# Patient Record
Sex: MALE | Race: WHITE | NOT HISPANIC OR LATINO | URBAN - METROPOLITAN AREA
[De-identification: names, ages, dates, MRNs, and addresses within clinical notes are randomized per-mention and may not be internally consistent; named-entity substitution may affect disease eponyms.]

---

## 2018-05-15 ENCOUNTER — EMERGENCY (EMERGENCY)
Facility: HOSPITAL | Age: 78
LOS: 1 days | Discharge: HOME | End: 2018-05-15
Attending: EMERGENCY MEDICINE | Admitting: EMERGENCY MEDICINE
Payer: COMMERCIAL

## 2018-05-15 VITALS
RESPIRATION RATE: 20 BRPM | DIASTOLIC BLOOD PRESSURE: 80 MMHG | HEART RATE: 74 BPM | OXYGEN SATURATION: 100 % | SYSTOLIC BLOOD PRESSURE: 152 MMHG | TEMPERATURE: 99 F

## 2018-05-15 DIAGNOSIS — Z96.652 PRESENCE OF LEFT ARTIFICIAL KNEE JOINT: Chronic | ICD-10-CM

## 2018-05-15 LAB
ALBUMIN SERPL ELPH-MCNC: 4.2 G/DL — SIGNIFICANT CHANGE UP (ref 3.3–5)
ALP SERPL-CCNC: 137 U/L — HIGH (ref 40–120)
ALT FLD-CCNC: 3 U/L — LOW (ref 4–41)
AST SERPL-CCNC: 16 U/L — SIGNIFICANT CHANGE UP (ref 4–40)
BASOPHILS # BLD AUTO: 0.03 K/UL — SIGNIFICANT CHANGE UP (ref 0–0.2)
BASOPHILS NFR BLD AUTO: 0.3 % — SIGNIFICANT CHANGE UP (ref 0–2)
BILIRUB SERPL-MCNC: 0.3 MG/DL — SIGNIFICANT CHANGE UP (ref 0.2–1.2)
BUN SERPL-MCNC: 40 MG/DL — HIGH (ref 7–23)
CALCIUM SERPL-MCNC: 9.1 MG/DL — SIGNIFICANT CHANGE UP (ref 8.4–10.5)
CHLORIDE SERPL-SCNC: 104 MMOL/L — SIGNIFICANT CHANGE UP (ref 98–107)
CK MB BLD-MCNC: 4.19 NG/ML — SIGNIFICANT CHANGE UP (ref 1–6.6)
CK SERPL-CCNC: 113 U/L — SIGNIFICANT CHANGE UP (ref 30–200)
CO2 SERPL-SCNC: 24 MMOL/L — SIGNIFICANT CHANGE UP (ref 22–31)
CREAT SERPL-MCNC: 1.88 MG/DL — HIGH (ref 0.5–1.3)
EOSINOPHIL # BLD AUTO: 0.09 K/UL — SIGNIFICANT CHANGE UP (ref 0–0.5)
EOSINOPHIL NFR BLD AUTO: 0.8 % — SIGNIFICANT CHANGE UP (ref 0–6)
GLUCOSE SERPL-MCNC: 115 MG/DL — HIGH (ref 70–99)
HBA1C BLD-MCNC: 5.6 % — SIGNIFICANT CHANGE UP (ref 4–5.6)
HCT VFR BLD CALC: 38.2 % — LOW (ref 39–50)
HGB BLD-MCNC: 12.7 G/DL — LOW (ref 13–17)
IMM GRANULOCYTES # BLD AUTO: 0.02 # — SIGNIFICANT CHANGE UP
IMM GRANULOCYTES NFR BLD AUTO: 0.2 % — SIGNIFICANT CHANGE UP (ref 0–1.5)
LYMPHOCYTES # BLD AUTO: 0.9 K/UL — LOW (ref 1–3.3)
LYMPHOCYTES # BLD AUTO: 8.1 % — LOW (ref 13–44)
MCHC RBC-ENTMCNC: 33.2 % — SIGNIFICANT CHANGE UP (ref 32–36)
MCHC RBC-ENTMCNC: 33.2 PG — SIGNIFICANT CHANGE UP (ref 27–34)
MCV RBC AUTO: 100 FL — SIGNIFICANT CHANGE UP (ref 80–100)
MONOCYTES # BLD AUTO: 0.36 K/UL — SIGNIFICANT CHANGE UP (ref 0–0.9)
MONOCYTES NFR BLD AUTO: 3.3 % — SIGNIFICANT CHANGE UP (ref 2–14)
NEUTROPHILS # BLD AUTO: 9.65 K/UL — HIGH (ref 1.8–7.4)
NEUTROPHILS NFR BLD AUTO: 87.3 % — HIGH (ref 43–77)
NRBC # FLD: 0 — SIGNIFICANT CHANGE UP
PLATELET # BLD AUTO: 219 K/UL — SIGNIFICANT CHANGE UP (ref 150–400)
PMV BLD: 11.5 FL — SIGNIFICANT CHANGE UP (ref 7–13)
POTASSIUM SERPL-MCNC: 4.7 MMOL/L — SIGNIFICANT CHANGE UP (ref 3.5–5.3)
POTASSIUM SERPL-SCNC: 4.7 MMOL/L — SIGNIFICANT CHANGE UP (ref 3.5–5.3)
PROT SERPL-MCNC: 6.9 G/DL — SIGNIFICANT CHANGE UP (ref 6–8.3)
RBC # BLD: 3.82 M/UL — LOW (ref 4.2–5.8)
RBC # FLD: 13.3 % — SIGNIFICANT CHANGE UP (ref 10.3–14.5)
SODIUM SERPL-SCNC: 140 MMOL/L — SIGNIFICANT CHANGE UP (ref 135–145)
TROPONIN T SERPL-MCNC: < 0.06 NG/ML — SIGNIFICANT CHANGE UP (ref 0–0.06)
TROPONIN T SERPL-MCNC: < 0.06 NG/ML — SIGNIFICANT CHANGE UP (ref 0–0.06)
TSH SERPL-MCNC: 0.46 UIU/ML — SIGNIFICANT CHANGE UP (ref 0.27–4.2)
WBC # BLD: 11.05 K/UL — HIGH (ref 3.8–10.5)
WBC # FLD AUTO: 11.05 K/UL — HIGH (ref 3.8–10.5)

## 2018-05-15 PROCEDURE — 99218: CPT | Mod: 25

## 2018-05-15 PROCEDURE — 70450 CT HEAD/BRAIN W/O DYE: CPT | Mod: 26

## 2018-05-15 PROCEDURE — 71046 X-RAY EXAM CHEST 2 VIEWS: CPT | Mod: 26

## 2018-05-15 PROCEDURE — 93010 ELECTROCARDIOGRAM REPORT: CPT | Mod: 59

## 2018-05-15 RX ORDER — ACETAMINOPHEN 500 MG
650 TABLET ORAL ONCE
Qty: 0 | Refills: 0 | Status: COMPLETED | OUTPATIENT
Start: 2018-05-15 | End: 2018-05-15

## 2018-05-15 RX ORDER — AMLODIPINE BESYLATE 2.5 MG/1
5 TABLET ORAL DAILY
Qty: 0 | Refills: 0 | Status: DISCONTINUED | OUTPATIENT
Start: 2018-05-15 | End: 2018-05-19

## 2018-05-15 RX ORDER — LORATADINE 10 MG/1
10 TABLET ORAL AT BEDTIME
Qty: 0 | Refills: 0 | Status: DISCONTINUED | OUTPATIENT
Start: 2018-05-15 | End: 2018-05-19

## 2018-05-15 RX ORDER — ALLOPURINOL 300 MG
100 TABLET ORAL DAILY
Qty: 0 | Refills: 0 | Status: DISCONTINUED | OUTPATIENT
Start: 2018-05-15 | End: 2018-05-19

## 2018-05-15 RX ORDER — TAMSULOSIN HYDROCHLORIDE 0.4 MG/1
0.4 CAPSULE ORAL AT BEDTIME
Qty: 0 | Refills: 0 | Status: DISCONTINUED | OUTPATIENT
Start: 2018-05-15 | End: 2018-05-19

## 2018-05-15 RX ORDER — CARBIDOPA AND LEVODOPA 25; 100 MG/1; MG/1
1 TABLET ORAL DAILY
Qty: 0 | Refills: 0 | Status: DISCONTINUED | OUTPATIENT
Start: 2018-05-15 | End: 2018-05-15

## 2018-05-15 RX ORDER — LEVOTHYROXINE SODIUM 125 MCG
100 TABLET ORAL DAILY
Qty: 0 | Refills: 0 | Status: DISCONTINUED | OUTPATIENT
Start: 2018-05-15 | End: 2018-05-19

## 2018-05-15 RX ORDER — SIMVASTATIN 20 MG/1
10 TABLET, FILM COATED ORAL AT BEDTIME
Qty: 0 | Refills: 0 | Status: DISCONTINUED | OUTPATIENT
Start: 2018-05-15 | End: 2018-05-19

## 2018-05-15 RX ORDER — CARBIDOPA AND LEVODOPA 25; 100 MG/1; MG/1
2 TABLET ORAL THREE TIMES A DAY
Qty: 0 | Refills: 0 | Status: DISCONTINUED | OUTPATIENT
Start: 2018-05-15 | End: 2018-05-19

## 2018-05-15 RX ORDER — LISINOPRIL 2.5 MG/1
10 TABLET ORAL DAILY
Qty: 0 | Refills: 0 | Status: DISCONTINUED | OUTPATIENT
Start: 2018-05-15 | End: 2018-05-19

## 2018-05-15 RX ADMIN — Medication 650 MILLIGRAM(S): at 13:23

## 2018-05-15 RX ADMIN — LORATADINE 10 MILLIGRAM(S): 10 TABLET ORAL at 23:05

## 2018-05-15 RX ADMIN — LISINOPRIL 10 MILLIGRAM(S): 2.5 TABLET ORAL at 21:34

## 2018-05-15 RX ADMIN — TAMSULOSIN HYDROCHLORIDE 0.4 MILLIGRAM(S): 0.4 CAPSULE ORAL at 21:29

## 2018-05-15 RX ADMIN — CARBIDOPA AND LEVODOPA 2 TABLET(S): 25; 100 TABLET ORAL at 21:29

## 2018-05-15 RX ADMIN — SIMVASTATIN 10 MILLIGRAM(S): 20 TABLET, FILM COATED ORAL at 21:29
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
## 2018-05-15 NOTE — ED ADULT NURSE NOTE - OBJECTIVE STATEMENT
received pt A&ox3 in no apparent distress at this time. #20g IVL to R AC, bloods drawn and sent to the lab. no s/s of infiltration noted at this time. family and Md at bedside, vss. cardiac monitor in place. dispo pending.
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
## 2018-05-16 VITALS
OXYGEN SATURATION: 98 % | RESPIRATION RATE: 16 BRPM | HEART RATE: 55 BPM | DIASTOLIC BLOOD PRESSURE: 63 MMHG | TEMPERATURE: 98 F | SYSTOLIC BLOOD PRESSURE: 135 MMHG

## 2018-05-16 PROCEDURE — 93306 TTE W/DOPPLER COMPLETE: CPT | Mod: 26

## 2018-05-16 PROCEDURE — 99217: CPT

## 2018-05-16 RX ORDER — AMLODIPINE BESYLATE 2.5 MG/1
5 TABLET ORAL ONCE
Qty: 0 | Refills: 0 | Status: COMPLETED | OUTPATIENT
Start: 2018-05-16 | End: 2018-05-16

## 2018-05-16 RX ADMIN — Medication 100 MICROGRAM(S): at 07:00

## 2018-05-16 RX ADMIN — LISINOPRIL 10 MILLIGRAM(S): 2.5 TABLET ORAL at 06:59

## 2018-05-16 RX ADMIN — AMLODIPINE BESYLATE 5 MILLIGRAM(S): 2.5 TABLET ORAL at 07:00

## 2018-05-16 RX ADMIN — CARBIDOPA AND LEVODOPA 2 TABLET(S): 25; 100 TABLET ORAL at 07:00

## 2018-05-16 RX ADMIN — AMLODIPINE BESYLATE 5 MILLIGRAM(S): 2.5 TABLET ORAL at 01:32

## 2018-05-16 NOTE — ED CDU PROVIDER DISPOSITION NOTE - PLAN OF CARE
Continue taking your home medications as prescribed. Follow up with your cardiologist within 1 week for further evaluation. Return to the Emergency Department for any new, worsening or concerning symptoms.

## 2018-05-16 NOTE — ED CDU PROVIDER DISPOSITION NOTE - ATTENDING CONTRIBUTION TO CARE
Dr. Guevara:  I performed a face to face bedside interview with patient regarding history of present illness, review of symptoms and past medical history. I completed an independent physical exam.  I have discussed patient's plan of care with PA.   I agree with note as stated above, having amended the EMR as needed to reflect my findings.   This includes HISTORY OF PRESENT ILLNESS, HIV, PAST MEDICAL/SURGICAL/FAMILY/SOCIAL HISTORY, ALLERGIES AND HOME MEDICATIONS, REVIEW OF SYSTEMS, PHYSICAL EXAM, and any PROGRESS NOTES during the time I functioned as the attending physician for this patient.

## 2018-05-16 NOTE — ED CDU PROVIDER SUBSEQUENT DAY NOTE - PROGRESS NOTE DETAILS
JERMAINE Fraga: pt NAD, VSS, no acute complaints. No events on tele. PE: cardiac: RRR, Lungs: CTA. Pending Echo. Echo WNL. Pt stable for dc. Pt has a cardiologist he is able to f/u with. Discussed results of labs/imaging with the patient. Pt ok for dc.

## 2018-05-16 NOTE — ED CDU PROVIDER SUBSEQUENT DAY NOTE - NS ED ATTENDING STATEMENT MOD
I have personally performed a face to face diagnostic evaluation on this patient. I have reviewed the ACP note and agree with the history, exam and plan of care, except as noted.
Yes
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
## 2019-09-13 PROBLEM — G20 PARKINSON'S DISEASE: Chronic | Status: ACTIVE | Noted: 2018-05-15

## 2019-09-13 PROBLEM — E78.5 HYPERLIPIDEMIA, UNSPECIFIED: Chronic | Status: ACTIVE | Noted: 2018-05-15

## 2019-09-13 PROBLEM — N18.9 CHRONIC KIDNEY DISEASE, UNSPECIFIED: Chronic | Status: ACTIVE | Noted: 2018-05-15

## 2019-09-13 PROBLEM — I10 ESSENTIAL (PRIMARY) HYPERTENSION: Chronic | Status: ACTIVE | Noted: 2018-05-15

## 2019-11-25 ENCOUNTER — APPOINTMENT (OUTPATIENT)
Dept: NEUROLOGY | Facility: CLINIC | Age: 79
End: 2019-11-25
Payer: MEDICARE

## 2019-11-25 VITALS
WEIGHT: 148 LBS | DIASTOLIC BLOOD PRESSURE: 80 MMHG | BODY MASS INDEX: 27.23 KG/M2 | SYSTOLIC BLOOD PRESSURE: 187 MMHG | HEIGHT: 62 IN | HEART RATE: 58 BPM

## 2019-11-25 VITALS — SYSTOLIC BLOOD PRESSURE: 172 MMHG | DIASTOLIC BLOOD PRESSURE: 83 MMHG | HEART RATE: 61 BPM

## 2019-11-25 PROCEDURE — 99205 OFFICE O/P NEW HI 60 MIN: CPT

## 2019-11-25 RX ORDER — GLUCOSAMINE HCL/CHONDROITIN SU 500-400 MG
3 CAPSULE ORAL
Qty: 30 | Refills: 5 | Status: ACTIVE | COMMUNITY
Start: 2019-11-25 | End: 1900-01-01

## 2019-11-25 NOTE — REASON FOR VISIT
[Spouse] : spouse [Initial Eval - Existing Diagnosis] : an initial evaluation of an existing diagnosis

## 2019-11-26 NOTE — DISCUSSION/SUMMARY
[FreeTextEntry1] : Conrad Thornton is a 78 year old M with a history of chronic lower back pain in the setting of moderate to severe spinal stenosis, and Parkinson's disease diagnosed in 2012, initially presenting with changes in handwriting, head tremor, and left foot dragging. He is here today with his wife. His examination was significant for bilateral bradykinesia and rigidity, more affected on the right side. There were resting tremors in the hands and legs bilaterally. No dyskinesias. Gait is with short stride, multistep turns, no freezing, postural reflexes are intact. The patient was not orthostatic today.\par \par Impression: Idiopathic Parkinson's disease. There were no red flags to indicate an atypical Parkinsonism at this time, however he is unable to tell any benefit on sinemet. . It was discussed that he may need an increased dose to control his symptoms. 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 \par \par Recommendations:\par - Start Sinemet  mg CR at bedtime\par - Start Melatonin 3 mg at bedtime\par - Monitor whether symptoms improve with medication and how long it lasts\par - Increase fluid intake. Monitor BP at home. \par - Continue and increase exercise\par \par RTC 2 months
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
## 2019-11-26 NOTE — PHYSICAL EXAM
[Motor Handedness Right-Handed] : the patient is right hand dominant [Person] : oriented to person [Place] : oriented to place [Time] : oriented to time [Concentration Intact] : normal concentrating ability [Comprehension] : comprehension intact [Motor Strength] : muscle strength was normal in all four extremities [Sensation Tactile Decrease] : light touch was intact [2+] : Ankle jerk left 2+ [General Appearance - Alert] : alert [General Appearance - In No Acute Distress] : in no acute distress [Oriented To Time, Place, And Person] : oriented to person, place, and time [Impaired Insight] : insight and judgment were intact [Affect] : the affect was normal [Motor Strength Lower Extremities Bilaterally] : strength was normal in both lower extremities [Motor Strength Upper Extremities Bilaterally] : strength was normal in both upper extremities [Coordination - Dysmetria Impaired Finger-to-Nose Bilateral] : not present [Extraocular Movements] : extraocular movements were intact [Hearing Threshold Finger Rub Not [place]] : hearing was normal [FreeTextEntry1] : dystonia [Edema] : there was no peripheral edema [Abdomen Soft] : soft [Nail Clubbing] : no clubbing  or cyanosis of the fingernails [] : no rash

## 2019-12-09 ENCOUNTER — MEDICATION RENEWAL (OUTPATIENT)
Age: 79
End: 2019-12-09

## 2019-12-20 ENCOUNTER — MEDICATION RENEWAL (OUTPATIENT)
Age: 79
End: 2019-12-20

## 2020-02-25 ENCOUNTER — APPOINTMENT (OUTPATIENT)
Dept: NEUROLOGY | Facility: CLINIC | Age: 80
End: 2020-02-25

## 2020-05-11 ENCOUNTER — APPOINTMENT (OUTPATIENT)
Dept: NEUROLOGY | Facility: CLINIC | Age: 80
End: 2020-05-11
Payer: MEDICARE

## 2020-05-11 PROCEDURE — 99443: CPT | Mod: CR

## 2020-05-26 ENCOUNTER — APPOINTMENT (OUTPATIENT)
Dept: NEUROLOGY | Facility: CLINIC | Age: 80
End: 2020-05-26
Payer: MEDICARE

## 2020-05-26 PROCEDURE — 99214 OFFICE O/P EST MOD 30 MIN: CPT | Mod: 95

## 2020-05-26 NOTE — DISCUSSION/SUMMARY
[FreeTextEntry1] : plan\par agree with northera, urged to monitor sitting /standing BP -- follow with cardio/nephrology\par risk of fall and injury with PD emphasized\par monitor if freezing is worse in off time\par increase fluid intake, fall precautions\par f/u in 6 weeks\par
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
## 2020-07-01 ENCOUNTER — APPOINTMENT (OUTPATIENT)
Dept: NEUROLOGY | Facility: CLINIC | Age: 80
End: 2020-07-01
Payer: MEDICARE

## 2020-07-01 VITALS — DIASTOLIC BLOOD PRESSURE: 85 MMHG | HEART RATE: 67 BPM | SYSTOLIC BLOOD PRESSURE: 150 MMHG

## 2020-07-01 VITALS
SYSTOLIC BLOOD PRESSURE: 166 MMHG | BODY MASS INDEX: 27.23 KG/M2 | WEIGHT: 148 LBS | HEART RATE: 64 BPM | DIASTOLIC BLOOD PRESSURE: 89 MMHG | HEIGHT: 62 IN

## 2020-07-01 VITALS — TEMPERATURE: 97.6 F

## 2020-07-01 PROCEDURE — 99214 OFFICE O/P EST MOD 30 MIN: CPT

## 2020-07-01 NOTE — PHYSICAL EXAM
[General Appearance - Alert] : alert [General Appearance - In No Acute Distress] : in no acute distress [Impaired Insight] : insight and judgment were intact [Oriented To Time, Place, And Person] : oriented to person, place, and time [Affect] : the affect was normal [Place] : oriented to place [Person] : oriented to person [Concentration Intact] : normal concentrating ability [Time] : oriented to time [Comprehension] : comprehension intact [Motor Strength] : muscle strength was normal in all four extremities [Motor Handedness Right-Handed] : the patient is right hand dominant [Motor Strength Lower Extremities Bilaterally] : strength was normal in both lower extremities [Sensation Tactile Decrease] : light touch was intact [Motor Strength Upper Extremities Bilaterally] : strength was normal in both upper extremities [Coordination - Dysmetria Impaired Finger-to-Nose Bilateral] : not present [2+] : Ankle jerk left 2+ [Extraocular Movements] : extraocular movements were intact [FreeTextEntry1] : dystonia [Hearing Threshold Finger Rub Not [place]] : hearing was normal [Edema] : there was no peripheral edema [Abdomen Soft] : soft [Nail Clubbing] : no clubbing  or cyanosis of the fingernails [] : no rash

## 2020-07-01 NOTE — DISCUSSION/SUMMARY
[FreeTextEntry1] : Conrad Thornton is a 78 year old M with a history of chronic lower back pain in the setting of moderate to severe spinal stenosis, and Parkinson's disease diagnosed in 2012, initially presenting with changes in handwriting, head tremor, and left foot dragging. He is here today with his wife. His examination was significant for bilateral bradykinesia and rigidity, more affected on the right side. There were resting tremors in the hands and legs bilaterally. No dyskinesias. Gait is with short stride, multistep turns, no freezing, postural reflexes are intact. \par \par Impression: Idiopathic Parkinson's disease. There were no red flags to indicate an atypical Parkinsonism at this time, however he is unable to tell any benefit on sinemet. . It was discussed that he may need an increased dose to control his symptoms. 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 \par \par Recommendations:\par - continue Sinemet 3 times a day at 8-1-7\par - Increase fluid intake, to help with orthostatic hypotension and constipation\par -Compression stockings\par . Monitor BP at home. \par - Continue and increase exercise, PT, monitor for any improvement in freezing, if not may consider azilect\par - low back pain follows with [facility]\par \par RTC 2-3  months
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
## 2020-07-15 ENCOUNTER — INPATIENT (INPATIENT)
Facility: HOSPITAL | Age: 80
LOS: 3 days | Discharge: HOME HEALTH | End: 2020-07-19
Attending: INTERNAL MEDICINE | Admitting: INTERNAL MEDICINE
Payer: MEDICARE

## 2020-07-15 VITALS
OXYGEN SATURATION: 94 % | RESPIRATION RATE: 20 BRPM | DIASTOLIC BLOOD PRESSURE: 67 MMHG | TEMPERATURE: 98 F | SYSTOLIC BLOOD PRESSURE: 118 MMHG | HEART RATE: 76 BPM

## 2020-07-15 DIAGNOSIS — E03.9 HYPOTHYROIDISM, UNSPECIFIED: ICD-10-CM

## 2020-07-15 DIAGNOSIS — R55 SYNCOPE AND COLLAPSE: ICD-10-CM

## 2020-07-15 DIAGNOSIS — N17.9 ACUTE KIDNEY FAILURE, UNSPECIFIED: ICD-10-CM

## 2020-07-15 DIAGNOSIS — G90.3 MULTI-SYSTEM DEGENERATION OF THE AUTONOMIC NERVOUS SYSTEM: ICD-10-CM

## 2020-07-15 DIAGNOSIS — I16.0 HYPERTENSIVE URGENCY: ICD-10-CM

## 2020-07-15 DIAGNOSIS — Z96.652 PRESENCE OF LEFT ARTIFICIAL KNEE JOINT: Chronic | ICD-10-CM

## 2020-07-15 DIAGNOSIS — G20 PARKINSON'S DISEASE: ICD-10-CM

## 2020-07-15 DIAGNOSIS — Z29.9 ENCOUNTER FOR PROPHYLACTIC MEASURES, UNSPECIFIED: ICD-10-CM

## 2020-07-15 LAB
ALBUMIN SERPL ELPH-MCNC: 3.9 G/DL — SIGNIFICANT CHANGE UP (ref 3.3–5)
ALP SERPL-CCNC: 88 U/L — SIGNIFICANT CHANGE UP (ref 40–120)
ALT FLD-CCNC: 5 U/L — SIGNIFICANT CHANGE UP (ref 4–41)
ANION GAP SERPL CALC-SCNC: 14 MMO/L — SIGNIFICANT CHANGE UP (ref 7–14)
APPEARANCE UR: CLEAR — SIGNIFICANT CHANGE UP
AST SERPL-CCNC: 18 U/L — SIGNIFICANT CHANGE UP (ref 4–40)
BACTERIA # UR AUTO: NEGATIVE — SIGNIFICANT CHANGE UP
BASOPHILS # BLD AUTO: 0.01 K/UL — SIGNIFICANT CHANGE UP (ref 0–0.2)
BASOPHILS NFR BLD AUTO: 0.1 % — SIGNIFICANT CHANGE UP (ref 0–2)
BILIRUB SERPL-MCNC: 0.3 MG/DL — SIGNIFICANT CHANGE UP (ref 0.2–1.2)
BILIRUB UR-MCNC: NEGATIVE — SIGNIFICANT CHANGE UP
BLOOD UR QL VISUAL: HIGH
BUN SERPL-MCNC: 34 MG/DL — HIGH (ref 7–23)
CALCIUM SERPL-MCNC: 8.9 MG/DL — SIGNIFICANT CHANGE UP (ref 8.4–10.5)
CHLORIDE SERPL-SCNC: 109 MMOL/L — HIGH (ref 98–107)
CO2 SERPL-SCNC: 18 MMOL/L — LOW (ref 22–31)
COLOR SPEC: SIGNIFICANT CHANGE UP
CREAT SERPL-MCNC: 2.25 MG/DL — HIGH (ref 0.5–1.3)
EOSINOPHIL # BLD AUTO: 0.02 K/UL — SIGNIFICANT CHANGE UP (ref 0–0.5)
EOSINOPHIL NFR BLD AUTO: 0.1 % — SIGNIFICANT CHANGE UP (ref 0–6)
GLUCOSE SERPL-MCNC: 108 MG/DL — HIGH (ref 70–99)
GLUCOSE UR-MCNC: NEGATIVE — SIGNIFICANT CHANGE UP
HCT VFR BLD CALC: 37.2 % — LOW (ref 39–50)
HGB BLD-MCNC: 11.9 G/DL — LOW (ref 13–17)
HYALINE CASTS # UR AUTO: SIGNIFICANT CHANGE UP
IMM GRANULOCYTES NFR BLD AUTO: 0.4 % — SIGNIFICANT CHANGE UP (ref 0–1.5)
KETONES UR-MCNC: NEGATIVE — SIGNIFICANT CHANGE UP
LEUKOCYTE ESTERASE UR-ACNC: NEGATIVE — SIGNIFICANT CHANGE UP
LYMPHOCYTES # BLD AUTO: 0.54 K/UL — LOW (ref 1–3.3)
LYMPHOCYTES # BLD AUTO: 3.4 % — LOW (ref 13–44)
MCHC RBC-ENTMCNC: 32 % — SIGNIFICANT CHANGE UP (ref 32–36)
MCHC RBC-ENTMCNC: 33.1 PG — SIGNIFICANT CHANGE UP (ref 27–34)
MCV RBC AUTO: 103.3 FL — HIGH (ref 80–100)
MONOCYTES # BLD AUTO: 0.74 K/UL — SIGNIFICANT CHANGE UP (ref 0–0.9)
MONOCYTES NFR BLD AUTO: 4.6 % — SIGNIFICANT CHANGE UP (ref 2–14)
NEUTROPHILS # BLD AUTO: 14.67 K/UL — HIGH (ref 1.8–7.4)
NEUTROPHILS NFR BLD AUTO: 91.4 % — HIGH (ref 43–77)
NITRITE UR-MCNC: NEGATIVE — SIGNIFICANT CHANGE UP
NRBC # FLD: 0 K/UL — SIGNIFICANT CHANGE UP (ref 0–0)
PH UR: 6.5 — SIGNIFICANT CHANGE UP (ref 5–8)
PLATELET # BLD AUTO: 198 K/UL — SIGNIFICANT CHANGE UP (ref 150–400)
PMV BLD: 11.4 FL — SIGNIFICANT CHANGE UP (ref 7–13)
POTASSIUM SERPL-MCNC: 3.6 MMOL/L — SIGNIFICANT CHANGE UP (ref 3.5–5.3)
POTASSIUM SERPL-SCNC: 3.6 MMOL/L — SIGNIFICANT CHANGE UP (ref 3.5–5.3)
PROT SERPL-MCNC: 6.5 G/DL — SIGNIFICANT CHANGE UP (ref 6–8.3)
PROT UR-MCNC: 50 — SIGNIFICANT CHANGE UP
RBC # BLD: 3.6 M/UL — LOW (ref 4.2–5.8)
RBC # FLD: 13.8 % — SIGNIFICANT CHANGE UP (ref 10.3–14.5)
RBC CASTS # UR COMP ASSIST: SIGNIFICANT CHANGE UP (ref 0–?)
SODIUM SERPL-SCNC: 141 MMOL/L — SIGNIFICANT CHANGE UP (ref 135–145)
SP GR SPEC: 1.02 — SIGNIFICANT CHANGE UP (ref 1–1.04)
SQUAMOUS # UR AUTO: SIGNIFICANT CHANGE UP
TROPONIN T, HIGH SENSITIVITY: 62 NG/L — CRITICAL HIGH (ref ?–14)
TROPONIN T, HIGH SENSITIVITY: 71 NG/L — CRITICAL HIGH (ref ?–14)
UROBILINOGEN FLD QL: NORMAL — SIGNIFICANT CHANGE UP
WBC # BLD: 16.05 K/UL — HIGH (ref 3.8–10.5)
WBC # FLD AUTO: 16.05 K/UL — HIGH (ref 3.8–10.5)
WBC UR QL: SIGNIFICANT CHANGE UP (ref 0–?)

## 2020-07-15 PROCEDURE — 71046 X-RAY EXAM CHEST 2 VIEWS: CPT | Mod: 26

## 2020-07-15 PROCEDURE — 99285 EMERGENCY DEPT VISIT HI MDM: CPT

## 2020-07-15 PROCEDURE — 99223 1ST HOSP IP/OBS HIGH 75: CPT

## 2020-07-15 PROCEDURE — 93010 ELECTROCARDIOGRAM REPORT: CPT

## 2020-07-15 PROCEDURE — 70450 CT HEAD/BRAIN W/O DYE: CPT | Mod: 26

## 2020-07-15 RX ORDER — LEVOTHYROXINE SODIUM 125 MCG
75 TABLET ORAL DAILY
Refills: 0 | Status: DISCONTINUED | OUTPATIENT
Start: 2020-07-15 | End: 2020-07-19

## 2020-07-15 RX ORDER — ACETAMINOPHEN 500 MG
650 TABLET ORAL EVERY 6 HOURS
Refills: 0 | Status: DISCONTINUED | OUTPATIENT
Start: 2020-07-15 | End: 2020-07-19

## 2020-07-15 RX ORDER — HYDRALAZINE HCL 50 MG
10 TABLET ORAL ONCE
Refills: 0 | Status: COMPLETED | OUTPATIENT
Start: 2020-07-15 | End: 2020-07-15

## 2020-07-15 RX ORDER — ALLOPURINOL 300 MG
100 TABLET ORAL DAILY
Refills: 0 | Status: DISCONTINUED | OUTPATIENT
Start: 2020-07-15 | End: 2020-07-19

## 2020-07-15 RX ORDER — HYDRALAZINE HCL 50 MG
25 TABLET ORAL ONCE
Refills: 0 | Status: COMPLETED | OUTPATIENT
Start: 2020-07-15 | End: 2020-07-15

## 2020-07-15 RX ORDER — SIMVASTATIN 20 MG/1
40 TABLET, FILM COATED ORAL AT BEDTIME
Refills: 0 | Status: DISCONTINUED | OUTPATIENT
Start: 2020-07-15 | End: 2020-07-19

## 2020-07-15 RX ORDER — CARBIDOPA AND LEVODOPA 25; 100 MG/1; MG/1
2 TABLET ORAL THREE TIMES A DAY
Refills: 0 | Status: DISCONTINUED | OUTPATIENT
Start: 2020-07-15 | End: 2020-07-19

## 2020-07-15 RX ORDER — SODIUM CHLORIDE 9 MG/ML
1000 INJECTION INTRAMUSCULAR; INTRAVENOUS; SUBCUTANEOUS
Refills: 0 | Status: DISCONTINUED | OUTPATIENT
Start: 2020-07-15 | End: 2020-07-15

## 2020-07-15 RX ORDER — HYDRALAZINE HCL 50 MG
25 TABLET ORAL THREE TIMES A DAY
Refills: 0 | Status: DISCONTINUED | OUTPATIENT
Start: 2020-07-15 | End: 2020-07-19

## 2020-07-15 RX ORDER — AMLODIPINE BESYLATE 2.5 MG/1
2.5 TABLET ORAL ONCE
Refills: 0 | Status: COMPLETED | OUTPATIENT
Start: 2020-07-15 | End: 2020-07-15

## 2020-07-15 RX ORDER — SENNA PLUS 8.6 MG/1
2 TABLET ORAL AT BEDTIME
Refills: 0 | Status: DISCONTINUED | OUTPATIENT
Start: 2020-07-15 | End: 2020-07-17

## 2020-07-15 RX ORDER — FINASTERIDE 5 MG/1
5 TABLET, FILM COATED ORAL DAILY
Refills: 0 | Status: DISCONTINUED | OUTPATIENT
Start: 2020-07-15 | End: 2020-07-19

## 2020-07-15 RX ORDER — AMLODIPINE BESYLATE 2.5 MG/1
2.5 TABLET ORAL DAILY
Refills: 0 | Status: DISCONTINUED | OUTPATIENT
Start: 2020-07-15 | End: 2020-07-17

## 2020-07-15 RX ORDER — HEPARIN SODIUM 5000 [USP'U]/ML
5000 INJECTION INTRAVENOUS; SUBCUTANEOUS EVERY 8 HOURS
Refills: 0 | Status: DISCONTINUED | OUTPATIENT
Start: 2020-07-15 | End: 2020-07-19

## 2020-07-15 RX ADMIN — AMLODIPINE BESYLATE 2.5 MILLIGRAM(S): 2.5 TABLET ORAL at 17:22

## 2020-07-15 RX ADMIN — Medication 10 MILLIGRAM(S): at 18:07

## 2020-07-15 RX ADMIN — CARBIDOPA AND LEVODOPA 2 TABLET(S): 25; 100 TABLET ORAL at 23:19

## 2020-07-15 RX ADMIN — Medication 650 MILLIGRAM(S): at 23:18

## 2020-07-15 RX ADMIN — AMLODIPINE BESYLATE 2.5 MILLIGRAM(S): 2.5 TABLET ORAL at 14:06

## 2020-07-15 RX ADMIN — Medication 25 MILLIGRAM(S): at 17:22

## 2020-07-15 RX ADMIN — SENNA PLUS 2 TABLET(S): 8.6 TABLET ORAL at 22:12

## 2020-07-15 RX ADMIN — Medication 10 MILLIGRAM(S): at 14:06

## 2020-07-15 RX ADMIN — SIMVASTATIN 40 MILLIGRAM(S): 20 TABLET, FILM COATED ORAL at 22:12

## 2020-07-15 RX ADMIN — Medication 25 MILLIGRAM(S): at 22:13

## 2020-07-15 NOTE — H&P ADULT - HISTORY OF PRESENT ILLNESS
79yo M with HTN (episodes of hypotension), parkinsons, CKD, hypothyroidism presents to the ED BIBA with 1 episode of syncope during ambulation this morning. Pt states he was walking from his kitchen to his dinning room when he suddenly felt lightheaded and like he "bottomed out" in blood pressure. He states he had LOC for a few seconds. Denies preceding symptoms including dizziness, chest pain or SOB. Blood pressure drawn at the time was SBP 70s. Pt states he has been experiencing recent hypotension (SBP averaging range: 70-80. He takes his BP at home QID) and his PCP recommended he discontinue his amlodipine. Wife believes it could be related to recent increase in levodopa. Pt states he fell forward onto his knees and was on the floor for over an hour while him and his wife tried to get him up. Pt states he was so weak that he couldn't stand. Pt states he has "rug burn" on his knees and admits to pain. Pt also admits to decreased PO fluid intake and has been drinking Gingerale mostly and been feeling "thirsty." Also has had loose stools up until 2 days ago, frequently suffers from constipation. Pt reveals that he has been hospitalized for falls in the past; most recent fall was "months ago." Also states he has had frequent falls recently related to rigidity in his legs.    	Denies: chest pain/tightness, head trauma, neck pain, blood thinners, arrhythmias, CHF, palpitations, urinary symptoms/changes, Hx of CA, abdominal pain, SOB, DM, melena, bleeding

## 2020-07-15 NOTE — ED ADULT NURSE REASSESSMENT NOTE - NS ED NURSE REASSESS COMMENT FT1
Hypertensive; sbp 230s. Pt. asymptomatic. MD Zavala notified. BP medication given as ordered. Will reassess.

## 2020-07-15 NOTE — ED PROVIDER NOTE - CLINICAL SUMMARY MEDICAL DECISION MAKING FREE TEXT BOX
80 yo M with HTN, parkinsons, CKD, and Hypothyroidism presents with syncope and minor knee trauma/abraisons  - Suggestive of orthostatic hypotension vs dysautonomia related to progression of parkinsons. R/O ACS vs sepsis  -plan: EKG, troponin, labs, fluids, UA, imaging (CT head and Chest Xray), reassess vitals  possible admisison

## 2020-07-15 NOTE — ED ADULT TRIAGE NOTE - BP NONINVASIVE SYSTOLIC (MM HG)
Patient resolved from Transition of Care episode on 4/8/20  Patient/family has been provided the following resources and education related to COVID-19:                         Signs, symptoms and red flags related to COVID-19            CDC exposure and quarantine guidelines            [facility] exposure contact - [phone]            Contact for their local Department of Health               Patient currently reports that the following symptoms have improved: chest discomfort  fatigue, pain or aching joints, cough, no new/worsening symptoms     Patient states that he is feeling better. No more symptoms as per Pt. No further outreach scheduled with this CTN/ACM. Episode of Care resolved. Patient: Nicolle Serra    118

## 2020-07-15 NOTE — H&P ADULT - NSHPREVIEWOFSYSTEMS_GEN_ALL_CORE
CONSTITUTIONAL: +generalized weakness No weight loss, fever, chills  HEENT:  Eyes:  No visual loss, blurred vision, double vision or yellow sclerae. Ears, Nose, Throat:  No hearing loss, sneezing, congestion, runny nose or sore throat.  SKIN:  No rash or itching.  CARDIOVASCULAR:  No chest pain, chest pressure or chest discomfort. No palpitations or edema.  RESPIRATORY:  No shortness of breath, cough or sputum.  GASTROINTESTINAL: +constipation No anorexia, nausea, vomiting or diarrhea. No abdominal pain or blood.  GENITOURINARY:  Denies hematuria, dysuria.   NEUROLOGICAL: +syncope No headache, dizziness, paralysis, ataxia, numbness or tingling in the extremities. No change in bowel or bladder control.  MUSCULOSKELETAL:  +chronic back pain +bilateral knee pain  HEMATOLOGIC:  No anemia, bleeding or bruising.  LYMPHATICS:  No enlarged nodes. No history of splenectomy.  PSYCHIATRIC:  No history of depression or anxiety.  ENDOCRINOLOGIC:  No reports of sweating, cold or heat intolerance. No polyuria or polydipsia.  ALLERGIES:  No history of asthma, hives, eczema or rhinitis.

## 2020-07-15 NOTE — H&P ADULT - NSHPPHYSICALEXAM_GEN_ALL_CORE
GENERAL APPEARANCE: Well developed, well nourished, alert and cooperative. NAD.   HEENT:  PERRL, EOMI. External auditory canals normal, hearing grossly intact.  NECK: Neck supple, non-tender without lymphadenopathy, masses or thyromegaly.  CARDIAC: Normal S1 and S2. No S3, S4 or murmurs. Rhythm is regular.  LUNGS: Clear to auscultation and percussion without rales, rhonchi, wheezing or diminished breath sounds anteriorly   ABDOMEN: Positive bowel sounds. Soft, distended, nontender. No guarding or rebound.   MUSCULOSKELETAL: ROM intact spine and extremities. No joint erythema or tenderness.   BACK: Kyphosis, no tenderness on palpation  EXTREMITIES: +1 b/l pitting edema. No significant deformity or joint abnormality. Peripheral pulses intact. No varicosities.  NEUROLOGICAL: CN II-XII intact. Strength and sensation symmetric and intact throughout. Pill rolling tremor. Rigidity of all extremities notes  SKIN: erythema/malar rash. Scattered brusing. Abrasion on right knee, pressure injury to both knees  PSYCHIATRIC: AOx3.Normal affect and behavior.

## 2020-07-15 NOTE — H&P ADULT - PROBLEM SELECTOR PLAN 2
no signs of end organ damage  -restart home meds  -plan to lower blood pressure 20-30% over first several hour with goal of SBP 160s in the first 24 hour  -VSS q4h  -trend trops

## 2020-07-15 NOTE — H&P ADULT - NSHPLABSRESULTS_GEN_ALL_CORE
(07-15 @ 12:30)                      11.9  16.05 )-----------( 198                 37.2    Neutrophils = 14.67 (91.4%)  Lymphocytes = 0.54 (3.4%)  Eosinophils = 0.02 (0.1%)  Basophils = 0.01 (0.1%)  Monocytes = 0.74 (4.6%)  Bands = --%    07-15    141  |  109<H>  |  34<H>  ----------------------------<  108<H>  3.6   |  18<L>  |  2.25<H>    Ca    8.9      15 Jul 2020 12:30    TPro  6.5  /  Alb  3.9  /  TBili  0.3  /  DBili  x   /  AST  18  /  ALT  5   /  AlkPhos  88  07-15      Urinalysis Basic - ( 15 Jul 2020 13:25 )    Color: LIGHT YELLOW / Appearance: CLEAR / S.016 / pH: 6.5  Gluc: NEGATIVE / Ketone: NEGATIVE  / Bili: NEGATIVE / Urobili: NORMAL   Blood: MODERATE / Protein: 50 / Nitrite: NEGATIVE   Leuk Esterase: NEGATIVE / RBC: 0-2 / WBC 0-2   Sq Epi: OCC / Non Sq Epi: x / Bacteria: NEGATIVE    Trops: 62 -> 71    < from: CT Head No Cont (07.15.20 @ 13:32) >      IMPRESSION:    Volume loss with microvascular disease, no acute hemorrhage or midline shift. If symptoms persist consider follow-up head CT or MR if no contraindications    < end of copied text >    < from: Xray Chest 2 Views PA/Lat (07.15.20 @ 13:53) >    IMPRESSION:  Clear lungs. No pleural effusions or pneumothorax.    Stable cardiac and mediastinal silhouettes.     Trachea midline.    Generalized osteopenia and spinal degenerative changes.    < end of copied text >      Labs and imaging reviewed  EKG: NSR, t wave inversion III, no acute ST changes

## 2020-07-15 NOTE — ED ADULT NURSE NOTE - NSIMPLEMENTINTERV_GEN_ALL_ED
Implemented All Fall with Harm Risk Interventions:  [place] to call system. Call bell, personal items and telephone within reach. Instruct patient to call for assistance. Room bathroom lighting operational. Non-slip footwear when patient is off stretcher. Physically safe environment: no spills, clutter or unnecessary equipment. Stretcher in lowest position, wheels locked, appropriate side rails in place. Provide visual cue, wrist band, yellow gown, etc. Monitor gait and stability. Monitor for mental status changes and reorient to person, place, and time. Review medications for side effects contributing to fall risk. Reinforce activity limits and safety measures with patient and family. Provide visual clues: red socks.

## 2020-07-15 NOTE — ED PROVIDER NOTE - OBJECTIVE STATEMENT
79yo M with HTN (episodes of hypotension), parkinsons, CKD, hypothyroidism presents to the ED BIBA with 1 episode of syncope during ambulation this morning. Pt states he was walking from his kitchen to his dinning room when he suddenly felt lightheaded and like he "bottomed out" in blood pressure. Pt states he has been experiencing recent hypotension (SBP averaging range: 70-80. He takes his BP at home QID) and his PCP recommended he discontinue his amlodipine. Pt states he is compliant on his current synthroid and methyldopa without dosage changes. Pt states he fell forward onto his knees and was on the floor for over an hour while him and his wife tried to get him up. Pt states he was so weak that he couldn't stand. Pt states he has "rug burn" on his knees and admits to pain. Pt also admits to decreased PO fluid intake and has been drinking Gingerale mostly and been feeling "thirsty." Pt states he has also been constipated. Pt reveals that he has been hospitalized for falls in the past; most recent fall was "months ago."    Denies: chest pain/tightness, head trauma, neck pain, blood thinners, arrhythmias, CHF, palpitations, urinary symptoms/changes, bowel movement changes, Hx of CA, abdominal pain, SOB, DM, melena, bleeding

## 2020-07-15 NOTE — H&P ADULT - PROBLEM SELECTOR PLAN 3
- 2/2 to progression of Parkinson's, dehydration. Carbidopa recently increased at night, will hold bedtime doing. Continue with TID dosing. In AM, should reach out to neurologist Dr. Gao regarding titration of caribidopa in the setting of worsening orthostatic hypotension  -hold tamsulosin, start finasteride  -gentle hydration

## 2020-07-15 NOTE — H&P ADULT - PROBLEM SELECTOR PLAN 1
-Pt s/p syncopal episode with brief LOC upon standing. States he has multiple episodes of similar occurrence  -Denies chest pain. EKG without acute changes. Trops elevated likely 2/2 to HTN urgency, MELISSA on CKD.  Continue to trend. Repeat TTE in AM  -Rigidity on exam but otherwise non focal. CT head wnl, low suspicion for TIA/CVA  -Suspect likely orthostatic instability 2/2 to progression of Parkinson's, dehydration. Carbidopa recently increased at night, will hold bedtime doing. Continue with TID dosing. In AM, should reach out to neurologist Dr. Gao regarding titration of caribidopa in the setting of worsening orthostatic hypotension  -Monitor on telemetry  -Gentle hydration -Pt s/p syncopal episode with brief LOC upon standing. States he has multiple episodes of similar occurrence  -Denies chest pain. EKG without acute changes. Trops elevated likely 2/2 to HTN urgency, MELISSA on CKD.  Continue to trend. Repeat TTE in AM  -Rigidity on exam but otherwise non focal. CT head wnl, low suspicion for TIA/CVA  -despite leukocytosis, no evidence of infection. Monitor off antibiotics  -Suspect likely orthostatic instability 2/2 to progression of Parkinson's, dehydration. Carbidopa recently increased at night, will hold bedtime doing. Continue with TID dosing. In AM, should reach out to neurologist Dr. Gao regarding titration of caribidopa in the setting of worsening orthostatic hypotension  -Monitor on telemetry  -Gentle hydration

## 2020-07-15 NOTE — H&P ADULT - ASSESSMENT
79yo M with HTN (episodes of hypotension), Parkinson's, CKD, hypothyroidism presents s/p syncopal episode likely 2/2 to postural instability in the setting of progression of parkinson's

## 2020-07-15 NOTE — H&P ADULT - NSICDXPASTMEDICALHX_GEN_ALL_CORE_FT
PAST MEDICAL HISTORY:  CKD (chronic kidney disease)     HLD (hyperlipidemia)     HTN (hypertension)     Hypothyroidism     Parkinson disease

## 2020-07-15 NOTE — ED PROVIDER NOTE - PHYSICAL EXAMINATION
Neuro exam consistent with parkinson's. B/L leg/arm tremor at rest. Tremor decreases when pt completes a voluntary movement.

## 2020-07-15 NOTE — ED ADULT TRIAGE NOTE - CHIEF COMPLAINT QUOTE
BIBEMS from home for lethargy and syncope with +LOC. On route pt was hypotensive with systolic BP in the 60-70s.Hx: CKD, HLD, hypertension but wife told EMS that pt has not been taking BP meds because of hypotension. Arrives with 2 IVs. Received 250Ml NS bolus and now has 1L NS infusing. Pt awake/alert, verbal Aox3 at this time

## 2020-07-15 NOTE — ED PROVIDER NOTE - ATTENDING CONTRIBUTION TO CARE
Awake, Alert, Conversant.  Resting comfortably.  Breath sounds clear in all lung fields.  Normal and regular heart rate without murmurs, rubs, or gallops.  Normal S1/S2.  Abdomen soft and nontender.  No lower extremity swelling or tenderness.  Alert and Oriented x3, CN II-XII intact, no pronator drift, finger t onose intact B/L, parkinsonian tremor, conversant with fluent speech,Strength 5/5 in all extremities, sensation intact throughout.    Dr. Zavala: I agree with the above provided history and exam and addend/modify it as follows.  80M Hx HTN, HLD, parkinsons, CKD, hypothyroidism Presents S/P syncopal episode.  Patient endorses feeling lightheaded upon rising.  Lost consciousness: no incontinence or tongue biting.  Otherwise in his USOH until today.  Denies chest pain.  No fever or chills.  No headache.  Syncope.  Will R/O ACS.  EKG without evidence of STEMI.  R/O pneumothorax.  R/O ICH though unlikely.  Screen for underlying infection or electrolyte abnormality, dehydration.  Plan for labs, EKG, CXR, CT head.  Anticipate admission.    I Lavelle Zavala MD performed a history and physical exam of the patient and discussed their management with the resident and /or advanced care provider. I reviewed the resident and /or ACP's note and agree with the documented findings and plan of care. My medical decision making and observations are found above.

## 2020-07-15 NOTE — ED ADULT NURSE NOTE - OBJECTIVE STATEMENT
A&Ox3 and ambulatory at baseline. Pt. states that he was walking at his house, felt faint and fell face forward. Denies hitting his head of having LOC. Bilateral knees reddened but no abrasions or bruising noted. Pt. able to move all extremities. Pt. states that he has been having these episodes previously and has gone to the hospital afterward. No SOB or respiratory distress noted. Denies N/V/D. Two existing IVs from EMS. VSS, pt. hypertensive sbp 170s at this time. NSR on cardiac monitor. MD at bedside to assess. A&Ox3 and ambulatory at baseline. Pt. states that he was walking at his house, felt faint and fell face forward. Denies hitting his head of having LOC. Bilateral knees reddened but no abrasions or bruising noted. Pt. able to move all extremities. Pt. states that he has been having these episodes previously and has gone to the hospital afterward. No SOB or respiratory distress noted. Denies N/V/D. Two existing IVs from EMS; one in LAC 18G and RFA 20G. VSS, pt. hypertensive sbp 170s at this time. NSR on cardiac monitor. MD at bedside to assess. Pt. unable to perform orthostatic VS as ordered d/t him not being able to stand safely at this time.

## 2020-07-16 DIAGNOSIS — Z02.9 ENCOUNTER FOR ADMINISTRATIVE EXAMINATIONS, UNSPECIFIED: ICD-10-CM

## 2020-07-16 LAB
ANION GAP SERPL CALC-SCNC: 15 MMO/L — HIGH (ref 7–14)
BASOPHILS # BLD AUTO: 0.03 K/UL — SIGNIFICANT CHANGE UP (ref 0–0.2)
BASOPHILS NFR BLD AUTO: 0.3 % — SIGNIFICANT CHANGE UP (ref 0–2)
BUN SERPL-MCNC: 31 MG/DL — HIGH (ref 7–23)
CALCIUM SERPL-MCNC: 9.8 MG/DL — SIGNIFICANT CHANGE UP (ref 8.4–10.5)
CHLORIDE SERPL-SCNC: 108 MMOL/L — HIGH (ref 98–107)
CK MB BLD-MCNC: 24.14 NG/ML — HIGH (ref 1–6.6)
CK MB BLD-MCNC: 27.24 NG/ML — HIGH (ref 1–6.6)
CK MB BLD-MCNC: 27.68 NG/ML — HIGH (ref 1–6.6)
CK SERPL-CCNC: 7412 U/L — HIGH (ref 30–200)
CK SERPL-CCNC: 8895 U/L — HIGH (ref 30–200)
CK SERPL-CCNC: 9042 U/L — HIGH (ref 30–200)
CO2 SERPL-SCNC: 21 MMOL/L — LOW (ref 22–31)
CREAT SERPL-MCNC: 2.08 MG/DL — HIGH (ref 0.5–1.3)
CULTURE RESULTS: SIGNIFICANT CHANGE UP
EOSINOPHIL # BLD AUTO: 0.09 K/UL — SIGNIFICANT CHANGE UP (ref 0–0.5)
EOSINOPHIL NFR BLD AUTO: 0.9 % — SIGNIFICANT CHANGE UP (ref 0–6)
GLUCOSE SERPL-MCNC: 102 MG/DL — HIGH (ref 70–99)
HCT VFR BLD CALC: 42.2 % — SIGNIFICANT CHANGE UP (ref 39–50)
HGB BLD-MCNC: 13.6 G/DL — SIGNIFICANT CHANGE UP (ref 13–17)
IMM GRANULOCYTES NFR BLD AUTO: 0.3 % — SIGNIFICANT CHANGE UP (ref 0–1.5)
LYMPHOCYTES # BLD AUTO: 1.16 K/UL — SIGNIFICANT CHANGE UP (ref 1–3.3)
LYMPHOCYTES # BLD AUTO: 11.6 % — LOW (ref 13–44)
MAGNESIUM SERPL-MCNC: 2.5 MG/DL — SIGNIFICANT CHANGE UP (ref 1.6–2.6)
MCHC RBC-ENTMCNC: 32.2 % — SIGNIFICANT CHANGE UP (ref 32–36)
MCHC RBC-ENTMCNC: 32.9 PG — SIGNIFICANT CHANGE UP (ref 27–34)
MCV RBC AUTO: 102.2 FL — HIGH (ref 80–100)
MONOCYTES # BLD AUTO: 0.52 K/UL — SIGNIFICANT CHANGE UP (ref 0–0.9)
MONOCYTES NFR BLD AUTO: 5.2 % — SIGNIFICANT CHANGE UP (ref 2–14)
NEUTROPHILS # BLD AUTO: 8.15 K/UL — HIGH (ref 1.8–7.4)
NEUTROPHILS NFR BLD AUTO: 81.7 % — HIGH (ref 43–77)
NRBC # FLD: 0 K/UL — SIGNIFICANT CHANGE UP (ref 0–0)
PHOSPHATE SERPL-MCNC: 3.4 MG/DL — SIGNIFICANT CHANGE UP (ref 2.5–4.5)
PLATELET # BLD AUTO: 211 K/UL — SIGNIFICANT CHANGE UP (ref 150–400)
PMV BLD: 11.2 FL — SIGNIFICANT CHANGE UP (ref 7–13)
POTASSIUM SERPL-MCNC: 4.7 MMOL/L — SIGNIFICANT CHANGE UP (ref 3.5–5.3)
POTASSIUM SERPL-SCNC: 4.7 MMOL/L — SIGNIFICANT CHANGE UP (ref 3.5–5.3)
RBC # BLD: 4.13 M/UL — LOW (ref 4.2–5.8)
RBC # FLD: 13.8 % — SIGNIFICANT CHANGE UP (ref 10.3–14.5)
SARS-COV-2 IGG SERPL QL IA: NEGATIVE — SIGNIFICANT CHANGE UP
SARS-COV-2 IGM SERPL IA-ACNC: <3.8 AU/ML — SIGNIFICANT CHANGE UP
SARS-COV-2 RNA SPEC QL NAA+PROBE: SIGNIFICANT CHANGE UP
SODIUM SERPL-SCNC: 144 MMOL/L — SIGNIFICANT CHANGE UP (ref 135–145)
SPECIMEN SOURCE: SIGNIFICANT CHANGE UP
TROPONIN T, HIGH SENSITIVITY: 61 NG/L — CRITICAL HIGH (ref ?–14)
TROPONIN T, HIGH SENSITIVITY: 65 NG/L — CRITICAL HIGH (ref ?–14)
TROPONIN T, HIGH SENSITIVITY: 67 NG/L — CRITICAL HIGH (ref ?–14)
TSH SERPL-MCNC: 1.16 UIU/ML — SIGNIFICANT CHANGE UP (ref 0.27–4.2)
WBC # BLD: 9.98 K/UL — SIGNIFICANT CHANGE UP (ref 3.8–10.5)
WBC # FLD AUTO: 9.98 K/UL — SIGNIFICANT CHANGE UP (ref 3.8–10.5)

## 2020-07-16 PROCEDURE — 93010 ELECTROCARDIOGRAM REPORT: CPT

## 2020-07-16 PROCEDURE — 99233 SBSQ HOSP IP/OBS HIGH 50: CPT

## 2020-07-16 RX ORDER — SODIUM CHLORIDE 9 MG/ML
1000 INJECTION, SOLUTION INTRAVENOUS
Refills: 0 | Status: DISCONTINUED | OUTPATIENT
Start: 2020-07-16 | End: 2020-07-16

## 2020-07-16 RX ORDER — POLYETHYLENE GLYCOL 3350 17 G/17G
17 POWDER, FOR SOLUTION ORAL DAILY
Refills: 0 | Status: DISCONTINUED | OUTPATIENT
Start: 2020-07-16 | End: 2020-07-17

## 2020-07-16 RX ORDER — SODIUM CHLORIDE 9 MG/ML
1000 INJECTION, SOLUTION INTRAVENOUS
Refills: 0 | Status: DISCONTINUED | OUTPATIENT
Start: 2020-07-16 | End: 2020-07-17

## 2020-07-16 RX ADMIN — Medication 25 MILLIGRAM(S): at 13:56

## 2020-07-16 RX ADMIN — HEPARIN SODIUM 5000 UNIT(S): 5000 INJECTION INTRAVENOUS; SUBCUTANEOUS at 13:57

## 2020-07-16 RX ADMIN — SODIUM CHLORIDE 125 MILLILITER(S): 9 INJECTION, SOLUTION INTRAVENOUS at 03:26

## 2020-07-16 RX ADMIN — SIMVASTATIN 40 MILLIGRAM(S): 20 TABLET, FILM COATED ORAL at 22:48

## 2020-07-16 RX ADMIN — Medication 650 MILLIGRAM(S): at 05:36

## 2020-07-16 RX ADMIN — Medication 25 MILLIGRAM(S): at 22:48

## 2020-07-16 RX ADMIN — Medication 100 MILLIGRAM(S): at 13:56

## 2020-07-16 RX ADMIN — CARBIDOPA AND LEVODOPA 2 TABLET(S): 25; 100 TABLET ORAL at 13:56

## 2020-07-16 RX ADMIN — CARBIDOPA AND LEVODOPA 2 TABLET(S): 25; 100 TABLET ORAL at 22:48

## 2020-07-16 RX ADMIN — SENNA PLUS 2 TABLET(S): 8.6 TABLET ORAL at 22:49

## 2020-07-16 RX ADMIN — FINASTERIDE 5 MILLIGRAM(S): 5 TABLET, FILM COATED ORAL at 13:56

## 2020-07-16 RX ADMIN — CARBIDOPA AND LEVODOPA 2 TABLET(S): 25; 100 TABLET ORAL at 05:36

## 2020-07-16 RX ADMIN — Medication 25 MILLIGRAM(S): at 05:36

## 2020-07-16 RX ADMIN — SODIUM CHLORIDE 75 MILLILITER(S): 9 INJECTION, SOLUTION INTRAVENOUS at 12:17

## 2020-07-16 RX ADMIN — Medication 75 MICROGRAM(S): at 05:36

## 2020-07-16 RX ADMIN — HEPARIN SODIUM 5000 UNIT(S): 5000 INJECTION INTRAVENOUS; SUBCUTANEOUS at 05:36

## 2020-07-16 RX ADMIN — AMLODIPINE BESYLATE 2.5 MILLIGRAM(S): 2.5 TABLET ORAL at 05:36

## 2020-07-16 NOTE — PHYSICAL THERAPY INITIAL EVALUATION ADULT - ADDITIONAL COMMENTS
Pt reports that he lives in a private house with his wife with ~3 steps to enter; (+)bilateral handrails; and a flight of stairs to negotiate to bedroom; (+)1 handrail. Prior to hospital admission pt ambulated both independently/ with assistance from his wife using a hurricane cane. Pt also owns a rolling walker, and his daughter is trying to get him a wheel chair. As per pt's daughter his parkinson's has been getting worse.    Orthostatic Hypotension: Supine BP-140/73mmHg, Sitting BP-125/58mmHg, Standing-122/64mmHg    Pt left comfortable in bed, NAD, all lines intact, all precautions maintained, with call bell in reach, bed alarm on, and RN aware of PT evaluation.

## 2020-07-16 NOTE — PHYSICAL THERAPY INITIAL EVALUATION ADULT - DIAGNOSIS, PT EVAL
Pt admitted for syncope; +orthostatic hypotension; pt presents with decreased strength and decreased balance.

## 2020-07-16 NOTE — PATIENT PROFILE ADULT - NSPRESCRALCFREQ_GEN_A_NUR
1  Pantoprazole twice a day instead of famotidine  2   You will likely need to be admitted and have blood work and a CAT scan & see a GI specialist Never

## 2020-07-16 NOTE — PHYSICAL THERAPY INITIAL EVALUATION ADULT - PATIENT PROFILE REVIEW, REHAB EVAL
yes/ACTIVITY: Ambulate with Assistance/Increase as tolerated; spoke with KAT Galindo prior to PT evaluation--> Pt OK for PT consult/OOB activity

## 2020-07-16 NOTE — PROGRESS NOTE ADULT - PROBLEM SELECTOR PLAN 2
no signs of end organ damage. Per wife, patient was hypotensive for the past several months. Has not taking BP meds since May because his BP at home has been very low. Wife records: 90s-80s/60s-50. In [employer] 7/1: blood pressure 166/89 standing blood pressure 150/85  -restart home meds  -plan to lower blood pressure 20-30% over first several hour with goal of SBP 160s in the first 24 hour  -VSS q4h BP max 261/115 in ED, now 160s/80s   Per wife, patient was hypotensive for the past several months. Has not taking BP meds since May because his BP at home has been very low. Wife records: 90s-80s/60s-50. In [employer] office visit on 7/1: BP sitting- 166/89 BP standing- 150/85  -restart home meds. Advised wife to get new BP cuff   -plan to lower blood pressure 20-30% over first several hour with goal of SBP 160s in the first 24 hour  -VSS q4h

## 2020-07-16 NOTE — PROGRESS NOTE ADULT - ASSESSMENT
80M with HTN (episodes of hypotension), Parkinson's, CKD, hypothyroidism presents s/p syncopal episode likely 2/2 to postural instability in the setting of progression of parkinson's

## 2020-07-16 NOTE — PROGRESS NOTE ADULT - PROBLEM SELECTOR PLAN 8
1.  Name of PCP: Dr. Cody Lazcano; Dr. Oconnor (cards), Dr. Cullen (renal)   2.  PCP Contacted on Admission: [ ] Y    [ ] N    3.  PCP contacted at Discharge: [ ] Y    [ ] N    [ ] N/A  4.  Post-Discharge Appointment Date and Location:  5.  Summary of Handoff given to PCP: 1.  Name of PCP: Dr. Cody Lazcano; Dr. Oconnor (cards), Dr. Cullen (renal); Dr. Gao (neuro)   2.  PCP Contacted on Admission: [X ] Y    [ ] N  Emailed neuro Dr. Gao   3.  PCP contacted at Discharge: [ ] Y    [ ] N    [ ] N/A  4.  Post-Discharge Appointment Date and Location:  5.  Summary of Handoff given to PCP:

## 2020-07-16 NOTE — PROGRESS NOTE ADULT - PROBLEM SELECTOR PLAN 1
-Pt s/p syncopal episode with brief LOC upon standing. States he has multiple episodes of similar occurrence  -Denies chest pain. EKG without acute changes. Trops elevated likely 2/2 to HTN urgency, MELISSA on CKD.  Continue to trend.   -TTE pending  -Rigidity on exam but otherwise non focal.   -CT head wnl, low suspicion for TIA/CVA  -Leukocytosis likely reactive -> improved, no evidence of infection. Monitor off antibiotics  -Suspect likely orthostatic instability 2/2 to progression of Parkinson's, dehydration. Carbidopa recently increased at night, will hold bedtime doing. Continue with TID dosing. Emailed patient's neurologist Dr. Gao  -Monitor on telemetry  -IV hydration  -PT eval   -Orthostatic BP negative

## 2020-07-16 NOTE — CONSULT NOTE ADULT - ASSESSMENT
ASSESSMENT:  Mr. Thornton is a  79 M with significant PMH of CKD stage 4 (patient of Dr. Godfrey Navarro of [facility]), HTN (episodes of hypotension), Parkinson's, hypothyroidism presents to the ED at [facility] with 1 episode of syncope during ambulation this morning. Pt states he was walking from his kitchen to his dinning room when he suddenly felt lightheaded and like he "bottomed out" in blood pressure. Nephrology consulted for MELISSA on CKD stage 4.     1. MELISSA: oliguric secondary to prerenal azotemia or hypotension. He is now hypertensive. at 225/105.   2. CKD stage 3b  3. Euvolemic and at goal.   4. High anion gap metabolic acidosis.   5. Syncope.  6. HTN.   7. Hypermagnesemia.       RECOMMEND:  - UA, urine creatinine, urine protein, urine sodium, urine creatinine, urine chloride.   - BMP, CBC, Magnesium and phosphorus.   - Renal US.   - No need for IVF.   - Blood pressure is elevated and uncontrolled add back amlodipine of 2.5 mg po daily in am.       Thank you for involving [employer] in this patient's care.    With warm regards,    Mirna Matthew, DO  [email or web address]  [phone]

## 2020-07-16 NOTE — CONSULT NOTE ADULT - SUBJECTIVE AND OBJECTIVE BOX
Patient is a 79y old  Male who presents with a chief complaint of syncope (2020 14:51)      HPI:  Mr. Thornton is a 81 yo gentleman with significant PMH of CKD stage 3b (patient of Dr. Godfrey Navarro of [facility]), HTN (episodes of hypotension), Parkinson's, CKD, hypothyroidism presents to the ED at [facility] with 1 episode of syncope during ambulation this morning. Pt states he was walking from his kitchen to his dinning room when he suddenly felt lightheaded and like he "bottomed out" in blood pressure. He states he had LOC for a few seconds. Denies preceding symptoms including dizziness, chest pain or SOB. Blood pressure drawn at the time was SBP 70s. Pt states he has been experiencing recent hypotension (SBP averaging range: 70-80. He takes his BP at home QID) and his PCP recommended he discontinue his amlodipine. Wife believes it could be related to recent increase in levodopa. Pt states he fell forward onto his knees and was on the floor for over an hour while him and his wife tried to get him up. Pt states he was so weak that he couldn't stand. Pt states he has "rug burn" on his knees and admits to pain. Pt also admits to decreased PO fluid intake and has been drinking Gingerale mostly and been feeling "thirsty." Also has had loose stools up until 2 days ago, frequently suffers from constipation. Pt reveals that he has been hospitalized for falls in the past; most recent fall was "months ago." Also states he has had frequent falls recently related to rigidity in his legs. He denies chest pain, chest tightness, head trauma, neck pain,  arrhythmias, CHF, palpitations, urinary symptoms/changes, Hx of CA, abdominal pain, SOB, DM, melena, bleeding (15 Jul 2020 18:08). Nephrology consulted for MELISSA on CKD stage 3b.       PAST MEDICAL & SURGICAL HISTORY:  Hypothyroidism  CKD (chronic kidney disease)  Parkinson disease  HLD (hyperlipidemia)  HTN (hypertension)  History of total knee replacement, left      MEDICATIONS  (STANDING):  allopurinol 100 milliGRAM(s) Oral daily  carbidopa/levodopa  25/100 2 Tablet(s) Oral three times a day  finasteride 5 milliGRAM(s) Oral daily  heparin   Injectable 5000 Unit(s) SubCutaneous every 8 hours  hydrALAZINE 25 milliGRAM(s) Oral three times a day  lactated ringers. 1000 milliLiter(s) (75 mL/Hr) IV Continuous <Continuous>  levothyroxine 75 MICROGram(s) Oral daily  polyethylene glycol 3350 17 Gram(s) Oral daily  senna 2 Tablet(s) Oral at bedtime  simvastatin 40 milliGRAM(s) Oral at bedtime      Allergies  Advil (Rash)  Alleve    SOCIAL HISTORY:  Denies alcohol or tobacco abuse.    FAMILY HISTORY:  No pertinent family history in first degree relatives    No kidney disease in family.    REVIEW OF SYSTEMS:  CONSTITUTIONAL: No weakness, fevers or chills  EYES/ENT: No visual changes  NECK: No pain or stiffness  RESPIRATORY: No cough, wheezing, hemoptysis. No shortness of breath  CARDIOVASCULAR: No chest pain or palpitations  GASTROINTESTINAL: No abdominal or epigastric pain. No nausea, vomiting, or hematemesis. No diarrhea or constipation. No melena or hematochezia  GENITOURINARY: No dysuria, frequency or hematuria. No stones or infection  NEUROLOGICAL: No numbness or weakness  SKIN: No itching, burning, rashes, or lesions   All other review of systems is negative unless indicated above    VITAL:  T(C): , Max: 36.9 (20 @ 12:50)  T(F): , Max: 98.4 (20 @ 12:50)  HR: 67 (20 @ 22:38)  BP: 225/105 (20 @ 22:38)  RR: 18 (20 @ 22:38)  SpO2: 99% (20 @ 22:38)      PHYSICAL EXAM:  General: NAD, Alert, Pleasant  HEENT: NCAT, PERRLA  Neck: Supple, No JVD  Respiratory: CTA-b/l  Cardiovascular: RRR s1s2, no m/r/g  Gastrointestinal: +BS, soft, NT/ND  Extremities: No peripheral edema b/l  Neurological: no focal deficits; strength grossly intact  Psychiatric: Normal mood, normal affect  : No perry  Back: no CVAT b/l  Skin: No rashes, no nevi      LABS:                        13.6   9.98  )-----------( 211      ( 2020 05:30 )             42.2     Na(144)/K(4.7)/Cl(108)/HCO3(21)/BUN(31)/Cr(2.08)Glu(102)/Ca(9.8)/Mg(2.5)/PO4(3.4)    07-16 @ 05:30  Na(141)/K(3.6)/Cl(109)/HCO3(18)/BUN(34)/Cr(2.25)Glu(108)/Ca(8.9)/Mg(--)/PO4(--)    07-15 @ 12:30    07-16    144  |  108<H>  |  31<H>  ----------------------------<  102<H>  4.7   |  21<L>  |  2.08<H>    Ca    9.8      2020 05:30  Phos  3.4     07-16  Mg     2.5     07-16    TPro  6.5  /  Alb  3.9  /  TBili  0.3  /  DBili  x   /  AST  18  /  ALT  5   /  AlkPhos  88  -15    Urinalysis Basic - ( 15 Jul 2020 13:25 )    Color: LIGHT YELLOW / Appearance: CLEAR / S.016 / pH: 6.5  Gluc: NEGATIVE / Ketone: NEGATIVE  / Bili: NEGATIVE / Urobili: NORMAL   Blood: MODERATE / Protein: 50 / Nitrite: NEGATIVE   Leuk Esterase: NEGATIVE / RBC: 0-2 / WBC 0-2   Sq Epi: OCC / Non Sq Epi: x / Bacteria: NEGATIVE            IMAGING:  EXAM:  CT BRAIN        PROCEDURE DATE:  Jul 15 2020         INTERPRETATION:  CLINICAL INDICATION: Syncope     Technique: Noncontrast CT of the head was performed.    Multiple contiguous axial images were acquired from the skull base to the vertex without the administration of intravenous contrast. Coronal and sagittal reformations were made.    COMPARISON: Head CT 5/15/18    FINDINGS:  Unchanged enlarged ventricles and sulci consistent with volume loss. Redemonstration hemispheric white matter areas of low attenuation which are nonspecific but likely related to sequelae of microvascular disease. There is no intraparenchymal hematoma, mass effect or midline shift. No new abnormal extra-axial fluid collections are present. The basal cisterns remain patent.    The calvarium is intact. The visualized intraorbital compartments and paranasal sinuses  appear free of acute disease. There is slight sclerosis and opacification within the right mastoid tip.    IMPRESSION:    Volume loss with microvascular disease, no acute hemorrhage or midline shift. If symptoms persist consider follow-up head CT or MR if no contraindications        EXAM:  XR CHEST PA LAT 2V        PROCEDURE DATE:  Jul 15 2020         INTERPRETATION:  CLINICAL INDICATION: chest pain    EXAM:  Portable upright frontal and lateral chest from 7/15/2020 at 1353. Compared to prior study from 5/15/2018.     IMPRESSION:  Clear lungs. No pleural effusions or pneumothorax.    Stable cardiac and mediastinal silhouettes.     Trachea midline.    Generalized osteopenia and spinal degenerative changes.

## 2020-07-16 NOTE — PHYSICAL THERAPY INITIAL EVALUATION ADULT - PLANNED THERAPY INTERVENTIONS, PT EVAL
bed mobility training/strengthening/balance training/postural re-education/gait training/transfer training

## 2020-07-16 NOTE — PHYSICAL THERAPY INITIAL EVALUATION ADULT - PERTINENT HX OF CURRENT PROBLEM, REHAB EVAL
81yo M with HTN (episodes of hypotension), Parkinson's, CKD, hypothyroidism presents s/p syncopal episode likely 2/2 to postural instability in the setting of progression of parkinson's

## 2020-07-17 ENCOUNTER — TRANSCRIPTION ENCOUNTER (OUTPATIENT)
Age: 80
End: 2020-07-17

## 2020-07-17 LAB
ANION GAP SERPL CALC-SCNC: 13 MMO/L — SIGNIFICANT CHANGE UP (ref 7–14)
BASOPHILS # BLD AUTO: 0.02 K/UL — SIGNIFICANT CHANGE UP (ref 0–0.2)
BASOPHILS NFR BLD AUTO: 0.3 % — SIGNIFICANT CHANGE UP (ref 0–2)
BUN SERPL-MCNC: 33 MG/DL — HIGH (ref 7–23)
CALCIUM SERPL-MCNC: 9 MG/DL — SIGNIFICANT CHANGE UP (ref 8.4–10.5)
CHLORIDE SERPL-SCNC: 107 MMOL/L — SIGNIFICANT CHANGE UP (ref 98–107)
CK SERPL-CCNC: 3460 U/L — HIGH (ref 30–200)
CO2 SERPL-SCNC: 20 MMOL/L — LOW (ref 22–31)
CREAT SERPL-MCNC: 2.03 MG/DL — HIGH (ref 0.5–1.3)
EOSINOPHIL # BLD AUTO: 0.19 K/UL — SIGNIFICANT CHANGE UP (ref 0–0.5)
EOSINOPHIL NFR BLD AUTO: 2.6 % — SIGNIFICANT CHANGE UP (ref 0–6)
GLUCOSE SERPL-MCNC: 81 MG/DL — SIGNIFICANT CHANGE UP (ref 70–99)
HCT VFR BLD CALC: 36.2 % — LOW (ref 39–50)
HGB BLD-MCNC: 12.1 G/DL — LOW (ref 13–17)
IMM GRANULOCYTES NFR BLD AUTO: 0.4 % — SIGNIFICANT CHANGE UP (ref 0–1.5)
LYMPHOCYTES # BLD AUTO: 0.8 K/UL — LOW (ref 1–3.3)
LYMPHOCYTES # BLD AUTO: 10.8 % — LOW (ref 13–44)
MAGNESIUM SERPL-MCNC: 2.4 MG/DL — SIGNIFICANT CHANGE UP (ref 1.6–2.6)
MCHC RBC-ENTMCNC: 33.4 % — SIGNIFICANT CHANGE UP (ref 32–36)
MCHC RBC-ENTMCNC: 34.5 PG — HIGH (ref 27–34)
MCV RBC AUTO: 103.1 FL — HIGH (ref 80–100)
MONOCYTES # BLD AUTO: 0.49 K/UL — SIGNIFICANT CHANGE UP (ref 0–0.9)
MONOCYTES NFR BLD AUTO: 6.6 % — SIGNIFICANT CHANGE UP (ref 2–14)
NEUTROPHILS # BLD AUTO: 5.88 K/UL — SIGNIFICANT CHANGE UP (ref 1.8–7.4)
NEUTROPHILS NFR BLD AUTO: 79.3 % — HIGH (ref 43–77)
NRBC # FLD: 0 K/UL — SIGNIFICANT CHANGE UP (ref 0–0)
PHOSPHATE SERPL-MCNC: 3.2 MG/DL — SIGNIFICANT CHANGE UP (ref 2.5–4.5)
PLATELET # BLD AUTO: 182 K/UL — SIGNIFICANT CHANGE UP (ref 150–400)
PMV BLD: 12.3 FL — SIGNIFICANT CHANGE UP (ref 7–13)
POTASSIUM SERPL-MCNC: 3.7 MMOL/L — SIGNIFICANT CHANGE UP (ref 3.5–5.3)
POTASSIUM SERPL-SCNC: 3.7 MMOL/L — SIGNIFICANT CHANGE UP (ref 3.5–5.3)
RBC # BLD: 3.51 M/UL — LOW (ref 4.2–5.8)
RBC # FLD: 14 % — SIGNIFICANT CHANGE UP (ref 10.3–14.5)
SODIUM SERPL-SCNC: 140 MMOL/L — SIGNIFICANT CHANGE UP (ref 135–145)
WBC # BLD: 7.41 K/UL — SIGNIFICANT CHANGE UP (ref 3.8–10.5)
WBC # FLD AUTO: 7.41 K/UL — SIGNIFICANT CHANGE UP (ref 3.8–10.5)

## 2020-07-17 PROCEDURE — 99233 SBSQ HOSP IP/OBS HIGH 50: CPT

## 2020-07-17 PROCEDURE — 76770 US EXAM ABDO BACK WALL COMP: CPT | Mod: 26

## 2020-07-17 PROCEDURE — 93306 TTE W/DOPPLER COMPLETE: CPT | Mod: 26

## 2020-07-17 RX ORDER — CARBIDOPA AND LEVODOPA 25; 100 MG/1; MG/1
1 TABLET ORAL
Qty: 0 | Refills: 0 | DISCHARGE

## 2020-07-17 RX ORDER — FINASTERIDE 5 MG/1
1 TABLET, FILM COATED ORAL
Qty: 0 | Refills: 0 | DISCHARGE
Start: 2020-07-17

## 2020-07-17 RX ORDER — FINASTERIDE 5 MG/1
1 TABLET, FILM COATED ORAL
Qty: 30 | Refills: 0
Start: 2020-07-17

## 2020-07-17 RX ORDER — TAMSULOSIN HYDROCHLORIDE 0.4 MG/1
1 CAPSULE ORAL
Qty: 0 | Refills: 0 | DISCHARGE

## 2020-07-17 RX ORDER — POLYETHYLENE GLYCOL 3350 17 G/17G
17 POWDER, FOR SOLUTION ORAL DAILY
Refills: 0 | Status: DISCONTINUED | OUTPATIENT
Start: 2020-07-17 | End: 2020-07-17

## 2020-07-17 RX ORDER — AMLODIPINE BESYLATE 2.5 MG/1
5 TABLET ORAL DAILY
Refills: 0 | Status: DISCONTINUED | OUTPATIENT
Start: 2020-07-17 | End: 2020-07-19

## 2020-07-17 RX ORDER — AMLODIPINE BESYLATE 2.5 MG/1
1 TABLET ORAL
Qty: 30 | Refills: 0
Start: 2020-07-17 | End: 2020-08-15

## 2020-07-17 RX ORDER — ALLOPURINOL 300 MG
2 TABLET ORAL
Qty: 0 | Refills: 0 | DISCHARGE

## 2020-07-17 RX ORDER — HYDRALAZINE HCL 50 MG
1 TABLET ORAL
Qty: 0 | Refills: 0 | DISCHARGE

## 2020-07-17 RX ORDER — ALLOPURINOL 300 MG
1 TABLET ORAL
Qty: 0 | Refills: 0 | DISCHARGE
Start: 2020-07-17

## 2020-07-17 RX ORDER — HYDRALAZINE HCL 50 MG
1 TABLET ORAL
Refills: 0 | DISCHARGE
Start: 2020-07-17

## 2020-07-17 RX ORDER — AMLODIPINE BESYLATE 2.5 MG/1
1 TABLET ORAL
Qty: 0 | Refills: 0 | DISCHARGE

## 2020-07-17 RX ORDER — HYDRALAZINE HCL 50 MG
25 TABLET ORAL ONCE
Refills: 0 | Status: COMPLETED | OUTPATIENT
Start: 2020-07-17 | End: 2020-07-17

## 2020-07-17 RX ORDER — HYDRALAZINE HCL 50 MG
1 TABLET ORAL
Qty: 90 | Refills: 0
Start: 2020-07-17 | End: 2020-08-15

## 2020-07-17 RX ADMIN — Medication 25 MILLIGRAM(S): at 11:47

## 2020-07-17 RX ADMIN — AMLODIPINE BESYLATE 2.5 MILLIGRAM(S): 2.5 TABLET ORAL at 06:11

## 2020-07-17 RX ADMIN — HEPARIN SODIUM 5000 UNIT(S): 5000 INJECTION INTRAVENOUS; SUBCUTANEOUS at 22:03

## 2020-07-17 RX ADMIN — Medication 100 MILLIGRAM(S): at 11:47

## 2020-07-17 RX ADMIN — AMLODIPINE BESYLATE 5 MILLIGRAM(S): 2.5 TABLET ORAL at 19:11

## 2020-07-17 RX ADMIN — CARBIDOPA AND LEVODOPA 2 TABLET(S): 25; 100 TABLET ORAL at 06:10

## 2020-07-17 RX ADMIN — HEPARIN SODIUM 5000 UNIT(S): 5000 INJECTION INTRAVENOUS; SUBCUTANEOUS at 11:47

## 2020-07-17 RX ADMIN — Medication 25 MILLIGRAM(S): at 19:11

## 2020-07-17 RX ADMIN — HEPARIN SODIUM 5000 UNIT(S): 5000 INJECTION INTRAVENOUS; SUBCUTANEOUS at 06:11

## 2020-07-17 RX ADMIN — Medication 25 MILLIGRAM(S): at 04:34

## 2020-07-17 RX ADMIN — CARBIDOPA AND LEVODOPA 2 TABLET(S): 25; 100 TABLET ORAL at 22:03

## 2020-07-17 RX ADMIN — CARBIDOPA AND LEVODOPA 2 TABLET(S): 25; 100 TABLET ORAL at 11:47

## 2020-07-17 RX ADMIN — FINASTERIDE 5 MILLIGRAM(S): 5 TABLET, FILM COATED ORAL at 11:47

## 2020-07-17 RX ADMIN — SIMVASTATIN 40 MILLIGRAM(S): 20 TABLET, FILM COATED ORAL at 22:03

## 2020-07-17 RX ADMIN — Medication 75 MICROGRAM(S): at 06:10

## 2020-07-17 NOTE — DISCHARGE NOTE NURSING/CASE MANAGEMENT/SOCIAL WORK - PATIENT PORTAL LINK FT
You can access the FollowMyHealth Patient Portal offered by [facility] by registering at the following website: [email or web address]. By joining [facility]’s FollowMyHealth portal, you will also be able to view your health information using other applications (apps) compatible with our system.

## 2020-07-17 NOTE — DISCHARGE NOTE PROVIDER - NSDCCPCAREPLAN_GEN_ALL_CORE_FT
PRINCIPAL DISCHARGE DIAGNOSIS  Diagnosis: Orthostatic hypotension due to Parkinson's disease  Assessment and Plan of Treatment: Orthostatic hypotension due to Parkinson's disease  Follow up with your primary care doctor in one week Dr Navarro   Follow up with your neurologist DR Gao in one week for titrating iof Continue sinemet three times daily, disocntinue bedtime dose  Stop flomax      SECONDARY DISCHARGE DIAGNOSES  Diagnosis: Hypertension  Assessment and Plan of Treatment: low salt, low fat, low cholesterol   hydralazine increased and norvasc started    Diagnosis: Acute kidney injury superimposed on CKD  Assessment and Plan of Treatment: Acute kidney injury superimposed on CKD  Follow up with your primary care doctor for bloodwork check creatinine    Diagnosis: Acute kidney injury superimposed on CKD  Assessment and Plan of Treatment: Acute kidney injury superimposed on CKD PRINCIPAL DISCHARGE DIAGNOSIS  Diagnosis: Orthostatic hypotension due to Parkinson's disease  Assessment and Plan of Treatment: Orthostatic hypotension due to Parkinson's disease  Follow up with your primary care doctor in one week Dr Navarro   Follow up with your neurologist DR Gao in one week for titrating of Sinemet.  Continue sinemet three times daily, disocntinue bedtime dose  Stop flomax but start proscar      SECONDARY DISCHARGE DIAGNOSES  Diagnosis: Hypertension  Assessment and Plan of Treatment: low salt, low fat, low cholesterol   hydralazine increased and norvasc started    Diagnosis: Acute kidney injury superimposed on CKD  Assessment and Plan of Treatment: Acute kidney injury superimposed on CKD  Follow up with your primary care doctor for bloodwork check creatinine    Diagnosis: Acute kidney injury superimposed on CKD  Assessment and Plan of Treatment: Acute kidney injury superimposed on CKD PRINCIPAL DISCHARGE DIAGNOSIS  Diagnosis: Orthostatic hypotension due to Parkinson's disease  Assessment and Plan of Treatment: Orthostatic hypotension due to Parkinson's disease  Follow up with your primary care doctor in one week Dr Navarro   Follow up with your neurologist Dr. Gao in one week for titrating of Sinemet.  Continue sinemet three times daily, disocntinue bedtime dose  Stop flomax but start proscar      SECONDARY DISCHARGE DIAGNOSES  Diagnosis: Hypertension  Assessment and Plan of Treatment: low salt, low fat, low cholesterol   hydralazine increased and norvasc started    Diagnosis: Hypothyroidism  Assessment and Plan of Treatment: Continue your thyroid medications as directed and please follow-up as an outpatient with your primary care provider/Endo for further care and recommendations.      Diagnosis: Acute kidney injury superimposed on CKD  Assessment and Plan of Treatment: In order to prevent further disease progression, continue to follow recommendations made by your primary provider/nephrologist. Continue a diet that is low in sodium and avoid foods that are concentrated in potassium and phosphorus. Continue your medications/supplementations as directed and avoid over-the-counter drugs that are harmful to kidneys, such as, Non-Steroidal Anti-Inflammatory Drugs (NSAIDs). Follow-up as outpatient to monitor your kidney function, as well as, vitamin D, Calcium, potassium, and phosphorus levels.  Follow up with your primary care doctor for bloodwork check creatinine PRINCIPAL DISCHARGE DIAGNOSIS  Diagnosis: Orthostatic hypotension due to Parkinson's disease  Assessment and Plan of Treatment: Orthostatic hypotension due to Parkinson's disease  Follow up with your primary care doctor in one week Dr Navarro   Follow up with your neurologist Dr. Gao in one week for titrating of Sinemet.  Continue sinemet three times daily, disocntinue bedtime dose  Stop flomax but start proscar  Please move slowly when moving from sitting to standing position      SECONDARY DISCHARGE DIAGNOSES  Diagnosis: Hypertension  Assessment and Plan of Treatment: low salt, low fat, low cholesterol   hydralazine increased and norvasc started    Diagnosis: Hypothyroidism  Assessment and Plan of Treatment: Continue your thyroid medications as directed and please follow-up as an outpatient with your primary care provider/Endo for further care and recommendations.      Diagnosis: Acute kidney injury superimposed on CKD  Assessment and Plan of Treatment: In order to prevent further disease progression, continue to follow recommendations made by your primary provider/nephrologist. Continue a diet that is low in sodium and avoid foods that are concentrated in potassium and phosphorus. Continue your medications/supplementations as directed and avoid over-the-counter drugs that are harmful to kidneys, such as, Non-Steroidal Anti-Inflammatory Drugs (NSAIDs). Follow-up as outpatient to monitor your kidney function, as well as, vitamin D, Calcium, potassium, and phosphorus levels.  Follow up with your primary care doctor for bloodwork check creatinine

## 2020-07-17 NOTE — DISCHARGE NOTE PROVIDER - NSDCACTIVITY_GEN_ALL_CORE
No restrictions/No heavy lifting/straining/Return to Work/School allowed/Bathing allowed/Walking - Indoors allowed

## 2020-07-17 NOTE — PROGRESS NOTE ADULT - ATTENDING COMMENTS
Spoke with patient's wife and updated re: BP continues to be elevated, discussed purchasing new BP cuff at home, she is agreeable. Discussed that we are awaiting TTE before discharge, she is aware. Spoke with patient's wife and updated re: BP continues to be elevated, discussed purchasing new BP cuff at home, she is agreeable. Discussed that we are awaiting TTE before discharge, she is aware. Patient's son is the only one that drives and can pick him up, but he is [employer]. Would need to be discharged later this evening or on Sunday, as family cannot drive on Saturdays. Wife does not feel comfortable with arranging transportation otherwise.

## 2020-07-17 NOTE — DISCHARGE NOTE PROVIDER - NSDCMRMEDTOKEN_GEN_ALL_CORE_FT
amLODIPine 2.5 mg oral tablet: 1 tab(s) orally once a day  carbidopa-levodopa 25 mg-100 mg oral tablet: 2 tab(s) orally 3 times a day  carbidopa-levodopa 50 mg-200 mg oral tablet, extended release: 1 tab(s) orally once a day (at bedtime)  finasteride 5 mg oral tablet: 1 tab(s) orally once a day  hydrALAZINE 10 mg oral tablet: 1 tab(s) orally once a day  levothyroxine 75 mcg (0.075 mg) oral tablet: 1 tab(s) orally once a day  simvastatin 40 mg oral tablet: 1 tab(s) orally once a day (at bedtime)  tamsulosin 0.4 mg oral capsule: 1 cap(s) orally once a day  Zyloprim 100 mg oral tablet: 1 tab(s) orally once a day carbidopa-levodopa 25 mg-100 mg oral tablet: 2 tab(s) orally 3 times a day  levothyroxine 75 mcg (0.075 mg) oral tablet: 1 tab(s) orally once a day  simvastatin 40 mg oral tablet: 1 tab(s) orally once a day (at bedtime)  Zyloprim 100 mg oral tablet: 1 tab(s) orally once a day carbidopa-levodopa 25 mg-100 mg oral tablet: 2 tab(s) orally 3 times a day  finasteride 5 mg oral tablet: 1 tab(s) orally once a day  hydrALAZINE 25 mg oral tablet: 1 tab(s) orally 3 times a day  levothyroxine 75 mcg (0.075 mg) oral tablet: 1 tab(s) orally once a day  Norvasc 5 mg oral tablet: 1 tab(s) orally once a day  simvastatin 40 mg oral tablet: 1 tab(s) orally once a day (at bedtime)  Zyloprim 100 mg oral tablet: 1 tab(s) orally once a day

## 2020-07-17 NOTE — DISCHARGE NOTE PROVIDER - PROVIDER TOKENS
PROVIDER:[TOKEN:[[id number]],ESTABLISHEDPATIENT:[T]],PROVIDER:[TOKEN:[[id number]],ESTABLISHEDPATIENT:[T]],PROVIDER:[TOKEN:[[id number]],ESTABLISHEDPATIENT:[T]],PROVIDER:[TOKEN:[[id number]],ESTABLISHEDPATIENT:[T]]

## 2020-07-17 NOTE — DISCHARGE NOTE NURSING/CASE MANAGEMENT/SOCIAL WORK - NSDCFUADDAPPT_GEN_ALL_CORE_FT
If you are in need of a general medicine physician and post-discharge medical follow-up for further care/recommendations you may contact the [facility] for an appointment [phone]

## 2020-07-17 NOTE — PROGRESS NOTE ADULT - ASSESSMENT
ASSESSMENT/PLAN    ASSESSMENT:  Mr. Thornton is a  79 M with significant PMH of CKD stage 4 (patient of Dr. Godfrey Navarro of [facility]), HTN (episodes of hypotension), Parkinson's, hypothyroidism presents to the ED at [facility] with 1 episode of syncope during ambulation this morning. Pt states he was walking from his kitchen to his dinning room when he suddenly felt lightheaded and like he "bottomed out" in blood pressure. Nephrology consulted for MELISSA on CKD stage 4.     1. MELISSA: oliguric secondary to prerenal azotemia or hypotension.  Scer improved to 2.03 mg/dl.   2. CKD stage 3b. US showed CKD and multiple cysts in addition to enlarged prostate. Not completely emptying the kidneys.   3. Euvolemic and at goal.   4. High anion gap metabolic acidosis.   5. Syncope.  6. HTN.   7. Hypermagnesemia.       RECOMMEND:  - UA, urine creatinine, urine protein, urine sodium, urine creatinine, urine chloride.   - BMP, CBC, Magnesium and phosphorus.   - Renal US.   - No need for IVF.   - Blood pressure is elevated and uncontrolled add back amlodipine of 2.5 mg po daily in am. They increased it to 5mg po daily.   - Have him follow up with Dr. Navarro within the month. Office number is below.       Thank you for involving [employer] in this patient's care.    With warm regards,    Mirna Matthew, DO  [employer]  [phone]

## 2020-07-17 NOTE — DISCHARGE NOTE NURSING/CASE MANAGEMENT/SOCIAL WORK - NSSCNAMETXT_GEN_ALL_CORE
[facility] [phone] Nurse to visit on the day following discharge. Other appropriate services to be arranged thereafter. Please contact the home care agency at the above phone number if you have not heard from them by approximately 12 noon on the day after your hospital discharge.

## 2020-07-17 NOTE — PROGRESS NOTE ADULT - PROBLEM SELECTOR PLAN 8
1.  Name of PCP: Dr. Cody Lazcano; Dr. Oconnor (cards), Dr. Cullen (renal); Dr. Gao (neuro)   2.  PCP Contacted on Admission: [X ] Y    [ ] N  Emailed neuro Dr. Gao   3.  PCP contacted at Discharge: [ ] Y    [ ] N    [ ] N/A  4.  Post-Discharge Appointment Date and Location:  5.  Summary of Handoff given to PCP:

## 2020-07-17 NOTE — DISCHARGE NOTE PROVIDER - NSDCFUADDAPPT_GEN_ALL_CORE_FT
If you are in need of a general medicine physician and post-discharge medical follow-up for further care/recommendations you may contact the [facility] for an appointment [phone]

## 2020-07-17 NOTE — DISCHARGE NOTE PROVIDER - CARE PROVIDERS DIRECT ADDRESSES
[email or web address],[email or web address],DirectAddress_Unknown

## 2020-07-17 NOTE — PROGRESS NOTE ADULT - PROBLEM SELECTOR PLAN 1
-Pt s/p syncopal episode with brief LOC upon standing. States he has multiple episodes of similar occurrence  -Denies chest pain. EKG without acute changes. Trops elevated likely 2/2 to HTN urgency, MELISSA on CKD.  Continue to trend.   -TTE pending  -Rigidity on exam but otherwise non focal.   -CT head wnl, low suspicion for TIA/CVA  -Leukocytosis likely reactive -> improved, no evidence of infection. Monitor off antibiotics  -Suspect likely orthostatic instability 2/2 to progression of Parkinson's, dehydration. Carbidopa recently increased at night, will hold bedtime doing. Continue with TID dosing. Emailed patient's neurologist Dr. Gao  -Monitor on telemetry  -PT eval   -Orthostatic BP negative

## 2020-07-17 NOTE — DISCHARGE NOTE PROVIDER - CARE PROVIDER_API CALL
Sylvia Gao)  Neurology  [address], [address]  [place] [address]  Phone: [phone]  Fax: [phone]  Established Patient  Follow Up Time:     Godfrey Navarro)  Internal Medicine; Nephrology  [address], [address]  [place] [address]  Phone: [phone]  Fax: [phone]  Established Patient  Follow Up Time:     WICHO MAY  Geriatric Medicine-Internal Medicine  [address]  [place] [address]  Phone: [phone]  Fax: [phone]  Established Patient  Follow Up Time:     ABDON WALLS  Cardiology  [address]  [place] [address]  Phone: [phone]  Fax: [phone]  Established Patient  Follow Up Time:

## 2020-07-17 NOTE — PROGRESS NOTE ADULT - PROBLEM SELECTOR PLAN 2
BP max 261/115 in ED, now 160s/80s   Per wife, patient was hypotensive for the past several months. Has not taking BP meds since May 2020 because his BP at home has been very low. Wife records: 90s-80s/60s-50. In [employer] office visit on 7/1: BP sitting- 166/89 BP standing- 150/85  -restart home meds. Advised wife to get new BP cuff   -Likely related to autonomic dysfunction in setting of PD.   -Avoid hypotension   -VSS q4h

## 2020-07-17 NOTE — DISCHARGE NOTE PROVIDER - NSDCFUSCHEDAPPT_GEN_ALL_CORE_FT
FABRIZIO BUNCH ; 10/05/2020 ; [facility] Neuro [address] [facility] FABRIZIO BUNCH ; 10/05/2020 ; NPP Neuro [address] [facility] FABRIZIO BUNCH ; 10/05/2020 ; NPP Neuro [address] [facility] FABRIZIO BUNCH ; 10/05/2020 ; NPP Neuro [address] [facility] FABRIZIO BUNCH ; 10/05/2020 ; NPP Neuro [address] [facility] FABRIZIO BUNCH ; 10/05/2020 ; NPP Neuro [address] [facility] FABRIZIO BUNCH ; 10/05/2020 ; NPP Neuro [address] [facility] FABRIZIO BUNCH ; 10/05/2020 ; NPP Neuro [address] [facility] FABRIZIO BUNCH ; 10/05/2020 ; NPP Neuro [address] [facility] FABRIZIO BUNCH ; 10/05/2020 ; NPP Neuro [address] [facility]

## 2020-07-17 NOTE — DISCHARGE NOTE PROVIDER - HOSPITAL COURSE
79yo M with HTN (episodes of hypotension), Parkinson's, CKD, hypothyroidism presents s/p syncopal episode likely 2/2 to postural instability in the setting of progression of parkinson's         + Syncope - 2/2 progression of parkinsons, dehydration. IVF, neuro in AM     + HTN Urgency- plan to lower blood pressure 20-30% over first several hour with goal of SBP 160s in the first 24 hour, VS q4hrs                 Problem/Plan - 1:    ·  Problem: Syncope.  Plan: -Pt s/p syncopal episode with brief LOC upon standing. States he has multiple episodes of similar occurrence    -Denies chest pain. EKG without acute changes. Trops elevated likely 2/2 to HTN urgency, MELISSA on CKD.  Continue to trend. Repeat TTE in AM    -Rigidity on exam but otherwise non focal. CT head wnl, low suspicion for TIA/CVA    -Suspect likely orthostatic instability 2/2 to progression of Parkinson's, dehydration. Carbidopa recently increased at night, will hold bedtime doing. Continue with TID dosing.     -Monitor on telemetry    -Gentle hydration.         Problem/Plan - 2:    ·  Problem: Hypertensive urgency.  Plan: no signs of end organ damage    -restart home meds    -plan to lower blood pressure 20-30% over first several hour with goal of SBP 160s in the first 24 hour    -VSS q4h    -trend trops.         Problem/Plan - 3:    ·  Problem: Orthostatic hypotension due to Parkinson's disease.  Plan: - 2/2 to progression of Parkinson's, dehydration. Carbidopa recently increased at night, will hold bedtime doing. Continue with TID dosing. In AM, should reach out to neurologist Dr. Gao regarding titration of caribidopa in the setting of worsening orthostatic hypotension    -hold tamsulosin, start finasteride    -gentle hydration.         Problem/Plan - 4:    ·  Problem: Acute kidney injury superimposed on CKD.  Plan: -likely pre-renal 2/2 to dehydration    -gentle hydration    -trend BMP.         Problem/Plan - 5:    ·  Problem: Parkinson disease.  Plan: -c/w carbidopa as above.         Problem/Plan - 6:    Problem: Hypothyroidism. Plan: -c/w synthroid.        Problem/Plan - 7:    ·  Problem: Need for prophylactic measure.  Plan: DVT ppx: heparin. 80M with HTN (episodes of hypotension), Parkinson's, CKD, hypothyroidism presents s/p syncopal episode likely 2/2 to postural instability in the setting of progression of parkinson's          Problem/Plan - 1:    ·  Problem: Syncope.  Plan: -Pt s/p syncopal episode with brief LOC upon standing. States he has multiple episodes of similar occurrence    -Denies chest pain. EKG without acute changes. Trops elevated likely 2/2 to HTN urgency, MELISSA on CKD.  Continue to trend.     -TTE pending    -Rigidity on exam but otherwise non focal.     -CT head wnl, low suspicion for TIA/CVA    -Leukocytosis likely reactive -> improved, no evidence of infection. Monitor off antibiotics    -Suspect likely orthostatic instability 2/2 to progression of Parkinson's, dehydration. Carbidopa recently increased at night, will hold bedtime doing. Continue with TID dosing. Emailed patient's neurologist Dr. Gao    -Monitor on telemetry    -PT eval     -Orthostatic BP negative.          Problem/Plan - 2:    ·  Problem: Hypertensive urgency.  Plan: BP max 261/115 in ED, now 160s/80s     Per wife, patient was hypotensive for the past several months. Has not taking BP meds since May 2020 because his BP at home has been very low. Wife records: 90s-80s/60s-50. In [employer] office visit on 7/1: BP sitting- 166/89 BP standing- 150/85    -restart home meds. Advised wife to get new BP cuff     -Likely related to autonomic dysfunction in setting of PD.     -Avoid hypotension     -VSS q4h.          Problem/Plan - 3:    ·  Problem: Orthostatic hypotension due to Parkinson's disease.  Plan: -2/2 to progression of Parkinson's, dehydration    -hold tamsulosin, start finasteride    -S/p gentle hydration    -Orthostatics negative this AM.          Problem/Plan - 4:    ·  Problem: Acute kidney injury superimposed on CKD.  Plan: -likely 2/2 rhabdo from fall     -renal recs appreciated    -renal US reviewed, "Bilateral renal cortical thinning and multiple cysts noted. No hydronephrosis. Enlarged prostate."    -trend BMP and CK (improved to 3K from 9K).         Dispo: PT evaluated patient and recommended rehab. Patient and his wife declined rehab and prefer home w/ home PT. 80M with HTN (episodes of hypotension), Parkinson's, CKD, hypothyroidism presents s/p syncopal episode likely 2/2 to postural instability in the setting of progression of parkinson's > pt had renal sonogram anhd echocardiogram done. Pt stable for discharge as per attending          Problem/Plan - 1:    ·  Problem: Syncope.  Plan: -Pt s/p syncopal episode with brief LOC upon standing. States he has multiple episodes of similar occurrence    -Denies chest pain. EKG without acute changes. Trops elevated likely 2/2 to HTN urgency, MELISSA on CKD.  Continue to trend.     -TTE :    -Rigidity on exam but otherwise non focal.     -CT head wnl, low suspicion for TIA/CVA    -Leukocytosis likely reactive -> improved, no evidence of infection. Monitor off antibiotics    -Suspect likely orthostatic instability 2/2 to progression of Parkinson's, dehydration. Carbidopa recently increased at night, will hold bedtime doing. Continue with TID dosing. Emailed patient's neurologist Dr. Gao    -Monitor on telemetry    -PT eval for rehab but patient refusing so for home PT    -Orthostatic BP negative.          Problem/Plan - 2:    ·  Problem: Hypertensive urgency.  Plan: BP max 261/115 in ED, now 160s/80s     Per wife, patient was hypotensive for the past several months. Has not taking BP meds since May 2020 because his BP at home has been very low. Wife records: 90s-80s/60s-50. In [employer] office visit on 7/1: BP sitting- 166/89 BP standing- 150/85    -restart home meds. Advised wife to get new BP cuff     -Likely related to autonomic dysfunction in setting of PD.     -Avoid hypotension     -VSS q4h.          Problem/Plan - 3:    ·  Problem: Orthostatic hypotension due to Parkinson's disease.  Plan: -2/2 to progression of Parkinson's, dehydration    -hold tamsulosin, start finasteride    -S/p gentle hydration    -Orthostatics negative this AM.          Problem/Plan - 4:    ·  Problem: Acute kidney injury superimposed on CKD.  Plan: -likely 2/2 rhabdo from fall     -renal recs appreciated    -renal US reviewed, "Bilateral renal cortical thinning and multiple cysts noted. No hydronephrosis. Enlarged prostate."    -trend BMP and CK (improved to 3K from 9K).         Dispo: PT evaluated patient and recommended rehab. Patient and his wife declined rehab and prefer home w/ home PT. 80M with HTN (episodes of hypotension), Parkinson's, CKD, hypothyroidism presents s/p syncopal episode likely 2/2 to postural instability in the setting of progression of parkinson's > pt had renal sonogram and echocardiogram done. Pt stable for discharge as per attending.           Problem/Plan - 1:    ·  Problem: Syncope.  Plan: -Pt s/p syncopal episode with brief LOC upon standing. States he has multiple episodes of similar occurrence    -Denies chest pain. EKG without acute changes. Trops elevated likely 2/2 to HTN urgency, MELISSA on CKD.  Continue to trend.     -TTE :    -Rigidity on exam but otherwise non focal.     -CT head wnl, low suspicion for TIA/CVA    -Leukocytosis likely reactive -> improved, no evidence of infection. Monitor off antibiotics    -Suspect likely orthostatic instability 2/2 to progression of Parkinson's, dehydration. Carbidopa recently increased at night, will hold bedtime doing. Continue with TID dosing. Emailed patient's neurologist Dr. Gao    -Monitor on telemetry    -PT eval for rehab but patient refusing so for home PT    -Orthostatic BP negative.          Problem/Plan - 2:    ·  Problem: Hypertensive urgency.  Plan: BP max 261/115 in ED, now 160s/80s     Per wife, patient was hypotensive for the past several months. Has not taking BP meds since May 2020 because his BP at home has been very low. Wife records: 90s-80s/60s-50. In [employer] office visit on 7/1: BP sitting- 166/89 BP standing- 150/85    -restart home meds. Advised wife to get new BP cuff     -Likely related to autonomic dysfunction in setting of PD.     -Avoid hypotension     -VSS q4h.          Problem/Plan - 3:    ·  Problem: Orthostatic hypotension due to Parkinson's disease.  Plan: -2/2 to progression of Parkinson's, dehydration    -hold tamsulosin, start finasteride    -S/p gentle hydration    -Orthostatics negative this AM.          Problem/Plan - 4:    ·  Problem: Acute kidney injury superimposed on CKD.  Plan: -likely 2/2 rhabdo from fall     -renal recs appreciated    -renal US reviewed, "Bilateral renal cortical thinning and multiple cysts noted. No hydronephrosis. Enlarged prostate."    -trend BMP and CK (improved to 3K from 9K).         Dispo: PT evaluated patient and recommended rehab. Patient and his wife declined rehab and prefer home w/ home PT. 80M with HTN (episodes of hypotension), Parkinson's, CKD, hypothyroidism presents s/p syncopal episode likely 2/2 to postural instability in the setting of progression of parkinson's > pt had renal sonogram and echocardiogram done. Pt stable for discharge as per attending.          Hospital Course    Syncope.      -Pt s/p syncopal episode with brief LOC upon standing. States he has multiple episodes of similar occurrence    -Denies chest pain. EKG without acute changes. Trops elevated likely 2/2 to HTN urgency, MELISSA on CKD.  Continue to trend.     -TTE:     -Rigidity on exam but otherwise non focal.     -CT head wnl, low suspicion for TIA/CVA    -Leukocytosis likely reactive -> improved, no evidence of infection. Monitor off antibiotics    -Suspect likely orthostatic instability 2/2 to progression of Parkinson's, dehydration. Carbidopa recently increased at night, will hold bedtime doing. Continue with TID dosing. Emailed patient's neurologist Dr. Gao    -Monitor on telemetry    -PT eval for rehab but patient refusing so for home PT    -Orthostatic BP negative.          Problem/Plan - 2:    ·  Problem: Hypertensive urgency.  Plan: BP max 261/115 in ED, now 160s/80s     Per wife, patient was hypotensive for the past several months. Has not taking BP meds since May 2020 because his BP at home has been very low. Wife records: 90s-80s/60s-50. In [employer] office visit on 7/1: BP sitting- 166/89 BP standing- 150/85    -restart home meds. Advised wife to get new BP cuff     -Likely related to autonomic dysfunction in setting of PD.     -Avoid hypotension     -VSS q4h.          Problem/Plan - 3:    ·  Problem: Orthostatic hypotension due to Parkinson's disease.  Plan: -2/2 to progression of Parkinson's, dehydration    -hold tamsulosin, start finasteride    -S/p gentle hydration    -Orthostatics negative this AM.          Problem/Plan - 4:    ·  Problem: Acute kidney injury superimposed on CKD.  Plan: -likely 2/2 rhabdo from fall     -renal recs appreciated    -renal US reviewed, "Bilateral renal cortical thinning and multiple cysts noted. No hydronephrosis. Enlarged prostate."    -trend BMP and CK (improved to 3K from 9K).         Dispo: PT evaluated patient and recommended rehab. Patient and his wife declined rehab and prefer home w/ home PT. 80M with HTN (episodes of hypotension), Parkinson's, CKD, hypothyroidism presents s/p syncopal episode likely 2/2 to postural instability in the setting of progression of parkinson's > pt had renal sonogram and echocardiogram done. Pt stable for discharge as per attending.          Hospital Course    Syncope.      -Pt s/p syncopal episode with brief LOC upon standing. States he has multiple episodes of similar occurrence    -Denies chest pain. EKG without acute changes. Trops elevated likely 2/2 to HTN urgency, MELISSA on CKD.  Continue to trend.     -TTE:     -Rigidity on exam but otherwise non focal.     -CT head wnl, low suspicion for TIA/CVA    -Leukocytosis likely reactive -> improved, no evidence of infection. Monitor off antibiotics    -Suspect likely orthostatic instability 2/2 to progression of Parkinson's, dehydration. Carbidopa recently increased at night, will hold bedtime doing. Continue with TID dosing. Emailed patient's neurologist Dr. Gao    -Monitored on telemetry    -PT eval for rehab however pt family requesting DC home w/ home PT    -Orthostatic BP negative.         Hypertensive urgency.      -BP max 261/115 in ED, now 160s/80s     -Per wife, patient was hypotensive for the past several months. Has not taking BP meds since May 2020 because his BP at home has been very low. Wife records: 90s-80s/60s-50. In [employer] office visit on 7/1: BP sitting- 166/89 BP standing- 150/85    -restarted home meds- norvasc 5 mg qd and hydralazine 25 mg TID. Advised wife to get new BP cuff     -Likely related to autonomic dysfunction in setting of PD.         Orthostatic hypotension due to Parkinson's disease.      -2/2 to progression of Parkinson's, dehydration    -hold tamsulosin, start finasteride    -S/p gentle hydration    -Orthostatics negative        Acute kidney injury superimposed on CKD.     -likely 2/2 rhabdo from fall     -renal followed     -renal US reviewed, "Bilateral renal cortical thinning and multiple cysts noted. No hydronephrosis. Enlarged prostate."    -trend BMP and CK (improved to 3K from 9K).         Dispo: PT evaluated patient and recommended rehab. Patient and his wife declined rehab and prefer home w/ home PT.         On 7/18 this case was reviewed with Dr. Hinkle, the patient is medically stable and optimized for discharge. All medications were reviewed and prescriptions were sent to mutually agreed upon pharmacy. 80M with HTN (episodes of hypotension), Parkinson's, CKD, hypothyroidism presents s/p syncopal episode likely 2/2 to postural instability in the setting of progression of parkinson's > pt had renal sonogram and echocardiogram done. Pt stable for discharge as per attending.          Hospital Course    Syncope.      -Pt s/p syncopal episode with brief LOC upon standing. States he has multiple episodes of similar occurrence    -Denies chest pain. EKG without acute changes. Trops elevated likely 2/2 to HTN urgency, MELISSA on CKD.  Continue to trend.     -TTE:     -Rigidity on exam but otherwise non focal.     -CT head wnl, low suspicion for TIA/CVA    -Leukocytosis likely reactive -> improved, no evidence of infection. Monitor off antibiotics    -Suspect likely orthostatic instability 2/2 to progression of Parkinson's, dehydration. Carbidopa recently increased at night, will hold bedtime doing. Continue with TID dosing. Emailed patient's neurologist Dr. Gao    -Monitored on telemetry    -PT eval for rehab however pt family requesting DC home w/ home PT    -Orthostatic BP negative.         Hypertensive urgency.      -BP max 261/115 in ED, now 160s/80s     -Per wife, patient was hypotensive for the past several months. Has not taking BP meds since May 2020 because his BP at home has been very low. Wife records: 90s-80s/60s-50. In [employer] office visit on 7/1: BP sitting- 166/89 BP standing- 150/85    -restarted home meds- norvasc 5 mg qd and hydralazine 25 mg TID. Advised wife to get new BP cuff     -Likely related to autonomic dysfunction in setting of PD.         Orthostatic hypotension due to dysautonomia 2/2 Parkinson's disease.      -2/2 to progression of Parkinson's, dehydration    -hold tamsulosin, start finasteride    -S/p gentle hydration    -Orthostatics negative        Acute kidney injury superimposed on CKD.     -likely 2/2 rhabdo from fall     -renal followed     -renal US reviewed, "Bilateral renal cortical thinning and multiple cysts noted. No hydronephrosis. Enlarged prostate."    -trend BMP and CK (improved to 3K from 9K).         Dispo: PT evaluated patient and recommended rehab. Patient and his wife declined rehab and prefer home w/ home PT.         On 7/19 this case was reviewed with Dr. Hinkle, the patient is medically stable and optimized for discharge. All medications were reviewed and prescriptions were sent to mutually agreed upon pharmacy.

## 2020-07-18 LAB
ANION GAP SERPL CALC-SCNC: 13 MMO/L — SIGNIFICANT CHANGE UP (ref 7–14)
BASOPHILS # BLD AUTO: 0.02 K/UL — SIGNIFICANT CHANGE UP (ref 0–0.2)
BASOPHILS NFR BLD AUTO: 0.3 % — SIGNIFICANT CHANGE UP (ref 0–2)
BUN SERPL-MCNC: 33 MG/DL — HIGH (ref 7–23)
CALCIUM SERPL-MCNC: 8.7 MG/DL — SIGNIFICANT CHANGE UP (ref 8.4–10.5)
CHLORIDE SERPL-SCNC: 108 MMOL/L — HIGH (ref 98–107)
CO2 SERPL-SCNC: 21 MMOL/L — LOW (ref 22–31)
CREAT SERPL-MCNC: 1.5 MG/DL — HIGH (ref 0.5–1.3)
EOSINOPHIL # BLD AUTO: 0.18 K/UL — SIGNIFICANT CHANGE UP (ref 0–0.5)
EOSINOPHIL NFR BLD AUTO: 2.4 % — SIGNIFICANT CHANGE UP (ref 0–6)
GLUCOSE SERPL-MCNC: 84 MG/DL — SIGNIFICANT CHANGE UP (ref 70–99)
HCT VFR BLD CALC: 36.1 % — LOW (ref 39–50)
HGB BLD-MCNC: 11.6 G/DL — LOW (ref 13–17)
IMM GRANULOCYTES NFR BLD AUTO: 0.4 % — SIGNIFICANT CHANGE UP (ref 0–1.5)
LYMPHOCYTES # BLD AUTO: 0.63 K/UL — LOW (ref 1–3.3)
LYMPHOCYTES # BLD AUTO: 8.4 % — LOW (ref 13–44)
MAGNESIUM SERPL-MCNC: 2.3 MG/DL — SIGNIFICANT CHANGE UP (ref 1.6–2.6)
MCHC RBC-ENTMCNC: 32.1 % — SIGNIFICANT CHANGE UP (ref 32–36)
MCHC RBC-ENTMCNC: 33 PG — SIGNIFICANT CHANGE UP (ref 27–34)
MCV RBC AUTO: 102.8 FL — HIGH (ref 80–100)
MONOCYTES # BLD AUTO: 0.5 K/UL — SIGNIFICANT CHANGE UP (ref 0–0.9)
MONOCYTES NFR BLD AUTO: 6.6 % — SIGNIFICANT CHANGE UP (ref 2–14)
NEUTROPHILS # BLD AUTO: 6.17 K/UL — SIGNIFICANT CHANGE UP (ref 1.8–7.4)
NEUTROPHILS NFR BLD AUTO: 81.9 % — HIGH (ref 43–77)
NRBC # FLD: 0 K/UL — SIGNIFICANT CHANGE UP (ref 0–0)
PHOSPHATE SERPL-MCNC: 3 MG/DL — SIGNIFICANT CHANGE UP (ref 2.5–4.5)
PLATELET # BLD AUTO: 193 K/UL — SIGNIFICANT CHANGE UP (ref 150–400)
PMV BLD: 12.3 FL — SIGNIFICANT CHANGE UP (ref 7–13)
POTASSIUM SERPL-MCNC: 3.3 MMOL/L — LOW (ref 3.5–5.3)
POTASSIUM SERPL-SCNC: 3.3 MMOL/L — LOW (ref 3.5–5.3)
RBC # BLD: 3.51 M/UL — LOW (ref 4.2–5.8)
RBC # FLD: 13.8 % — SIGNIFICANT CHANGE UP (ref 10.3–14.5)
SODIUM SERPL-SCNC: 142 MMOL/L — SIGNIFICANT CHANGE UP (ref 135–145)
WBC # BLD: 7.53 K/UL — SIGNIFICANT CHANGE UP (ref 3.8–10.5)
WBC # FLD AUTO: 7.53 K/UL — SIGNIFICANT CHANGE UP (ref 3.8–10.5)

## 2020-07-18 PROCEDURE — 99232 SBSQ HOSP IP/OBS MODERATE 35: CPT

## 2020-07-18 RX ORDER — POTASSIUM CHLORIDE 20 MEQ
40 PACKET (EA) ORAL ONCE
Refills: 0 | Status: COMPLETED | OUTPATIENT
Start: 2020-07-18 | End: 2020-07-18

## 2020-07-18 RX ORDER — SODIUM CHLORIDE 9 MG/ML
500 INJECTION INTRAMUSCULAR; INTRAVENOUS; SUBCUTANEOUS ONCE
Refills: 0 | Status: COMPLETED | OUTPATIENT
Start: 2020-07-18 | End: 2020-07-18

## 2020-07-18 RX ADMIN — Medication 25 MILLIGRAM(S): at 05:40

## 2020-07-18 RX ADMIN — Medication 40 MILLIEQUIVALENT(S): at 14:07

## 2020-07-18 RX ADMIN — HEPARIN SODIUM 5000 UNIT(S): 5000 INJECTION INTRAVENOUS; SUBCUTANEOUS at 21:12

## 2020-07-18 RX ADMIN — CARBIDOPA AND LEVODOPA 2 TABLET(S): 25; 100 TABLET ORAL at 14:06

## 2020-07-18 RX ADMIN — Medication 25 MILLIGRAM(S): at 21:12

## 2020-07-18 RX ADMIN — FINASTERIDE 5 MILLIGRAM(S): 5 TABLET, FILM COATED ORAL at 14:06

## 2020-07-18 RX ADMIN — HEPARIN SODIUM 5000 UNIT(S): 5000 INJECTION INTRAVENOUS; SUBCUTANEOUS at 05:40

## 2020-07-18 RX ADMIN — CARBIDOPA AND LEVODOPA 2 TABLET(S): 25; 100 TABLET ORAL at 05:40

## 2020-07-18 RX ADMIN — Medication 25 MILLIGRAM(S): at 14:06

## 2020-07-18 RX ADMIN — SODIUM CHLORIDE 500 MILLILITER(S): 9 INJECTION INTRAMUSCULAR; INTRAVENOUS; SUBCUTANEOUS at 23:45

## 2020-07-18 RX ADMIN — Medication 75 MICROGRAM(S): at 05:40

## 2020-07-18 RX ADMIN — Medication 100 MILLIGRAM(S): at 14:06

## 2020-07-18 RX ADMIN — CARBIDOPA AND LEVODOPA 2 TABLET(S): 25; 100 TABLET ORAL at 21:12

## 2020-07-18 RX ADMIN — SIMVASTATIN 40 MILLIGRAM(S): 20 TABLET, FILM COATED ORAL at 21:12

## 2020-07-18 RX ADMIN — AMLODIPINE BESYLATE 5 MILLIGRAM(S): 2.5 TABLET ORAL at 05:39

## 2020-07-18 RX ADMIN — HEPARIN SODIUM 5000 UNIT(S): 5000 INJECTION INTRAVENOUS; SUBCUTANEOUS at 14:06

## 2020-07-18 NOTE — CHART NOTE - NSCHARTNOTEFT_GEN_A_CORE
Patient is for discharge tonight. BP prior to the administration of night dose of  Hydralazine 25 mg -186/86. Repeated BP post administration of the /72 manually. Patient also received due Proscar and Sinemet at night .Orthostatic BP checked. Patient is orthostatic positive. Normal saline 500 ml x1 ordered. Patient seen at bedside. Patient denies dizziness or chest pain. Discharge cancelled. Spoke to wife over the phone( [phone])  and updated plan of care. Case discussed with hospitalist and agrees with the plan. Will continue to monitor overnight.  ICU Vital Signs Last 24 Hrs  T(C): 37 (18 Jul 2020 17:23), Max: 37 (18 Jul 2020 17:23)  T(F): 98.6 (18 Jul 2020 17:23), Max: 98.6 (18 Jul 2020 17:23)  HR: 82 (18 Jul 2020 21:00) (70 - 82)  BP: 110/78 (18 Jul 2020 22:07) (110/78 - 188/80)  BP(mean): --  ABP: --  ABP(mean): --  RR: 18 (18 Jul 2020 17:23) (17 - 18)  SpO2: 99% (18 Jul 2020 17:23) (99% - 100%)

## 2020-07-18 NOTE — PROVIDER CONTACT NOTE (CHANGE IN STATUS NOTIFICATION) - ASSESSMENT
pt woke up and called his wife from hospital phone, was confused, not sure where he was. Endorses no other symptoms. easily reoriented. States he feels he might have been dreaming. No focal deficits noted during neuro check.

## 2020-07-18 NOTE — PROGRESS NOTE ADULT - ASSESSMENT
80M with HTN (episodes of hypotension), Parkinson's, CKD, hypothyroidism presents s/p syncopal episode likely 2/2 to postural instability in the setting of progression of parkinson's   < from: Transthoracic Echocardiogram (07.17.20 @ 17:33) >  CONCLUSIONS:  Ef 73 %  1. Mitral annular calcification, otherwise normal mitral  valve. Mild mitral regurgitation.  2. Mildly dilated left atrium.  LA volume index = 35 cc/m2.  3. Normal left ventricular internal dimensions and wall  thicknesses.  4. Normal left ventricular systolic function. No segmental  wall motion abnormalities.  5. Mild diastolic dysfunction (Stage I).  6. Normal right ventricular size and function.  *** Compared with echocardiogram of 5/16/2018, no  significant changes noted.    < end of copied text >

## 2020-07-18 NOTE — PROGRESS NOTE ADULT - ATTENDING COMMENTS
Bp improved  Echo done Bp improved  Echo done  Suspect likely orthostatic instability 2/2 to progression of Parkinson's, dehydration. Carbidopa recently increased at night, will hold bedtime doing. Continue with TID  patient was to go home 7/17 as per NP  He will go home today35 min to coordinate d/c

## 2020-07-19 VITALS
SYSTOLIC BLOOD PRESSURE: 172 MMHG | DIASTOLIC BLOOD PRESSURE: 76 MMHG | OXYGEN SATURATION: 99 % | RESPIRATION RATE: 17 BRPM | HEART RATE: 75 BPM | TEMPERATURE: 98 F

## 2020-07-19 DIAGNOSIS — I50.30 UNSPECIFIED DIASTOLIC (CONGESTIVE) HEART FAILURE: ICD-10-CM

## 2020-07-19 LAB
ANION GAP SERPL CALC-SCNC: 12 MMO/L — SIGNIFICANT CHANGE UP (ref 7–14)
BUN SERPL-MCNC: 33 MG/DL — HIGH (ref 7–23)
CALCIUM SERPL-MCNC: 8.9 MG/DL — SIGNIFICANT CHANGE UP (ref 8.4–10.5)
CHLORIDE SERPL-SCNC: 107 MMOL/L — SIGNIFICANT CHANGE UP (ref 98–107)
CO2 SERPL-SCNC: 21 MMOL/L — LOW (ref 22–31)
CREAT SERPL-MCNC: 1.58 MG/DL — HIGH (ref 0.5–1.3)
GLUCOSE SERPL-MCNC: 90 MG/DL — SIGNIFICANT CHANGE UP (ref 70–99)
MAGNESIUM SERPL-MCNC: 2.3 MG/DL — SIGNIFICANT CHANGE UP (ref 1.6–2.6)
PHOSPHATE SERPL-MCNC: 2.9 MG/DL — SIGNIFICANT CHANGE UP (ref 2.5–4.5)
POTASSIUM SERPL-MCNC: 3.7 MMOL/L — SIGNIFICANT CHANGE UP (ref 3.5–5.3)
POTASSIUM SERPL-SCNC: 3.7 MMOL/L — SIGNIFICANT CHANGE UP (ref 3.5–5.3)
SODIUM SERPL-SCNC: 140 MMOL/L — SIGNIFICANT CHANGE UP (ref 135–145)

## 2020-07-19 PROCEDURE — 99239 HOSP IP/OBS DSCHRG MGMT >30: CPT

## 2020-07-19 RX ADMIN — FINASTERIDE 5 MILLIGRAM(S): 5 TABLET, FILM COATED ORAL at 13:08

## 2020-07-19 RX ADMIN — Medication 25 MILLIGRAM(S): at 13:08

## 2020-07-19 RX ADMIN — CARBIDOPA AND LEVODOPA 2 TABLET(S): 25; 100 TABLET ORAL at 05:31

## 2020-07-19 RX ADMIN — Medication 75 MICROGRAM(S): at 05:31

## 2020-07-19 RX ADMIN — AMLODIPINE BESYLATE 5 MILLIGRAM(S): 2.5 TABLET ORAL at 05:31

## 2020-07-19 RX ADMIN — Medication 100 MILLIGRAM(S): at 13:08

## 2020-07-19 RX ADMIN — CARBIDOPA AND LEVODOPA 2 TABLET(S): 25; 100 TABLET ORAL at 13:08

## 2020-07-19 RX ADMIN — HEPARIN SODIUM 5000 UNIT(S): 5000 INJECTION INTRAVENOUS; SUBCUTANEOUS at 05:31

## 2020-07-19 RX ADMIN — HEPARIN SODIUM 5000 UNIT(S): 5000 INJECTION INTRAVENOUS; SUBCUTANEOUS at 13:08

## 2020-07-19 RX ADMIN — Medication 25 MILLIGRAM(S): at 07:38

## 2020-07-19 NOTE — PROGRESS NOTE ADULT - PROBLEM SELECTOR PLAN 2
BP max 261/115 in ED, now 160s/80s   Per wife, patient was hypotensive for the past several months. Has not taking BP meds since May 2020 because his BP at home has been very low. Wife records: 90s-80s/60s-50. In [employer] office visit on 7/1: BP sitting- 166/89 BP standing- 150/85  -restart home meds. Advised wife to get new BP cuff   -Likely related to autonomic dysfunction in setting of PD.   -Avoid hypotension   -VSS q4h BP max 261/115 in ED, now 160s/80s   Per wife, patient was hypotensive for the past several months. Has not taking BP meds since May 2020 because his BP at home has been very low. Wife records: 90s-80s/60s-50. In [employer] office visit on 7/1: BP sitting- 166/89 BP standing- 150/85  -restart home meds. Advised wife to get new BP cuff   -Likely related to autonomic dysfunction in setting of PD.   -Avoid hypotension   -VSS q4h  Out patient follow up with PCP for BP check

## 2020-07-19 NOTE — PROGRESS NOTE ADULT - PROBLEM SELECTOR PROBLEM 4
Acute kidney injury superimposed on CKD

## 2020-07-19 NOTE — PROVIDER CONTACT NOTE (OTHER) - BACKGROUND
Admitted after syncopal episode
Patient came in for syncope and collapse
Admitted after syncopal episode
Admitted after syncopal episode
Patient admitted with episode of syncope and collapse. Patient has Parkinson's Disease.
Patient admitted with episode of syncope and collapse. Patient has Parkinson's Disease.
Patient admitted with episode of syncope and collapse. Patient has Parkinson's Disease. Patient was scheduled for discharge on 3/18, but was found to be positive for orthostatic hypotension.
Patient came in for syncope and collapse
Patient came in for syncope and collapse
admitted for syncope
patient previous BP taken at 19:11 was 203/86. PO meds given. BP still not within parameters

## 2020-07-19 NOTE — PROGRESS NOTE ADULT - PROBLEM SELECTOR PLAN 7
DVT ppx: heparin  Dispo: PT recs rehab, wife and patient prefer home

## 2020-07-19 NOTE — PROGRESS NOTE ADULT - PROBLEM SELECTOR PLAN 1
-Pt s/p syncopal episode with brief LOC upon standing. States he has multiple episodes of similar occurrence  -Denies chest pain. EKG without acute changes. Trops elevated likely 2/2 to HTN urgency, MELISSA on CKD.  Continue to trend.   -TTE Done  Had svt on Tele-Patient with Low hr and Hr 150s- Tachy / savage - ?? need PPM- Ep to asses  -Rigidity on exam but otherwise non focal.   -CT head wnl, low suspicion for TIA/CVA  -Leukocytosis likely reactive -> improved, no evidence of infection. Monitor off antibiotics  -Suspect likely orthostatic instability 2/2 to progression of Parkinson's, dehydration. Carbidopa recently increased at night, will hold bedtime doing. Continue with TID dosing. Emailed patient's neurologist Dr. Gao  -Monitor on telemetry  -PT eval   -Orthostatic BP negative -Pt s/p syncopal episode with brief LOC upon standing. States he has multiple episodes of similar occurrence  -Denies chest pain. EKG without acute changes. Trops elevated likely 2/2 to HTN urgency, MELISSA on CKD.  Continue to trend.   -TTE Done  Had svt on Tele-Patient with Low hr and Hr 150s- Tachy / savage - ?? need PPM- Ep to asses  -Rigidity on exam but otherwise non focal.   -CT head wnl, low suspicion for TIA/CVA  -Leukocytosis likely reactive -> improved, no evidence of infection. Monitor off antibiotics  -Suspect likely orthostatic instability 2/2 to progression of Parkinson's, dehydration. Carbidopa recently increased at night, will hold bedtime doing. Continue with TID dosing. Emailed patient's neurologist Dr. Gao  -Monitor on telemetry  -PT eval - Rec REHAB- Family wants home  -Orthostatic BP negative

## 2020-07-19 NOTE — PROGRESS NOTE ADULT - ATTENDING COMMENTS
Patient was to go home 7/18 but had increased hr ans svt- EP called to asses  ?/ Tachy/ savage synd - ?? need PPM  ep to asses Patient was to go home 7/18 but had increased hr ans svt- EP called to asses  ?/ Tachy/ savage synd - ?? need PPM  ep to asses    Addendum 1:56 pm  Patient seen with EP- RN- patient DOES NOT HAVE SVT on tele- the readings were artifacts from shaking most likely sec to Parkinson    Patient again ready for home with son today  60 min to coordinate d/c Patient was to go home 7/18 but had increased hr ans svt- EP called to asses  ?/ Tachy/ savage synd - ?? need PPM  ep to asses    Addendum 1:56 pm  Patient seen with EP- RN- patient DOES NOT HAVE SVT on tele- the readings were artifacts from shaking most likely sec to Parkinson    Patient again ready for home with son today  Called wife Angela-c/w walker/ hickman and wheel chair- needs get up slowly - due to dysautonomia sec to Parkinson-  Called wife Angela  - patient is ready for home -explained all above- they will come for him in 1 hr.  Patient will f/u PCP for Bp   60 min to coordinate d/c

## 2020-07-19 NOTE — PROGRESS NOTE ADULT - SUBJECTIVE AND OBJECTIVE BOX
[facility] Division of Hospital Medicine  Josie Henry MD  Pager: [phone]      Patient is a 79y old  Male who presents with a chief complaint of syncope (2020 10:32)      SUBJECTIVE / OVERNIGHT EVENTS: patient feels well today, denies chest pain, SOB or dizziness. He wants to go home. He has a little pain on his R knee where he fell. Tele: sinus, sinus savage     MEDICATIONS  (STANDING):  allopurinol 100 milliGRAM(s) Oral daily  amLODIPine   Tablet 5 milliGRAM(s) Oral daily  carbidopa/levodopa  25/100 2 Tablet(s) Oral three times a day  finasteride 5 milliGRAM(s) Oral daily  heparin   Injectable 5000 Unit(s) SubCutaneous every 8 hours  hydrALAZINE 25 milliGRAM(s) Oral three times a day  levothyroxine 75 MICROGram(s) Oral daily  polyethylene glycol 3350 17 Gram(s) Oral daily  senna 2 Tablet(s) Oral at bedtime  simvastatin 40 milliGRAM(s) Oral at bedtime    MEDICATIONS  (PRN):  acetaminophen   Tablet .. 650 milliGRAM(s) Oral every 6 hours PRN Temp greater or equal to 38C (100.4F), Mild Pain (1 - 3), Moderate Pain (4 - 6)    PHYSICAL EXAM:  Vital Signs Last 24 Hrs  T(C): 36.8 (2020 11:39), Max: 36.9 (2020 12:50)  T(F): 98.3 (2020 11:39), Max: 98.4 (2020 12:50)  HR: 70 (2020 11:39) (56 - 70)  BP: 180/92 (2020 11:39) (145/76 - 225/105)  BP(mean): --  RR: 18 (2020 11:39) (15 - 20)  SpO2: 100% (2020 11:39) (96% - 100%)    CONSTITUTIONAL: elderly male in NAD  ENMT: Moist oral mucosa  RESPIRATORY: Normal respiratory effort; lungs are clear to auscultation bilaterally  CARDIOVASCULAR:  S1/S2; No lower extremity edema  ABDOMEN: Nontender to palpation, normoactive bowel sounds, no rebound/guarding  PSYCH: A+O to person, place, and time; affect appropriate  NEUROLOGY: rigidity of ext; Pill rolling tremor of RUE > LUE   SKIN: abrasion R knee     LABS:                        12.1   7.41  )-----------( 182      ( 2020 05:20 )             36.2     07-17    140  |  107  |  33<H>  ----------------------------<  81  3.7   |  20<L>  |  2.03<H>    Ca    9.0      2020 05:10  Phos  3.2     07-17  Mg     2.4     07-17        CARDIAC MARKERS ( 2020 05:10 )  x     / x     / 3460 u/L / x     / x      CARDIAC MARKERS ( 2020 05:30 )  x     / x     / 9042 u/L / 27.68 ng/mL / x      CARDIAC MARKERS ( 2020 00:57 )  x     / x     / 7412 u/L / 24.14 ng/mL / x          Urinalysis Basic - ( 15 Jul 2020 13:25 )    Color: LIGHT YELLOW / Appearance: CLEAR / S.016 / pH: 6.5  Gluc: NEGATIVE / Ketone: NEGATIVE  / Bili: NEGATIVE / Urobili: NORMAL   Blood: MODERATE / Protein: 50 / Nitrite: NEGATIVE   Leuk Esterase: NEGATIVE / RBC: 0-2 / WBC 0-2   Sq Epi: OCC / Non Sq Epi: x / Bacteria: NEGATIVE        Culture - Urine (collected 15 Jul 2020 13:16)  Source: .Urine Clean Catch (Midstream)  Final Report (2020 16:05):    <10,000 CFU/mL Normal Urogenital Lolita        RADIOLOGY & ADDITIONAL TESTS:  Results Reviewed:   Imaging Personally Reviewed:  Electrocardiogram Personally Reviewed:    COORDINATION OF CARE:  Care Discussed with Consultants/Other Providers [Y/N]:  Prior or Outpatient Records Reviewed [Y/N]:
[facility] Division of Hospital Medicine  Josie Henry MD  Pager: [phone]      Patient is a 79y old  Male who presents with a chief complaint of syncope (15 Jul 2020 18:08)      SUBJECTIVE / OVERNIGHT EVENTS: patient feels okay this AM, he states he has fallen several times in the past several weeks. Currently denies chest pain or SOB. States he felt lightheaded prior to fall and thinks he had LOC for a few seconds. Did not hit his head. States that he felt slightly dizzy when going from sit to stand with PT as well.     MEDICATIONS  (STANDING):  allopurinol 100 milliGRAM(s) Oral daily  amLODIPine   Tablet 2.5 milliGRAM(s) Oral daily  carbidopa/levodopa  25/100 2 Tablet(s) Oral three times a day  finasteride 5 milliGRAM(s) Oral daily  heparin   Injectable 5000 Unit(s) SubCutaneous every 8 hours  hydrALAZINE 25 milliGRAM(s) Oral three times a day  lactated ringers. 1000 milliLiter(s) (75 mL/Hr) IV Continuous <Continuous>  levothyroxine 75 MICROGram(s) Oral daily  polyethylene glycol 3350 17 Gram(s) Oral daily  senna 2 Tablet(s) Oral at bedtime  simvastatin 40 milliGRAM(s) Oral at bedtime    MEDICATIONS  (PRN):  acetaminophen   Tablet .. 650 milliGRAM(s) Oral every 6 hours PRN Temp greater or equal to 38C (100.4F), Mild Pain (1 - 3), Moderate Pain (4 - 6)      CAPILLARY BLOOD GLUCOSE        I&O's Summary    15 Jul 2020 07:01  -  2020 07:00  --------------------------------------------------------  IN: 0 mL / OUT: 275 mL / NET: -275 mL        PHYSICAL EXAM:  Vital Signs Last 24 Hrs  T(C): 36.9 (2020 12:50), Max: 37.4 (15 Jul 2020 16:52)  T(F): 98.4 (2020 12:50), Max: 99.3 (15 Jul 2020 16:52)  HR: 68 (2020 12:50) (63 - 78)  BP: 161/87 (2020 12:50) (161/87 - 235/103)  BP(mean): --  RR: 20 (2020 12:50) (16 - 20)  SpO2: 98% (2020 12:50) (97% - 100%)    CONSTITUTIONAL: elderly male in NAD  ENMT: Moist oral mucosa  RESPIRATORY: Normal respiratory effort; lungs are clear to auscultation bilaterally  CARDIOVASCULAR:  S1/S2; No lower extremity edema  ABDOMEN: Nontender to palpation, normoactive bowel sounds, no rebound/guarding  PSYCH: A+O to person, place, and time; affect appropriate  NEUROLOGY: rigidity of ext; Pill rolling tremor of RUE > LUE   SKIN: No rashes; no palpable lesions    LABS:                        13.6   9.98  )-----------( 211      ( 2020 05:30 )             42.2     07-16    144  |  108<H>  |  31<H>  ----------------------------<  102<H>  4.7   |  21<L>  |  2.08<H>    Ca    9.8      2020 05:30  Phos  3.4     07-16  Mg     2.5     07-16    TPro  6.5  /  Alb  3.9  /  TBili  0.3  /  DBili  x   /  AST  18  /  ALT  5   /  AlkPhos  88  07-15      CARDIAC MARKERS ( 2020 05:30 )  x     / x     / 9042 u/L / 27.68 ng/mL / x      CARDIAC MARKERS ( 2020 00:57 )  x     / x     / 7412 u/L / 24.14 ng/mL / x          Urinalysis Basic - ( 15 Jul 2020 13:25 )    Color: LIGHT YELLOW / Appearance: CLEAR / S.016 / pH: 6.5  Gluc: NEGATIVE / Ketone: NEGATIVE  / Bili: NEGATIVE / Urobili: NORMAL   Blood: MODERATE / Protein: 50 / Nitrite: NEGATIVE   Leuk Esterase: NEGATIVE / RBC: 0-2 / WBC 0-2   Sq Epi: OCC / Non Sq Epi: x / Bacteria: NEGATIVE          RADIOLOGY & ADDITIONAL TESTS:  Results Reviewed: X  Imaging Personally Reviewed:  Electrocardiogram Personally Reviewed:    COORDINATION OF CARE:  Care Discussed with Consultants/Other Providers [Y/N]:  Prior or Outpatient Records Reviewed [Y/N]:
No pain, no shortness of breath. Wants to go home.     Review of systems: All 10 points ROS was obtained except as above.     acetaminophen   Tablet .. 650 milliGRAM(s) Oral every 6 hours PRN  allopurinol 100 milliGRAM(s) Oral daily  amLODIPine   Tablet 5 milliGRAM(s) Oral daily  carbidopa/levodopa  25/100 2 Tablet(s) Oral three times a day  finasteride 5 milliGRAM(s) Oral daily  heparin   Injectable 5000 Unit(s) SubCutaneous every 8 hours  hydrALAZINE 25 milliGRAM(s) Oral three times a day  levothyroxine 75 MICROGram(s) Oral daily  polyethylene glycol 3350 17 Gram(s) Oral daily PRN  senna 2 Tablet(s) Oral at bedtime  simvastatin 40 milliGRAM(s) Oral at bedtime      VITAL:  T(C): , Max: 36.9 (07-16-20 @ 18:04)  T(F): , Max: 98.4 (07-16-20 @ 18:04)  HR: 74 (07-17-20 @ 15:23)  BP: 158/88 (07-17-20 @ 15:23)  BP(mean): --  RR: 18 (07-17-20 @ 15:23)  SpO2: 100% (07-17-20 @ 15:23)  Wt(kg): --      PHYSICAL EXAM:Scr improved to   General: NAD; Alert  HEENT:  NCAT; PERRLA  Neck: No JVD; supple  Respiratory: CTA-b/l  Cardiac: RRR s1s2  Gastrointestinal: BS+, soft, NT/ND  Urologic: No perry  Extremities: No peripheral edema      LABS:                          12.1   7.41  )-----------( 182      ( 17 Jul 2020 05:20 )             36.2     Na(140)/K(3.7)/Cl(107)/HCO3(20)/BUN(33)/Cr(2.03)Glu(81)/Ca(9.0)/Mg(2.4)/PO4(3.2)    07-17 @ 05:10  Na(144)/K(4.7)/Cl(108)/HCO3(21)/BUN(31)/Cr(2.08)Glu(102)/Ca(9.8)/Mg(2.5)/PO4(3.4)    07-16 @ 05:30  Na(141)/K(3.6)/Cl(109)/HCO3(18)/BUN(34)/Cr(2.25)Glu(108)/Ca(8.9)/Mg(--)/PO4(--)    07-15 @ 12:30    07-17    140  |  107  |  33<H>  ----------------------------<  81  3.7   |  20<L>  |  2.03<H>    Ca    9.0      17 Jul 2020 05:10  Phos  3.2     07-17  Mg     2.4     07-17      EXAM:  US KIDNEYS AND BLADDER        PROCEDURE DATE:  Jul 17 2020         INTERPRETATION:  CLINICAL INFORMATION: MELISSA and CKD.    COMPARISON: None available.    TECHNIQUE: Sonography of the kidneys and bladder.     FINDINGS:    Right kidney:  9.7 cm. Cortical thinning and multiple cysts noted. A dominant cyst measures 3.6 x 3.4 x 2.8 cm. No  hydronephrosis or calculi.    Left kidney:  10.2 cm. Cortical thinning and multiple cysts noted. A dominant cyst measures 3.8 x 4.0 x 3.4 cm. No hydronephrosis or calculi.    Urinary bladder: Within normal limits.    Prostate: Enlarged, causing mass effect on the bladder, with a volume of 58 cc (normal volume < 30 cc).    IMPRESSION:     Bilateral renal cortical thinning and multiple cysts noted. No hydronephrosis.  Enlarged prostate.
Patient is a 79y old  Male who presents with a chief complaint of syncope (18 Jul 2020 11:06)      SUBJECTIVE / OVERNIGHT EVENTS:  Patient is resting in bed    MEDICATIONS  (STANDING):  allopurinol 100 milliGRAM(s) Oral daily  amLODIPine   Tablet 5 milliGRAM(s) Oral daily  carbidopa/levodopa  25/100 2 Tablet(s) Oral three times a day  finasteride 5 milliGRAM(s) Oral daily  heparin   Injectable 5000 Unit(s) SubCutaneous every 8 hours  hydrALAZINE 25 milliGRAM(s) Oral three times a day  levothyroxine 75 MICROGram(s) Oral daily  potassium chloride    Tablet ER 40 milliEquivalent(s) Oral once  simvastatin 40 milliGRAM(s) Oral at bedtime    MEDICATIONS  (PRN):  acetaminophen   Tablet .. 650 milliGRAM(s) Oral every 6 hours PRN Temp greater or equal to 38C (100.4F), Mild Pain (1 - 3), Moderate Pain (4 - 6)      Vital Signs Last 24 Hrs  T(C): 36.4 (18 Jul 2020 09:53), Max: 37.1 (17 Jul 2020 17:35)  T(F): 97.6 (18 Jul 2020 09:53), Max: 98.8 (17 Jul 2020 17:35)  HR: 76 (18 Jul 2020 09:53) (73 - 76)  BP: 158/88 (18 Jul 2020 09:53) (158/88 - 203/82)  BP(mean): --  RR: 18 (18 Jul 2020 09:53) (17 - 18)  SpO2: 99% (18 Jul 2020 09:53) (99% - 100%)    PHYSICAL EXAM:  GENERAL: NAD, well-developed  HEAD:  Atraumatic, Normocephalic  EYES: EOMI, PERRLA, conjunctiva and sclera clear  NECK: Supple, No JVD  CHEST/LUNG: Clear to auscultation bilaterally; No wheeze  HEART: Regular rate and rhythm; No murmurs, rubs, or gallops  ABDOMEN: Soft, Nontender, Nondistended; Bowel sounds present  EXTREMITIES:  2+ Peripheral Pulses, No clubbing, cyanosis, or edema  PSYCH: AAOx3  NEUROLOGY: non-focal  SKIN: No rashes or lesions    LABS:                        11.6   7.53  )-----------( 193      ( 18 Jul 2020 06:30 )             36.1     07-18    142  |  108<H>  |  33<H>  ----------------------------<  84  3.3<L>   |  21<L>  |  1.50<H>    Ca    8.7      18 Jul 2020 06:30  Phos  3.0     07-18  Mg     2.3     07-18        CARDIAC MARKERS ( 17 Jul 2020 05:10 )  x     / x     / 3460 u/L / x     / x              RADIOLOGY & ADDITIONAL TESTS:  < from: CT Head No Cont (07.15.20 @ 13:32) >  IMPRESSION:  Volume loss with microvascular disease, no acute hemorrhage or midline shift. If symptoms persist consider follow-up head CT or MR if no contraindications    < from: US Kidney and Bladder (07.17.20 @ 10:12) >  IMPRESSION:   Bilateral renal cortical thinning and multiple cysts noted. No hydronephrosis.  Enlarged prostate.
Patient is a 79y old  Male who presents with a chief complaint of syncope (19 Jul 2020 13:46)      SUBJECTIVE / OVERNIGHT EVENTS:  resting in bed. no sob/ cp  Patient wants to go home    MEDICATIONS  (STANDING):  allopurinol 100 milliGRAM(s) Oral daily  amLODIPine   Tablet 5 milliGRAM(s) Oral daily  carbidopa/levodopa  25/100 2 Tablet(s) Oral three times a day  finasteride 5 milliGRAM(s) Oral daily  heparin   Injectable 5000 Unit(s) SubCutaneous every 8 hours  hydrALAZINE 25 milliGRAM(s) Oral three times a day  levothyroxine 75 MICROGram(s) Oral daily  simvastatin 40 milliGRAM(s) Oral at bedtime    MEDICATIONS  (PRN):  acetaminophen   Tablet .. 650 milliGRAM(s) Oral every 6 hours PRN Temp greater or equal to 38C (100.4F), Mild Pain (1 - 3), Moderate Pain (4 - 6)      Vital Signs Last 24 Hrs  T(C): 36.9 (19 Jul 2020 13:02), Max: 37 (18 Jul 2020 17:23)  T(F): 98.5 (19 Jul 2020 13:02), Max: 98.6 (18 Jul 2020 17:23)  HR: 75 (19 Jul 2020 13:02) (59 - 82)  BP: 172/76 (19 Jul 2020 13:02) (110/78 - 193/76)  BP(mean): --  RR: 17 (19 Jul 2020 13:02) (17 - 18)  SpO2: 99% (19 Jul 2020 13:02) (99% - 99%)    PHYSICAL EXAM:  GENERAL: NAD, well-developed  HEAD:  Atraumatic, Normocephalic  EYES: EOMI, PERRLA, conjunctiva and sclera clear  NECK: Supple, No JVD  CHEST/LUNG: Clear to auscultation bilaterally; No wheeze  HEART: Regular rate and rhythm; No murmurs, rubs, or gallops  ABDOMEN: Soft, Nontender, Nondistended; Bowel sounds present  EXTREMITIES:  2+ Peripheral Pulses, No clubbing, cyanosis, or edema  PSYCH: Alert  NEUROLOGY: mask like face    LABS:                        11.6   7.53  )-----------( 193      ( 18 Jul 2020 06:30 )             36.1     07-19    140  |  107  |  33<H>  ----------------------------<  90  3.7   |  21<L>  |  1.58<H>    Ca    8.9      19 Jul 2020 05:24  Phos  2.9     07-19  Mg     2.3     07-19            Care Discussed with Consultants/Other Providers:  EP- RN- notes no SVT on telemetry- all artifacts

## 2020-07-19 NOTE — PROVIDER CONTACT NOTE (OTHER) - DATE AND TIME:
15-Jul-2020 22:02
16-Jul-2020 00:40
16-Jul-2020 08:23
16-Jul-2020 11:15
16-Jul-2020 18:11
16-Jul-2020 22:45
17-Jul-2020 00:20
17-Jul-2020 04:15
17-Jul-2020 08:25
17-Jul-2020 19:17
17-Jul-2020 21:23
18-Jul-2020 13:46
19-Jul-2020 01:35
19-Jul-2020 02:45
19-Jul-2020 04:30
16-Jul-2020 14:57

## 2020-07-19 NOTE — PROGRESS NOTE ADULT - PROBLEM SELECTOR PLAN 4
-likely 2/2 rhabdo from fall   -renal recs appreciated  -renal US reviewed, "Bilateral renal cortical thinning and multiple cysts noted. No hydronephrosis. Enlarged prostate."  -trend BMP and CK (improved to 3K from 9K)
-likely 2/2 rhabdo from fall   -renal recs appreciated  -renal US reviewed, "Bilateral renal cortical thinning and multiple cysts noted. No hydronephrosis. Enlarged prostate."  -trend BMP and CK (improved to 3K from 9K)
-likely 2/2 rhabdo from fall   -IVF hydration @75 cc/hr   -trend BMP and CK
-likely 2/2 rhabdo from fall   -renal recs appreciated  -renal US reviewed, "Bilateral renal cortical thinning and multiple cysts noted. No hydronephrosis. Enlarged prostate."  -trend BMP and CK (improved to 3K from 9K)

## 2020-07-19 NOTE — CHART NOTE - NSCHARTNOTEFT_GEN_A_CORE
Notified by RN that patient with episode of bradycardia while sleeping. Heart rate on Tele monitor noted to be 55-59. Per RN patient asymptomatic. Later RN has informed patient noted to have an abnormal rhythm on the monitor. Tele strip reviewed. Patient noted to have 2 episodes of ( 0130 & 0220) ?? SVT heart rate approximately 150s and which has self resolved.  Patient had similar episode of tachycardia on 7/17/20. Patient denies chest pain, dizziness, palpitation and shortness of breath. Current rhythm on monitor is sinus rhythm, Heart rate at 60s. Consider EP consult in AM. Notified by RN that patient with episode of bradycardia while sleeping. Heart rate on Tele monitor noted to be 55-59. Per RN patient asymptomatic. Later RN has informed patient noted to have an abnormal rhythm on the monitor. Tele strip reviewed. Patient noted to have 2 episodes of ( 0130 & 0220) ?? SVT heart rate approximately 150s while urinating and which has self resolved.  Patient had similar episode of tachycardia on 7/17/20. Patient denies chest pain, dizziness, palpitation and shortness of breath. Current rhythm on monitor is sinus rhythm, Heart rate at 60s. Consider EP consult in AM.

## 2020-07-19 NOTE — CHART NOTE - NSCHARTNOTEFT_GEN_A_CORE
EP consult called by PA for SVT. This is 80M with HTN (episodes of hypotension), Parkinson's, CKD, hypothyroidism presents s/p syncopal episode likely 2/2 to postural instability SBP range 220-110 in the setting of progression of parkinson's. Patient had multiple episode of syncope in last 4 years and had neuro work up. Patient was seen and examined. Patient denies any chest pain ,sob, palpitation dizziness. Tele monitor reviewed showed artifact. Bedside RN confirmed that patient was urinating at bedside and asymptomatic. Patient is currently NSR at 60-70

## 2020-07-19 NOTE — PROGRESS NOTE ADULT - PROBLEM SELECTOR PROBLEM 3
Orthostatic hypotension due to Parkinson's disease

## 2020-07-19 NOTE — PROVIDER CONTACT NOTE (OTHER) - ACTION/TREATMENT ORDERED:
Okay to give one BP medication early.
Give hydralazine 25mg, Recheck BP in an 1 hr & 30 mins
Okay with blood pressure
Provider okay with HR, continue to monitor patient
Administer Hydralazine and Amlodipine as ordered. Reassess Manual BP in 1 hr.
Provider would like orthostats during day shift and okay with blood pressure
Awaiting orders. Continue to monitor.
Give BP medications and continue to monitor patient
No further actions ordered. Will continue to monitor.
No further actions. Continue to monitor.
Provider okay with blood pressure, given hydralazine 25 mg as ordered.
recheck BP in an hour
Hold hydralazine due at 10pm, patient received 25mg @ 7pm. Continue to monitor BP throughout night
Per provider no action necessary at this time since patient asymptomatic and arrythmia resolves on its own. Will continue to monitor. Safety maintained.
Per provider no action necessary at this time since patient sleeping. Will continue to monitor. Safety maintained.
Provider consulted with cardiology team and arrythmia likely SVT. No action needed at the moment since episode resolved on its own. No action necessary for BP at this time. Will continue to monitor.

## 2020-07-19 NOTE — PROVIDER CONTACT NOTE (OTHER) - ASSESSMENT
Patient HR is 56, patient is asymptomatic
SBP: 225/105, HR: 67, patient is asymptomatic
Patient blood pressure 161/87. Denies headache, dizziness, chest pain, shortness of breath. Gave hydralazine as per orders.
Previous SBP: 182/89, Recheck /77, patient is asymptomatic
Pt arrived to unit back from echo. Asymptomatic at this time. Denies headache, dizziness, or distress.
Patient A&Ox3. No signs of SOB, dizziness, chest pain, palpitations. Asymptomatic. No acute distress noted.
Patient A&Ox4. Denies SOB, dizziness, pain, headache, palpitations. No acute distress noted. Tele RN notified RN Lara and Lara discovered patient urinating during arrhythmic episode similar to previous documented event.
Patient A&Ox4. Patient denies SOB, dizziness, chest pain, palpitations. No acute distress noted. Following notification of tele RN of abnormal rhythm, RN Lara entered patient's room and discovered him urinating.
Patient IV infiltrated, R forearm. Patient denies pain. Site is swollen, without redness. RN elevated arm, removed IV, and applied hot pack.
Patient blood pressure 180/84. Denies dizziness, headache, visual changes. Not due for BP medications until 14:00.
SBP: 194/90, patient is asymptomatic, patient has 7/10 neck pain
SBP:194/94
melissa/ox4, /81

## 2020-07-19 NOTE — PROGRESS NOTE ADULT - ASSESSMENT
80M with HTN (episodes of hypotension), Parkinson's, CKD, hypothyroidism presents s/p syncopal episode likely 2/2 to postural instability in the setting of progression of parkinson's   < from: Transthoracic Echocardiogram (07.17.20 @ 17:33) >  CONCLUSIONS:  Ef 73 %  1. Mitral annular calcification, otherwise normal mitral  valve. Mild mitral regurgitation.  2. Mildly dilated left atrium.  LA volume index = 35 cc/m2.  3. Normal left ventricular internal dimensions and wall  thicknesses.  4. Normal left ventricular systolic function. No segmental  wall motion abnormalities.  5. Mild diastolic dysfunction (Stage I).  6. Normal right ventricular size and function.  *** Compared with echocardiogram of 5/16/2018, no  significant changes noted.    Patient was to go home 7/18 but had increased hr ans svt- EP called to belkises  Rn notes"Notified by RN that patient with episode of bradycardia while sleeping. Heart rate on Tele monitor noted to be 55-59. Per RN patient asymptomatic. Later RN has informed patient noted to have an abnormal rhythm on the monitor. Tele strip reviewed. Patient noted to have 2 episodes of ( 0130 & 0220) ?? SVT heart rate approximately 150s while urinating and which has self resolved.  Patient had similar episode of tachycardia on 7/17/20."    Tachy / savage Syndrome ? - May need PPM 80M with HTN (episodes of hypotension), Parkinson's, CKD, hypothyroidism presents s/p syncopal episode likely 2/2 to postural instability in the setting of progression of parkinson's   < from: Transthoracic Echocardiogram (07.17.20 @ 17:33) >  CONCLUSIONS:  Ef 73 %  1. Mitral annular calcification, otherwise normal mitral  valve. Mild mitral regurgitation.  2. Mildly dilated left atrium.  LA volume index = 35 cc/m2.  3. Normal left ventricular internal dimensions and wall  thicknesses.  4. Normal left ventricular systolic function. No segmental  wall motion abnormalities.  5. Mild diastolic dysfunction (Stage I).  6. Normal right ventricular size and function.  *** Compared with echocardiogram of 5/16/2018, no  significant changes noted.    Patient was to go home 7/18 but had increased hr ans svt- EP called to asses  Rn notes"Notified by RN that patient with episode of bradycardia while sleeping. Heart rate on Tele monitor noted to be 55-59. Per RN patient asymptomatic. Later RN has informed patient noted to have an abnormal rhythm on the monitor. Tele strip reviewed. Patient noted to have 2 episodes of ( 0130 & 0220) ?? SVT heart rate approximately 150s while urinating and which has self resolved.  Patient had similar episode of tachycardia on 7/17/20."    Tachy / savage Syndrome ? - May need PPM- Evaluated by EP- fast HR on monitors were artifacts- NO SVT

## 2020-07-19 NOTE — CHART NOTE - NSCHARTNOTESELECT_GEN_ALL_CORE
[facility] [facility] Note    Sabrina Chavez is a 48 y.o. female who was seen in clinic today (10/30/2018). Subjective:  Shoulder Pain  Patient complains of right side shoulder pain. The symptoms began 1 month ago Course of symptoms since onset has been symptoms have progressed to a point and plateaued. . Pain is described as location: glenohumeral region and worse with overhead movements. Symptoms were incited by repetitive activity, exercise. Therapy to date includes OTC analgesics: somewhat effective. Prior to Admission medications    Medication Sig Start Date End Date Taking? Authorizing Provider   PARoxetine (PAXIL) 30 mg tablet Take 60 mg by mouth daily. 6/7/18  Yes Provider, Historical   clonazePAM (KLONOPIN) 0.5 mg tablet Take 1 Tab by mouth daily as needed. 6/12/18  Yes Radha Craig NP   ALPRAZolam Wilfredo Splinter) 0.5 mg tablet Take 1 Tab by mouth daily as needed for Anxiety. 6/1/18  Yes Fozia Dave NP   buPROPion XL (WELLBUTRIN XL) 300 mg XL tablet TAKE ONE TABLET BY MOUTH EVERY MORNING 11/28/17  Yes Radha Craig NP   hydroCHLOROthiazide (HYDRODIURIL) 25 mg tablet Take 1 Tab by mouth daily. For blood pressure 11/28/17  Yes Radha Craig NP   multivitamin (ONE A DAY) tablet Take 1 tablet by mouth daily. Yes Provider, Historical   SF 5000 PLUS 1.1 % crea BRUSH FOR 2 MINUTES AT BEDTIME, SPIT OUT, DO NOT RINSE 10/6/16   Provider, Historical          No Known Allergies        ROS  See HPI    Objective:   Physical Exam   Constitutional: She is oriented to person, place, and time. She appears well-developed and well-nourished. Cardiovascular: Normal rate, regular rhythm and intact distal pulses. Exam reveals no gallop and no friction rub. No murmur heard. Pulmonary/Chest: Effort normal and breath sounds normal. No respiratory distress.    Musculoskeletal:        Right shoulder: She exhibits decreased range of motion (with overhead movements), Event Note tenderness (glenohumeral region) and decreased strength (with rotation). She exhibits no bony tenderness. Positive right shoulder impingement test   Neurological: She is alert and oriented to person, place, and time. Psychiatric: She has a normal mood and affect. Her behavior is normal. Judgment and thought content normal.   Nursing note and vitals reviewed. Visit Vitals  /86 (BP 1 Location: Right arm, BP Patient Position: Sitting)   Pulse 68   Temp 98.3 °F (36.8 °C) (Oral)   Resp 18   Ht 5' 1\" (1.549 m)   Wt 157 lb (71.2 kg)   LMP 09/10/2018 (Approximate)   SpO2 98%   BMI 29.66 kg/m²       Assessment & Plan:  Diagnoses and all orders for this visit:    1. Acute pain of right shoulder  Possible rotator cuff strain with bursitis. Request physical therapy evaluation and treatment. Recommended ice and NSAiDs. Referral to orthopedics for continued symptoms.   -     REFERRAL TO PHYSICAL THERAPY      I have discussed the diagnosis with the patient and the intended plan as seen in the above orders. The patient has received an after-visit summary along with patient information handout. I have discussed medication side effects and warnings with the patient as well. Follow-up Disposition:  Return if symptoms worsen or fail to improve.         Coleen Barahona NP

## 2020-07-19 NOTE — PROVIDER CONTACT NOTE (OTHER) - SITUATION
Patient blood pressure 161/87
/81, received hydralzine
Patient IV infiltrated
Patient blood pressure 180/84
Patient bradycardic while asleep to 55-59 BPM
Patient had abnormal heart rhythm on tele at 0240, patient BP elevated.
Patient had arrhythmia similar to previous episode while urinating.
Patient was due for AM vitals with AM medication
Previous SBP: 182/89, Recheck BP after meds 160/77
Pt /82, HR 73
Pt BP not within parameters. 175/76
Received patient, did vitals for admission, patient has high SBP

## 2020-07-19 NOTE — PROGRESS NOTE ADULT - PROBLEM SELECTOR PLAN 3
-2/2 to progression of Parkinson's, dehydration  -hold tamsulosin, start finasteride  -S/p gentle hydration  -Orthostatics negative this AM
- 2/2 to progression of Parkinson's, dehydration. Carbidopa recently increased at night, will hold bedtime doing. Continue with TID dosing. In AM, should reach out to neurologist Dr. Gao regarding titration of caribidopa in the setting of worsening orthostatic hypotension  -hold tamsulosin, start finasteride  -gentle hydration
-2/2 to progression of Parkinson's, dehydration  -hold tamsulosin, start finasteride  -S/p gentle hydration  -Orthostatics negative this AM
-2/2 to progression of Parkinson's, dehydration  -hold tamsulosin, start finasteride  -S/p gentle hydration  -Orthostatics negative this AM

## 2020-10-06 ENCOUNTER — APPOINTMENT (OUTPATIENT)
Dept: NEUROLOGY | Facility: CLINIC | Age: 80
End: 2020-10-06
Payer: MEDICARE

## 2020-10-06 VITALS
DIASTOLIC BLOOD PRESSURE: 79 MMHG | WEIGHT: 145 LBS | HEART RATE: 61 BPM | SYSTOLIC BLOOD PRESSURE: 198 MMHG | HEIGHT: 62 IN | BODY MASS INDEX: 26.68 KG/M2

## 2020-10-06 VITALS — TEMPERATURE: 97.7 F

## 2020-10-06 PROBLEM — E03.9 HYPOTHYROIDISM, UNSPECIFIED: Chronic | Status: ACTIVE | Noted: 2020-07-15

## 2020-10-06 PROCEDURE — 99214 OFFICE O/P EST MOD 30 MIN: CPT

## 2020-10-06 NOTE — PHYSICAL EXAM
[General Appearance - Alert] : alert [General Appearance - In No Acute Distress] : in no acute distress [Oriented To Time, Place, And Person] : oriented to person, place, and time [Impaired Insight] : insight and judgment were intact [Affect] : the affect was normal [Person] : oriented to person [Place] : oriented to place [Time] : oriented to time [Concentration Intact] : normal concentrating ability [Comprehension] : comprehension intact [Motor Strength] : muscle strength was normal in all four extremities [Motor Handedness Right-Handed] : the patient is right hand dominant [Motor Strength Upper Extremities Bilaterally] : strength was normal in both upper extremities [Motor Strength Lower Extremities Bilaterally] : strength was normal in both lower extremities [Sensation Tactile Decrease] : light touch was intact [Coordination - Dysmetria Impaired Finger-to-Nose Bilateral] : not present [2+] : Ankle jerk left 2+ [Extraocular Movements] : extraocular movements were intact [Hearing Threshold Finger Rub Not Isabella] : hearing was normal [FreeTextEntry1] : dystonia [Edema] : there was no peripheral edema [Abdomen Soft] : soft [Nail Clubbing] : no clubbing  or cyanosis of the fingernails [] : no rash

## 2020-10-06 NOTE — DISCUSSION/SUMMARY
[FreeTextEntry1] : Conrad Thornton is a 78 year old M with a history of chronic lower back pain in the setting of moderate to severe spinal stenosis, and Parkinson's disease diagnosed in 2012, initially presenting with changes in handwriting, head tremor, and left foot dragging. He is here today with his wife. His examination was significant for bilateral bradykinesia and rigidity, more affected on the right side. There were resting tremors in the hands and legs bilaterally. No dyskinesias. Gait is with short stride, multistep turns, no freezing, postural reflexes are intact. \par \par Impression: Idiopathic Parkinson's disease. There were no red flags to indicate an atypical Parkinsonism at this time, however he is unable to tell any benefit on sinemet. . It was discussed that he may need an increased dose to control his symptoms. 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 \par \par Recommendations:\par - continue Sinemet  2 tabs qid, 4 hrs apart, monitor improvement in tremor freezing/BP\par - Increase fluid intake, to help with orthostatic hypotension and constipation\par -Compression stockings\par . Monitor BP at home- keep appointment with cardio and nephro\par - Continue and increase exercise, PT, monitor for any improvement in freezing, if not may consider azilect\par - [facility]\par \par RTC 3  months
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
## 2021-01-19 ENCOUNTER — APPOINTMENT (OUTPATIENT)
Dept: NEUROLOGY | Facility: CLINIC | Age: 81
End: 2021-01-19
Payer: MEDICARE

## 2021-01-19 VITALS — SYSTOLIC BLOOD PRESSURE: 210 MMHG | DIASTOLIC BLOOD PRESSURE: 99 MMHG | HEART RATE: 56 BPM

## 2021-01-19 VITALS
WEIGHT: 145 LBS | HEIGHT: 62 IN | HEART RATE: 59 BPM | SYSTOLIC BLOOD PRESSURE: 218 MMHG | DIASTOLIC BLOOD PRESSURE: 86 MMHG | BODY MASS INDEX: 26.68 KG/M2

## 2021-01-19 VITALS — TEMPERATURE: 97.8 F

## 2021-01-19 PROCEDURE — 99214 OFFICE O/P EST MOD 30 MIN: CPT

## 2021-01-19 NOTE — DISCUSSION/SUMMARY
[FreeTextEntry1] : Conrad Thornton is a 78 year old M with a history of chronic lower back pain in the setting of moderate to severe spinal stenosis, and Parkinson's disease diagnosed in 2012, initially presenting with changes in handwriting, head tremor, and left foot dragging. He is here today with his wife. His examination was significant for bilateral bradykinesia and rigidity, more affected on the right side. There were resting tremors in the hands and legs bilaterally. No dyskinesias. Gait is with short stride, multistep turns, no freezing, postural reflexes are intact. 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 \par \par Recommendations:\par To help with freezing we will try azilect 0.5mg qd, s/e discussed in detail\par atropine drops-for drooling were discussed, not prescribed today, will check with cardiologist\par uses 2 canes to walk, will check with PT if walker is better\par - continue Sinemet  2 tabs qid, 4 hrs apart, monitor improvement in tremor freezing/BP\par - Increase fluid intake, to help with orthostatic hypotension and constipation, PND\par -Compression stockings\par . Monitor BP at home- keep appointment with cardio and nephro. BP high on 2 occasions today, no HA, CP, SOB- states that it is sometimes that high. will check BP again when they go home and if high will call nephro and take extra pill for BP. advised to go to ER in case of CP,SOb,HA vision change or persistent high BP\par - Continue and increase exercise, PT, monitor for any improvement in freezing\par - [facility]\par \par RTC 2 months

## 2021-01-19 NOTE — PHYSICAL EXAM
[General Appearance - Alert] : alert [General Appearance - In No Acute Distress] : in no acute distress [Oriented To Time, Place, And Person] : oriented to person, place, and time [Impaired Insight] : insight and judgment were intact [Affect] : the affect was normal [Person] : oriented to person [Place] : oriented to place [Time] : oriented to time [Concentration Intact] : normal concentrating ability [Comprehension] : comprehension intact [Motor Strength] : muscle strength was normal in all four extremities [Motor Handedness Right-Handed] : the patient is right hand dominant [Sensation Tactile Decrease] : light touch was intact [2+] : Ankle jerk left 2+ [Extraocular Movements] : extraocular movements were intact [Hearing Threshold Finger Rub Not Parmer] : hearing was normal [Edema] : there was no peripheral edema [Abdomen Soft] : soft [Nail Clubbing] : no clubbing  or cyanosis of the fingernails [] : no rash [Motor Strength Upper Extremities Bilaterally] : strength was normal in both upper extremities [Motor Strength Lower Extremities Bilaterally] : strength was normal in both lower extremities [Coordination - Dysmetria Impaired Finger-to-Nose Bilateral] : not present [FreeTextEntry1] : dystonia
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
## 2021-04-09 ENCOUNTER — APPOINTMENT (OUTPATIENT)
Dept: NEUROLOGY | Facility: CLINIC | Age: 81
End: 2021-04-09
Payer: MEDICARE

## 2021-04-09 VITALS — TEMPERATURE: 97.1 F

## 2021-04-09 VITALS
HEIGHT: 65 IN | DIASTOLIC BLOOD PRESSURE: 79 MMHG | BODY MASS INDEX: 24.99 KG/M2 | WEIGHT: 150 LBS | SYSTOLIC BLOOD PRESSURE: 174 MMHG | HEART RATE: 58 BPM

## 2021-04-09 PROCEDURE — 99215 OFFICE O/P EST HI 40 MIN: CPT

## 2021-04-09 RX ORDER — CARBIDOPA AND LEVODOPA 50; 200 MG/1; MG/1
50-200 TABLET, EXTENDED RELEASE ORAL
Qty: 90 | Refills: 3 | Status: DISCONTINUED | COMMUNITY
Start: 2019-11-25 | End: 2021-04-09

## 2021-04-09 RX ORDER — RASAGILINE 0.5 MG/1
0.5 TABLET ORAL
Qty: 30 | Refills: 3 | Status: DISCONTINUED | COMMUNITY
Start: 2021-01-19 | End: 2021-04-09

## 2021-04-09 NOTE — DISCUSSION/SUMMARY
[FreeTextEntry1] : Conrad Thornton is a 80 year old M with a history of chronic lower back pain in the setting of moderate to severe spinal stenosis, and Parkinson's disease diagnosed in 2012, initially presenting with changes in handwriting, head tremor, and left foot dragging. He is here today with his wife. His examination was significant for bilateral bradykinesia and rigidity, more affected on the right side. There were resting tremors in the  legs bilaterally. No dyskinesias. Gait is with short stride, multistep turns, no freezing, postural reflexes are intact. \par Having blood pressure fluctuations, orthostatic hypotension-follows with GI and nephrology\par Drooling\par Constipation\par Soft speech\par Vivid dreams\par Azilect worsened dizziness stopped\par Impression: Parkinsonism- autonomic dysfunction (fluctuating blood pressure, cold) 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 \par \par Recommendations:\par \par atropine drops-for drooling were discussed again, not prescribed today, name written will check with cardiologist.  If atropine is not an option may consider Botox injections\par uses 2 canes to walk, will check with PT if walker is better\par - continue Sinemet  2 tabs qid, 4 hrs apart, monitor improvement in tremor freezing/BP\par - Increase fluid intake, to help with orthostatic hypotension and constipation\par -Compression stockings\par . Monitor BP at home- keep appointment with cardio and nephro. BP high on 2 occasions today, no HA, CP, SOB- states that it is sometimes that high. will check BP again when they go home and if high will call nephro and take extra pill for BP. advised to go to ER in case of CP,SOb,HA vision change or persistent high or low BP\par - Continue and increase exercise, PT, monitor for any improvement in freezing\par -Speech therapy-encouraged to exercise and practice speaking louder\par -Restart melatonin to help with vivid dreams\par All questions were addressed and answered\par RTC 2-3 months

## 2021-04-09 NOTE — PHYSICAL EXAM
[General Appearance - Alert] : alert [General Appearance - In No Acute Distress] : in no acute distress [Oriented To Time, Place, And Person] : oriented to person, place, and time [Impaired Insight] : insight and judgment were intact [Affect] : the affect was normal [Person] : oriented to person [Place] : oriented to place [Time] : oriented to time [Concentration Intact] : normal concentrating ability [Comprehension] : comprehension intact [Motor Strength] : muscle strength was normal in all four extremities [Motor Handedness Right-Handed] : the patient is right hand dominant [Motor Strength Lower Extremities Bilaterally] : strength was normal in both lower extremities [Motor Strength Upper Extremities Bilaterally] : strength was normal in both upper extremities [Sensation Tactile Decrease] : light touch was intact [Coordination - Dysmetria Impaired Finger-to-Nose Bilateral] : not present [2+] : Ankle jerk left 2+ [Extraocular Movements] : extraocular movements were intact [Hearing Threshold Finger Rub Not Dickey] : hearing was normal [FreeTextEntry1] : dystonia [Edema] : there was no peripheral edema [Abdomen Soft] : soft [Nail Clubbing] : no clubbing  or cyanosis of the fingernails [] : no rash
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
## 2021-06-07 ENCOUNTER — NON-APPOINTMENT (OUTPATIENT)
Age: 81
End: 2021-06-07

## 2021-06-10 ENCOUNTER — APPOINTMENT (OUTPATIENT)
Dept: SPEECH THERAPY | Facility: CLINIC | Age: 81
End: 2021-06-10

## 2021-07-16 ENCOUNTER — APPOINTMENT (OUTPATIENT)
Dept: NEUROLOGY | Facility: CLINIC | Age: 81
End: 2021-07-16

## 2021-08-02 NOTE — ED ADULT TRIAGE NOTE - NS ED NURSE DIRECT TO ROOM YN
[facility]  Complex Care Plan  About Me:    Patient Name:  Catherine Dwyer Ra    YOB: 1978  Age:         43 year old   [place] MRN:    [id number] Telephone Information:  Home Phone [phone]   Mobile [phone]       Address:  [address]  [place] [address] Email address:  No e-mail address on record      Emergency Contact(s)    Name Relationship Lgl Grd Work Phone Home Phone Mobile Phone   1. ONDINA TAYLOR Cousin    [phone]   2. NUBIA ALVAREZ Spouse   [phone]           Primary language:  Kendra     needed? Yes   [place] [facility]:  [phone] op. 1  Other communication barriers:    Preferred Method of Communication:     Current living arrangement:    Mobility Status/ Medical Equipment:      Health Maintenance  Health Maintenance Reviewed:      My Access Plan  Medical Emergency 911   Primary Clinic Line [facility] [phone]   24 Hour Appointment Line [phone] or  [phone]) (toll-free)   24 Hour Nurse Line [phone] (toll-free)   Preferred Urgent Care     Preferred Hospital     Preferred Pharmacy [facility] - [place], [place] - [address]     Behavioral Health Crisis Line The National Suicide Prevention Lifeline at 1-328.136.6026 or 911             My Care Team Members  Patient Care Team       Relationship Specialty Notifications Start End    Diana Levy MD PCP - General Family Practice  11/22/20     Phone: [phone] Fax: [phone]         [address] [place] [address]    Diana Levy MD Assigned PCP   6/16/21     Phone: [phone] Fax: [phone]         [address] [place] [address]    Kristine Odom, RN Lead Care Coordinator Primary Care - [facility] Admissions 6/21/21     Alexander Torre Community Health Worker Primary Care - [facility] Admissions 6/21/21     Phone: [phone] Fax: [phone]         [address]  [address]            My Care Plans  Self Management and Treatment Plan  Goals and (Comments)  Goals        General     Medical (pt-stated)      Notes - Note edited  7/27/2021 11:34 AM by Alexander Torre     Goal Statement: I will attend my follow up appt with [facility] the next 90 days.      Date Goal set: 6/21/2021  Barriers: language barrier, lack of knowledge  Strengths: agrees to work on goal  Date to Achieve By: 9/21/2021  Patient expressed understanding of goal: Yes     Action steps to achieve this goal:  1. I went to my appointment at [facility] on 7/16/2021 but there no , my appointment was rescheduled on 7/19/2021 and I didn't have medical transportation and rescheduled again tomorrow, 7/28/2021 at 10:45 AM and my friend will provide medical transportation.    2. I will call [facility] directly for upcoming follow up appointment with [facility] for transportation if needed.    3. I will call [facility] or [facility] RN with concerns or questions.      [facility] - [facility]   [address], [place] [address]   ~14.7 mi  [phone]    Note: Patient's appointment rescheduled on 7/28/2021 at 10:45 AM and friend will provide medical transportation.       Goal update: 7/27/2021       Medical (pt-stated)      Notes - Note edited  7/27/2021 11:36 AM by Alexander Torre     Goal Statement: My family and I will attend my green card appt 4 months.      Date Goal set: 6/21/2021  Barriers: language barrier, lack of knowledge  Strengths: agrees to work on goal  Date to Achieve By: 10/21/2021  Patient expressed understanding of goal: Yes     Action steps to achieve this goal:  1) My family of 5 will attend our green card appts as scheduled in October 2021.   2. I will call [facility] or [facility] RN with concerns or questions.      Spouse: Moo There ( [phone])  Child 1: Ta War ( [phone])  Child 2: Hser Kpray ( [phone])  Child 3: Haroldo La mike Destinee ( [phone])    Note: Family of 5 is scheduled for Green Card physical  on 10/13/2021.      Goal update: 7/27/2021             Action Plans on File:                       Advance Care Plans/Directives Type:        My Medical and Care Information  Problem List   Patient Active Problem List   Diagnosis     Current smoker     Poverty status     Closed fracture of distal ends of left radius and ulna with routine healing, subsequent encounter     Financial difficulties     Language barrier affecting health care      Current Medications and Allergies:  See printed Medication Report.    Care Coordination Start Date: 6/21/2021   Frequency of Care Coordination: 6 weeks   Form Last Updated: 08/02/2021        No

## 2021-09-01 ENCOUNTER — APPOINTMENT (OUTPATIENT)
Dept: NEUROLOGY | Facility: CLINIC | Age: 81
End: 2021-09-01
Payer: MEDICARE

## 2021-09-01 VITALS — SYSTOLIC BLOOD PRESSURE: 180 MMHG | DIASTOLIC BLOOD PRESSURE: 78 MMHG | HEART RATE: 61 BPM

## 2021-09-01 PROCEDURE — 99215 OFFICE O/P EST HI 40 MIN: CPT

## 2021-09-01 NOTE — PHYSICAL EXAM
[General Appearance - Alert] : alert [General Appearance - In No Acute Distress] : in no acute distress [Oriented To Time, Place, And Person] : oriented to person, place, and time [Impaired Insight] : insight and judgment were intact [Affect] : the affect was normal [Person] : oriented to person [Place] : oriented to place [Time] : oriented to time [Concentration Intact] : normal concentrating ability [Comprehension] : comprehension intact [Motor Strength] : muscle strength was normal in all four extremities [Motor Handedness Right-Handed] : the patient is right hand dominant [Motor Strength Upper Extremities Bilaterally] : strength was normal in both upper extremities [Motor Strength Lower Extremities Bilaterally] : strength was normal in both lower extremities [Sensation Tactile Decrease] : light touch was intact [Coordination - Dysmetria Impaired Finger-to-Nose Bilateral] : not present [2+] : Ankle jerk left 2+ [Extraocular Movements] : extraocular movements were intact [Hearing Threshold Finger Rub Not [place]] : hearing was normal [FreeTextEntry1] : dystonia [Edema] : there was no peripheral edema [Abdomen Soft] : soft [Nail Clubbing] : no clubbing  or cyanosis of the fingernails [] : no rash
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
## 2021-09-01 NOTE — DISCUSSION/SUMMARY
[FreeTextEntry1] : Conrad Thornton is a 80 year old M with a history of chronic lower back pain in the setting of moderate to severe spinal stenosis, and Parkinson's disease diagnosed in 2012, initially presenting with changes in handwriting, head tremor, and left foot dragging. He is here today with his wife. His examination was significant for bilateral bradykinesia and rigidity, more affected on the right side. There were resting tremors in the  legs bilaterally. No dyskinesias. Gait is with short stride, multistep turns, no freezing, postural reflexes are intact. 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 \par \par Recommendations:\par -Interested in Botox injections for cervical dystonia, will obtain a prior authorization-would like to inject right splenius capitis, right sided paraspinals and trapezius\par -Can try increasing Sinemet by half a tablet and 1 dose during the day where his blood pressure is greater than 120 x 80.  Will monitor if it makes any difference to the freezing of gait\par -Continue occupational therapy\par -Is trying to get speech therapy at home\par -Advised to take a  regimen on a regular basis to prevent constipation, increase fluid intake\par -Continue melatonin 3 mg at bedtime\par -Presently  takes Sinemet 25/102 tablets 4 times a day about 4 hours apart\par All questions were addressed and answered\par RTC 2-3 months

## 2021-10-11 ENCOUNTER — RX RENEWAL (OUTPATIENT)
Age: 81
End: 2021-10-11

## 2021-11-12 ENCOUNTER — APPOINTMENT (OUTPATIENT)
Dept: NEUROLOGY | Facility: CLINIC | Age: 81
End: 2021-11-12

## 2022-01-10 ENCOUNTER — RX RENEWAL (OUTPATIENT)
Age: 82
End: 2022-01-10

## 2022-01-12 ENCOUNTER — APPOINTMENT (OUTPATIENT)
Dept: NEUROLOGY | Facility: CLINIC | Age: 82
End: 2022-01-12

## 2022-03-01 ENCOUNTER — APPOINTMENT (OUTPATIENT)
Dept: NEUROLOGY | Facility: CLINIC | Age: 82
End: 2022-03-01

## 2022-03-01 ENCOUNTER — APPOINTMENT (OUTPATIENT)
Dept: NEUROLOGY | Facility: CLINIC | Age: 82
End: 2022-03-01
Payer: MEDICARE

## 2022-03-01 VITALS
RESPIRATION RATE: 15 BRPM | HEART RATE: 60 BPM | SYSTOLIC BLOOD PRESSURE: 190 MMHG | DIASTOLIC BLOOD PRESSURE: 94 MMHG | BODY MASS INDEX: 24.99 KG/M2 | WEIGHT: 150 LBS | HEIGHT: 65 IN

## 2022-03-01 PROCEDURE — 99214 OFFICE O/P EST MOD 30 MIN: CPT | Mod: 25

## 2022-03-01 PROCEDURE — 64616 CHEMODENERV MUSC NECK DYSTON: CPT

## 2022-03-01 NOTE — PROCEDURE
[FreeTextEntry1] : Area to be injected was cleansed with alcohol wipes.  100 units of Botox were mixed in 1 cc of normal saline in 10units/0.1cc\par I injected as follows\par Right splenius capitis 40 units\par Right trapezius 30 units\par Right paraspinal 10 units\par \par Total injected 80 units\par Wasted 20 units\par

## 2022-03-01 NOTE — PHYSICAL EXAM
[General Appearance - Alert] : alert [General Appearance - In No Acute Distress] : in no acute distress [Oriented To Time, Place, And Person] : oriented to person, place, and time [Impaired Insight] : insight and judgment were intact [Affect] : the affect was normal [Person] : oriented to person [Place] : oriented to place [Time] : oriented to time [Concentration Intact] : normal concentrating ability [Comprehension] : comprehension intact [Motor Strength] : muscle strength was normal in all four extremities [Motor Handedness Right-Handed] : the patient is right hand dominant [Motor Strength Upper Extremities Bilaterally] : strength was normal in both upper extremities [Motor Strength Lower Extremities Bilaterally] : strength was normal in both lower extremities [Sensation Tactile Decrease] : light touch was intact [Coordination - Dysmetria Impaired Finger-to-Nose Bilateral] : not present [2+] : Ankle jerk left 2+ [Extraocular Movements] : extraocular movements were intact [Hearing Threshold Finger Rub Not [place]] : hearing was normal [FreeTextEntry1] : dystonia [Edema] : there was no peripheral edema [Abdomen Soft] : soft [Nail Clubbing] : no clubbing  or cyanosis of the fingernails [] : no rash

## 2022-03-01 NOTE — DISCUSSION/SUMMARY
[FreeTextEntry1] : Conrad Thornton is a 80 year old M with a history of chronic lower back pain in the setting of moderate to severe spinal stenosis, and Parkinson's disease diagnosed in 2012\par Having blood pressure fluctuations, orthostatic hypotension- follows with GI and nephrology\par Drooling -better with Claritin, not wish to have Botox, cannot use atropine drops per cardiology\par Constipation\par Soft speech\par Vivid dreams\par Azilect worsened dizziness stopped\par Freezing of gait\par Cervical dystonia\par \par Impression: Parkinsonism- autonomic dysfunction (fluctuating blood pressure, cold) 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 \par \par Recommendations:\par -Patient was injected with 80 units of Botox as above.  He tolerated the procedure well.  Anticipated risks and benefits were discussed in detail\par -Can try increasing Sinemet by half a tablet and 1 dose during the day where his blood pressure is greater than 120/ 80.  Will monitor if it makes any difference to the freezing of gait\par -Continue PT OT\par -Advised to take bowel regimen on a regular basis to prevent constipation, increase fluid intake\par -Continue melatonin 3 mg at bedtime\par -Presently  takes Sinemet 25/100, 2 tablets 4 times a day about 4 hours apart\par All questions were addressed and answered\par He will have a telehealth visit in 1 month for follow-up on Botox injections\par Return to clinic in 3 months for repeat Botox injections and follow-up of Parkinson's disease

## 2022-03-22 ENCOUNTER — TRANSCRIPTION ENCOUNTER (OUTPATIENT)
Age: 82
End: 2022-03-22

## 2022-03-25 ENCOUNTER — TRANSCRIPTION ENCOUNTER (OUTPATIENT)
Age: 82
End: 2022-03-25

## 2022-03-28 ENCOUNTER — APPOINTMENT (OUTPATIENT)
Dept: NEUROLOGY | Facility: CLINIC | Age: 82
End: 2022-03-28

## 2022-04-26 ENCOUNTER — APPOINTMENT (OUTPATIENT)
Dept: NEUROLOGY | Facility: CLINIC | Age: 82
End: 2022-04-26
Payer: MEDICARE

## 2022-04-26 DIAGNOSIS — K59.00 CONSTIPATION, UNSPECIFIED: ICD-10-CM

## 2022-04-26 PROCEDURE — 99214 OFFICE O/P EST MOD 30 MIN: CPT | Mod: 95
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
## 2022-04-26 NOTE — PHYSICAL EXAM
[General Appearance - Alert] : alert [General Appearance - In No Acute Distress] : in no acute distress [Oriented To Time, Place, And Person] : oriented to person, place, and time [Impaired Insight] : insight and judgment were intact [Affect] : the affect was normal [Person] : oriented to person [Place] : oriented to place [Time] : oriented to time [Concentration Intact] : normal concentrating ability [Comprehension] : comprehension intact [Motor Strength] : muscle strength was normal in all four extremities [Motor Handedness Right-Handed] : the patient is right hand dominant [Motor Strength Upper Extremities Bilaterally] : strength was normal in both upper extremities [Motor Strength Lower Extremities Bilaterally] : strength was normal in both lower extremities [Sensation Tactile Decrease] : light touch was intact [Coordination - Dysmetria Impaired Finger-to-Nose Bilateral] : not present [2+] : Ankle jerk left 2+ [Extraocular Movements] : extraocular movements were intact [Hearing Threshold Finger Rub Not [place]] : hearing was normal [FreeTextEntry1] : dystonia [Edema] : there was no peripheral edema [Abdomen Soft] : soft [Nail Clubbing] : no clubbing  or cyanosis of the fingernails [] : no rash

## 2022-04-26 NOTE — DISCUSSION/SUMMARY
[FreeTextEntry1] : Conrad Thornton is a 81 year old M with a history of chronic lower back pain in the setting of moderate to severe spinal stenosis, and Parkinson's disease diagnosed in 2012\par Having blood pressure fluctuations, orthostatic hypotension- follows with GI and nephrology\par Drooling -better with Claritin, not wish to have Botox, cannot use atropine drops per cardiology\par Constipation\par Soft speech\par Vivid dreams\par Azilect worsened dizziness stopped\par Freezing of gait\par Cervical dystonia\par \par Impression: Parkinsonism- autonomic dysfunction (fluctuating blood pressure, cold) 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 \par \par Recommendations:\par -Patient was injected with 80 units of Botox 1 month ago.  He feels that it has been helpful especially with neck pain however the benefit was short-lived.  He denies any side effects and wishes to have repeat injections done.  He has a follow-up appointment Carisa 3 plan on injecting a higher dose at that time.  \par -Can try increasing Sinemet by half a tablet and 1 dose during the day where his blood pressure is greater than 120/ 80.  Will monitor if it makes any difference to the freezing of gait\par -Continue PT OT\par -Advised to take bowel regimen on a regular basis to prevent constipation, increase fluid intake\par -Continue melatonin 3 mg at bedtime\par -Presently  takes Sinemet 25/100, 2 tablets 4 times a day about 4 hours apart\par All questions were addressed and answered\par

## 2022-06-03 ENCOUNTER — APPOINTMENT (OUTPATIENT)
Dept: NEUROLOGY | Facility: CLINIC | Age: 82
End: 2022-06-03
Payer: MEDICARE

## 2022-06-03 VITALS
WEIGHT: 150 LBS | SYSTOLIC BLOOD PRESSURE: 119 MMHG | BODY MASS INDEX: 24.96 KG/M2 | HEART RATE: 61 BPM | DIASTOLIC BLOOD PRESSURE: 67 MMHG

## 2022-06-03 PROCEDURE — 64616 CHEMODENERV MUSC NECK DYSTON: CPT

## 2022-06-03 PROCEDURE — 99214 OFFICE O/P EST MOD 30 MIN: CPT | Mod: 25

## 2022-06-03 NOTE — PROCEDURE
[FreeTextEntry1] : Area to be injected was cleansed with alcohol wipes.  200 units of Botox were mixed in 1 cc of normal saline in 10units/0.1cc\par I injected as follows\par Right splenius capitis 80 units  (+40 compared to last visit)\par Right trapezius 40 units (+10 compared to last visit)\par Right paraspinal 20/2  units (+10 compared to last visit)\par \par Total injected 140 units\par Wasted 60 units\par

## 2022-06-03 NOTE — DISCUSSION/SUMMARY
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 \par \par Recommendations:\par -Patient was injected with 140 units of Botox as above.  He tolerated the procedure well.  Anticipated risks and benefits were discussed in detail\par -Continue Sinemet 2 tablets 4 times a day\par -Continue PT OT\par -Advised to take bowel regimen on a regular basis to prevent constipation, increase fluid intake\par -Continue melatonin 3 mg at bedtime\par \par All questions were addressed and answered\par He will have a telehealth visit in 1 month for follow-up on Botox injections\par Return to clinic in 3 months for repeat Botox injections and follow-up of Parkinson's disease

## 2022-06-03 NOTE — PHYSICAL EXAM
[General Appearance - Alert] : alert [General Appearance - In No Acute Distress] : in no acute distress [Oriented To Time, Place, And Person] : oriented to person, place, and time [Impaired Insight] : insight and judgment were intact [Affect] : the affect was normal [Person] : oriented to person [Place] : oriented to place [Time] : oriented to time [Concentration Intact] : normal concentrating ability [Comprehension] : comprehension intact [Motor Strength] : muscle strength was normal in all four extremities [Motor Handedness Right-Handed] : the patient is right hand dominant [Motor Strength Upper Extremities Bilaterally] : strength was normal in both upper extremities [Motor Strength Lower Extremities Bilaterally] : strength was normal in both lower extremities [Sensation Tactile Decrease] : light touch was intact [Coordination - Dysmetria Impaired Finger-to-Nose Bilateral] : not present [2+] : Ankle jerk left 2+ [Extraocular Movements] : extraocular movements were intact [Hearing Threshold Finger Rub Not [place]] : hearing was normal [FreeTextEntry1] : dystonia [Edema] : there was no peripheral edema [Abdomen Soft] : soft [Nail Clubbing] : no clubbing  or cyanosis of the fingernails [] : no rash
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
## 2022-06-05 ENCOUNTER — INPATIENT (INPATIENT)
Facility: HOSPITAL | Age: 82
LOS: 7 days | Discharge: NURSING, REHAB OR LONG-TERM CARE | DRG: 329 | End: 2022-06-13
Attending: COLON & RECTAL SURGERY | Admitting: COLON & RECTAL SURGERY
Payer: MEDICARE

## 2022-06-05 VITALS
SYSTOLIC BLOOD PRESSURE: 187 MMHG | HEIGHT: 65 IN | OXYGEN SATURATION: 97 % | TEMPERATURE: 98 F | DIASTOLIC BLOOD PRESSURE: 85 MMHG | RESPIRATION RATE: 17 BRPM | WEIGHT: 145.06 LBS | HEART RATE: 67 BPM

## 2022-06-05 DIAGNOSIS — Z96.652 PRESENCE OF LEFT ARTIFICIAL KNEE JOINT: Chronic | ICD-10-CM

## 2022-06-05 DIAGNOSIS — K56.2 VOLVULUS: ICD-10-CM

## 2022-06-05 LAB
APPEARANCE UR: CLEAR — SIGNIFICANT CHANGE UP
BASOPHILS # BLD AUTO: 0.02 K/UL — SIGNIFICANT CHANGE UP (ref 0–0.2)
BASOPHILS NFR BLD AUTO: 0.2 % — SIGNIFICANT CHANGE UP (ref 0–2)
BILIRUB UR-MCNC: NEGATIVE — SIGNIFICANT CHANGE UP
COLOR SPEC: YELLOW — SIGNIFICANT CHANGE UP
DIFF PNL FLD: NEGATIVE — SIGNIFICANT CHANGE UP
EOSINOPHIL # BLD AUTO: 0.04 K/UL — SIGNIFICANT CHANGE UP (ref 0–0.5)
EOSINOPHIL NFR BLD AUTO: 0.4 % — SIGNIFICANT CHANGE UP (ref 0–6)
GLUCOSE UR QL: NEGATIVE — SIGNIFICANT CHANGE UP
HCT VFR BLD CALC: 35.9 % — LOW (ref 39–50)
HGB BLD-MCNC: 11.6 G/DL — LOW (ref 13–17)
IMM GRANULOCYTES NFR BLD AUTO: 0.3 % — SIGNIFICANT CHANGE UP (ref 0–1.5)
KETONES UR-MCNC: NEGATIVE — SIGNIFICANT CHANGE UP
LACTATE SERPL-SCNC: 0.9 MMOL/L — SIGNIFICANT CHANGE UP (ref 0.7–2)
LEUKOCYTE ESTERASE UR-ACNC: NEGATIVE — SIGNIFICANT CHANGE UP
LYMPHOCYTES # BLD AUTO: 0.78 K/UL — LOW (ref 1–3.3)
LYMPHOCYTES # BLD AUTO: 7.1 % — LOW (ref 13–44)
MCHC RBC-ENTMCNC: 32.3 GM/DL — SIGNIFICANT CHANGE UP (ref 32–36)
MCHC RBC-ENTMCNC: 32.7 PG — SIGNIFICANT CHANGE UP (ref 27–34)
MCV RBC AUTO: 101.1 FL — HIGH (ref 80–100)
MONOCYTES # BLD AUTO: 0.62 K/UL — SIGNIFICANT CHANGE UP (ref 0–0.9)
MONOCYTES NFR BLD AUTO: 5.7 % — SIGNIFICANT CHANGE UP (ref 2–14)
NEUTROPHILS # BLD AUTO: 9.44 K/UL — HIGH (ref 1.8–7.4)
NEUTROPHILS NFR BLD AUTO: 86.3 % — HIGH (ref 43–77)
NITRITE UR-MCNC: NEGATIVE — SIGNIFICANT CHANGE UP
PH UR: 5 — SIGNIFICANT CHANGE UP (ref 5–8)
PLATELET # BLD AUTO: 269 K/UL — SIGNIFICANT CHANGE UP (ref 150–400)
PROT UR-MCNC: 100
RBC # BLD: 3.55 M/UL — LOW (ref 4.2–5.8)
RBC # FLD: 14 % — SIGNIFICANT CHANGE UP (ref 10.3–14.5)
SP GR SPEC: 1.02 — SIGNIFICANT CHANGE UP (ref 1.01–1.02)
UROBILINOGEN FLD QL: NEGATIVE — SIGNIFICANT CHANGE UP
WBC # BLD: 10.93 K/UL — HIGH (ref 3.8–10.5)
WBC # FLD AUTO: 10.93 K/UL — HIGH (ref 3.8–10.5)

## 2022-06-05 PROCEDURE — 93005 ELECTROCARDIOGRAM TRACING: CPT

## 2022-06-05 PROCEDURE — 97116 GAIT TRAINING THERAPY: CPT | Mod: GP

## 2022-06-05 PROCEDURE — 45330 DIAGNOSTIC SIGMOIDOSCOPY: CPT

## 2022-06-05 PROCEDURE — 97162 PT EVAL MOD COMPLEX 30 MIN: CPT | Mod: GP

## 2022-06-05 PROCEDURE — U0003: CPT

## 2022-06-05 PROCEDURE — 88307 TISSUE EXAM BY PATHOLOGIST: CPT

## 2022-06-05 PROCEDURE — 99222 1ST HOSP IP/OBS MODERATE 55: CPT | Mod: 25

## 2022-06-05 PROCEDURE — U0005: CPT

## 2022-06-05 PROCEDURE — 99285 EMERGENCY DEPT VISIT HI MDM: CPT | Mod: FS

## 2022-06-05 PROCEDURE — 83735 ASSAY OF MAGNESIUM: CPT

## 2022-06-05 PROCEDURE — C1765: CPT

## 2022-06-05 PROCEDURE — 93010 ELECTROCARDIOGRAM REPORT: CPT

## 2022-06-05 PROCEDURE — C1889: CPT

## 2022-06-05 PROCEDURE — 97530 THERAPEUTIC ACTIVITIES: CPT | Mod: GP

## 2022-06-05 PROCEDURE — 84100 ASSAY OF PHOSPHORUS: CPT

## 2022-06-05 PROCEDURE — 80048 BASIC METABOLIC PNL TOTAL CA: CPT

## 2022-06-05 PROCEDURE — 82962 GLUCOSE BLOOD TEST: CPT

## 2022-06-05 PROCEDURE — 74176 CT ABD & PELVIS W/O CONTRAST: CPT | Mod: 26,MA

## 2022-06-05 PROCEDURE — 74018 RADEX ABDOMEN 1 VIEW: CPT

## 2022-06-05 PROCEDURE — 36415 COLL VENOUS BLD VENIPUNCTURE: CPT

## 2022-06-05 PROCEDURE — 71045 X-RAY EXAM CHEST 1 VIEW: CPT

## 2022-06-05 PROCEDURE — C9113: CPT

## 2022-06-05 PROCEDURE — 85027 COMPLETE CBC AUTOMATED: CPT

## 2022-06-05 RX ORDER — HYDRALAZINE HCL 50 MG
25 TABLET ORAL THREE TIMES A DAY
Refills: 0 | Status: DISCONTINUED | OUTPATIENT
Start: 2022-06-05 | End: 2022-06-07

## 2022-06-05 RX ORDER — OXYCODONE HYDROCHLORIDE 5 MG/1
5 TABLET ORAL EVERY 6 HOURS
Refills: 0 | Status: DISCONTINUED | OUTPATIENT
Start: 2022-06-05 | End: 2022-06-06

## 2022-06-05 RX ORDER — HYDRALAZINE HCL 50 MG
10 TABLET ORAL ONCE
Refills: 0 | Status: COMPLETED | OUTPATIENT
Start: 2022-06-05 | End: 2022-06-05

## 2022-06-05 RX ORDER — SODIUM CHLORIDE 9 MG/ML
1000 INJECTION, SOLUTION INTRAVENOUS
Refills: 0 | Status: DISCONTINUED | OUTPATIENT
Start: 2022-06-05 | End: 2022-06-08

## 2022-06-05 RX ORDER — AMLODIPINE BESYLATE 2.5 MG/1
5 TABLET ORAL DAILY
Refills: 0 | Status: DISCONTINUED | OUTPATIENT
Start: 2022-06-05 | End: 2022-06-10

## 2022-06-05 RX ORDER — ONDANSETRON 8 MG/1
4 TABLET, FILM COATED ORAL EVERY 6 HOURS
Refills: 0 | Status: DISCONTINUED | OUTPATIENT
Start: 2022-06-05 | End: 2022-06-13

## 2022-06-05 RX ORDER — ACETAMINOPHEN 500 MG
650 TABLET ORAL EVERY 6 HOURS
Refills: 0 | Status: DISCONTINUED | OUTPATIENT
Start: 2022-06-05 | End: 2022-06-13

## 2022-06-05 RX ORDER — FINASTERIDE 5 MG/1
5 TABLET, FILM COATED ORAL DAILY
Refills: 0 | Status: DISCONTINUED | OUTPATIENT
Start: 2022-06-05 | End: 2022-06-13

## 2022-06-05 RX ORDER — LEVOTHYROXINE SODIUM 125 MCG
75 TABLET ORAL DAILY
Refills: 0 | Status: DISCONTINUED | OUTPATIENT
Start: 2022-06-05 | End: 2022-06-13

## 2022-06-05 RX ORDER — SIMVASTATIN 20 MG/1
40 TABLET, FILM COATED ORAL AT BEDTIME
Refills: 0 | Status: DISCONTINUED | OUTPATIENT
Start: 2022-06-05 | End: 2022-06-13

## 2022-06-05 RX ORDER — POTASSIUM CHLORIDE 20 MEQ
10 PACKET (EA) ORAL
Refills: 0 | Status: COMPLETED | OUTPATIENT
Start: 2022-06-05 | End: 2022-06-05

## 2022-06-05 RX ORDER — ONDANSETRON 8 MG/1
4 TABLET, FILM COATED ORAL ONCE
Refills: 0 | Status: DISCONTINUED | OUTPATIENT
Start: 2022-06-05 | End: 2022-06-05

## 2022-06-05 RX ORDER — HEPARIN SODIUM 5000 [USP'U]/ML
5000 INJECTION INTRAVENOUS; SUBCUTANEOUS EVERY 8 HOURS
Refills: 0 | Status: DISCONTINUED | OUTPATIENT
Start: 2022-06-05 | End: 2022-06-13

## 2022-06-05 RX ORDER — CARBIDOPA AND LEVODOPA 25; 100 MG/1; MG/1
2 TABLET ORAL THREE TIMES A DAY
Refills: 0 | Status: DISCONTINUED | OUTPATIENT
Start: 2022-06-05 | End: 2022-06-07

## 2022-06-05 RX ORDER — SODIUM CHLORIDE 9 MG/ML
1000 INJECTION, SOLUTION INTRAVENOUS
Refills: 0 | Status: DISCONTINUED | OUTPATIENT
Start: 2022-06-05 | End: 2022-06-05

## 2022-06-05 RX ORDER — PANTOPRAZOLE SODIUM 20 MG/1
40 TABLET, DELAYED RELEASE ORAL
Refills: 0 | Status: DISCONTINUED | OUTPATIENT
Start: 2022-06-05 | End: 2022-06-10

## 2022-06-05 RX ORDER — ALLOPURINOL 300 MG
100 TABLET ORAL DAILY
Refills: 0 | Status: DISCONTINUED | OUTPATIENT
Start: 2022-06-05 | End: 2022-06-13

## 2022-06-05 RX ADMIN — SODIUM CHLORIDE 75 MILLILITER(S): 9 INJECTION, SOLUTION INTRAVENOUS at 23:13

## 2022-06-05 RX ADMIN — SODIUM CHLORIDE 75 MILLILITER(S): 9 INJECTION, SOLUTION INTRAVENOUS at 22:24

## 2022-06-05 RX ADMIN — Medication 100 MILLIEQUIVALENT(S): at 20:24

## 2022-06-05 RX ADMIN — Medication 10 MILLIGRAM(S): at 22:24

## 2022-06-05 RX ADMIN — Medication 100 MILLIEQUIVALENT(S): at 22:24

## 2022-06-05 RX ADMIN — Medication 10 MILLIGRAM(S): at 18:50

## 2022-06-05 RX ADMIN — Medication 100 MILLIEQUIVALENT(S): at 18:33

## 2022-06-05 NOTE — ED ADULT NURSE NOTE - OBJECTIVE STATEMENT
Pt present to ED c/o constipation for a few days. Pt abd distended and round. Pt has a hx of parkinson's and has constipation frequently. Pt had mag citrate at 9am with no bm. Pt denies any pain at this time comfort and safety maintained.

## 2022-06-05 NOTE — H&P ADULT - HISTORY OF PRESENT ILLNESS
An 81 year old male with a history of  An 81 year old male with a history of Parkinson's disease. He presented to the ED for abdominal distension and constipation for 2 days. Patient has been having intermittent constipation for a long time and was seen last by his gastroenterologist 2 weeks ago and was given laxatives. He started having increasing abdominal distention and constipation for the last 2 days and was unable to pass any bowel movement or gas. He denies any fever, nausea, vomiting, and is only complaining of mild abdominal pain. He was not able to have any food becuase of the abdominal distension.

## 2022-06-05 NOTE — H&P ADULT - NSHPPHYSICALEXAM_GEN_ALL_CORE
Vital Signs Last 24 Hrs  T(C): 36.4 (05 Jun 2022 22:45), Max: 36.7 (05 Jun 2022 18:42)  T(F): 97.6 (05 Jun 2022 22:45), Max: 98 (05 Jun 2022 18:42)  HR: 62 (05 Jun 2022 22:45) (56 - 67)  BP: 161/68 (05 Jun 2022 22:45) (146/62 - 223/80)  BP(mean): 106 (05 Jun 2022 19:09) (106 - 106)  RR: 18 (05 Jun 2022 22:45) (12 - 18)  SpO2: 96% (05 Jun 2022 22:45) (95% - 100%)  Alert, conscious, oriented  Not in pain or respiratory distress  Pale, no jaundice or cyanosis  Chest clear, equal air entry bilaterally  Abdomen distended, tympanic percussion note, no tenderness, no guarding or rigidity  Extremities: no swelling or tenderness

## 2022-06-05 NOTE — ED PROVIDER NOTE - PROGRESS NOTE DETAILS
PA: Patient is an 80 y/o male with PMHx of CKD, HTN, HLD, hypothyroid, Parkinson's disease who presents  to ED c/o lower abd pain and constipation x2 days. Pt took magnesium citrate and Miralax at home without relief. Denies fever, n/v. ~Elpidio Evans PA-C Waiting on CT. ~Elpidio Evans PA-C, Discussed with radiology.  patient with sigmoid volvulus.  Paging surgery, adding lactate. Lavelle Jenkins D.O. Discussed with colorectal and GI Dr. Finney.  Plan for decompression with Dr. Finney.  Discussed with colorectal on call, states will send resident to see patient, likely admit under colorectal, plan for surgery later in the week. Lavelle Jenkins D.O.

## 2022-06-05 NOTE — H&P ADULT - NSHPLABSRESULTS_GEN_ALL_CORE
11.6   10.93 )-----------( 269      ( 05 Jun 2022 15:43 )             35.9   06-05    139  |  107  |  48<H>  ----------------------------<  106<H>  3.4<L>   |  20<L>  |  2.72<H>    Ca    8.7      05 Jun 2022 16:52    TPro  7.2  /  Alb  3.2<L>  /  TBili  0.4  /  DBili  x   /  AST  17  /  ALT  8<L>  /  AlkPhos  93  06-05  < from: CT Abdomen and Pelvis w/ Oral Cont (06.05.22 @ 18:24) >    IMPRESSION:  Sigmoid volvulus. GI or Surgical consultation is advised for reduction.    Findings discussed with DR. ANI VILLARREAL  on 6/5/2022 6:49 PM with read   back.      < end of copied text >

## 2022-06-05 NOTE — CONSULT NOTE ADULT - SUBJECTIVE AND OBJECTIVE BOX
Patient is a 81y old  Male who presents with a chief complaint of     HPI:  82yo male with chronic constipation and Parkinsons presented to Er 6/5/22 with acute abdominal distention  CT shows sigmoid volvulus    PAST MEDICAL & SURGICAL HISTORY:  HTN (hypertension)      HLD (hyperlipidemia)      Parkinson disease      CKD (chronic kidney disease)      Hypothyroidism      History of total knee replacement, left          MEDICATIONS  (STANDING):  potassium chloride  10 mEq/100 mL IVPB 10 milliEquivalent(s) IV Intermittent every 1 hour    MEDICATIONS  (PRN):      Allergies    Advil (Rash)  allieve (Rash)    Intolerances        SOCIAL HISTORY:    FAMILY HISTORY:  No pertinent family history in first degree relatives        REVIEW OF SYSTEMS:    CONSTITUTIONAL: No weakness, fevers or chills  EYES/ENT: No visual changes;  No vertigo or throat pain   NECK: No pain or stiffness  RESPIRATORY: No cough, wheezing, hemoptysis; No shortness of breath  CARDIOVASCULAR: No chest pain or palpitations  GASTROINTESTINAL: No abdominal or epigastric pain. No nausea, vomiting, or hematemesis; No diarrhea or constipation. No melena or hematochezia.  GENITOURINARY: No dysuria, frequency or hematuria  NEUROLOGICAL: No numbness or weakness  SKIN: No itching, burning, rashes, or lesions   PSYCH: Normal mood and affect  All other review of systems is negative unless indicated above.    Vital Signs Last 24 Hrs  T(C): 36.7 (05 Jun 2022 21:01), Max: 36.7 (05 Jun 2022 18:42)  T(F): 98 (05 Jun 2022 21:01), Max: 98 (05 Jun 2022 18:42)  HR: 58 (05 Jun 2022 21:01) (58 - 67)  BP: 158/61 (05 Jun 2022 21:01) (158/61 - 223/80)  BP(mean): 106 (05 Jun 2022 19:09) (106 - 106)  RR: 18 (05 Jun 2022 21:01) (16 - 18)  SpO2: 100% (05 Jun 2022 21:01) (95% - 100%)    PHYSICAL EXAM  Constitutional: NAD, uncomfortable  HEENT: MMM  Neck: No LAD  Respiratory: CTA  Cardiovascular: S1 and S2  Gastrointestinal: markedly distended  Neurological: A/O x 3, no focal deficits  Psychiatric: Normal mood, normal affect    LABS:                        11.6   10.93 )-----------( 269      ( 05 Jun 2022 15:43 )             35.9     06-05    139  |  107  |  48<H>  ----------------------------<  106<H>  3.4<L>   |  20<L>  |  2.72<H>    Ca    8.7      05 Jun 2022 16:52    TPro  7.2  /  Alb  3.2<L>  /  TBili  0.4  /  DBili  x   /  AST  17  /  ALT  8<L>  /  AlkPhos  93  06-05    PT/INR - ( 05 Jun 2022 16:52 )   PT: 11.3 sec;   INR: 0.97 ratio         PTT - ( 05 Jun 2022 16:52 )  PTT:30.9 sec  LIVER FUNCTIONS - ( 05 Jun 2022 16:52 )  Alb: 3.2 g/dL / Pro: 7.2 gm/dL / ALK PHOS: 93 U/L / ALT: 8 U/L / AST: 17 U/L / GGT: x             RADIOLOGY & ADDITIONAL STUDIES:

## 2022-06-05 NOTE — ED ADULT NURSE NOTE - CHIEF COMPLAINT QUOTE
BIBA to ED from [facility] for c/o constipation x2 days. Reports abdominal pain. Denies nausea and vomiting. Abdomen firm and distended.

## 2022-06-05 NOTE — PATIENT PROFILE ADULT - FALL HARM RISK - HARM RISK INTERVENTIONS

## 2022-06-05 NOTE — ED ADULT TRIAGE NOTE - CHIEF COMPLAINT QUOTE
BIBA to ED from [facility] for c/o constipation x2 days. Reports abdominal pain. Denies nausea and vomiting. Abdomen firm and distended.

## 2022-06-05 NOTE — ED PROVIDER NOTE - PHYSICAL EXAMINATION
PA NOTE: GEN: AOX3, NAD. HEENT: Throat clear. Airway is patent. EYES: PERRLA. EOMI. Head: NC/AT. NECK: Supple, No JVD. FROM. C-spine non-tender. CV:S1S2, RRR, LUNGS: Non-labored breathing, no tachypnea. O2sat 100% RA. CTA b/l. No w/r/r. CHEST: Equal chest expansion and rise. No deformity. ABD: Soft, +Moderate tenderness lower ABD. No rebound, no guarding. No CVAT. EXT: No e/c/c. 2+ distal pulses. SKIN: No rashes. NEURO: No focal deficits. CN II-XII intact. FROM. 5/5 motor and sensory. ~Elpidio Evans PA-C PA NOTE: GEN: AOX3, NAD. HEENT: Throat clear. Airway is patent. EYES: PERRLA. EOMI. Head: NC/AT. NECK: Supple, No JVD. FROM. C-spine non-tender. CV:S1S2, RRR, LUNGS: Non-labored breathing, no tachypnea. O2sat 100% RA. CTA b/l. No w/r/r. CHEST: Equal chest expansion and rise. No deformity. ABD: Soft, Severely distended abd. +Tympany on percussion. +Moderate tenderness lower ABD. No rebound, no guarding. No CVAT. EXT: No e/c/c. 2+ distal pulses. SKIN: No rashes. NEURO: No focal deficits. CN II-XII intact. FROM. 5/5 motor and sensory. ~Elpidio Evans PA-C

## 2022-06-05 NOTE — ED PROVIDER NOTE - CLINICAL SUMMARY MEDICAL DECISION MAKING FREE TEXT BOX
Patient with sigmoid volvulus.  Discussed with Dr. Finney, will take for decompression, and Dr. Yoon colorectal for admit.

## 2022-06-05 NOTE — ED PROVIDER NOTE - OBJECTIVE STATEMENT
80 y/o male with a PMHx of CKD, HTN, HLD, hypothyroid, Parkinsons presents to the ED c/o abd pain and constipation x2 days.  Pt took magnesium citrate and Miralax at home without relief. Denies fevers, vomiting. No other complaints at this time. GI: Dr. Zapien.

## 2022-06-05 NOTE — ED ADULT NURSE REASSESSMENT NOTE - NS ED NURSE REASSESS COMMENT FT1
Pt awaiting CT scan. Pt Aox3, VSS. Pt has no complaints at this time. Denies pain. Safety and comfort maintained, will continue to monitor.

## 2022-06-05 NOTE — ED ADULT NURSE NOTE - NSIMPLEMENTINTERV_GEN_ALL_ED
Implemented All Fall with Harm Risk Interventions:  [place] to call system. Call bell, personal items and telephone within reach. Instruct patient to call for assistance. Room bathroom lighting operational. Non-slip footwear when patient is off stretcher. Physically safe environment: no spills, clutter or unnecessary equipment. Stretcher in lowest position, wheels locked, appropriate side rails in place. Provide visual cue, wrist band, yellow gown, etc. Monitor gait and stability. Monitor for mental status changes and reorient to person, place, and time. Review medications for side effects contributing to fall risk. Reinforce activity limits and safety measures with patient and family. Provide visual clues: red socks.

## 2022-06-05 NOTE — H&P ADULT - ASSESSMENT
An 81 year old male with history of Parkinson's disease  Sigmoid volvulus    PLan:  GI for decompression  Admission to colorectal surgery  Keep NPO for now  IV fluids  Pain medications  Will arrange for sigmoid colectomy on Tuesday    Plan discussed with Dr Vences

## 2022-06-06 ENCOUNTER — TRANSCRIPTION ENCOUNTER (OUTPATIENT)
Age: 82
End: 2022-06-06

## 2022-06-06 ENCOUNTER — APPOINTMENT (OUTPATIENT)
Dept: COLORECTAL SURGERY | Facility: HOSPITAL | Age: 82
End: 2022-06-06

## 2022-06-06 ENCOUNTER — RESULT REVIEW (OUTPATIENT)
Age: 82
End: 2022-06-06

## 2022-06-06 LAB
ANION GAP SERPL CALC-SCNC: 8 MMOL/L — SIGNIFICANT CHANGE UP (ref 5–17)
BUN SERPL-MCNC: 42 MG/DL — HIGH (ref 7–23)
CALCIUM SERPL-MCNC: 8.2 MG/DL — LOW (ref 8.5–10.1)
CHLORIDE SERPL-SCNC: 108 MMOL/L — SIGNIFICANT CHANGE UP (ref 96–108)
CO2 SERPL-SCNC: 21 MMOL/L — LOW (ref 22–31)
CREAT SERPL-MCNC: 2.36 MG/DL — HIGH (ref 0.5–1.3)
CULTURE RESULTS: SIGNIFICANT CHANGE UP
EGFR: 27 ML/MIN/1.73M2 — LOW
GLUCOSE BLDC GLUCOMTR-MCNC: 108 MG/DL — HIGH (ref 70–99)
GLUCOSE BLDC GLUCOMTR-MCNC: 110 MG/DL — HIGH (ref 70–99)
GLUCOSE BLDC GLUCOMTR-MCNC: 197 MG/DL — HIGH (ref 70–99)
GLUCOSE SERPL-MCNC: 114 MG/DL — HIGH (ref 70–99)
HCT VFR BLD CALC: 29.4 % — LOW (ref 39–50)
HGB BLD-MCNC: 9.4 G/DL — LOW (ref 13–17)
MAGNESIUM SERPL-MCNC: 3.1 MG/DL — HIGH (ref 1.6–2.6)
MCHC RBC-ENTMCNC: 32 GM/DL — SIGNIFICANT CHANGE UP (ref 32–36)
MCHC RBC-ENTMCNC: 32.6 PG — SIGNIFICANT CHANGE UP (ref 27–34)
MCV RBC AUTO: 102.1 FL — HIGH (ref 80–100)
PHOSPHATE SERPL-MCNC: 3.1 MG/DL — SIGNIFICANT CHANGE UP (ref 2.5–4.5)
PLATELET # BLD AUTO: 240 K/UL — SIGNIFICANT CHANGE UP (ref 150–400)
POTASSIUM SERPL-MCNC: 2.8 MMOL/L — CRITICAL LOW (ref 3.5–5.3)
POTASSIUM SERPL-SCNC: 2.8 MMOL/L — CRITICAL LOW (ref 3.5–5.3)
RBC # BLD: 2.88 M/UL — LOW (ref 4.2–5.8)
RBC # FLD: 14 % — SIGNIFICANT CHANGE UP (ref 10.3–14.5)
SODIUM SERPL-SCNC: 137 MMOL/L — SIGNIFICANT CHANGE UP (ref 135–145)
SPECIMEN SOURCE: SIGNIFICANT CHANGE UP
WBC # BLD: 7.43 K/UL — SIGNIFICANT CHANGE UP (ref 3.8–10.5)
WBC # FLD AUTO: 7.43 K/UL — SIGNIFICANT CHANGE UP (ref 3.8–10.5)

## 2022-06-06 PROCEDURE — 44143 PARTIAL REMOVAL OF COLON: CPT

## 2022-06-06 PROCEDURE — 93010 ELECTROCARDIOGRAM REPORT: CPT

## 2022-06-06 PROCEDURE — 71045 X-RAY EXAM CHEST 1 VIEW: CPT | Mod: 26

## 2022-06-06 PROCEDURE — 88307 TISSUE EXAM BY PATHOLOGIST: CPT | Mod: 26

## 2022-06-06 PROCEDURE — 99222 1ST HOSP IP/OBS MODERATE 55: CPT

## 2022-06-06 PROCEDURE — 99233 SBSQ HOSP IP/OBS HIGH 50: CPT | Mod: 57

## 2022-06-06 RX ORDER — ACETAMINOPHEN 500 MG
1000 TABLET ORAL EVERY 6 HOURS
Refills: 0 | Status: DISCONTINUED | OUTPATIENT
Start: 2022-06-06 | End: 2022-06-13

## 2022-06-06 RX ORDER — CARBIDOPA AND LEVODOPA 25; 100 MG/1; MG/1
2 TABLET ORAL
Qty: 0 | Refills: 0 | DISCHARGE

## 2022-06-06 RX ORDER — SODIUM CHLORIDE 9 MG/ML
1000 INJECTION, SOLUTION INTRAVENOUS
Refills: 0 | Status: DISCONTINUED | OUTPATIENT
Start: 2022-06-06 | End: 2022-06-06

## 2022-06-06 RX ORDER — KETOROLAC TROMETHAMINE 30 MG/ML
15 SYRINGE (ML) INJECTION EVERY 6 HOURS
Refills: 0 | Status: DISCONTINUED | OUTPATIENT
Start: 2022-06-06 | End: 2022-06-11

## 2022-06-06 RX ORDER — MEPERIDINE HYDROCHLORIDE 50 MG/ML
12.5 INJECTION INTRAMUSCULAR; INTRAVENOUS; SUBCUTANEOUS
Refills: 0 | Status: DISCONTINUED | OUTPATIENT
Start: 2022-06-06 | End: 2022-06-06

## 2022-06-06 RX ORDER — FENTANYL CITRATE 50 UG/ML
25 INJECTION INTRAVENOUS
Refills: 0 | Status: DISCONTINUED | OUTPATIENT
Start: 2022-06-06 | End: 2022-06-06

## 2022-06-06 RX ORDER — HYDROMORPHONE HYDROCHLORIDE 2 MG/ML
0.5 INJECTION INTRAMUSCULAR; INTRAVENOUS; SUBCUTANEOUS
Refills: 0 | Status: DISCONTINUED | OUTPATIENT
Start: 2022-06-06 | End: 2022-06-06

## 2022-06-06 RX ORDER — MORPHINE SULFATE 50 MG/1
2 CAPSULE, EXTENDED RELEASE ORAL EVERY 6 HOURS
Refills: 0 | Status: DISCONTINUED | OUTPATIENT
Start: 2022-06-06 | End: 2022-06-11

## 2022-06-06 RX ORDER — LEVOTHYROXINE SODIUM 125 MCG
1 TABLET ORAL
Qty: 0 | Refills: 0 | DISCHARGE

## 2022-06-06 RX ORDER — NEOMYCIN SULFATE 500 MG/1
1000 TABLET ORAL
Refills: 0 | Status: COMPLETED | OUTPATIENT
Start: 2022-06-06 | End: 2022-06-06

## 2022-06-06 RX ORDER — HYDRALAZINE HCL 50 MG
10 TABLET ORAL ONCE
Refills: 0 | Status: COMPLETED | OUTPATIENT
Start: 2022-06-06 | End: 2022-06-06

## 2022-06-06 RX ORDER — METRONIDAZOLE 500 MG
500 TABLET ORAL
Refills: 0 | Status: COMPLETED | OUTPATIENT
Start: 2022-06-06 | End: 2022-06-06

## 2022-06-06 RX ORDER — MAGNESIUM SULFATE 500 MG/ML
1 VIAL (ML) INJECTION ONCE
Refills: 0 | Status: DISCONTINUED | OUTPATIENT
Start: 2022-06-06 | End: 2022-06-06

## 2022-06-06 RX ORDER — SIMVASTATIN 20 MG/1
1 TABLET, FILM COATED ORAL
Qty: 0 | Refills: 0 | DISCHARGE

## 2022-06-06 RX ORDER — POTASSIUM CHLORIDE 20 MEQ
10 PACKET (EA) ORAL
Refills: 0 | Status: COMPLETED | OUTPATIENT
Start: 2022-06-06 | End: 2022-06-06

## 2022-06-06 RX ORDER — ONDANSETRON 8 MG/1
4 TABLET, FILM COATED ORAL ONCE
Refills: 0 | Status: DISCONTINUED | OUTPATIENT
Start: 2022-06-06 | End: 2022-06-06

## 2022-06-06 RX ORDER — FENTANYL CITRATE 50 UG/ML
50 INJECTION INTRAVENOUS
Refills: 0 | Status: DISCONTINUED | OUTPATIENT
Start: 2022-06-06 | End: 2022-06-06

## 2022-06-06 RX ADMIN — Medication 25 MILLIGRAM(S): at 21:28

## 2022-06-06 RX ADMIN — Medication 25 MILLIGRAM(S): at 06:40

## 2022-06-06 RX ADMIN — Medication 100 MILLIEQUIVALENT(S): at 13:08

## 2022-06-06 RX ADMIN — Medication 500 MILLIGRAM(S): at 10:15

## 2022-06-06 RX ADMIN — NEOMYCIN SULFATE 1000 MILLIGRAM(S): 500 TABLET ORAL at 10:15

## 2022-06-06 RX ADMIN — SODIUM CHLORIDE 75 MILLILITER(S): 9 INJECTION, SOLUTION INTRAVENOUS at 17:06

## 2022-06-06 RX ADMIN — CARBIDOPA AND LEVODOPA 2 TABLET(S): 25; 100 TABLET ORAL at 17:32

## 2022-06-06 RX ADMIN — Medication 25 MILLIGRAM(S): at 16:58

## 2022-06-06 RX ADMIN — Medication 500 MILLIGRAM(S): at 08:35

## 2022-06-06 RX ADMIN — Medication 100 MILLIGRAM(S): at 10:16

## 2022-06-06 RX ADMIN — PANTOPRAZOLE SODIUM 40 MILLIGRAM(S): 20 TABLET, DELAYED RELEASE ORAL at 06:40

## 2022-06-06 RX ADMIN — NEOMYCIN SULFATE 1000 MILLIGRAM(S): 500 TABLET ORAL at 08:35

## 2022-06-06 RX ADMIN — Medication 100 MILLIEQUIVALENT(S): at 11:55

## 2022-06-06 RX ADMIN — Medication 75 MICROGRAM(S): at 06:39

## 2022-06-06 RX ADMIN — FINASTERIDE 5 MILLIGRAM(S): 5 TABLET, FILM COATED ORAL at 10:15

## 2022-06-06 RX ADMIN — CARBIDOPA AND LEVODOPA 2 TABLET(S): 25; 100 TABLET ORAL at 21:28

## 2022-06-06 RX ADMIN — SODIUM CHLORIDE 75 MILLILITER(S): 9 INJECTION, SOLUTION INTRAVENOUS at 10:22

## 2022-06-06 RX ADMIN — Medication 10 MILLIGRAM(S): at 17:02

## 2022-06-06 RX ADMIN — SODIUM CHLORIDE 75 MILLILITER(S): 9 INJECTION, SOLUTION INTRAVENOUS at 17:05

## 2022-06-06 RX ADMIN — AMLODIPINE BESYLATE 5 MILLIGRAM(S): 2.5 TABLET ORAL at 10:18

## 2022-06-06 RX ADMIN — Medication 650 MILLIGRAM(S): at 23:36

## 2022-06-06 RX ADMIN — HEPARIN SODIUM 5000 UNIT(S): 5000 INJECTION INTRAVENOUS; SUBCUTANEOUS at 06:40

## 2022-06-06 RX ADMIN — Medication 100 MILLIEQUIVALENT(S): at 10:15

## 2022-06-06 RX ADMIN — HEPARIN SODIUM 5000 UNIT(S): 5000 INJECTION INTRAVENOUS; SUBCUTANEOUS at 21:29

## 2022-06-06 RX ADMIN — Medication 10 MILLIGRAM(S): at 23:31

## 2022-06-06 RX ADMIN — CARBIDOPA AND LEVODOPA 2 TABLET(S): 25; 100 TABLET ORAL at 06:40

## 2022-06-06 RX ADMIN — SIMVASTATIN 40 MILLIGRAM(S): 20 TABLET, FILM COATED ORAL at 21:28

## 2022-06-06 NOTE — CONSULT NOTE ADULT - SUBJECTIVE AND OBJECTIVE BOX
PCP    Patient is a 81y old  Male who presents with a chief complaint of Sigmoid volvulus (2022 01:18)        HPI:  An 81 year old male with a history of Parkinson's disease. He presented to the ED for abdominal distension and constipation for 2 days. Patient has been having intermittent constipation for a long time and was seen last by his gastroenterologist 2 weeks ago and was given laxatives. He started having increasing abdominal distention and constipation for the last 2 days and was unable to pass any bowel movement or gas. He denies any fever, nausea, vomiting, and is only complaining of mild abdominal pain. He was not able to have any food becuase of the abdominal distension.  (2022 20:48)      Review of system- All 10 systems reviewed and is as per HPI otherwise negative.           T(C): 36.8 (22 @ 00:48), Max: 36.8 (22 @ 00:48)  HR: 62 (22 @ 06:45) (56 - 84)  BP: 172/64 (22 @ 06:45) (112/71 - 223/80)  RR: 17 (22 @ 00:48) (12 - 18)  SpO2: 100% (22 @ 00:48) (95% - 100%)  Wt(kg): --      LABS:                        11.6   10.93 )-----------( 269      ( 2022 15:43 )             35.9     06-    139  |  107  |  48<H>  ----------------------------<  106<H>  3.4<L>   |  20<L>  |  2.72<H>    Ca    8.7      2022 16:52    TPro  7.2  /  Alb  3.2<L>  /  TBili  0.4  /  DBili  x   /  AST  17  /  ALT  8<L>  /  AlkPhos  93  06-05    PT/INR - ( 2022 16:52 )   PT: 11.3 sec;   INR: 0.97 ratio         PTT - ( 2022 16:52 )  PTT:30.9 sec  Urinalysis Basic - ( 2022 18:15 )    Color: Yellow / Appearance: Clear / S.020 / pH: x  Gluc: x / Ketone: Negative  / Bili: Negative / Urobili: Negative   Blood: x / Protein: 100 / Nitrite: Negative   Leuk Esterase: Negative / RBC: Negative /HPF / WBC Negative   Sq Epi: x / Non Sq Epi: Occasional / Bacteria: Negative        CAPILLARY BLOOD GLUCOSE          CAPILLARY BLOOD GLUCOSE        CAPILLARY BLOOD GLUCOSE                RECENT CULTURES:      RADIOLOGY & ADDITIONAL TESTS:      PHYSICAL EXAM:    GENERAL: NAD, well-groomed, well-developed  HEAD:  Atraumatic, Normocephalic  EYES: EOMI, PERRLA, conjunctiva and sclera clear  HEENT: Moist mucous membranes  NECK: Supple, No JVD  NERVOUS SYSTEM:  Alert & Oriented X3, Motor Strength 5/5 B/L upper and lower extremities; DTRs 2+ intact and symmetric  CHEST/LUNG: Clear to auscultation bilaterally; No rales, rhonchi, wheezing, or rubs  HEART: Regular rate and rhythm; No murmurs, rubs, or gallops  ABDOMEN: Soft, Nontender, Nondistended; Bowel sounds present  GENITOURINARY- Voiding, no palpable bladder  EXTREMITIES:  2+ Peripheral Pulses, No clubbing, cyanosis, or edema  MUSCULOSKELTAL- No muscle tenderness, Muscle tone normal, No joint tenderness, no Joint swelling, Joint range of motion-normal  SKIN-no rash, no lesion  CNS- alert, oriented X3, non focal       Daily Height in cm: 165.1 (2022 14:59)    Daily       acetaminophen     Tablet .. 650 milliGRAM(s) Oral every 6 hours PRN  allopurinol 100 milliGRAM(s) Oral daily  amLODIPine   Tablet 5 milliGRAM(s) Oral daily  carbidopa/levodopa  25/100 2 Tablet(s) Oral three times a day  dextrose 5% + sodium chloride 0.45%. 1000 milliLiter(s) IV Continuous <Continuous>  finasteride 5 milliGRAM(s) Oral daily  heparin   Injectable 5000 Unit(s) SubCutaneous every 8 hours  hydrALAZINE 25 milliGRAM(s) Oral three times a day  levothyroxine 75 MICROGram(s) Oral daily  metroNIDAZOLE    Tablet 500 milliGRAM(s) Oral <User Schedule>  neomycin 1000 milliGRAM(s) Oral <User Schedule>  ondansetron Injectable 4 milliGRAM(s) IV Push every 6 hours PRN  oxyCODONE    IR 5 milliGRAM(s) Oral every 6 hours PRN  pantoprazole    Tablet 40 milliGRAM(s) Oral before breakfast  simvastatin 40 milliGRAM(s) Oral at bedtime        Assessment    Plan       CC-  Sigmoid volvulus (2022 01:18)    HPI:  82yo/M with PMH Parkinson's. HTN, hypothyroidism, CKD presented  to the ED for abdominal distension and constipation for 2 days. Patient has been having intermittent constipation for a long time and was seen last by his gastroenterologist 2 weeks ago and was given laxatives. He started having increasing abdominal distention and constipation for the last 2 days and was unable to pass any bowel movement or gas. He denies any fever, nausea, vomiting, and is only complaining of mild abdominal pain. He was not able to have any food because of the abdominal distension.  (2022 20:48) Patient admitted with volvulus and underwent colonoscopy with decompression. His abdomen still distended and he needs to go to OR urgently. Medical consult called for preop medical clearance    PMH-as above  PSH- LT TKR  SOc hx- wheelchair bound  Fam hx- +COPD    22- NPO for OR. +abd distention    Review of system- All 10 systems reviewed and is as per HPI otherwise negative.     T(C): 36.8 (22 @ 00:48), Max: 36.8 (22 @ 00:48)  HR: 62 (22 @ 06:45) (56 - 84)  BP: 172/64 (22 @ 06:45) (112/71 - 223/80)  RR: 17 (22 @ 00:48) (12 - 18)  SpO2: 100% (22 @ 00:48) (95% - 100%)  Wt(kg): --    LABS:                        11.6   10.93 )-----------( 269      ( 2022 15:43 )             35.9         139  |  107  |  48<H>  ----------------------------<  106<H>  3.4<L>   |  20<L>  |  2.72<H>    Ca    8.7      2022 16:52    TPro  7.2  /  Alb  3.2<L>  /  TBili  0.4  /  DBili  x   /  AST  17  /  ALT  8<L>  /  AlkPhos  93  06-05    PT/INR - ( 2022 16:52 )   PT: 11.3 sec;   INR: 0.97 ratio       PTT - ( 2022 16:52 )  PTT:30.9 sec    Urinalysis Basic - ( 2022 18:15 )  Color: Yellow / Appearance: Clear / S.020 / pH: x  Gluc: x / Ketone: Negative  / Bili: Negative / Urobili: Negative   Blood: x / Protein: 100 / Nitrite: Negative   Leuk Esterase: Negative / RBC: Negative /HPF / WBC Negative   Sq Epi: x / Non Sq Epi: Occasional / Bacteria: Negative    RADIOLOGY & ADDITIONAL TESTS:  CT ABDOMEN AND PELVIS OC                        PROCEDURE DATE:  2022    INTERPRETATION:  CLINICAL INFORMATION: Abdominal pain and constipation.    COMPARISON: None.    CONTRAST/COMPLICATIONS:  IV Contrast: NONE  Oral Contrast: NONE  Complications: None reported at time of study completion    PROCEDURE:  CT of the Abdomen and Pelvis was performed.  Sagittal and coronal reformats were performed.    FINDINGS:  LOWER CHEST: Small right pleural effusion.    LIVER:Within normal limits.  BILE DUCTS: Normal caliber.  GALLBLADDER: Cholelithiasis.  SPLEEN: Within normal limits.  PANCREAS: Within normal limits.  ADRENALS: Within normal limits.  KIDNEYS/URETERS: Bilateral atrophic kidneys with cortical thinning and no   hydronephrosis. A slightly hyperdense cyst in the right upper pole has   increased from 3.6 cm to 5.1 cm from prior ultrasound and with simple on   that exam. Multiple other bilateral simple/hyperdense cysts are noted.    BLADDER: Trabeculated wall with diverticuli, consistent with chronic   outlet obstruction.  REPRODUCTIVE ORGANS: Prostate is enlarged. Fiduciary markers in place.   Small bilateral fat and fluid containing inguinal hernias.    BOWEL: Large hiatal hernia with small intraluminal contrast. Appendix is   normal. Extensive amount of stool in ascending through descending colon.   There is swirling and twisting of the sigmoid mesentery with a markedly   distended sigmoid, measuring at least 10 cm, located in the right upper   quadrant, with associated collapse of the rectum. Trace free fluid in the   presacral space is reactive. Engorgement of the sigmoid mesentery.  PERITONEUM: Trace ascites around the liver.  VESSELS: Atherosclerotic changes.  RETROPERITONEUM/LYMPH NODES:No lymphadenopathy.  ABDOMINAL WALL: Within normal limits.  BONES: Extensive degenerative changes from L1 to L3.    IMPRESSION:  Sigmoid volvulus. GI or Surgical consultation is advised for reduction.      PHYSICAL EXAM:  GENERAL: NAD, well-groomed, well-developed  HEAD:  Atraumatic, Normocephalic  EYES: EOMI, PERRLA, conjunctiva and sclera clear  HEENT: Moist mucous membranes  NECK: Supple, No JVD  NERVOUS SYSTEM:  Alert & Oriented X3, Motor Strength 5/5 B/L upper and lower extremities; DTRs 2+ intact and symmetric  CHEST/LUNG: Clear to auscultation bilaterally; No rales, rhonchi, wheezing, or rubs  HEART: Regular rate and rhythm; No murmurs, rubs, or gallops  ABDOMEN: Distended, BS+  GENITOURINARY- Voiding, no palpable bladder  EXTREMITIES:  2+ Peripheral Pulses, No clubbing, cyanosis, or edema  MUSCULOSKELTAL- No muscle tenderness, Muscle tone normal, No joint tenderness, no Joint swelling, Joint range of motion-normal  SKIN-no rash, no lesion  CNS- alert, oriented X3, non focal       Daily Height in cm: 165.1 (2022 14:59)        acetaminophen     Tablet .. 650 milliGRAM(s) Oral every 6 hours PRN  allopurinol 100 milliGRAM(s) Oral daily  amLODIPine   Tablet 5 milliGRAM(s) Oral daily  carbidopa/levodopa  25/100 2 Tablet(s) Oral three times a day  dextrose 5% + sodium chloride 0.45%. 1000 milliLiter(s) IV Continuous <Continuous>  finasteride 5 milliGRAM(s) Oral daily  heparin   Injectable 5000 Unit(s) SubCutaneous every 8 hours  hydrALAZINE 25 milliGRAM(s) Oral three times a day  levothyroxine 75 MICROGram(s) Oral daily  metroNIDAZOLE    Tablet 500 milliGRAM(s) Oral <User Schedule>  neomycin 1000 milliGRAM(s) Oral <User Schedule>  ondansetron Injectable 4 milliGRAM(s) IV Push every 6 hours PRN  oxyCODONE    IR 5 milliGRAM(s) Oral every 6 hours PRN  pantoprazole    Tablet 40 milliGRAM(s) Oral before breakfast  simvastatin 40 milliGRAM(s) Oral at bedtime    Assessment/Plan  #Sigmoid volvulus  S/p GI decompression  CRS team following  NPO for OR  IVF  ECHO from 20- EF 73%, no wall motion abnormalities  No medical contraindications for OR    #Parkinson's  Cont Sinemet and home meds    #HTN- stable    #Hypothyroidism- cont synthroid    #DVT proph- on hold for OR    #Dispo- thank you for consult, no medical contraindications for OR, medically optimized. Will follow

## 2022-06-06 NOTE — PROGRESS NOTE ADULT - TIME BILLING
Seen and examined.  s/p decompression of volvulus  Discussed with pt, wife and daughter need to undergo urgent colectomy to prevent recurrent volvulus and given absence of bowel prep and likely questionable ischemic changes to the colon, anticipate the need for colostomy. Discussed possibility of colostomy reversal in future should pt recover without incident.   All agree to proceed with surgery.

## 2022-06-06 NOTE — PHARMACOTHERAPY INTERVENTION NOTE - COMMENTS
Med history complete, reviewed medications and allergies with patients med list from [facility] and confirmed medication list with doctor first med profile, reviewed updates to med list with primary team

## 2022-06-07 LAB
ANION GAP SERPL CALC-SCNC: 9 MMOL/L — SIGNIFICANT CHANGE UP (ref 5–17)
BUN SERPL-MCNC: 37 MG/DL — HIGH (ref 7–23)
CALCIUM SERPL-MCNC: 8.1 MG/DL — LOW (ref 8.5–10.1)
CHLORIDE SERPL-SCNC: 108 MMOL/L — SIGNIFICANT CHANGE UP (ref 96–108)
CO2 SERPL-SCNC: 20 MMOL/L — LOW (ref 22–31)
CREAT SERPL-MCNC: 2.5 MG/DL — HIGH (ref 0.5–1.3)
EGFR: 25 ML/MIN/1.73M2 — LOW
GLUCOSE BLDC GLUCOMTR-MCNC: 113 MG/DL — HIGH (ref 70–99)
GLUCOSE BLDC GLUCOMTR-MCNC: 143 MG/DL — HIGH (ref 70–99)
GLUCOSE BLDC GLUCOMTR-MCNC: 147 MG/DL — HIGH (ref 70–99)
GLUCOSE BLDC GLUCOMTR-MCNC: 147 MG/DL — HIGH (ref 70–99)
GLUCOSE SERPL-MCNC: 142 MG/DL — HIGH (ref 70–99)
HCT VFR BLD CALC: 29.4 % — LOW (ref 39–50)
HGB BLD-MCNC: 9.9 G/DL — LOW (ref 13–17)
MAGNESIUM SERPL-MCNC: 2.8 MG/DL — HIGH (ref 1.6–2.6)
MCHC RBC-ENTMCNC: 33 PG — SIGNIFICANT CHANGE UP (ref 27–34)
MCHC RBC-ENTMCNC: 33.7 GM/DL — SIGNIFICANT CHANGE UP (ref 32–36)
MCV RBC AUTO: 98 FL — SIGNIFICANT CHANGE UP (ref 80–100)
PHOSPHATE SERPL-MCNC: 3.8 MG/DL — SIGNIFICANT CHANGE UP (ref 2.5–4.5)
PLATELET # BLD AUTO: 237 K/UL — SIGNIFICANT CHANGE UP (ref 150–400)
POTASSIUM SERPL-MCNC: 3.7 MMOL/L — SIGNIFICANT CHANGE UP (ref 3.5–5.3)
POTASSIUM SERPL-SCNC: 3.7 MMOL/L — SIGNIFICANT CHANGE UP (ref 3.5–5.3)
RBC # BLD: 3 M/UL — LOW (ref 4.2–5.8)
RBC # FLD: 14 % — SIGNIFICANT CHANGE UP (ref 10.3–14.5)
SODIUM SERPL-SCNC: 137 MMOL/L — SIGNIFICANT CHANGE UP (ref 135–145)
WBC # BLD: 11.24 K/UL — HIGH (ref 3.8–10.5)
WBC # FLD AUTO: 11.24 K/UL — HIGH (ref 3.8–10.5)

## 2022-06-07 PROCEDURE — 99233 SBSQ HOSP IP/OBS HIGH 50: CPT

## 2022-06-07 RX ORDER — HYDRALAZINE HCL 50 MG
50 TABLET ORAL THREE TIMES A DAY
Refills: 0 | Status: DISCONTINUED | OUTPATIENT
Start: 2022-06-07 | End: 2022-06-10

## 2022-06-07 RX ORDER — CARBIDOPA AND LEVODOPA 25; 100 MG/1; MG/1
2 TABLET ORAL
Refills: 0 | Status: DISCONTINUED | OUTPATIENT
Start: 2022-06-07 | End: 2022-06-13

## 2022-06-07 RX ADMIN — HEPARIN SODIUM 5000 UNIT(S): 5000 INJECTION INTRAVENOUS; SUBCUTANEOUS at 05:14

## 2022-06-07 RX ADMIN — CARBIDOPA AND LEVODOPA 2 TABLET(S): 25; 100 TABLET ORAL at 17:38

## 2022-06-07 RX ADMIN — HEPARIN SODIUM 5000 UNIT(S): 5000 INJECTION INTRAVENOUS; SUBCUTANEOUS at 21:26

## 2022-06-07 RX ADMIN — CARBIDOPA AND LEVODOPA 2 TABLET(S): 25; 100 TABLET ORAL at 21:26

## 2022-06-07 RX ADMIN — CARBIDOPA AND LEVODOPA 2 TABLET(S): 25; 100 TABLET ORAL at 05:14

## 2022-06-07 RX ADMIN — AMLODIPINE BESYLATE 5 MILLIGRAM(S): 2.5 TABLET ORAL at 09:03

## 2022-06-07 RX ADMIN — Medication 650 MILLIGRAM(S): at 00:00

## 2022-06-07 RX ADMIN — Medication 75 MICROGRAM(S): at 05:14

## 2022-06-07 RX ADMIN — Medication 50 MILLIGRAM(S): at 11:20

## 2022-06-07 RX ADMIN — HEPARIN SODIUM 5000 UNIT(S): 5000 INJECTION INTRAVENOUS; SUBCUTANEOUS at 13:00

## 2022-06-07 RX ADMIN — Medication 15 MILLIGRAM(S): at 15:56

## 2022-06-07 RX ADMIN — Medication 25 MILLIGRAM(S): at 05:14

## 2022-06-07 RX ADMIN — PANTOPRAZOLE SODIUM 40 MILLIGRAM(S): 20 TABLET, DELAYED RELEASE ORAL at 09:03

## 2022-06-07 RX ADMIN — Medication 100 MILLIGRAM(S): at 09:03

## 2022-06-07 RX ADMIN — CARBIDOPA AND LEVODOPA 2 TABLET(S): 25; 100 TABLET ORAL at 13:01

## 2022-06-07 RX ADMIN — SIMVASTATIN 40 MILLIGRAM(S): 20 TABLET, FILM COATED ORAL at 21:26

## 2022-06-07 RX ADMIN — Medication 50 MILLIGRAM(S): at 21:26

## 2022-06-07 RX ADMIN — FINASTERIDE 5 MILLIGRAM(S): 5 TABLET, FILM COATED ORAL at 09:03

## 2022-06-07 RX ADMIN — SODIUM CHLORIDE 75 MILLILITER(S): 9 INJECTION, SOLUTION INTRAVENOUS at 05:14

## 2022-06-07 NOTE — PROVIDER CONTACT NOTE (CHANGE IN STATUS NOTIFICATION) - ACTION/TREATMENT ORDERED:
MD's aware. Assessed by colorectal surgical team this morning. Continue to monitor. Surgical team will reassess tomorrow morning.

## 2022-06-07 NOTE — CDI QUERY NOTE - NSCDIOTHERTXTBX_GEN_ALL_CORE_HH
The patient has a past medical history of CKD. The patient's creatinine / eGFR results in [facility] are as below:    Query:  Is there a clinically significant diagnosis associated with the findings?    Chronic Kidney Disease :    A) Stage  III /moderate    (eGFR 30-59)  B) Stage  IV /severe   (eGFR 15-29)  C) Other (please specify) _____________.  D) Unknown    SUPPORTING DOCUMENTATION AND/OR CLINICAL EVIDENCE:    Supporting Documentation:    Creatinine, Serum: 2.50 mg/dL (06.07.22 @ 07:14)   Creatinine, Serum: 2.36 mg/dL (06.06.22 @ 08:08)   Creatinine, Serum: 2.72 mg/dL (06.05.22 @ 16:52)     eGFR: 25 (06.07.22 @ 07:14)    eGFR: 27 (06.06.22 @ 08:08)   eGFR: 23 (06.05.22 @ 16:52)    Noted in [facility] the diagnoses of CKD stage 4 dated 11/8/2021 and CKD stage 3 dated 7/19/2020

## 2022-06-07 NOTE — PHYSICAL THERAPY INITIAL EVALUATION ADULT - DIAGNOSIS, PT EVAL
sigmoid volvulus s/p colonoscopic decompression 6/5 and emergent sigmoid colectomy 6/6 with Hartmanns procedure, post-op debility sigmoid volvulus s/p colonoscopic decompression 6/5 and emergent sigmoid colectomy 6/6 with Hartmanns procedure, post-op debility,Parkinsons Disease/movement disorder , CKD3B,

## 2022-06-07 NOTE — PHYSICAL THERAPY INITIAL EVALUATION ADULT - PERTINENT HX OF CURRENT PROBLEM, REHAB EVAL
abdominal distention and constipation x few days abdominal distention and constipation x few days, no BM,no flatus ,unable to eat because of abdominal distention

## 2022-06-08 LAB
ANION GAP SERPL CALC-SCNC: 9 MMOL/L — SIGNIFICANT CHANGE UP (ref 5–17)
BUN SERPL-MCNC: 35 MG/DL — HIGH (ref 7–23)
CALCIUM SERPL-MCNC: 8.1 MG/DL — LOW (ref 8.5–10.1)
CHLORIDE SERPL-SCNC: 108 MMOL/L — SIGNIFICANT CHANGE UP (ref 96–108)
CO2 SERPL-SCNC: 19 MMOL/L — LOW (ref 22–31)
CREAT SERPL-MCNC: 2.5 MG/DL — HIGH (ref 0.5–1.3)
EGFR: 25 ML/MIN/1.73M2 — LOW
GLUCOSE BLDC GLUCOMTR-MCNC: 112 MG/DL — HIGH (ref 70–99)
GLUCOSE BLDC GLUCOMTR-MCNC: 125 MG/DL — HIGH (ref 70–99)
GLUCOSE BLDC GLUCOMTR-MCNC: 139 MG/DL — HIGH (ref 70–99)
GLUCOSE SERPL-MCNC: 116 MG/DL — HIGH (ref 70–99)
HCT VFR BLD CALC: 31.5 % — LOW (ref 39–50)
HGB BLD-MCNC: 10.2 G/DL — LOW (ref 13–17)
MAGNESIUM SERPL-MCNC: 2.7 MG/DL — HIGH (ref 1.6–2.6)
MCHC RBC-ENTMCNC: 32.4 GM/DL — SIGNIFICANT CHANGE UP (ref 32–36)
MCHC RBC-ENTMCNC: 32.7 PG — SIGNIFICANT CHANGE UP (ref 27–34)
MCV RBC AUTO: 101 FL — HIGH (ref 80–100)
PHOSPHATE SERPL-MCNC: 2.9 MG/DL — SIGNIFICANT CHANGE UP (ref 2.5–4.5)
PLATELET # BLD AUTO: 238 K/UL — SIGNIFICANT CHANGE UP (ref 150–400)
POTASSIUM SERPL-MCNC: 3.6 MMOL/L — SIGNIFICANT CHANGE UP (ref 3.5–5.3)
POTASSIUM SERPL-SCNC: 3.6 MMOL/L — SIGNIFICANT CHANGE UP (ref 3.5–5.3)
RBC # BLD: 3.12 M/UL — LOW (ref 4.2–5.8)
RBC # FLD: 14.4 % — SIGNIFICANT CHANGE UP (ref 10.3–14.5)
SODIUM SERPL-SCNC: 136 MMOL/L — SIGNIFICANT CHANGE UP (ref 135–145)
WBC # BLD: 9.73 K/UL — SIGNIFICANT CHANGE UP (ref 3.8–10.5)
WBC # FLD AUTO: 9.73 K/UL — SIGNIFICANT CHANGE UP (ref 3.8–10.5)

## 2022-06-08 PROCEDURE — 74018 RADEX ABDOMEN 1 VIEW: CPT | Mod: 26

## 2022-06-08 PROCEDURE — 99232 SBSQ HOSP IP/OBS MODERATE 35: CPT

## 2022-06-08 RX ORDER — TAMSULOSIN HYDROCHLORIDE 0.4 MG/1
0.4 CAPSULE ORAL AT BEDTIME
Refills: 0 | Status: DISCONTINUED | OUTPATIENT
Start: 2022-06-08 | End: 2022-06-13

## 2022-06-08 RX ORDER — LORATADINE 10 MG/1
5 TABLET ORAL DAILY
Refills: 0 | Status: DISCONTINUED | OUTPATIENT
Start: 2022-06-08 | End: 2022-06-13

## 2022-06-08 RX ORDER — MAGNESIUM HYDROXIDE 400 MG/1
30 TABLET, CHEWABLE ORAL AT BEDTIME
Refills: 0 | Status: DISCONTINUED | OUTPATIENT
Start: 2022-06-08 | End: 2022-06-13

## 2022-06-08 RX ORDER — SODIUM CHLORIDE 9 MG/ML
1000 INJECTION, SOLUTION INTRAVENOUS ONCE
Refills: 0 | Status: DISCONTINUED | OUTPATIENT
Start: 2022-06-08 | End: 2022-06-08

## 2022-06-08 RX ORDER — TAMSULOSIN HYDROCHLORIDE 0.4 MG/1
0.4 CAPSULE ORAL ONCE
Refills: 0 | Status: COMPLETED | OUTPATIENT
Start: 2022-06-08 | End: 2022-06-08

## 2022-06-08 RX ADMIN — Medication 100 MILLIGRAM(S): at 09:00

## 2022-06-08 RX ADMIN — SIMVASTATIN 40 MILLIGRAM(S): 20 TABLET, FILM COATED ORAL at 21:11

## 2022-06-08 RX ADMIN — HEPARIN SODIUM 5000 UNIT(S): 5000 INJECTION INTRAVENOUS; SUBCUTANEOUS at 15:09

## 2022-06-08 RX ADMIN — FINASTERIDE 5 MILLIGRAM(S): 5 TABLET, FILM COATED ORAL at 09:00

## 2022-06-08 RX ADMIN — Medication 75 MICROGRAM(S): at 05:12

## 2022-06-08 RX ADMIN — CARBIDOPA AND LEVODOPA 2 TABLET(S): 25; 100 TABLET ORAL at 15:09

## 2022-06-08 RX ADMIN — TAMSULOSIN HYDROCHLORIDE 0.4 MILLIGRAM(S): 0.4 CAPSULE ORAL at 11:48

## 2022-06-08 RX ADMIN — LORATADINE 5 MILLIGRAM(S): 10 TABLET ORAL at 11:48

## 2022-06-08 RX ADMIN — PANTOPRAZOLE SODIUM 40 MILLIGRAM(S): 20 TABLET, DELAYED RELEASE ORAL at 05:12

## 2022-06-08 RX ADMIN — Medication 50 MILLIGRAM(S): at 05:11

## 2022-06-08 RX ADMIN — CARBIDOPA AND LEVODOPA 2 TABLET(S): 25; 100 TABLET ORAL at 21:11

## 2022-06-08 RX ADMIN — CARBIDOPA AND LEVODOPA 2 TABLET(S): 25; 100 TABLET ORAL at 08:57

## 2022-06-08 RX ADMIN — HEPARIN SODIUM 5000 UNIT(S): 5000 INJECTION INTRAVENOUS; SUBCUTANEOUS at 21:11

## 2022-06-08 RX ADMIN — HEPARIN SODIUM 5000 UNIT(S): 5000 INJECTION INTRAVENOUS; SUBCUTANEOUS at 05:12

## 2022-06-08 RX ADMIN — TAMSULOSIN HYDROCHLORIDE 0.4 MILLIGRAM(S): 0.4 CAPSULE ORAL at 21:11

## 2022-06-08 RX ADMIN — AMLODIPINE BESYLATE 5 MILLIGRAM(S): 2.5 TABLET ORAL at 09:00

## 2022-06-08 RX ADMIN — ONDANSETRON 4 MILLIGRAM(S): 8 TABLET, FILM COATED ORAL at 08:52

## 2022-06-08 RX ADMIN — Medication 15 MILLIGRAM(S): at 08:52

## 2022-06-08 RX ADMIN — Medication 50 MILLIGRAM(S): at 21:10

## 2022-06-08 RX ADMIN — MAGNESIUM HYDROXIDE 30 MILLILITER(S): 400 TABLET, CHEWABLE ORAL at 21:11

## 2022-06-08 RX ADMIN — CARBIDOPA AND LEVODOPA 2 TABLET(S): 25; 100 TABLET ORAL at 18:36

## 2022-06-08 NOTE — ADVANCED PRACTICE NURSE CONSULT - RECOMMEDATIONS
Recommendations	  Pouch change: 	  -Gently remove pouch water moistened gauze   -Cleanse stoma site with water moistened gauze, gently pat dry  -Measure stoma, currently 2" x 2 1/2"  -Trace and cut current size on [place] 2 ¾” CeraPlus flat barrier (#14931)  -Stretch Emmons Adapt CeraRing (#9412) onto back of barrier  -Apply barrier to patient's skin  -Attach Kaleb 2 ¾” drainable lock and roll pouch (#06512) onto barrier  Empty pouch when 1/3 to no more than 1/2 full of effluent/flatus. Change pouching system q 3 - 4 days and prn for leaking. Next pouch change- Thurs 8/20.

## 2022-06-08 NOTE — ADVANCED PRACTICE NURSE CONSULT - ASSESSMENT
This is a 81 year old male Per H&P MD Note" with a history of Parkinson's disease. He presented to the ED for abdominal distension and constipation for 2 days. Patient has been having intermittent constipation for a long time and was seen last by his gastroenterologist 2 weeks ago and was given laxatives. He started having increasing abdominal distention and constipation for the last 2 days and was unable to pass any bowel movement or gas. He denies any fever, nausea, vomiting, and is only complaining of mild abdominal pain. He was not able to have any food because of the abdominal distension."     Wife at bedside and education done with her.       Received patient laying up in bed, awake, with recognition of WOCN from previous visits, made aware of purpose of WOCN, agreeable to pouch change & ostomy teaching. End colostomy to LUQ  w/ Kaleb drainable pouching system applied  in place, barrier intact, no leakage. Pouch gently removed from pt's abdomen w/ water moistened gauze.     Type of ostomy: End Colostomy   Location: LUQ  Abdullahi: n/a  Size: Round 2 1/4"  Mucosa: red, moist, remains edematous & slightly translucent   Peristomal skin: intact with left lateral abdomen with blister   Mucocutaneous junction: intact  Abdominal contours: round firm   Output: ~75cc dark brown liquid effluent present in removed pouch  Midline/lap sites/ drains: MADALYN in place    Patient re-pouched w/ [place] 2 ¾” CeraPlus flat barrier, and [place]  2 ¾” drainable pouch w/ lock & roll closure.  Full ostomy lesson provided to patient.    Impression:  End Colostomy to LUQ-given pt's stomal characteristics and abdominal topography, pt still requires flat pouching system w/ use of flat ring to absorb excess moisture and protect peristomal skin w/ shrinking post-op stomal size    Patient more ready & willing to learn ostomy care this visit, observed entire pouch change, looking directly at stoma, asking  appropriate questions & able to verbalize key ostomy points. Patient again observed pouch opening/closing, again reviewed w/ patient that her stool consistency & frequency of End Colostomy will require her to empty pouch several times each day & again strongly encouraged patient start practicing pouch opening/closing & emptying as this skill is required prior to d/c home, patient agreeable to start practicing pouch emptying. Patient expressed feeling overwhelmed with everything,  much emotional support & encouragement provided to patient.     Reviewed with patient dietary restrictions, s/s & prevention of food obstruction & constipation. Also reviewed w/ patient lifestyle modifications (clothing, bathing/showering, physical activity). Written information regarding all above topics provided to patient.

## 2022-06-09 LAB
ANION GAP SERPL CALC-SCNC: 10 MMOL/L — SIGNIFICANT CHANGE UP (ref 5–17)
BUN SERPL-MCNC: 36 MG/DL — HIGH (ref 7–23)
CALCIUM SERPL-MCNC: 8.3 MG/DL — LOW (ref 8.5–10.1)
CHLORIDE SERPL-SCNC: 104 MMOL/L — SIGNIFICANT CHANGE UP (ref 96–108)
CO2 SERPL-SCNC: 19 MMOL/L — LOW (ref 22–31)
CREAT SERPL-MCNC: 2.49 MG/DL — HIGH (ref 0.5–1.3)
EGFR: 25 ML/MIN/1.73M2 — LOW
GLUCOSE SERPL-MCNC: 122 MG/DL — HIGH (ref 70–99)
HCT VFR BLD CALC: 29.7 % — LOW (ref 39–50)
HGB BLD-MCNC: 9.7 G/DL — LOW (ref 13–17)
MAGNESIUM SERPL-MCNC: 2.7 MG/DL — HIGH (ref 1.6–2.6)
MCHC RBC-ENTMCNC: 32.7 GM/DL — SIGNIFICANT CHANGE UP (ref 32–36)
MCHC RBC-ENTMCNC: 32.7 PG — SIGNIFICANT CHANGE UP (ref 27–34)
MCV RBC AUTO: 100 FL — SIGNIFICANT CHANGE UP (ref 80–100)
PHOSPHATE SERPL-MCNC: 3.4 MG/DL — SIGNIFICANT CHANGE UP (ref 2.5–4.5)
PLATELET # BLD AUTO: 246 K/UL — SIGNIFICANT CHANGE UP (ref 150–400)
POTASSIUM SERPL-MCNC: 3.7 MMOL/L — SIGNIFICANT CHANGE UP (ref 3.5–5.3)
POTASSIUM SERPL-SCNC: 3.7 MMOL/L — SIGNIFICANT CHANGE UP (ref 3.5–5.3)
RBC # BLD: 2.97 M/UL — LOW (ref 4.2–5.8)
RBC # FLD: 14.2 % — SIGNIFICANT CHANGE UP (ref 10.3–14.5)
SODIUM SERPL-SCNC: 133 MMOL/L — LOW (ref 135–145)
WBC # BLD: 7.42 K/UL — SIGNIFICANT CHANGE UP (ref 3.8–10.5)
WBC # FLD AUTO: 7.42 K/UL — SIGNIFICANT CHANGE UP (ref 3.8–10.5)

## 2022-06-09 PROCEDURE — 74018 RADEX ABDOMEN 1 VIEW: CPT | Mod: 26

## 2022-06-09 PROCEDURE — 99232 SBSQ HOSP IP/OBS MODERATE 35: CPT

## 2022-06-09 RX ORDER — METOCLOPRAMIDE HCL 10 MG
10 TABLET ORAL
Refills: 0 | Status: DISCONTINUED | OUTPATIENT
Start: 2022-06-09 | End: 2022-06-13

## 2022-06-09 RX ORDER — POLYETHYLENE GLYCOL 3350 17 G/17G
17 POWDER, FOR SOLUTION ORAL AT BEDTIME
Refills: 0 | Status: DISCONTINUED | OUTPATIENT
Start: 2022-06-09 | End: 2022-06-13

## 2022-06-09 RX ADMIN — TAMSULOSIN HYDROCHLORIDE 0.4 MILLIGRAM(S): 0.4 CAPSULE ORAL at 21:32

## 2022-06-09 RX ADMIN — CARBIDOPA AND LEVODOPA 2 TABLET(S): 25; 100 TABLET ORAL at 17:30

## 2022-06-09 RX ADMIN — Medication 15 MILLIGRAM(S): at 16:15

## 2022-06-09 RX ADMIN — Medication 15 MILLIGRAM(S): at 06:22

## 2022-06-09 RX ADMIN — ONDANSETRON 4 MILLIGRAM(S): 8 TABLET, FILM COATED ORAL at 06:22

## 2022-06-09 RX ADMIN — PANTOPRAZOLE SODIUM 40 MILLIGRAM(S): 20 TABLET, DELAYED RELEASE ORAL at 05:46

## 2022-06-09 RX ADMIN — CARBIDOPA AND LEVODOPA 2 TABLET(S): 25; 100 TABLET ORAL at 08:40

## 2022-06-09 RX ADMIN — FINASTERIDE 5 MILLIGRAM(S): 5 TABLET, FILM COATED ORAL at 09:23

## 2022-06-09 RX ADMIN — HEPARIN SODIUM 5000 UNIT(S): 5000 INJECTION INTRAVENOUS; SUBCUTANEOUS at 13:30

## 2022-06-09 RX ADMIN — CARBIDOPA AND LEVODOPA 2 TABLET(S): 25; 100 TABLET ORAL at 21:32

## 2022-06-09 RX ADMIN — Medication 10 MILLIGRAM(S): at 13:29

## 2022-06-09 RX ADMIN — SIMVASTATIN 40 MILLIGRAM(S): 20 TABLET, FILM COATED ORAL at 21:32

## 2022-06-09 RX ADMIN — Medication 75 MICROGRAM(S): at 05:46

## 2022-06-09 RX ADMIN — LORATADINE 5 MILLIGRAM(S): 10 TABLET ORAL at 09:24

## 2022-06-09 RX ADMIN — Medication 50 MILLIGRAM(S): at 05:46

## 2022-06-09 RX ADMIN — Medication 100 MILLIGRAM(S): at 09:24

## 2022-06-09 RX ADMIN — Medication 50 MILLIGRAM(S): at 21:33

## 2022-06-09 RX ADMIN — AMLODIPINE BESYLATE 5 MILLIGRAM(S): 2.5 TABLET ORAL at 09:24

## 2022-06-09 RX ADMIN — HEPARIN SODIUM 5000 UNIT(S): 5000 INJECTION INTRAVENOUS; SUBCUTANEOUS at 05:46

## 2022-06-09 RX ADMIN — Medication 10 MILLIGRAM(S): at 17:29

## 2022-06-09 RX ADMIN — CARBIDOPA AND LEVODOPA 2 TABLET(S): 25; 100 TABLET ORAL at 13:30

## 2022-06-09 RX ADMIN — HEPARIN SODIUM 5000 UNIT(S): 5000 INJECTION INTRAVENOUS; SUBCUTANEOUS at 21:33

## 2022-06-09 RX ADMIN — Medication 15 MILLIGRAM(S): at 15:42

## 2022-06-09 NOTE — DIETITIAN INITIAL EVALUATION ADULT - NSFNSGIIOFT_GEN_A_CORE
06-08-22 @ 07:01  -  06-09-22 @ 07:00  --------------------------------------------------------  OUT:    Colostomy (mL): 100 mL  Total OUT: 100 mL    Total NET: -100 mL

## 2022-06-09 NOTE — DIETITIAN INITIAL EVALUATION ADULT - ORAL INTAKE PTA/DIET HISTORY
Pt lethargic and unable to provide detailed diet/ wt hx  From [facility] - receives meals from there

## 2022-06-09 NOTE — DIETITIAN INITIAL EVALUATION ADULT - ADD RECOMMEND
1) Suggest advance diet to Clear Liquids to Full Liquids to Low Fiber, when medically feasible and as tolerated, 2) add ensure max BID to optimize PO intake, 3) MVI w/ minerals daily to ensure 100% RDA met, 4) Consider adding thiamine 100 mg daily 2/2 poor PO intake/ malnutrition, 5) Monitor bowel movements, encourage hydration and monitor ostomy output, 6) Confirm goals of care regarding nutrition support, 7) Encourage protein-rich foods, maximize food preferences. RD will continue to monitor PO intake, labs, hydration, and wt prn.

## 2022-06-09 NOTE — DIETITIAN INITIAL EVALUATION ADULT - ETIOLOGY
r/t decreased ability to meet increased nutrient needs 2/2 altered GI function (intestinal volvulus)

## 2022-06-09 NOTE — DIETITIAN INITIAL EVALUATION ADULT - PERTINENT MEDS FT
MEDICATIONS  (STANDING):  allopurinol 100 milliGRAM(s) Oral daily  amLODIPine   Tablet 5 milliGRAM(s) Oral daily  carbidopa/levodopa  25/100 2 Tablet(s) Oral <User Schedule>  finasteride 5 milliGRAM(s) Oral daily  heparin   Injectable 5000 Unit(s) SubCutaneous every 8 hours  hydrALAZINE 50 milliGRAM(s) Oral three times a day  levothyroxine 75 MICROGram(s) Oral daily  loratadine 5 milliGRAM(s) Oral daily  magnesium hydroxide Suspension 30 milliLiter(s) Oral at bedtime  metoclopramide 10 milliGRAM(s) Oral three times a day before meals  pantoprazole    Tablet 40 milliGRAM(s) Oral before breakfast  simvastatin 40 milliGRAM(s) Oral at bedtime  tamsulosin 0.4 milliGRAM(s) Oral at bedtime    MEDICATIONS  (PRN):  acetaminophen     Tablet .. 650 milliGRAM(s) Oral every 6 hours PRN Mild Pain (1 - 3)  acetaminophen   IVPB .. 1000 milliGRAM(s) IV Intermittent every 6 hours PRN Mild Pain (1 - 3)  aluminum hydroxide/magnesium hydroxide/simethicone Suspension 30 milliLiter(s) Oral every 6 hours PRN Dyspepsia  ketorolac   Injectable 15 milliGRAM(s) IV Push every 6 hours PRN Moderate Pain (4 - 6)  morphine  - Injectable 2 milliGRAM(s) IV Push every 6 hours PRN Severe Pain (7 - 10)  ondansetron Injectable 4 milliGRAM(s) IV Push every 6 hours PRN Nausea and/or Vomiting

## 2022-06-09 NOTE — DIETITIAN INITIAL EVALUATION ADULT - ENTER FROM (CAL/KG)
Speech Therapy Video Visit     This patient contact was made to address the patients Speech Therapy  needs.  She is an established patient of mine. The patient's identity has been established.    patient was informed of the requirements, reasons for the rehab video-visit due to COVID-19 crisis and right to refuse the visit at any time. Patient was informed of the option to be seen for an in-person clinic visit.  In-person clinic visit risk and requirements were reviewed with patient related to COVID-19.     Prior to initiating the virtual visit, the patient's representative (name: Jennifer, relationship: mother) was informed that verbal consent to treat includes permission to submit claims to their insurance on their behalf for the services received.    patient and mother verbally consented to video-visit. Mother and patient present throughout the video-visit.  Video-visit was not recorded.     Therapist location: [place], [place]  Patient location: [address] [place] [id number]  Platform used: Zoom    Visit: 12 in authorization    SUBJECTIVE   Pt arrived and greeted the clinician with a smile. Mom helped expand to start conversation. Pt appeared happy and participated well today.  Pain: patient does not demonstrate pain behaviors, unable to verbalize specific level    OBJECTIVE   Session targeted conversation about progress and plan, discussion of additional testing, reading, and preferred topic (sheep) Boom cards.    Pt was assessed previously using the Clinical Evaluation of Language Fundaments- (CELP-3):  Ages: 3-6;11   Score Confidence Interval  (% level) Percentile  Age Equivalent Standard Score   SC   Raw: 13    Scaled: 8           Rank: 4     4;2 90   WS Raw: 9  Scaled: 7  Rank: 3 3;5 85   EV Raw: 22  Scaled: 8  Rank: 4 4;2 90   FD        RS        BC        WC        PA        DPP        PRS        Key: SC = Sentence Comprehension, WS = Word Structure, EV= Expressive Vocabulary, FD = Following  Directions, RS = Recalling Sentences, BC= Basic Concepts, WC = Word Classes, PA= Phonological Awareness, DPP = Descriptive Pragmatics Profile, PRS= Preliteracy Rating Scale    Core Language Score:   Sum of subtest scaled scores: 23  Standard score: 85  Percentile rank: 16    Today's Session:  Started with conversation then moved on to book reading x2 and comprehension questions. Pt correctly answered 10/12 questions which was good progress, and verbalized in 1 word responses to answer. Pt increased to imitating 2-6 word with direct models 10x in story and 5x in conversation/questions.    Pt verbalized the following spontaneously:  Because he cant eat any cupcakes    Talked about what is different:  They're not the same  (prompt one has...) horn  cutie iris  (hair) rainbow  White and brown  One's sad and one happy    A horn= inference questions  Attempted prediction questions    Clinician presented \"Conversation Starters\" handout to increase theory of mind and higher level language. Mother expressed understanding, and clinician modeled use in 2 stories. Mom will attempt at home before visit.    Pt made progress toward the following goals, to be obtained by end of this plan of care:  1.  Patient will produce the following sounds in initial, medial, final position at the syllable level: /s/, /z/ with 80% accuracy to facilitate expression.= also targeted sh, ch, j today. Continued difficulty producing but improving slightly with models  2. Pt will demonstrate 50-60% speech intelligibility, given structured activity, in 2/3 sessions= pt >65% intelligible today with semi-known context  3. Targeted language expansion= pt produced 3-5 word sentences in 3 different activities: to describe pictures in story, to say \"the X sheep is different\", and to imitate more difficult vocabulary to describe sheep.    Home Program:  Access Code: [email or web address]  URL: [email or web address]  Date: 06/02/2022  Prepared by:  Court Mathew    Patient Education  · NEW:  · Conversation.Starters.Parent.Coaching.Handout.Speech.ACH  5/19: continue book reading, discussed results of testing, plan to do 1-2 more sessions and will provide lengthy HEP  5/5: testing/discussion of plan  4/21: Easter scenes  4/7: continue expanding phrases with preferred topics, story retell  3/10  · Color.Monster.Emotions.Feelings.Chart.ACH.speech  · HowdoyoufeelThecolormonster.ACH.speech.emotions  · TheColorMonsterFeelingsActivity.ACH.Speech.emotions  · TheColorMonsterFeelingsActivity.ACH.speech.emotions   2/24: continue SH targets  2/3: continue emotions, unicorn vocabulary and expanding to phrases  1/27: continue discussion of emotions and identifying during ADLs  1/13: HEP: copy of winter break questionnaire for discussion with family  12/1: Clinician sent home encrypted email with Thanksgiving food chart with annotations to show patient's family's foods and his preference.  11/24: Provided screenshot of turkey craft activity incorporating colors, shapes, and numbers to describe at phrase level (and initially targeted following directions). Also advised to continue conversation of likes/dislikes related to Thanksgiving theme, especially foods.  ([employer] access code): [email or web address] not utilized today= sent via email  Suggestions for next session as indicated: progress per plan of care, continue target areas from today, will consider switch to EOW    ASSESSMENT   5 year old female patient has signs and symptoms consistent with mild speech sound deficits and below age-appropriate speech intelligibility. Discussed plan to discharge at next visit which was scheduled. Mother expressed understanding of session objectives, HEP/recs, and plan and consented to next visit and plan for discharge.    Patient/Caregiver Education:   Who will be receiving education: patient and patient's family  Are they ready to learn: yes  Preferred learning style: written, verbal,  30 demonstration  Barriers to learning: no barriers apparent at this time   Result of initial outlined education: Verbalizes understanding    Therapy procedure time and total treatment time can be found documented on the Time Entry flowsheet

## 2022-06-09 NOTE — DIETITIAN INITIAL EVALUATION ADULT - PERTINENT LABORATORY DATA
06-09    133<L>  |  104  |  36<H>  ----------------------------<  122<H>  3.7   |  19<L>  |  2.49<H>    Ca    8.3<L>      09 Jun 2022 06:24  Phos  3.4     06-09  Mg     2.7     06-09    POCT Blood Glucose.: 139 mg/dL (06-08-22 @ 12:52)    BMI: BMI (kg/m2): 24.1 (06-05-22 @ 14:59)  BP: 123/57 (06-09-22 @ 08:00) (107/47 - 192/77)

## 2022-06-09 NOTE — DIETITIAN INITIAL EVALUATION ADULT - OTHER INFO
81 year old male with a history of Parkinson's disease. He presented to the ED for abdominal distension and constipation for 2 days. Patient has been having intermittent constipation for a long time and was seen last by his gastroenterologist 2 weeks ago and was given laxatives. He started having increasing abdominal distention and constipation for the last 2 days and was unable to pass any bowel movement or gas. Is complaining of mild abdominal pain. He was not able to have any food because of the abdominal distension. S/p Janak's procedure on 6/6    Pt very lethargic at time of RD visit, endorses nausea, regurgitation, acid reflux while drinking CLD. As per surgery, will be advanced to FLD today. Pt has been NPO/ on clears x 4 days with poor PO intake PTA; meeting <50% intake x >5 days. Pt noted to be thin, frail with moderate to severe muscle/ fat wasting; meets criteria for PCM. Reports UBW ~148#; RD took bed scale wt 135#, c/w admit wt. Unintentional wt loss of 13#/ 8.7% x unknown time frame. RD gave pt and wife written and verbal education regarding ostomy nutrition. See recommendations below.

## 2022-06-09 NOTE — DIETITIAN INITIAL EVALUATION ADULT - PERSON TAUGHT/METHOD
verbal instruction/written material/teach back - (Patient repeats in own words)/patient instructed/spouse instructed

## 2022-06-10 PROCEDURE — 99233 SBSQ HOSP IP/OBS HIGH 50: CPT

## 2022-06-10 RX ORDER — OXYCODONE AND ACETAMINOPHEN 5; 325 MG/1; MG/1
1 TABLET ORAL
Qty: 20 | Refills: 0
Start: 2022-06-10

## 2022-06-10 RX ORDER — AMLODIPINE BESYLATE 2.5 MG/1
10 TABLET ORAL DAILY
Refills: 0 | Status: DISCONTINUED | OUTPATIENT
Start: 2022-06-10 | End: 2022-06-13

## 2022-06-10 RX ADMIN — POLYETHYLENE GLYCOL 3350 17 GRAM(S): 17 POWDER, FOR SOLUTION ORAL at 21:25

## 2022-06-10 RX ADMIN — FINASTERIDE 5 MILLIGRAM(S): 5 TABLET, FILM COATED ORAL at 11:21

## 2022-06-10 RX ADMIN — CARBIDOPA AND LEVODOPA 2 TABLET(S): 25; 100 TABLET ORAL at 21:24

## 2022-06-10 RX ADMIN — PANTOPRAZOLE SODIUM 40 MILLIGRAM(S): 20 TABLET, DELAYED RELEASE ORAL at 06:02

## 2022-06-10 RX ADMIN — Medication 10 MILLIGRAM(S): at 17:17

## 2022-06-10 RX ADMIN — AMLODIPINE BESYLATE 10 MILLIGRAM(S): 2.5 TABLET ORAL at 11:22

## 2022-06-10 RX ADMIN — LORATADINE 5 MILLIGRAM(S): 10 TABLET ORAL at 11:22

## 2022-06-10 RX ADMIN — HEPARIN SODIUM 5000 UNIT(S): 5000 INJECTION INTRAVENOUS; SUBCUTANEOUS at 21:25

## 2022-06-10 RX ADMIN — Medication 10 MILLIGRAM(S): at 06:02

## 2022-06-10 RX ADMIN — Medication 75 MICROGRAM(S): at 06:02

## 2022-06-10 RX ADMIN — SIMVASTATIN 40 MILLIGRAM(S): 20 TABLET, FILM COATED ORAL at 21:24

## 2022-06-10 RX ADMIN — TAMSULOSIN HYDROCHLORIDE 0.4 MILLIGRAM(S): 0.4 CAPSULE ORAL at 21:24

## 2022-06-10 RX ADMIN — MAGNESIUM HYDROXIDE 30 MILLILITER(S): 400 TABLET, CHEWABLE ORAL at 21:24

## 2022-06-10 RX ADMIN — Medication 50 MILLIGRAM(S): at 06:02

## 2022-06-10 RX ADMIN — HEPARIN SODIUM 5000 UNIT(S): 5000 INJECTION INTRAVENOUS; SUBCUTANEOUS at 14:30

## 2022-06-10 RX ADMIN — Medication 10 MILLIGRAM(S): at 11:21

## 2022-06-10 RX ADMIN — CARBIDOPA AND LEVODOPA 2 TABLET(S): 25; 100 TABLET ORAL at 17:17

## 2022-06-10 RX ADMIN — Medication 100 MILLIGRAM(S): at 11:21

## 2022-06-10 RX ADMIN — CARBIDOPA AND LEVODOPA 2 TABLET(S): 25; 100 TABLET ORAL at 12:24

## 2022-06-11 ENCOUNTER — TRANSCRIPTION ENCOUNTER (OUTPATIENT)
Age: 82
End: 2022-06-11

## 2022-06-11 DIAGNOSIS — E78.5 HYPERLIPIDEMIA, UNSPECIFIED: ICD-10-CM

## 2022-06-11 DIAGNOSIS — I10 ESSENTIAL (PRIMARY) HYPERTENSION: ICD-10-CM

## 2022-06-11 DIAGNOSIS — R33.9 RETENTION OF URINE, UNSPECIFIED: ICD-10-CM

## 2022-06-11 DIAGNOSIS — G20 PARKINSON'S DISEASE: ICD-10-CM

## 2022-06-11 LAB
ANION GAP SERPL CALC-SCNC: 10 MMOL/L — SIGNIFICANT CHANGE UP (ref 5–17)
BUN SERPL-MCNC: 46 MG/DL — HIGH (ref 7–23)
CALCIUM SERPL-MCNC: 8 MG/DL — LOW (ref 8.5–10.1)
CHLORIDE SERPL-SCNC: 109 MMOL/L — HIGH (ref 96–108)
CO2 SERPL-SCNC: 18 MMOL/L — LOW (ref 22–31)
CREAT SERPL-MCNC: 2.91 MG/DL — HIGH (ref 0.5–1.3)
EGFR: 21 ML/MIN/1.73M2 — LOW
GLUCOSE SERPL-MCNC: 99 MG/DL — SIGNIFICANT CHANGE UP (ref 70–99)
HCT VFR BLD CALC: 27.1 % — LOW (ref 39–50)
HGB BLD-MCNC: 9 G/DL — LOW (ref 13–17)
MAGNESIUM SERPL-MCNC: 2.9 MG/DL — HIGH (ref 1.6–2.6)
MCHC RBC-ENTMCNC: 33.1 PG — SIGNIFICANT CHANGE UP (ref 27–34)
MCHC RBC-ENTMCNC: 33.2 GM/DL — SIGNIFICANT CHANGE UP (ref 32–36)
MCV RBC AUTO: 99.6 FL — SIGNIFICANT CHANGE UP (ref 80–100)
PHOSPHATE SERPL-MCNC: 2.8 MG/DL — SIGNIFICANT CHANGE UP (ref 2.5–4.5)
PLATELET # BLD AUTO: 238 K/UL — SIGNIFICANT CHANGE UP (ref 150–400)
POTASSIUM SERPL-MCNC: 3.3 MMOL/L — LOW (ref 3.5–5.3)
POTASSIUM SERPL-SCNC: 3.3 MMOL/L — LOW (ref 3.5–5.3)
RBC # BLD: 2.72 M/UL — LOW (ref 4.2–5.8)
RBC # FLD: 14 % — SIGNIFICANT CHANGE UP (ref 10.3–14.5)
SODIUM SERPL-SCNC: 137 MMOL/L — SIGNIFICANT CHANGE UP (ref 135–145)
WBC # BLD: 6.85 K/UL — SIGNIFICANT CHANGE UP (ref 3.8–10.5)
WBC # FLD AUTO: 6.85 K/UL — SIGNIFICANT CHANGE UP (ref 3.8–10.5)

## 2022-06-11 PROCEDURE — 99232 SBSQ HOSP IP/OBS MODERATE 35: CPT

## 2022-06-11 RX ORDER — POTASSIUM CHLORIDE 20 MEQ
20 PACKET (EA) ORAL
Refills: 0 | Status: COMPLETED | OUTPATIENT
Start: 2022-06-11 | End: 2022-06-11

## 2022-06-11 RX ORDER — ACETAMINOPHEN 500 MG
2 TABLET ORAL
Qty: 0 | Refills: 0 | DISCHARGE
Start: 2022-06-11

## 2022-06-11 RX ORDER — PANTOPRAZOLE SODIUM 20 MG/1
40 TABLET, DELAYED RELEASE ORAL DAILY
Refills: 0 | Status: DISCONTINUED | OUTPATIENT
Start: 2022-06-11 | End: 2022-06-13

## 2022-06-11 RX ORDER — AMLODIPINE BESYLATE 2.5 MG/1
1 TABLET ORAL
Qty: 0 | Refills: 0 | DISCHARGE
Start: 2022-06-11

## 2022-06-11 RX ADMIN — CARBIDOPA AND LEVODOPA 2 TABLET(S): 25; 100 TABLET ORAL at 21:38

## 2022-06-11 RX ADMIN — Medication 20 MILLIEQUIVALENT(S): at 15:01

## 2022-06-11 RX ADMIN — MORPHINE SULFATE 2 MILLIGRAM(S): 50 CAPSULE, EXTENDED RELEASE ORAL at 22:00

## 2022-06-11 RX ADMIN — Medication 100 MILLIGRAM(S): at 09:58

## 2022-06-11 RX ADMIN — AMLODIPINE BESYLATE 10 MILLIGRAM(S): 2.5 TABLET ORAL at 09:58

## 2022-06-11 RX ADMIN — MAGNESIUM HYDROXIDE 30 MILLILITER(S): 400 TABLET, CHEWABLE ORAL at 21:39

## 2022-06-11 RX ADMIN — MORPHINE SULFATE 2 MILLIGRAM(S): 50 CAPSULE, EXTENDED RELEASE ORAL at 21:38

## 2022-06-11 RX ADMIN — Medication 10 MILLIGRAM(S): at 05:37

## 2022-06-11 RX ADMIN — SIMVASTATIN 40 MILLIGRAM(S): 20 TABLET, FILM COATED ORAL at 21:39

## 2022-06-11 RX ADMIN — LORATADINE 5 MILLIGRAM(S): 10 TABLET ORAL at 09:57

## 2022-06-11 RX ADMIN — HEPARIN SODIUM 5000 UNIT(S): 5000 INJECTION INTRAVENOUS; SUBCUTANEOUS at 15:01

## 2022-06-11 RX ADMIN — Medication 10 MILLIGRAM(S): at 12:45

## 2022-06-11 RX ADMIN — Medication 75 MICROGRAM(S): at 05:37

## 2022-06-11 RX ADMIN — Medication 15 MILLIGRAM(S): at 18:53

## 2022-06-11 RX ADMIN — POLYETHYLENE GLYCOL 3350 17 GRAM(S): 17 POWDER, FOR SOLUTION ORAL at 21:39

## 2022-06-11 RX ADMIN — HEPARIN SODIUM 5000 UNIT(S): 5000 INJECTION INTRAVENOUS; SUBCUTANEOUS at 05:37

## 2022-06-11 RX ADMIN — TAMSULOSIN HYDROCHLORIDE 0.4 MILLIGRAM(S): 0.4 CAPSULE ORAL at 21:38

## 2022-06-11 RX ADMIN — CARBIDOPA AND LEVODOPA 2 TABLET(S): 25; 100 TABLET ORAL at 17:02

## 2022-06-11 RX ADMIN — CARBIDOPA AND LEVODOPA 2 TABLET(S): 25; 100 TABLET ORAL at 12:45

## 2022-06-11 RX ADMIN — FINASTERIDE 5 MILLIGRAM(S): 5 TABLET, FILM COATED ORAL at 09:58

## 2022-06-11 RX ADMIN — Medication 20 MILLIEQUIVALENT(S): at 12:45

## 2022-06-11 RX ADMIN — Medication 10 MILLIGRAM(S): at 17:02

## 2022-06-11 RX ADMIN — HEPARIN SODIUM 5000 UNIT(S): 5000 INJECTION INTRAVENOUS; SUBCUTANEOUS at 21:38

## 2022-06-11 RX ADMIN — Medication 20 MILLIEQUIVALENT(S): at 09:58

## 2022-06-11 RX ADMIN — CARBIDOPA AND LEVODOPA 2 TABLET(S): 25; 100 TABLET ORAL at 08:35

## 2022-06-11 NOTE — DISCHARGE NOTE PROVIDER - NSFOLLOWUPCLINICS_GEN_ALL_ED_FT
Patients GI provider:  Dr Mary Aranda     Number to return call: [phone]    Reason for call: Pt calling stating that she is having diarrhea that is orange and red in color      Scheduled procedure/appointment date if applicable:  Appt 9/5/35 A Family Medicine Doctor  Family Medicine  .  [place]   Phone:   Fax:   Follow Up Time: 1 week

## 2022-06-11 NOTE — DISCHARGE NOTE PROVIDER - HOSPITAL COURSE
81 year old male with a history of Parkinson's disease. He presented to the ED for abdominal distension and constipation for 2 days. Patient has been having intermittent constipation for a long time and was seen last by his gastroenterologist 2 weeks ago and was given laxatives. He started having increasing abdominal distention and constipation for the last 2 days and was unable to pass any bowel movement or gas. He denies any fever, nausea, vomiting, and is only complaining of mild abdominal pain. He was not able to have any food because of the abdominal distension.   Pt was admitted for sigmoid diverticulitis, s/p open Janak procedure, POD 6, postoperative course complicated by urinary retention, resolved now, and a postoperative ileus, resolved now. Tolerating diet, stoma functional, going home with VNS and a HHA service.    81 year old male with a history of Parkinson's disease. He presented to the ED for abdominal distension and constipation for 2 days. Patient has been having intermittent constipation for a long time and was seen last by his gastroenterologist 2 weeks ago and was given laxatives. He started having increasing abdominal distention and constipation for the last 2 days and was unable to pass any bowel movement or gas. He denies any fever, nausea, vomiting, and is only complaining of mild abdominal pain. He was not able to have any food because of the abdominal distension.   Pt was admitted for sigmoid diverticulitis, s/p open Janak procedure, POD 6, postoperative course complicated by urinary retention, resolved now, and a postoperative ileus, resolved now. Tolerating diet, stoma functional with areas of mucosal bruises from manipulation and trial of reduction, small amount of serosanguinous discharge, going home with VNS and a HHA service.

## 2022-06-11 NOTE — DISCHARGE NOTE PROVIDER - NSDCFUSCHEDAPPT_GEN_ALL_CORE_FT
Sylvia Gao  [facility]  NEUROLOGY [facility]  Scheduled Appointment: 07/06/2022

## 2022-06-11 NOTE — PROVIDER CONTACT NOTE (OTHER) - ACTION/TREATMENT ORDERED:
Call Surgical resident for further input.  Keep NP aware if stoma increases in size/bleeding worsens.
Will come to unit to assess site.
Hold of on Catheter insertion for now, since patient voided. Will evaluate in the am

## 2022-06-11 NOTE — DISCHARGE NOTE PROVIDER - CARE PROVIDERS DIRECT ADDRESSES
,[email or web address]

## 2022-06-11 NOTE — DISCHARGE NOTE PROVIDER - NSDCCPCAREPLAN_GEN_ALL_CORE_FT
PRINCIPAL DISCHARGE DIAGNOSIS  Diagnosis: Sigmoid volvulus  Assessment and Plan of Treatment:       SECONDARY DISCHARGE DIAGNOSES  Diagnosis: Urinary retention  Assessment and Plan of Treatment:     Diagnosis: Postoperative ileus  Assessment and Plan of Treatment:

## 2022-06-11 NOTE — DISCHARGE NOTE PROVIDER - PROVIDER TOKENS
PROVIDER:[TOKEN:[[id number]],FOLLOWUP:[2 weeks]],PROVIDER:[TOKEN:[[id number]],FOLLOWUP:[2 weeks]]

## 2022-06-11 NOTE — DISCHARGE NOTE PROVIDER - NSDCHC_MEDRECSTATUS_GEN_ALL_CORE
79 fm with pmhx DM, HTN, CAD, HLD presents to the ED with 1 day of mild cough, chills, fever and congestion. patient reports lives at home with daughter and grandchildren. patient reports grandchildren COVID Positive. patient reports mild symptoms wanted to come to the ED for a COVID test and go home. patient denies chest pain, baseline Shortness of Breath, abdominal pain, Nausea/Vomiting/Diarrhea, dizziness, weakness, confusion, vision changes, urinary symptoms, syncope, falls, trauma, discharge, bleeding. Admission Reconciliation is Not Complete  Discharge Reconciliation is Completed

## 2022-06-11 NOTE — DISCHARGE NOTE PROVIDER - CARE PROVIDER_API CALL
Mehreen Seymour)  ColonRectal Surgery; Surgery  [address] [place] [address]  Phone: [phone]  Fax: [phone]  Follow Up Time: 2 weeks    Laith Rodriguez)  Urology  [address] [facility], [address] [place] [address]  Phone: [phone]  Fax: [phone]  Follow Up Time: 2 weeks

## 2022-06-11 NOTE — DISCHARGE NOTE PROVIDER - NSDCMRMEDTOKEN_GEN_ALL_CORE_FT
amLODIPine 10 mg oral tablet: 1 tab(s) orally once a day  bisacodyl 5 mg oral delayed release tablet: 1 tab(s) orally once a day  carbidopa-levodopa 25 mg-100 mg oral tablet: 2 tab(s) orally 4 times a day @ 0900, 1300, 1700, 2100  cholecalciferol 25 mcg (1000 intl units) oral tablet: 1 tab(s) orally once a day  finasteride 5 mg oral tablet: 1 tab(s) orally once a day  folic acid 0.8 mg oral tablet: 1 tab(s) orally once a day  hydrALAZINE 25 mg oral tablet: 1 tab(s) orally once a day (at bedtime)  levothyroxine 75 mcg (0.075 mg) oral tablet: 1 tab(s) orally Monday through Friday  levothyroxine 88 mcg (0.088 mg) oral tablet: 1 tab(s) orally 2 times a week on Sat and Sun  Melatonin 3 mg oral tablet: 1 tab(s) orally once a day (at bedtime)  oxycodone-acetaminophen 5 mg-325 mg oral tablet: 1 tab(s) orally every 6 hours, As Needed -for moderate pain [id number]  simvastatin 10 mg oral tablet: 1 tab(s) orally once a day (at bedtime)  Tylenol 325 mg oral tablet: 2 tab(s) orally every 6 hours, As needed, Mild Pain (1 - 3)  Zyloprim 100 mg oral tablet: 1 tab(s) orally once a day

## 2022-06-12 LAB
ANION GAP SERPL CALC-SCNC: 8 MMOL/L — SIGNIFICANT CHANGE UP (ref 5–17)
BUN SERPL-MCNC: 48 MG/DL — HIGH (ref 7–23)
CALCIUM SERPL-MCNC: 8.1 MG/DL — LOW (ref 8.5–10.1)
CHLORIDE SERPL-SCNC: 112 MMOL/L — HIGH (ref 96–108)
CO2 SERPL-SCNC: 19 MMOL/L — LOW (ref 22–31)
CREAT SERPL-MCNC: 2.8 MG/DL — HIGH (ref 0.5–1.3)
EGFR: 22 ML/MIN/1.73M2 — LOW
GLUCOSE SERPL-MCNC: 88 MG/DL — SIGNIFICANT CHANGE UP (ref 70–99)
HCT VFR BLD CALC: 26.9 % — LOW (ref 39–50)
HGB BLD-MCNC: 8.7 G/DL — LOW (ref 13–17)
MAGNESIUM SERPL-MCNC: 3 MG/DL — HIGH (ref 1.6–2.6)
MCHC RBC-ENTMCNC: 32.2 PG — SIGNIFICANT CHANGE UP (ref 27–34)
MCHC RBC-ENTMCNC: 32.3 GM/DL — SIGNIFICANT CHANGE UP (ref 32–36)
MCV RBC AUTO: 99.6 FL — SIGNIFICANT CHANGE UP (ref 80–100)
PHOSPHATE SERPL-MCNC: 2.7 MG/DL — SIGNIFICANT CHANGE UP (ref 2.5–4.5)
PLATELET # BLD AUTO: 247 K/UL — SIGNIFICANT CHANGE UP (ref 150–400)
POTASSIUM SERPL-MCNC: 4.3 MMOL/L — SIGNIFICANT CHANGE UP (ref 3.5–5.3)
POTASSIUM SERPL-SCNC: 4.3 MMOL/L — SIGNIFICANT CHANGE UP (ref 3.5–5.3)
RBC # BLD: 2.7 M/UL — LOW (ref 4.2–5.8)
RBC # FLD: 14 % — SIGNIFICANT CHANGE UP (ref 10.3–14.5)
SODIUM SERPL-SCNC: 139 MMOL/L — SIGNIFICANT CHANGE UP (ref 135–145)
WBC # BLD: 7.99 K/UL — SIGNIFICANT CHANGE UP (ref 3.8–10.5)
WBC # FLD AUTO: 7.99 K/UL — SIGNIFICANT CHANGE UP (ref 3.8–10.5)

## 2022-06-12 PROCEDURE — 99232 SBSQ HOSP IP/OBS MODERATE 35: CPT

## 2022-06-12 RX ADMIN — Medication 10 MILLIGRAM(S): at 06:59

## 2022-06-12 RX ADMIN — SIMVASTATIN 40 MILLIGRAM(S): 20 TABLET, FILM COATED ORAL at 22:00

## 2022-06-12 RX ADMIN — HEPARIN SODIUM 5000 UNIT(S): 5000 INJECTION INTRAVENOUS; SUBCUTANEOUS at 22:00

## 2022-06-12 RX ADMIN — CARBIDOPA AND LEVODOPA 2 TABLET(S): 25; 100 TABLET ORAL at 22:00

## 2022-06-12 RX ADMIN — Medication 10 MILLIGRAM(S): at 13:33

## 2022-06-12 RX ADMIN — FINASTERIDE 5 MILLIGRAM(S): 5 TABLET, FILM COATED ORAL at 09:22

## 2022-06-12 RX ADMIN — TAMSULOSIN HYDROCHLORIDE 0.4 MILLIGRAM(S): 0.4 CAPSULE ORAL at 22:02

## 2022-06-12 RX ADMIN — Medication 75 MICROGRAM(S): at 06:59

## 2022-06-12 RX ADMIN — Medication 10 MILLIGRAM(S): at 17:28

## 2022-06-12 RX ADMIN — CARBIDOPA AND LEVODOPA 2 TABLET(S): 25; 100 TABLET ORAL at 13:33

## 2022-06-12 RX ADMIN — CARBIDOPA AND LEVODOPA 2 TABLET(S): 25; 100 TABLET ORAL at 17:27

## 2022-06-12 RX ADMIN — Medication 100 MILLIGRAM(S): at 09:20

## 2022-06-12 RX ADMIN — MAGNESIUM HYDROXIDE 30 MILLILITER(S): 400 TABLET, CHEWABLE ORAL at 22:01

## 2022-06-12 RX ADMIN — AMLODIPINE BESYLATE 10 MILLIGRAM(S): 2.5 TABLET ORAL at 09:20

## 2022-06-12 RX ADMIN — HEPARIN SODIUM 5000 UNIT(S): 5000 INJECTION INTRAVENOUS; SUBCUTANEOUS at 07:00

## 2022-06-12 RX ADMIN — POLYETHYLENE GLYCOL 3350 17 GRAM(S): 17 POWDER, FOR SOLUTION ORAL at 22:02

## 2022-06-12 RX ADMIN — PANTOPRAZOLE SODIUM 40 MILLIGRAM(S): 20 TABLET, DELAYED RELEASE ORAL at 09:23

## 2022-06-12 RX ADMIN — HEPARIN SODIUM 5000 UNIT(S): 5000 INJECTION INTRAVENOUS; SUBCUTANEOUS at 13:33

## 2022-06-12 RX ADMIN — CARBIDOPA AND LEVODOPA 2 TABLET(S): 25; 100 TABLET ORAL at 09:19

## 2022-06-12 RX ADMIN — LORATADINE 5 MILLIGRAM(S): 10 TABLET ORAL at 09:22

## 2022-06-12 NOTE — PROVIDER CONTACT NOTE (OTHER) - ASSESSMENT
PT denies pain. Stoma drained 30 ml of dark red fluid. Hematoma and stoma appears swollen
Pt stoma beefy red but swollen and bleeding.
Stoma beefy red but edematous and bleeding, 20ml emptied out of ostomy pouch. Dressing is not constricting stoma at this time.

## 2022-06-12 NOTE — PROVIDER CONTACT NOTE (OTHER) - DATE AND TIME:
06-Jun-2022 20:30
07-Jun-2022 15:52
11-Jun-2022 00:12
06-Jun-2022 21:00
12-Jun-2022 01:45
11-Jun-2022 05:00

## 2022-06-12 NOTE — PROVIDER CONTACT NOTE (OTHER) - SITUATION
Patient bladder scan was 273mLn order to insert FoleyCatheter. Attempt 2 times and wasn't able to retrieve urine. Pt did void but remained 273mL in bladder scanner
Left message on answering service in regard to patient urine output
notified office; spoke to Janiya
Pt's stoma protruding. Hematoma is present on stoma.

## 2022-06-12 NOTE — PROVIDER CONTACT NOTE (OTHER) - REASON
Bladder scan 273mL, unale to
PCP notification
Bladder scan 273mL
Stoma
pt stoma bleeding and swollen
stoma swollen and bleeding

## 2022-06-13 ENCOUNTER — TRANSCRIPTION ENCOUNTER (OUTPATIENT)
Age: 82
End: 2022-06-13

## 2022-06-13 VITALS
HEART RATE: 56 BPM | TEMPERATURE: 98 F | RESPIRATION RATE: 18 BRPM | SYSTOLIC BLOOD PRESSURE: 156 MMHG | OXYGEN SATURATION: 98 % | DIASTOLIC BLOOD PRESSURE: 58 MMHG

## 2022-06-13 LAB
ANION GAP SERPL CALC-SCNC: 7 MMOL/L — SIGNIFICANT CHANGE UP (ref 5–17)
BUN SERPL-MCNC: 45 MG/DL — HIGH (ref 7–23)
CALCIUM SERPL-MCNC: 8.4 MG/DL — LOW (ref 8.5–10.1)
CHLORIDE SERPL-SCNC: 111 MMOL/L — HIGH (ref 96–108)
CO2 SERPL-SCNC: 21 MMOL/L — LOW (ref 22–31)
CREAT SERPL-MCNC: 2.65 MG/DL — HIGH (ref 0.5–1.3)
EGFR: 24 ML/MIN/1.73M2 — LOW
GLUCOSE SERPL-MCNC: 110 MG/DL — HIGH (ref 70–99)
HCT VFR BLD CALC: 28.8 % — LOW (ref 39–50)
HGB BLD-MCNC: 9.4 G/DL — LOW (ref 13–17)
MAGNESIUM SERPL-MCNC: 2.9 MG/DL — HIGH (ref 1.6–2.6)
MCHC RBC-ENTMCNC: 32.6 GM/DL — SIGNIFICANT CHANGE UP (ref 32–36)
MCHC RBC-ENTMCNC: 32.6 PG — SIGNIFICANT CHANGE UP (ref 27–34)
MCV RBC AUTO: 100 FL — SIGNIFICANT CHANGE UP (ref 80–100)
PHOSPHATE SERPL-MCNC: 2.7 MG/DL — SIGNIFICANT CHANGE UP (ref 2.5–4.5)
PLATELET # BLD AUTO: 274 K/UL — SIGNIFICANT CHANGE UP (ref 150–400)
POTASSIUM SERPL-MCNC: 4.1 MMOL/L — SIGNIFICANT CHANGE UP (ref 3.5–5.3)
POTASSIUM SERPL-SCNC: 4.1 MMOL/L — SIGNIFICANT CHANGE UP (ref 3.5–5.3)
RBC # BLD: 2.88 M/UL — LOW (ref 4.2–5.8)
RBC # FLD: 14.1 % — SIGNIFICANT CHANGE UP (ref 10.3–14.5)
SARS-COV-2 RNA SPEC QL NAA+PROBE: SIGNIFICANT CHANGE UP
SODIUM SERPL-SCNC: 139 MMOL/L — SIGNIFICANT CHANGE UP (ref 135–145)
WBC # BLD: 9.01 K/UL — SIGNIFICANT CHANGE UP (ref 3.8–10.5)
WBC # FLD AUTO: 9.01 K/UL — SIGNIFICANT CHANGE UP (ref 3.8–10.5)

## 2022-06-13 PROCEDURE — 99232 SBSQ HOSP IP/OBS MODERATE 35: CPT

## 2022-06-13 RX ORDER — AMLODIPINE BESYLATE 2.5 MG/1
1 TABLET ORAL
Qty: 30 | Refills: 0 | DISCHARGE
Start: 2022-06-13

## 2022-06-13 RX ORDER — AMLODIPINE BESYLATE 2.5 MG/1
1 TABLET ORAL
Qty: 30 | Refills: 0
Start: 2022-06-13 | End: 2022-07-12

## 2022-06-13 RX ADMIN — HEPARIN SODIUM 5000 UNIT(S): 5000 INJECTION INTRAVENOUS; SUBCUTANEOUS at 05:51

## 2022-06-13 RX ADMIN — CARBIDOPA AND LEVODOPA 2 TABLET(S): 25; 100 TABLET ORAL at 09:59

## 2022-06-13 RX ADMIN — CARBIDOPA AND LEVODOPA 2 TABLET(S): 25; 100 TABLET ORAL at 13:23

## 2022-06-13 RX ADMIN — AMLODIPINE BESYLATE 10 MILLIGRAM(S): 2.5 TABLET ORAL at 10:00

## 2022-06-13 RX ADMIN — FINASTERIDE 5 MILLIGRAM(S): 5 TABLET, FILM COATED ORAL at 09:59

## 2022-06-13 RX ADMIN — Medication 100 MILLIGRAM(S): at 13:23

## 2022-06-13 RX ADMIN — HEPARIN SODIUM 5000 UNIT(S): 5000 INJECTION INTRAVENOUS; SUBCUTANEOUS at 13:23

## 2022-06-13 RX ADMIN — Medication 75 MICROGRAM(S): at 05:51

## 2022-06-13 RX ADMIN — Medication 10 MILLIGRAM(S): at 05:51

## 2022-06-13 RX ADMIN — Medication 10 MILLIGRAM(S): at 10:00

## 2022-06-13 RX ADMIN — Medication 10 MILLIGRAM(S): at 17:29

## 2022-06-13 RX ADMIN — LORATADINE 5 MILLIGRAM(S): 10 TABLET ORAL at 10:00

## 2022-06-13 RX ADMIN — CARBIDOPA AND LEVODOPA 2 TABLET(S): 25; 100 TABLET ORAL at 17:28

## 2022-06-13 RX ADMIN — PANTOPRAZOLE SODIUM 40 MILLIGRAM(S): 20 TABLET, DELAYED RELEASE ORAL at 10:00

## 2022-06-13 NOTE — DISCHARGE NOTE NURSING/CASE MANAGEMENT/SOCIAL WORK - PATIENT PORTAL LINK FT
You can access the FollowMyHealth Patient Portal offered by [facility] by registering at the following website: [email or web address]. By joining [employer]’s FollowMyHealth portal, you will also be able to view your health information using other applications (apps) compatible with our system.
You can access the FollowMyHealth Patient Portal offered by [facility] by registering at the following website: [email or web address]. By joining [employer]’s FollowMyHealth portal, you will also be able to view your health information using other applications (apps) compatible with our system.

## 2022-06-13 NOTE — DISCHARGE NOTE NURSING/CASE MANAGEMENT/SOCIAL WORK - NSDCPEFALRISK_GEN_ALL_CORE
For information on Fall & Injury Prevention, visit: [email or web address] OR  [email or web address] OR  https://www.cdc.gov/steadi/patient.html
For information on Fall & Injury Prevention, visit: [email or web address]-mobility OR  [email or web address]/[email or web address]-[email or web address]-to-avoid-injury OR  https://www.cdc.gov/steadi/patient.html

## 2022-06-13 NOTE — PROGRESS NOTE ADULT - PROVIDER SPECIALTY LIST ADULT
Colorectal Surgery
Hospitalist
Hospitalist
Colorectal Surgery
Hospitalist
Colorectal Surgery
Hospitalist
Colorectal Surgery
Hospitalist

## 2022-06-13 NOTE — CHART NOTE - NSCHARTNOTEFT_GEN_A_CORE
Prescription for norvasc sent. DC MEdrec updated
Brief Procedure Note    procedure sigmoidoscopy  pre op dx sigmoid volvulus  post op dx same  oper purow  anest marghoob    findings sigmoid volvulus at about 38cm from anal verge  scope advanced through twisted area and colon decompressed successfully    pt tolerated well and transferred to recovery room

## 2022-06-13 NOTE — PROGRESS NOTE ADULT - ATTENDING COMMENTS
Patient is POD#1 s/p sigmoid colectomy with end colostomy.  No N/V, tolerating clear liquids.  Minimal output in stoma.  Pain well-controlled.  On exam abdomen is soft but distended and tympanic, no rebound or guarding, appropriately tender near incision.  Ostomy is markedly edematous but is pink and well-perfused.  This edema will improve with time.    Vital Signs Last 24 Hrs  T(C): 37.3 (07 Jun 2022 11:00), Max: 37.3 (07 Jun 2022 11:00)  T(F): 99.2 (07 Jun 2022 11:00), Max: 99.2 (07 Jun 2022 11:00)  HR: 56 (07 Jun 2022 11:01) (56 - 69)  BP: 188/64 (07 Jun 2022 11:01) (135/92 - 192/77)  BP(mean): 98 (07 Jun 2022 11:01) (86 - 102)  RR: 15 (07 Jun 2022 11:01) (8 - 24)  SpO2: 99% (07 Jun 2022 11:01) (93% - 100%)                            9.9    11.24 )-----------( 237      ( 07 Jun 2022 07:14 )             29.4         06-07    137  |  108  |  37<H>  ----------------------------<  142<H>  3.7   |  20<L>  |  2.50<H>    Ca    8.1<L>      07 Jun 2022 07:14  Phos  3.8     06-07  Mg     2.8     06-07    TPro  7.2  /  Alb  3.2<L>  /  TBili  0.4  /  DBili  x   /  AST  17  /  ALT  8<L>  /  AlkPhos  93  06-05      -- Given distention, continue on clear liquids.  -- PT, encourage OOB  -- DVT prophylaxis  -- Multimodal pain control, minimize narcotics  -- Encourage IS  -- Ostomy care and education - stoma is edematous but this should improve over time
Seen and examined.    Suad po and in better spirits.  AFVSS  NAD  Colostomy edematous and prolapsing slightly, soft, NT, mild distention  Labs reviewed  A/P -   No further intervention for stoma edema or prolapse.  Cont current diet and bowel regimen.  [facility]
Seen and examined.  Retaining urine. Perry inserted with 375 cc immediately out.  AXR with postop ileus. Pt dino po but with nausea that is treated with the help of zofran.  No significant pain.   Colostomy with stool and some air.  AFVSS  NAD  chip in place, colostomy edematous, soft, NT, distended  Labs reviewed  POD 2  Clear liquids sparingly.  Uses laxatives regularly at home, will restart while in house.  Colostomy teaching. PT.  Keep perry to bag drainage.
Patient states that his abdomen feels a little better.  Remains distended, denies N/V. Tolerating some clear liquids.  Pain controlled.  On exam abdomen soft, distended though less than before.  Ostomy with edema, small hematoma at inferolateral aspect, but otherwise healthy in appearance.  Small amount solid stool in appliance.    06-09    133<L>  |  104  |  36<H>  ----------------------------<  122<H>  3.7   |  19<L>  |  2.49<H>    Ca    8.3<L>      09 Jun 2022 06:24  Phos  3.4     06-09  Mg     2.7     06-09                              9.7    7.42  )-----------( 246      ( 09 Jun 2022 06:24 )             29.7       -- Continue clear liquids given distention  -- Milk of magnesia  -- Replace potassium  -- Pain control - multimodal, minimize narcotics  -- DVT prophylaxis  -- Encourage OOB, ambulation; PT  -- Encourage IS
Patient without any acute issues overnight.  No N/V.  Tolerating diet.  Stoma functioning - some ecchymosis and slough of old mucosa, but otherwise pink and well-perfused.  Pain controlled.  On exam abdomen soft, nondistended, appropriately tender; ostomy as above.    -- Currently awaiting DC to longterm ([facility])  -- Continue diet  -- Ostomy care  -- DVT prophylaxis  -- Home medications  -- Encourage OOB, IS
Seen and examined earlier this AM.  Pt frustrated with hospital stay and "wants out"  Wants to eat.  AFVSS  NAD  soft, NT, distended, colostomy productive  Labs reviewed  A/P -   He continues to have a component of postop ileus as he remains distended despite colostomy output present.   As he is unwilling to await resolution of ileus to safely feed him, will allow for trial of solids though doubtful he will be able to tolerate much.   If develops N/V/emesis, may then be more understanding and agreeable for NGT decompression.  Trial of void this morning after perry removed.

## 2022-06-13 NOTE — PROGRESS NOTE ADULT - SUBJECTIVE AND OBJECTIVE BOX
CC-  Sigmoid volvulus (06 Jun 2022 01:18)    HPI: 81 y.o. male with PMH Parkinson's Dx, HTN, hypothyroidism, CKD st IIIb presented  to the ED for abdominal distension and constipation for 2 days. Patient has been having intermittent constipation for a long time and was seen last by his gastroenterologist 2 weeks ago and was given laxatives. He started having increasing abdominal distention and constipation for the last 2 days and was unable to pass any bowel movement or gas. He denies any fever, nausea, vomiting, and is only complaining of mild abdominal pain. He was not able to have any food because of the abdominal distension.  (05 Jun 2022 20:48) Patient admitted with volvulus and underwent colonoscopy with decompression. His abdomen still distended and he needs to go to OR urgently. Medical consult called for preop medical clearance    6/6/22- NPO for OR. +abd distention  6/7/22: comfortable in the bed, pain is controlled, noted distended abdomen, mild confusion in am - resolved now  6/8/22: unable to urinate, postnasal drip - asking for claritin  6/9-10 reviewed  6/11: comfortable in the bed, tolerates po, + BMs  6/12: awaiting DC, no new complaints, stoma edematous, tolerates po  Other ROS reviewed and neg     T(C): 37.4 (06-12-22 @ 08:00), Max: 37.4 (06-12-22 @ 08:00)  HR: 56 (06-12-22 @ 08:00) (56 - 58)  BP: 163/64 (06-12-22 @ 08:00) (155/67 - 163/64)  RR: 18 (06-12-22 @ 08:00) (18 - 18)  SpO2: 96% (06-12-22 @ 08:00) (96% - 96%)    06-11-22 @ 07:01  -  06-12-22 @ 07:00  --------------------------------------------------------  IN: 0 mL / OUT: 340 mL / NET: -340 mL                        8.7    7.99  )-----------( 247      ( 12 Jun 2022 08:02 )             26.9     12 Jun 2022 08:02    139    |  112    |  48     ----------------------------<  88     4.3     |  19     |  2.80     Ca    8.1        12 Jun 2022 08:02  Phos  2.7       12 Jun 2022 08:02  Mg     3.0       12 Jun 2022 08:02    acetaminophen     Tablet .. 650 milliGRAM(s) Oral every 6 hours PRN  acetaminophen   IVPB .. 1000 milliGRAM(s) IV Intermittent every 6 hours PRN  allopurinol 100 milliGRAM(s) Oral daily  aluminum hydroxide/magnesium hydroxide/simethicone Suspension 30 milliLiter(s) Oral every 6 hours PRN  amLODIPine   Tablet 10 milliGRAM(s) Oral daily  carbidopa/levodopa  25/100 2 Tablet(s) Oral <User Schedule>  finasteride 5 milliGRAM(s) Oral daily  heparin   Injectable 5000 Unit(s) SubCutaneous every 8 hours  levothyroxine 75 MICROGram(s) Oral daily  loratadine 5 milliGRAM(s) Oral daily  magnesium hydroxide Suspension 30 milliLiter(s) Oral at bedtime  metoclopramide 10 milliGRAM(s) Oral three times a day before meals  morphine  - Injectable 2 milliGRAM(s) IV Push every 6 hours PRN  ondansetron Injectable 4 milliGRAM(s) IV Push every 6 hours PRN  pantoprazole  Injectable 40 milliGRAM(s) IV Push daily  polyethylene glycol 3350 17 Gram(s) Oral at bedtime  simvastatin 40 milliGRAM(s) Oral at bedtime  tamsulosin 0.4 milliGRAM(s) Oral at bedtime      RADIOLOGY & ADDITIONAL TESTS:  CT ABDOMEN AND PELVIS OC                        PROCEDURE DATE:  06/05/2022    INTERPRETATION:  CLINICAL INFORMATION: Abdominal pain and constipation.  IMPRESSION:  Sigmoid volvulus. GI or Surgical consultation is advised for reduction.      PHYSICAL EXAM:  GENERAL: male in NAD  HEAD:  Atraumatic, Normocephalic  EYES: EOMI, PERRLA, conjunctiva and sclera clear  HEENT: dry mucous membranes  NECK: Supple, No JVD  NERVOUS SYSTEM:  Alert & Oriented X3, Motor Strength 5/5 B/L upper and lower extremities; DTRs 2+ intact and symmetric  CHEST/LUNG: Clear to auscultation bilaterally; No rales, rhonchi, wheezing, or rubs  HEART: S1, S2 reg  ABDOMEN: soft mildly distended abdomen with + BS, colostomy in place  GENITOURINARY: no cva tenderness  EXTREMITIES:  2+ Peripheral Pulses, No clubbing, cyanosis, or edema  MUSCULOSKELTAL- No muscle tenderness, Muscle tone normal, No joint tenderness, no Joint swelling, Joint range of motion-normal  SKIN-no rash, no lesion  CNS- alert, oriented X3, non focal         
Patient was seen and examined at the bedside this morning  Underwent colonoscopy and detortion   Feeling less distended  No nausea or vomiting  No passage of gas or bowel movement yet    O/E:  T(C): 36.8 (06-06-22 @ 00:48), Max: 36.8 (06-06-22 @ 00:48)  HR: 84 (06-06-22 @ 00:48) (56 - 84)  BP: 112/71 (06-06-22 @ 00:48) (112/71 - 223/80)  RR: 17 (06-06-22 @ 00:48) (12 - 18)  SpO2: 100% (06-06-22 @ 00:48) (95% - 100%)  Alert, conscious, oriented  No pallor, jaundice or cyanosis  Not in pain or distress  Chest clear, equal air entry bilaterally  Abdomen distended, tympanic, no tenderness  Extremities: No swelling or tenderness    Labs pending this morning    MEDICATIONS  (STANDING):  allopurinol 100 milliGRAM(s) Oral daily  amLODIPine   Tablet 5 milliGRAM(s) Oral daily  carbidopa/levodopa  25/100 2 Tablet(s) Oral three times a day  dextrose 5% + sodium chloride 0.45%. 1000 milliLiter(s) (75 mL/Hr) IV Continuous <Continuous>  finasteride 5 milliGRAM(s) Oral daily  heparin   Injectable 5000 Unit(s) SubCutaneous every 8 hours  hydrALAZINE 25 milliGRAM(s) Oral three times a day  levothyroxine 75 MICROGram(s) Oral daily  pantoprazole    Tablet 40 milliGRAM(s) Oral before breakfast  simvastatin 40 milliGRAM(s) Oral at bedtime    MEDICATIONS  (PRN):  acetaminophen     Tablet .. 650 milliGRAM(s) Oral every 6 hours PRN Mild Pain (1 - 3)  ondansetron Injectable 4 milliGRAM(s) IV Push every 6 hours PRN Nausea and/or Vomiting  oxyCODONE    IR 5 milliGRAM(s) Oral every 6 hours PRN Severe Pain (7 - 10)  
CC-  Sigmoid volvulus (06 Jun 2022 01:18)    HPI: 81 y.o. male with PMH Parkinson's Dx, HTN, hypothyroidism, CKD st IIIb presented  to the ED for abdominal distension and constipation for 2 days. Patient has been having intermittent constipation for a long time and was seen last by his gastroenterologist 2 weeks ago and was given laxatives. He started having increasing abdominal distention and constipation for the last 2 days and was unable to pass any bowel movement or gas. He denies any fever, nausea, vomiting, and is only complaining of mild abdominal pain. He was not able to have any food because of the abdominal distension.  (05 Jun 2022 20:48) Patient admitted with volvulus and underwent colonoscopy with decompression. His abdomen still distended and he needs to go to OR urgently. Medical consult called for preop medical clearance    Vital Signs Last 24 Hrs  T(C): 36.6 (09 Jun 2022 08:00), Max: 36.6 (09 Jun 2022 08:00)  T(F): 97.9 (09 Jun 2022 08:00), Max: 97.9 (09 Jun 2022 08:00)  HR: 52 (09 Jun 2022 00:00) (52 - 57)  BP: 155/63 (09 Jun 2022 05:45) (107/47 - 155/63)  BP(mean): 85 (09 Jun 2022 05:45) (63 - 86)  RR: 18 (08 Jun 2022 20:00) (12 - 18)  SpO2: 96% (09 Jun 2022 05:45) (94% - 98%)    RADIOLOGY & ADDITIONAL TESTS:  CT ABDOMEN AND PELVIS OC                        PROCEDURE DATE:  06/05/2022    INTERPRETATION:  CLINICAL INFORMATION: Abdominal pain and constipation.  IMPRESSION:  Sigmoid volvulus. GI or Surgical consultation is advised for reduction.      PHYSICAL EXAM:  GENERAL: male in NAD  HEAD:  Atraumatic, Normocephalic  EYES: EOMI, PERRLA, conjunctiva and sclera clear  HEENT: dry mucous membranes  NECK: Supple, No JVD  NERVOUS SYSTEM:  Alert & Oriented X3, Motor Strength 5/5 B/L upper and lower extremities; DTRs 2+ intact and symmetric  CHEST/LUNG: Clear to auscultation bilaterally; No rales, rhonchi, wheezing, or rubs  HEART: S1, S2 reg  ABDOMEN: soft distended abdomen tender in postop area with + BS  GENITOURINARY: + FQ removed, no urger with pressure on suprapubic area  EXTREMITIES:  2+ Peripheral Pulses, No clubbing, cyanosis, or edema  MUSCULOSKELTAL- No muscle tenderness, Muscle tone normal, No joint tenderness, no Joint swelling, Joint range of motion-normal  SKIN-no rash, no lesion  CNS- alert, oriented X3, non focal                             9.7    7.42  )-----------( 246      ( 09 Jun 2022 06:24 )             29.7   06-09    133<L>  |  104  |  36<H>  ----------------------------<  122<H>  3.7   |  19<L>  |  2.49<H>    Ca    8.3<L>      09 Jun 2022 06:24  Phos  3.4     06-09  Mg     2.7     06-09      MEDICATIONS  (STANDING):  allopurinol 100 milliGRAM(s) Oral daily  amLODIPine   Tablet 5 milliGRAM(s) Oral daily  carbidopa/levodopa  25/100 2 Tablet(s) Oral <User Schedule>  finasteride 5 milliGRAM(s) Oral daily  heparin   Injectable 5000 Unit(s) SubCutaneous every 8 hours  hydrALAZINE 50 milliGRAM(s) Oral three times a day  levothyroxine 75 MICROGram(s) Oral daily  loratadine 5 milliGRAM(s) Oral daily  magnesium hydroxide Suspension 30 milliLiter(s) Oral at bedtime  pantoprazole    Tablet 40 milliGRAM(s) Oral before breakfast  simvastatin 40 milliGRAM(s) Oral at bedtime  tamsulosin 0.4 milliGRAM(s) Oral at bedtime  
CC-  Sigmoid volvulus (2022 01:18)    HPI: 81 y.o. male with PMH Parkinson's Dx, HTN, hypothyroidism, CKD st IIIb presented  to the ED for abdominal distension and constipation for 2 days. Patient has been having intermittent constipation for a long time and was seen last by his gastroenterologist 2 weeks ago and was given laxatives. He started having increasing abdominal distention and constipation for the last 2 days and was unable to pass any bowel movement or gas. He denies any fever, nausea, vomiting, and is only complaining of mild abdominal pain. He was not able to have any food because of the abdominal distension.  (2022 20:48) Patient admitted with volvulus and underwent colonoscopy with decompression. His abdomen still distended and he needs to go to OR urgently. Medical consult called for preop medical clearance    22- NPO for OR. +abd distention  22: comfortable in the bed, pain is controlled, noted distended abdomen, mild confusion in am - resolved now  22: unable to urinate, postnasal drip - asking for claritin  Other ROS reviewed and neg     T(C): 37 (22 @ 07:28), Max: 37.3 (22 @ 11:00)  HR: 57 (22 @ 06:00) (48 - 62)  BP: 147/63 (22 @ 05:00) (111/68 - 188/71)  RR: 20 (22 @ 06:00) (11 - 20)  SpO2: 98% (22 @ 06:00) (95% - 100%)    22 @ 07:01  -  22 @ 07:00  --------------------------------------------------------  IN: 950 mL / OUT: 465 mL / NET: 485 mL      Daily     Daily Weight in k.9 (2022 05:43)                            10.2   9.73  )-----------( 238      ( 2022 06:16 )             31.5     2022 06:16    136    |  108    |  35     ----------------------------<  116    3.6     |  19     |  2.50     Ca    8.1        2022 06:16  Phos  2.9       2022 06:16  Mg     2.7       2022 06:16        CAPILLARY BLOOD GLUCOSE      POCT Blood Glucose.: 125 mg/dL (2022 08:55)  POCT Blood Glucose.: 112 mg/dL (2022 06:36)  POCT Blood Glucose.: 113 mg/dL (2022 23:27)  POCT Blood Glucose.: 147 mg/dL (2022 18:37)  POCT Blood Glucose.: 143 mg/dL (2022 12:08)          acetaminophen     Tablet .. 650 milliGRAM(s) Oral every 6 hours PRN  acetaminophen   IVPB .. 1000 milliGRAM(s) IV Intermittent every 6 hours PRN  allopurinol 100 milliGRAM(s) Oral daily  amLODIPine   Tablet 5 milliGRAM(s) Oral daily  carbidopa/levodopa  25/100 2 Tablet(s) Oral <User Schedule>  finasteride 5 milliGRAM(s) Oral daily  heparin   Injectable 5000 Unit(s) SubCutaneous every 8 hours  hydrALAZINE 50 milliGRAM(s) Oral three times a day  ketorolac   Injectable 15 milliGRAM(s) IV Push every 6 hours PRN  levothyroxine 75 MICROGram(s) Oral daily  loratadine 5 milliGRAM(s) Oral daily  morphine  - Injectable 2 milliGRAM(s) IV Push every 6 hours PRN  ondansetron Injectable 4 milliGRAM(s) IV Push every 6 hours PRN  pantoprazole    Tablet 40 milliGRAM(s) Oral before breakfast  simvastatin 40 milliGRAM(s) Oral at bedtime  tamsulosin 0.4 milliGRAM(s) Oral at bedtime  tamsulosin 0.4 milliGRAM(s) Oral once    RADIOLOGY & ADDITIONAL TESTS:  CT ABDOMEN AND PELVIS OC                        PROCEDURE DATE:  2022    INTERPRETATION:  CLINICAL INFORMATION: Abdominal pain and constipation.  IMPRESSION:  Sigmoid volvulus. GI or Surgical consultation is advised for reduction.      PHYSICAL EXAM:  GENERAL: male in NAD  HEAD:  Atraumatic, Normocephalic  EYES: EOMI, PERRLA, conjunctiva and sclera clear  HEENT: dry mucous membranes  NECK: Supple, No JVD  NERVOUS SYSTEM:  Alert & Oriented X3, Motor Strength 5/5 B/L upper and lower extremities; DTRs 2+ intact and symmetric  CHEST/LUNG: Clear to auscultation bilaterally; No rales, rhonchi, wheezing, or rubs  HEART: S1, S2 reg  ABDOMEN: soft distended abdomen tender in postop area with + BS  GENITOURINARY: + FQ removed, no urger with pressure on suprapubic area  EXTREMITIES:  2+ Peripheral Pulses, No clubbing, cyanosis, or edema  MUSCULOSKELTAL- No muscle tenderness, Muscle tone normal, No joint tenderness, no Joint swelling, Joint range of motion-normal  SKIN-no rash, no lesion  CNS- alert, oriented X3, non focal         
CC-  Sigmoid volvulus (2022 01:18)    HPI: 81 y.o. male with PMH Parkinson's Dx, HTN, hypothyroidism, CKD st IIIb presented  to the ED for abdominal distension and constipation for 2 days. Patient has been having intermittent constipation for a long time and was seen last by his gastroenterologist 2 weeks ago and was given laxatives. He started having increasing abdominal distention and constipation for the last 2 days and was unable to pass any bowel movement or gas. He denies any fever, nausea, vomiting, and is only complaining of mild abdominal pain. He was not able to have any food because of the abdominal distension.  (2022 20:48) Patient admitted with volvulus and underwent colonoscopy with decompression. His abdomen still distended and he needs to go to OR urgently. Medical consult called for preop medical clearance    22- NPO for OR. +abd distention  22: comfortable in the bed, pain is controlled, noted distended abdomen, mild confusion in am - resolved now  Other ROS reviewed and neg     T(C): 37.3 (22 @ 11:00), Max: 37.3 (22 @ 11:00)  HR: 58 (22 @ 13:00) (56 - 69)  BP: 158/60 (22 @ 13:00) (135/92 - 192/77)  RR: 16 (22 @ 13:00) (8 - 24)  SpO2: 97% (22 @ 13:00) (93% - 100%)    22 @ 07:01  -  22 @ 07:00  --------------------------------------------------------  IN: 2650 mL / OUT: 845 mL / NET: 1805 mL    22 @ 07:01  -  22 @ 14:17  --------------------------------------------------------  IN: 0 mL / OUT: 425 mL / NET: -425 mL      Daily     Daily                             9.9    11.24 )-----------( 237      ( 2022 07:14 )             29.4     2022 07:14    137    |  108    |  37     ----------------------------<  142    3.7     |  20     |  2.50     Ca    8.1        2022 07:14  Phos  3.8       2022 07:14  Mg     2.8       2022 07:14    TPro  7.2    /  Alb  3.2    /  TBili  0.4    /  DBili  x      /  AST  17     /  ALT  8      /  AlkPhos  93     2022 16:52    PT/INR - ( 2022 16:52 )   PT: 11.3 sec;   INR: 0.97 ratio         PTT - ( 2022 16:52 )  PTT:30.9 sec  CAPILLARY BLOOD GLUCOSE      POCT Blood Glucose.: 143 mg/dL (2022 12:08)  POCT Blood Glucose.: 147 mg/dL (2022 05:40)  POCT Blood Glucose.: 197 mg/dL (2022 23:32)  POCT Blood Glucose.: 110 mg/dL (2022 16:36)  POCT Blood Glucose.: 108 mg/dL (2022 14:59)    LIVER FUNCTIONS - ( 2022 16:52 )  Alb: 3.2 g/dL / Pro: 7.2 gm/dL / ALK PHOS: 93 U/L / ALT: 8 U/L / AST: 17 U/L / GGT: x           Urinalysis Basic - ( 2022 18:15 )    Color: Yellow / Appearance: Clear / S.020 / pH: x  Gluc: x / Ketone: Negative  / Bili: Negative / Urobili: Negative   Blood: x / Protein: 100 / Nitrite: Negative   Leuk Esterase: Negative / RBC: Negative /HPF / WBC Negative   Sq Epi: x / Non Sq Epi: Occasional / Bacteria: Negative        acetaminophen     Tablet .. 650 milliGRAM(s) Oral every 6 hours PRN  acetaminophen   IVPB .. 1000 milliGRAM(s) IV Intermittent every 6 hours PRN  allopurinol 100 milliGRAM(s) Oral daily  amLODIPine   Tablet 5 milliGRAM(s) Oral daily  carbidopa/levodopa  25/100 2 Tablet(s) Oral <User Schedule>  dextrose 5% + sodium chloride 0.45%. 1000 milliLiter(s) IV Continuous <Continuous>  finasteride 5 milliGRAM(s) Oral daily  heparin   Injectable 5000 Unit(s) SubCutaneous every 8 hours  hydrALAZINE 50 milliGRAM(s) Oral three times a day  ketorolac   Injectable 15 milliGRAM(s) IV Push every 6 hours PRN  levothyroxine 75 MICROGram(s) Oral daily  morphine  - Injectable 2 milliGRAM(s) IV Push every 6 hours PRN  ondansetron Injectable 4 milliGRAM(s) IV Push every 6 hours PRN  pantoprazole    Tablet 40 milliGRAM(s) Oral before breakfast  simvastatin 40 milliGRAM(s) Oral at bedtime    RADIOLOGY & ADDITIONAL TESTS:  CT ABDOMEN AND PELVIS OC                        PROCEDURE DATE:  2022    INTERPRETATION:  CLINICAL INFORMATION: Abdominal pain and constipation.  IMPRESSION:  Sigmoid volvulus. GI or Surgical consultation is advised for reduction.      PHYSICAL EXAM:  GENERAL: male in NAD  HEAD:  Atraumatic, Normocephalic  EYES: EOMI, PERRLA, conjunctiva and sclera clear  HEENT: dry mucous membranes  NECK: Supple, No JVD  NERVOUS SYSTEM:  Alert & Oriented X3, Motor Strength 5/5 B/L upper and lower extremities; DTRs 2+ intact and symmetric  CHEST/LUNG: Clear to auscultation bilaterally; No rales, rhonchi, wheezing, or rubs  HEART: S1, S2 reg  ABDOMEN: soft distended abdomen tender in postop area with + BS  GENITOURINARY: + FQ to gravity  EXTREMITIES:  2+ Peripheral Pulses, No clubbing, cyanosis, or edema  MUSCULOSKELTAL- No muscle tenderness, Muscle tone normal, No joint tenderness, no Joint swelling, Joint range of motion-normal  SKIN-no rash, no lesion  CNS- alert, oriented X3, non focal         
Patient was seen and examined at the bedside this morning  Doing well in spirit, no state of confusion. States he needs Claritin for his oral secretions.  Feels more distended but stoma functional  Feels nausea but no vomiting.    O/E:    Vital Signs Last 24 Hrs  T(C): 36.6 (09 Jun 2022 08:00), Max: 36.6 (09 Jun 2022 08:00)  T(F): 97.9 (09 Jun 2022 08:00), Max: 97.9 (09 Jun 2022 08:00)  HR: 52 (09 Jun 2022 00:00) (52 - 57)  BP: 155/63 (09 Jun 2022 05:45) (107/47 - 155/63)  BP(mean): 85 (09 Jun 2022 05:45) (63 - 86)  RR: 18 (08 Jun 2022 20:00) (12 - 18)  SpO2: 96% (09 Jun 2022 05:45) (94% - 98%)    Alert, conscious, oriented  No pallor, jaundice or cyanosis  Not in pain or distress  Chest clear, equal air entry bilaterally  Abdomen distended, tympanic, no tenderness, stoma less edematous, functional  Extremities: No swelling or tenderness    I&O's Detail    08 Jun 2022 07:01  -  09 Jun 2022 07:00  --------------------------------------------------------  IN:    Oral Fluid: 500 mL  Total IN: 500 mL    OUT:    Colostomy (mL): 100 mL    Indwelling Catheter - Urethral (mL): 275 mL  Total OUT: 375 mL    Total NET: 125 mL      LABS:                        9.7    7.42  )-----------( 246      ( 09 Jun 2022 06:24 )             29.7     09 Jun 2022 06:24    133    |  104    |  36     ----------------------------<  122    3.7     |  19     |  2.49     Ca    8.3        09 Jun 2022 06:24  Phos  3.4       09 Jun 2022 06:24  Mg     2.7       09 Jun 2022 06:24      
CC-  Sigmoid volvulus (06 Jun 2022 01:18)    HPI: 81 y.o. male with PMH Parkinson's Dx, HTN, hypothyroidism, CKD st IIIb presented  to the ED for abdominal distension and constipation for 2 days. Patient has been having intermittent constipation for a long time and was seen last by his gastroenterologist 2 weeks ago and was given laxatives. He started having increasing abdominal distention and constipation for the last 2 days and was unable to pass any bowel movement or gas. He denies any fever, nausea, vomiting, and is only complaining of mild abdominal pain. He was not able to have any food because of the abdominal distension.  (05 Jun 2022 20:48) Patient admitted with volvulus and underwent colonoscopy with decompression. His abdomen still distended and he needs to go to OR urgently. Medical consult called for preop medical clearance    6/6/22- NPO for OR. +abd distention  6/7/22: comfortable in the bed, pain is controlled, noted distended abdomen, mild confusion in am - resolved now  6/8/22: unable to urinate, postnasal drip - asking for claritin  6/9-10 reviewed  6/11: comfortable in the bed, tolerates po, + BMs  6/12: awaiting DC, no new complaints, stoma edematous, tolerates po  6/13: generalized edema, lower end of postop wound small opening with serous drainage  Other ROS reviewed and neg     T(C): 36.9 (06-13-22 @ 09:01), Max: 36.9 (06-13-22 @ 09:01)  HR: 59 (06-13-22 @ 09:01) (53 - 59)  BP: 162/63 (06-13-22 @ 09:01) (128/59 - 162/63)  RR: 18 (06-13-22 @ 09:01) (17 - 18)  SpO2: 98% (06-13-22 @ 09:01) (95% - 98%)    06-12-22 @ 07:01  -  06-13-22 @ 07:00  --------------------------------------------------------  IN: 0 mL / OUT: 400 mL / NET: -400 mL      Daily     Daily                             9.4    9.01  )-----------( 274      ( 13 Jun 2022 06:42 )             28.8     13 Jun 2022 06:42    139    |  111    |  45     ----------------------------<  110    4.1     |  21     |  2.65     Ca    8.4        13 Jun 2022 06:42  Phos  2.7       13 Jun 2022 06:42  Mg     2.9       13 Jun 2022 06:4    acetaminophen     Tablet .. 650 milliGRAM(s) Oral every 6 hours PRN  acetaminophen   IVPB .. 1000 milliGRAM(s) IV Intermittent every 6 hours PRN  allopurinol 100 milliGRAM(s) Oral daily  aluminum hydroxide/magnesium hydroxide/simethicone Suspension 30 milliLiter(s) Oral every 6 hours PRN  amLODIPine   Tablet 10 milliGRAM(s) Oral daily  carbidopa/levodopa  25/100 2 Tablet(s) Oral <User Schedule>  finasteride 5 milliGRAM(s) Oral daily  heparin   Injectable 5000 Unit(s) SubCutaneous every 8 hours  levothyroxine 75 MICROGram(s) Oral daily  loratadine 5 milliGRAM(s) Oral daily  magnesium hydroxide Suspension 30 milliLiter(s) Oral at bedtime  metoclopramide 10 milliGRAM(s) Oral three times a day before meals  morphine  - Injectable 2 milliGRAM(s) IV Push every 6 hours PRN  ondansetron Injectable 4 milliGRAM(s) IV Push every 6 hours PRN  pantoprazole  Injectable 40 milliGRAM(s) IV Push daily  polyethylene glycol 3350 17 Gram(s) Oral at bedtime  simvastatin 40 milliGRAM(s) Oral at bedtime  tamsulosin 0.4 milliGRAM(s) Oral at bedtime      RADIOLOGY & ADDITIONAL TESTS:  CT ABDOMEN AND PELVIS OC                        PROCEDURE DATE:  06/05/2022    INTERPRETATION:  CLINICAL INFORMATION: Abdominal pain and constipation.  IMPRESSION:  Sigmoid volvulus. GI or Surgical consultation is advised for reduction.      PHYSICAL EXAM:  GENERAL: male in NAD  HEAD:  Atraumatic, Normocephalic  EYES: EOMI, PERRLA, conjunctiva and sclera clear  HEENT: dry mucous membranes  NECK: Supple, No JVD  NERVOUS SYSTEM:  Alert & Oriented X3, Motor Strength 5/5 B/L upper and lower extremities; DTRs 2+ intact and symmetric  CHEST/LUNG: Clear to auscultation bilaterally; No rales, rhonchi, wheezing, or rubs  HEART: S1, S2 reg  ABDOMEN: soft mildly distended abdomen with + BS, colostomy in place, postop area lower part of wound small dehiscence with serous discharge  GENITOURINARY: no cva tenderness, scrotal edema  EXTREMITIES:  2+ Peripheral Pulses, No clubbing, cyanosis, +1 BL LE edema  MUSCULOSKELTAL- No muscle tenderness, Muscle tone normal  SKIN-no rash  CNS- alert, oriented X3, non focal         
CC-  Sigmoid volvulus (06 Jun 2022 01:18)    HPI: 81 y.o. male with PMH Parkinson's Dx, HTN, hypothyroidism, CKD st IIIb presented  to the ED for abdominal distension and constipation for 2 days. Patient has been having intermittent constipation for a long time and was seen last by his gastroenterologist 2 weeks ago and was given laxatives. He started having increasing abdominal distention and constipation for the last 2 days and was unable to pass any bowel movement or gas. He denies any fever, nausea, vomiting, and is only complaining of mild abdominal pain. He was not able to have any food because of the abdominal distension.  (05 Jun 2022 20:48) Patient admitted with volvulus and underwent colonoscopy with decompression. His abdomen still distended and he needs to go to OR urgently. Medical consult called for preop medical clearance    6/6/22- NPO for OR. +abd distention  6/7/22: comfortable in the bed, pain is controlled, noted distended abdomen, mild confusion in am - resolved now  6/8/22: unable to urinate, postnasal drip - asking for claritin  6/9-10 reviewed  6/11: comfortable in the bed, tolerates po, + BMs  Other ROS reviewed and neg     T(C): 37.2 (06-11-22 @ 09:03), Max: 37.2 (06-11-22 @ 09:03)  HR: 59 (06-11-22 @ 09:03) (59 - 61)  BP: 151/58 (06-11-22 @ 09:03) (125/53 - 151/58)  RR: 18 (06-11-22 @ 09:03) (16 - 18)  SpO2: 96% (06-11-22 @ 09:03) (96% - 97%)    06-10-22 @ 07:01  -  06-11-22 @ 07:00  --------------------------------------------------------  IN: 0 mL / OUT: 1250 mL / NET: -1250 mL    06-11-22 @ 07:01  -  06-11-22 @ 14:23  --------------------------------------------------------  IN: 0 mL / OUT: 150 mL / NET: -150 mL      Daily     Daily                             9.0    6.85  )-----------( 238      ( 11 Jun 2022 06:57 )             27.1     11 Jun 2022 06:57    137    |  109    |  46     ----------------------------<  99     3.3     |  18     |  2.91     Ca    8.0        11 Jun 2022 06:57  Phos  2.8       11 Jun 2022 06:57  Mg     2.9       11 Jun 2022 06:57        CAPILLARY BLOOD GLUCOSE              acetaminophen     Tablet .. 650 milliGRAM(s) Oral every 6 hours PRN  acetaminophen   IVPB .. 1000 milliGRAM(s) IV Intermittent every 6 hours PRN  allopurinol 100 milliGRAM(s) Oral daily  aluminum hydroxide/magnesium hydroxide/simethicone Suspension 30 milliLiter(s) Oral every 6 hours PRN  amLODIPine   Tablet 10 milliGRAM(s) Oral daily  carbidopa/levodopa  25/100 2 Tablet(s) Oral <User Schedule>  finasteride 5 milliGRAM(s) Oral daily  heparin   Injectable 5000 Unit(s) SubCutaneous every 8 hours  ketorolac   Injectable 15 milliGRAM(s) IV Push every 6 hours PRN  levothyroxine 75 MICROGram(s) Oral daily  loratadine 5 milliGRAM(s) Oral daily  magnesium hydroxide Suspension 30 milliLiter(s) Oral at bedtime  metoclopramide 10 milliGRAM(s) Oral three times a day before meals  morphine  - Injectable 2 milliGRAM(s) IV Push every 6 hours PRN  ondansetron Injectable 4 milliGRAM(s) IV Push every 6 hours PRN  polyethylene glycol 3350 17 Gram(s) Oral at bedtime  potassium chloride    Tablet ER 20 milliEquivalent(s) Oral every 2 hours  simvastatin 40 milliGRAM(s) Oral at bedtime  tamsulosin 0.4 milliGRAM(s) Oral at bedtime    RADIOLOGY & ADDITIONAL TESTS:  CT ABDOMEN AND PELVIS OC                        PROCEDURE DATE:  06/05/2022    INTERPRETATION:  CLINICAL INFORMATION: Abdominal pain and constipation.  IMPRESSION:  Sigmoid volvulus. GI or Surgical consultation is advised for reduction.      PHYSICAL EXAM:  GENERAL: male in NAD  HEAD:  Atraumatic, Normocephalic  EYES: EOMI, PERRLA, conjunctiva and sclera clear  HEENT: dry mucous membranes  NECK: Supple, No JVD  NERVOUS SYSTEM:  Alert & Oriented X3, Motor Strength 5/5 B/L upper and lower extremities; DTRs 2+ intact and symmetric  CHEST/LUNG: Clear to auscultation bilaterally; No rales, rhonchi, wheezing, or rubs  HEART: S1, S2 reg  ABDOMEN: soft mildly distended abdomen with + BS, colostomy in place  GENITOURINARY: no cva tenderness  EXTREMITIES:  2+ Peripheral Pulses, No clubbing, cyanosis, or edema  MUSCULOSKELTAL- No muscle tenderness, Muscle tone normal, No joint tenderness, no Joint swelling, Joint range of motion-normal  SKIN-no rash, no lesion  CNS- alert, oriented X3, non focal         
Patient was seen and examined at the bedside this morning  Doing not so well this AM. Pt is upset and refused examination, threatened to leave AMA.   Feels more distended but stoma functional  Feels nausea but no vomiting.    O/E:    Vital Signs Last 24 Hrs  T(C): 36.8 (10 Charlie 2022 05:49), Max: 37.1 (09 Jun 2022 22:54)  T(F): 98.2 (10 Charlie 2022 05:49), Max: 98.8 (09 Jun 2022 22:54)  BP: 113/52 (10 Charlie 2022 08:00) (97/40 - 123/49)  BP(mean): 66 (10 Charlie 2022 08:00) (54 - 79)    Alert, conscious, oriented  No pallor, jaundice or cyanosis  Not in pain but moderate distress  Chest clear, equal air entry bilaterally  Abdomen appears distended, stoma functional, unable to palpate abd today  Extremities No appearance of swelling     I&O's Detail    09 Jun 2022 07:01  -  10 Charlie 2022 07:00  --------------------------------------------------------  IN:  Total IN: 0 mL    OUT:    Colostomy (mL): 350 mL    Indwelling Catheter - Urethral (mL): 875 mL  Total OUT: 1225 mL    Total NET: -1225 mL      LABS:      Ca    8.3        09 Jun 2022 06:24      
Patient was seen and examined at the bedside this morning  Doing not so well this AM. Pt is upset and refused examination, threatened to leave AMA.   Feels more distended but stoma functional  Feels nausea but no vomiting.    O/E:    Vital Signs Last 24 Hrs  T(C): 36.8 (10 Charlie 2022 23:38), Max: 37 (10 Charlie 2022 15:58)  T(F): 98.2 (10 Charlie 2022 23:38), Max: 98.6 (10 Charlie 2022 15:58)  HR: 59 (10 Charlie 2022 23:38) (59 - 61)  BP: 136/59 (10 Charlie 2022 23:38) (106/58 - 138/60)  BP(mean): 79 (10 Charlie 2022 20:00) (68 - 79)  RR: 16 (10 Charlie 2022 23:38) (16 - 18)  SpO2: 97% (10 Charlie 2022 23:38) (96% - 97%)    Alert, conscious, oriented  No pallor, jaundice or cyanosis  Not in pain but moderate distress  Chest clear, equal air entry bilaterally  Abdomen appears distended, stoma functional, unable to palpate abd today  Extremities No appearance of swelling       LABS:                        9.0    6.85  )-----------( 238      ( 11 Jun 2022 06:57 )             27.1     11 Jun 2022 06:57    137    |  109    |  46     ----------------------------<  99     3.3     |  18     |  2.91     Ca    8.0        11 Jun 2022 06:57  Phos  2.8       11 Jun 2022 06:57  Mg     2.9       11 Jun 2022 06:57        
Patient was seen and examined at the bedside this morning  Doing well in spirit, no state of confusion. States he needs Claritin for his oral secretions.  Feels more distended but stoma functional  Feels nausea but no vomiting.    O/E:  Vital Signs Last 24 Hrs  T(C): 37 (08 Jun 2022 07:28), Max: 37.3 (07 Jun 2022 11:00)  T(F): 98.6 (08 Jun 2022 07:28), Max: 99.2 (07 Jun 2022 11:00)  HR: 57 (08 Jun 2022 06:00) (48 - 69)  BP: 147/63 (08 Jun 2022 05:00) (111/68 - 188/71)  BP(mean): 84 (08 Jun 2022 05:00) (71 - 100)  RR: 20 (08 Jun 2022 06:00) (11 - 24)  SpO2: 98% (08 Jun 2022 06:00) (95% - 100%)    Alert, conscious, oriented  No pallor, jaundice or cyanosis  Not in pain or distress  Chest clear, equal air entry bilaterally  Abdomen distended, tympanic, no tenderness  Extremities: No swelling or tenderness    I&O's Detail    07 Jun 2022 07:01  -  08 Jun 2022 07:00  --------------------------------------------------------  IN:    dextrose 5% + sodium chloride 0.45%: 950 mL  Total IN: 950 mL    OUT:    Colostomy (mL): 40 mL    Indwelling Catheter - Urethral (mL): 425 mL  Total OUT: 465 mL    Total NET: 485 mL           LABS:                        10.2   9.73  )-----------( 238      ( 08 Jun 2022 06:16 )             31.5     08 Jun 2022 06:16    136    |  108    |  35     ----------------------------<  116    3.6     |  19     |  2.50     Ca    8.1        08 Jun 2022 06:16  Phos  2.9       08 Jun 2022 06:16  Mg     2.7       08 Jun 2022 06:16        
Patient was seen and examined at the bedside this morning  No event reported overnight by pt. Aside from swollen protruding stoma with small ecchymosis on mucosa noted.  Attempted gentle reduction over the weekend, prolapse back with valsalva.  Stoma functional. Tolerating diet. No nausea or vomiting noted.    O/E:    Vital Signs Last 24 Hrs  T(C): 36.5 (12 Jun 2022 21:27), Max: 37.4 (12 Jun 2022 08:00)  T(F): 97.7 (12 Jun 2022 21:27), Max: 99.4 (12 Jun 2022 08:00)  HR: 58 (12 Jun 2022 21:27) (53 - 58)  BP: 144/63 (12 Jun 2022 21:27) (128/59 - 163/64)  RR: 17 (12 Jun 2022 21:27) (17 - 18)  SpO2: 95% (12 Jun 2022 21:27) (95% - 97%)    Alert, conscious, oriented  No pallor, jaundice or cyanosis  Not in pain but moderate distress  Chest clear, equal air entry bilaterally  Abdomen appears distended, stoma edematous with small medial ecchymosis on mucosa, functional  Extremities No appearance of swelling     I&O's Detail    12 Jun 2022 07:01  -  13 Jun 2022 07:00  --------------------------------------------------------  IN:  Total IN: 0 mL    OUT:    Colostomy (mL): 200 mL    Voided (mL): 200 mL  Total OUT: 400 mL    Total NET: -400 mL      LABS:                        9.4    9.01  )-----------( 274      ( 13 Jun 2022 06:42 )             28.8     13 Jun 2022 06:42    139    |  111    |  45     ----------------------------<  110    4.1     |  21     |  2.65     Ca    8.4        13 Jun 2022 06:42  Phos  2.7       13 Jun 2022 06:42  Mg     2.9       13 Jun 2022 06:42      
Patient was seen and examined at the bedside this morning  No event reported overnight by pt. Aside from swollen protruding stoma with small ecchymosis on mucosa noted.  Feels still distended but stoma functional  Tolerating diet. No nausea or vomiting noted.    O/E:    Vital Signs Last 24 Hrs  T(C): 37.4 (12 Jun 2022 08:00), Max: 37.4 (12 Jun 2022 08:00)  T(F): 99.4 (12 Jun 2022 08:00), Max: 99.4 (12 Jun 2022 08:00)  HR: 56 (12 Jun 2022 08:00) (56 - 59)  BP: 163/64 (12 Jun 2022 08:00) (151/58 - 163/64)  RR: 18 (12 Jun 2022 08:00) (18 - 18)  SpO2: 96% (12 Jun 2022 08:00) (96% - 96%)    Alert, conscious, oriented  No pallor, jaundice or cyanosis  Not in pain but moderate distress  Chest clear, equal air entry bilaterally  Abdomen appears distended, stoma edematous with small medial ecchymosis on mucosa, functional  Extremities No appearance of swelling     LABS:                        8.7    7.99  )-----------( 247      ( 12 Jun 2022 08:02 )             26.9       Ca    8.0        11 Jun 2022 06:57      
Patient was seen and examined at the bedside this morning  Underwent colonoscopy and detortion   Feeling less distended  No nausea or vomiting  No passage of gas or bowel movement yet    O/E:    Vital Signs Last 24 Hrs  T(C): 36.5 (07 Jun 2022 05:00), Max: 36.8 (06 Jun 2022 16:15)  T(F): 97.7 (07 Jun 2022 05:00), Max: 98.2 (06 Jun 2022 16:15)  HR: 61 (07 Jun 2022 08:00) (57 - 67)  BP: 135/92 (07 Jun 2022 08:00) (135/92 - 192/77)  BP(mean): 100 (07 Jun 2022 08:00) (86 - 102)  RR: 19 (07 Jun 2022 08:00) (8 - 20)  SpO2: 98% (07 Jun 2022 08:00) (93% - 100%)    Alert, conscious, oriented  No pallor, jaundice or cyanosis  Not in pain or distress  Chest clear, equal air entry bilaterally  Abdomen distended, tympanic, no tenderness  Extremities: No swelling or tenderness    LABS:                        9.9    11.24 )-----------( 237      ( 07 Jun 2022 07:14 )             29.4     07 Jun 2022 07:14    137    |  108    |  37     ----------------------------<  142    3.7     |  20     |  2.50     Ca    8.1        07 Jun 2022 07:14  Phos  3.8       07 Jun 2022 07:14  Mg     2.8       07 Jun 2022 07:14      PT/INR - ( 05 Jun 2022 16:52 )   PT: 11.3 sec;   INR: 0.97 ratio         PTT - ( 05 Jun 2022 16:52 )  PTT:30.9 sec      
CC-  Sigmoid volvulus (06 Jun 2022 01:18)    HPI: 81 y.o. male with PMH Parkinson's Dx, HTN, hypothyroidism, CKD st IIIb presented  to the ED for abdominal distension and constipation for 2 days. Patient has been having intermittent constipation for a long time and was seen last by his gastroenterologist 2 weeks ago and was given laxatives. He started having increasing abdominal distention and constipation for the last 2 days and was unable to pass any bowel movement or gas. He denies any fever, nausea, vomiting, and is only complaining of mild abdominal pain. He was not able to have any food because of the abdominal distension.  (05 Jun 2022 20:48) Patient admitted with volvulus and underwent colonoscopy with decompression. His abdomen still distended and he needs to go to OR urgently. Medical consult called for preop medical clearance      POD#4; was anxious and was demanding to be dc home this am, calmed down later    Vital Signs Last 24 Hrs  T(C): 36.6 (10 Charlie 2022 12:15), Max: 37.1 (09 Jun 2022 22:54)  T(F): 97.9 (10 Charlie 2022 12:15), Max: 98.8 (09 Jun 2022 22:54)  HR: --  BP: 106/58 (10 Charlie 2022 12:00) (97/40 - 123/49)  BP(mean): 68 (10 Charlie 2022 12:00) (54 - 68)  RR: 17 (10 Charlie 2022 08:00) (17 - 17)  SpO2: 96% (10 Charlie 2022 08:00) (96% - 96%)        GENERAL: male in NAD  HEAD:  Atraumatic, Normocephalic  EYES: EOMI, PERRLA, conjunctiva and sclera clear  HEENT: dry mucous membranes  NECK: Supple, No JVD  NERVOUS SYSTEM:  Alert & Oriented X3, Motor Strength 5/5 B/L upper and lower extremities; DTRs 2+ intact and symmetric  CHEST/LUNG: Clear to auscultation bilaterally; No rales, rhonchi, wheezing, or rubs  HEART: S1, S2 reg  ABDOMEN: soft distended abdomen tender in postop area with + BS  GENITOURINARY: + FQ removed, no urger with pressure on suprapubic area  EXTREMITIES:  2+ Peripheral Pulses, No clubbing, cyanosis, or edema  MUSCULOSKELTAL- No muscle tenderness, Muscle tone normal, No joint tenderness, no Joint swelling, Joint range of motion-normal  SKIN-no rash, no lesion  CNS- alert, oriented X3, non focal                             9.7    7.42  )-----------( 246      ( 09 Jun 2022 06:24 )             29.7   06-09    133<L>  |  104  |  36<H>  ----------------------------<  122<H>  3.7   |  19<L>  |  2.49<H>    Ca    8.3<L>      09 Jun 2022 06:24  Phos  3.4     06-09  Mg     2.7     06-09        MEDICATIONS  (STANDING):  allopurinol 100 milliGRAM(s) Oral daily  amLODIPine   Tablet 5 milliGRAM(s) Oral daily  carbidopa/levodopa  25/100 2 Tablet(s) Oral <User Schedule>  finasteride 5 milliGRAM(s) Oral daily  heparin   Injectable 5000 Unit(s) SubCutaneous every 8 hours  hydrALAZINE 50 milliGRAM(s) Oral three times a day  levothyroxine 75 MICROGram(s) Oral daily  loratadine 5 milliGRAM(s) Oral daily  magnesium hydroxide Suspension 30 milliLiter(s) Oral at bedtime  pantoprazole    Tablet 40 milliGRAM(s) Oral before breakfast  simvastatin 40 milliGRAM(s) Oral at bedtime  tamsulosin 0.4 milliGRAM(s) Oral at bedtime

## 2022-06-13 NOTE — PROGRESS NOTE ADULT - REASON FOR ADMISSION
Sigmoid volvulus

## 2022-06-13 NOTE — PROGRESS NOTE ADULT - ASSESSMENT
#Sigmoid volvulus - S/p GI decompression and sigmoid resection with colostomy   - diet, pain, bowel regimen as per surgery    #Parkinson's Dx  Cont Sinemet and home meds - medications corrected to prior regimen    #HTN - hydralazine increased to optimize tx    #Hypothyroidism- cont synthroid    #DVT proph- UFH    #CKD st IIIb - stable monitor    # Urinary retention and unable to straight cath - flomax    Time spent 41 min
#Sigmoid volvulus - S/p GI decompression and sigmoid resection with colostomy   - diet, pain, bowel regimen as per surgery   - wound care as per surgery    #Parkinson's Dx  Cont Sinemet and home meds - medications corrected to prior regimen    #HTN - hydralazine increased to optimize tx    #Hypothyroidism - cont synthroid    #DVT proph - UFH    #CKD st IIIb - stable monitor, no lasix due to it    # Urinary retention on flomax    # Generalized edema due to third spacing - no diuretics due to CKD, optimize nutrition and mobility    Time spent 43 min  Stable for DC
#Sigmoid volvulus - S/p GI decompression and sigmoid resection with colostomy POD#4   - diet, pain, bowel regimen as per surgery      #Parkinson's Dx  Cont Sinemet and home meds    #HTN   - hydralazine was dc, borderline BP  c/w Norvasc with parameters    #Hypothyroidism- cont synthroid      #CKD st IIIb - stable monitor  bicarb 19, repeat labs in am    # Urinary retention and unable to straight cath - c/w Flomax   had to have a Javier due to persistent retention  voiding trials in am      Will need Rehab; wife [facility] they live in [facility]
An 81 year old male w/ pmh of Parkinson admitted with sigmoid volvulus  S/p colonoscopic decompression  S/p Janak POD 3  Postop ileus resolving    Plan:  serial abd exam  analgesia  antiemetic  FLD today  monitor BF, stoma  entereg  stoma education  monitor stoma output  IV fluids  dispo SW set up VNS  downgrade to floor    Plan discussed with Colorectal surgery team
An 81 year old male w/ pmh of Parkinson admitted with sigmoid volvulus  S/p colonoscopic decompression  S/p Janak POD 6  Postop ileus resolving, stoma functional    Plan:  serial abd exam  analgesia  antiemetic  LRD tolerated  monitor BF, stoma functional  entereg  stoma care/education  IV fluids  perry out, voiding freely  dispo discharge JESÚS Ngo this week with stoma care      Plan discussed with Colorectal surgery team
An 81 year old male w/ pmh of Parkinson admitted with sigmoid volvulus  S/p colonoscopic decompression  S/p Janak POD 6  Postop ileus resolving, stoma functional    Plan:  serial abd exam  analgesia  antiemetic  LRD tolerated  monitor BF, stoma functional  entereg  stoma education  IV fluids  perry out, voiding freely  dispo discharge home this weekend w/ VIRAJ BARKER set up VNS stoma/perry leg bag  downgrade to floor    Plan discussed with Colorectal surgery team
An 81 year old male w/ pmh of Parkinson admitted with sigmoid volvulus  S/p colonoscopic decompression  S/p Janak POD 7  Postop ileus resolving, stoma functional    Plan:  serial abd exam  analgesia  LRD tolerated  monitor BF, stoma functional  entereg  stoma care/education  perry out, voiding freely  dispo discharge JESÚS Ngo today with stoma care      Plan discussed with Colorectal surgery team
#Sigmoid volvulus - S/p GI decompression and sigmoid resection with colostomy   - diet, pain, bowel regimen as per surgery    #Parkinson's Dx  Cont Sinemet and home meds - medications corrected to prior regimen    #HTN - hydralazine increased to optimize tx    #Hypothyroidism - cont synthroid    #DVT proph - UFH    #CKD st IIIb - stable monitor    # Urinary retention on flomax    Time spent 42 min  Stable for DC
An 81 year old male w/ pmh of Parkinson admitted with sigmoid volvulus  S/p colonoscopic decompression  S/p Janak POD 4  Postop ileus resolving, stoma functional    Plan:  serial abd exam  analgesia  antiemetic  FLD, adv as toelrated  monitor BF, stoma functional  entereg  stoma education  IV fluids  Perry for urinary retention  dispo discharge home this weekend, VIRAJ set up VNS stoma/perry leg bag  downgrade to floor    Plan discussed with Colorectal surgery team
An 81 year old male with sigmoid volvulus  S/p colonoscopic decompression  S/p Janak    Plan:  analgesia  antiemtic  CLD, adv with BF  monitor BF  entereg  stoma education  IV fluids  dispo SW set up VNS    Plan discussed with Colorectal surgery team
#Sigmoid volvulus - S/p GI decompression and sigmoid resection with colostomy   - diet, pain, bowel regimen as per surgery      #Parkinson's Dx  Cont Sinemet and home meds    #HTN - hydralazine increased to optimize tx    #Hypothyroidism- cont synthroid      #CKD st IIIb - stable monitor  bicarb 19, repeat labs in am    # Urinary retention and unable to straight cath - c/w Flomax   had to have a Javier due to persistent retention      Will need Rehab
#Sigmoid volvulus - S/p GI decompression and sigmoid resection with colostomy   - diet, pain, bowel regimen as per surgery   - PT, decrease IVF   - discussed with Dr. Vences    #Parkinson's Dx  Cont Sinemet and home meds - medications corrected to prior regimen    #HTN - hydralazine increased to optimize tx    #Hypothyroidism- cont synthroid    #DVT proph- UFH    #CKD st IIIb - stable monitor    # Urinary retention and unable to straight cath - stat flomax and Q HS, get pt out of bed to attempt more physiologic urination, if fails will need urology.   - no IVF needed as low output due to retention    Time spent 51 min
An 81 year old male w/ pmh of Parkinson admitted with sigmoid volvulus  S/p colonoscopic decompression  S/p Janak POD 2    Plan:  serial abd exam  analgesia  antiemetic  CLD, adv with more BF  monitor BF  entereg  stoma education  monitor stoma output  IV fluids  dispo SW set up VNS    Plan discussed with Colorectal surgery team
An 81 year old male with sigmoid volvulus  S/p colonoscopic decompression  Still distended    Plan:  Preop for possible surgery today versus tomorrow  Hospitalist consult for clearance for surgery  NPO  IV fluids    Plan discussed with Colorectal surgery team
#Sigmoid volvulus - S/p GI decompression and sigmoid resection with colostomy   - diet, pain, bowel regimen as per surgery   - PT, decrease IVF   - discussed with Dr. Vences    #Parkinson's Dx  Cont Sinemet and home meds - medications corrected to prior regimen    #HTN - hydralazine increased to optimize tx    #Hypothyroidism- cont synthroid    #DVT proph- UFH    #CKD st IIIb - stable monitor    Time spent 54 min

## 2022-06-20 DIAGNOSIS — N18.32 CHRONIC KIDNEY DISEASE, STAGE 3B: ICD-10-CM

## 2022-06-20 DIAGNOSIS — Z96.652 PRESENCE OF LEFT ARTIFICIAL KNEE JOINT: ICD-10-CM

## 2022-06-20 DIAGNOSIS — R33.9 RETENTION OF URINE, UNSPECIFIED: ICD-10-CM

## 2022-06-20 DIAGNOSIS — E43 UNSPECIFIED SEVERE PROTEIN-CALORIE MALNUTRITION: ICD-10-CM

## 2022-06-20 DIAGNOSIS — G20 PARKINSON'S DISEASE: ICD-10-CM

## 2022-06-20 DIAGNOSIS — K59.00 CONSTIPATION, UNSPECIFIED: ICD-10-CM

## 2022-06-20 DIAGNOSIS — I12.9 HYPERTENSIVE CHRONIC KIDNEY DISEASE WITH STAGE 1 THROUGH STAGE 4 CHRONIC KIDNEY DISEASE, OR UNSPECIFIED CHRONIC KIDNEY DISEASE: ICD-10-CM

## 2022-06-20 DIAGNOSIS — E78.5 HYPERLIPIDEMIA, UNSPECIFIED: ICD-10-CM

## 2022-06-20 DIAGNOSIS — Z88.8 ALLERGY STATUS TO OTHER DRUGS, MEDICAMENTS AND BIOLOGICAL SUBSTANCES: ICD-10-CM

## 2022-06-20 DIAGNOSIS — E03.9 HYPOTHYROIDISM, UNSPECIFIED: ICD-10-CM

## 2022-06-20 DIAGNOSIS — K56.2 VOLVULUS: ICD-10-CM

## 2022-06-20 DIAGNOSIS — R60.1 GENERALIZED EDEMA: ICD-10-CM

## 2022-06-20 DIAGNOSIS — K56.7 ILEUS, UNSPECIFIED: ICD-10-CM

## 2022-06-30 ENCOUNTER — APPOINTMENT (OUTPATIENT)
Dept: COLORECTAL SURGERY | Facility: CLINIC | Age: 82
End: 2022-06-30

## 2022-06-30 VITALS
OXYGEN SATURATION: 99 % | WEIGHT: 145 LBS | HEIGHT: 67 IN | BODY MASS INDEX: 22.76 KG/M2 | SYSTOLIC BLOOD PRESSURE: 112 MMHG | DIASTOLIC BLOOD PRESSURE: 69 MMHG | HEART RATE: 71 BPM

## 2022-06-30 PROCEDURE — 99024 POSTOP FOLLOW-UP VISIT: CPT

## 2022-06-30 NOTE — HISTORY OF PRESENT ILLNESS
[FreeTextEntry1] : Mr. 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  He continues to reside at [facility].  Wife states that he continues to be weak and can only move several steps.  He is otherwise tolerating p.o. and has good colostomy function.  At rehab, he is on MiraLAX, Senokot as well as Violetta-Colace.

## 2022-06-30 NOTE — ASSESSMENT
[FreeTextEntry1] : Here for postoperative visit after undergoing ex lap, sigmoid colectomy and end colostomy for sigmoid volvulus on 6/6/22.  Overall, doing well and continues at [facility] rehab to try to gain strength.  He needs to continue with a healthy bowel regimen despite having the colostomy as patients with Parkinson's can certainly develop constipation despite having a colostomy.  The current regimen he is receiving while at rehab is effective and can be continued on discharge.  The colostomy is healthy and as expected, the wafer has been downsized as postoperative edema resolves.  Recommend follow-up in office in 1 month.

## 2022-06-30 NOTE — PHYSICAL EXAM
[No Rash or Lesion] : No rash or lesion [Alert] : alert [Oriented to Person] : oriented to person [Oriented to Place] : oriented to place [Oriented to Time] : oriented to time [Calm] : calm [de-identified] : Incision clean/dry/intact; colostomy healthy, viable and less edematous, soft, nontender, mild distention [de-identified] : No apparent distress [de-identified] : Normocephalic atraumatic [de-identified] : Moving all extremities x4

## 2022-07-06 ENCOUNTER — APPOINTMENT (OUTPATIENT)
Dept: NEUROLOGY | Facility: CLINIC | Age: 82
End: 2022-07-06

## 2022-07-06 ENCOUNTER — NON-APPOINTMENT (OUTPATIENT)
Age: 82
End: 2022-07-06

## 2022-07-06 PROCEDURE — 99442: CPT | Mod: 95

## 2022-07-07 ENCOUNTER — APPOINTMENT (OUTPATIENT)
Dept: UROLOGY | Facility: CLINIC | Age: 82
End: 2022-07-07

## 2022-07-07 VITALS
SYSTOLIC BLOOD PRESSURE: 90 MMHG | BODY MASS INDEX: 22.13 KG/M2 | HEIGHT: 67 IN | WEIGHT: 141 LBS | OXYGEN SATURATION: 96 % | DIASTOLIC BLOOD PRESSURE: 47 MMHG | HEART RATE: 50 BPM

## 2022-07-07 PROCEDURE — 99204 OFFICE O/P NEW MOD 45 MIN: CPT

## 2022-07-07 NOTE — PHYSICAL EXAM
[General Appearance - Well Developed] : well developed [General Appearance - Well Nourished] : well nourished [Normal Appearance] : normal appearance [Well Groomed] : well groomed [General Appearance - In No Acute Distress] : no acute distress [Edema] : no peripheral edema [Respiration, Rhythm And Depth] : normal respiratory rhythm and effort [Exaggerated Use Of Accessory Muscles For Inspiration] : no accessory muscle use [Abdomen Soft] : soft [Abdomen Tenderness] : non-tender [Costovertebral Angle Tenderness] : no ~M costovertebral angle tenderness [Urethral Meatus] : meatus normal [Urinary Bladder Findings] : the bladder was normal on palpation [Scrotum] : the scrotum was normal [Testes Mass (___cm)] : there were no testicular masses [No Prostate Nodules] : no prostate nodules [Normal Station and Gait] : the gait and station were normal for the patient's age [] : no rash [No Focal Deficits] : no focal deficits [Oriented To Time, Place, And Person] : oriented to person, place, and time [Affect] : the affect was normal [Mood] : the mood was normal [Not Anxious] : not anxious [No Palpable Adenopathy] : no palpable adenopathy [FreeTextEntry1] : Pt has Parkinson's.

## 2022-07-07 NOTE — HISTORY OF PRESENT ILLNESS
[FreeTextEntry1] : 81M presents today with a CC of a mildly hyperdense renal cyst, which has increased in size from 3 to 5cm. Pt was recently hospitalized for colon issues and his CT scan revealed the above. He has a history of prostate cancer treated with radioactive seeds many years ago. According to his wife, he has been stable with this for many years. He has chronic kidney disease as well. His CT scan showed a trabeculated bladder and thinning kidneys without hydronephrosis. Pt has Parkinson's disease.

## 2022-07-07 NOTE — END OF VISIT
[FreeTextEntry3] : A US will be checked in 1 year. A PSA is checked today. Plans to follow with no further management at this time.

## 2022-07-08 LAB — PSA SERPL-MCNC: 0.04 NG/ML

## 2022-07-19 DIAGNOSIS — C61 MALIGNANT NEOPLASM OF PROSTATE: ICD-10-CM

## 2022-09-01 ENCOUNTER — APPOINTMENT (OUTPATIENT)
Dept: COLORECTAL SURGERY | Facility: CLINIC | Age: 82
End: 2022-09-01

## 2022-09-01 VITALS
BODY MASS INDEX: 22.13 KG/M2 | WEIGHT: 141 LBS | SYSTOLIC BLOOD PRESSURE: 160 MMHG | HEIGHT: 67 IN | DIASTOLIC BLOOD PRESSURE: 71 MMHG

## 2022-09-01 DIAGNOSIS — Z09 ENCOUNTER FOR FOLLOW-UP EXAMINATION AFTER COMPLETED TREATMENT FOR CONDITIONS OTHER THAN MALIGNANT NEOPLASM: ICD-10-CM

## 2022-09-01 PROCEDURE — 99024 POSTOP FOLLOW-UP VISIT: CPT

## 2022-09-01 NOTE — HISTORY OF PRESENT ILLNESS
[FreeTextEntry1] : Mr. 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  He continues to reside at [facility].  Wife states that he continues to be weak and can only move several steps.  He is otherwise tolerating p.o. and has good colostomy function.  At rehab, he is on MiraLAX, Senokot as well as Violetta-Colace.\par \par 9/1/22 Mr. Thornton returns to the office for a postoperative visit accompanied by his wife and daughter.  Since his last office appointment, he is out of rehab and is back in assisted living.  He is no longer ambulatory.  His wife reports that he has a good appetite and that he appears to be gaining weight.  She notes that his colostomy will occasionally prolapse but appears to reduce on its own when he is recumbent.  Appliance is changed once daily and he has no issues with leakage.  He remains on a bowel regimen.

## 2022-09-01 NOTE — PHYSICAL EXAM
[No Rash or Lesion] : No rash or lesion [Alert] : alert [Oriented to Person] : oriented to person [Oriented to Place] : oriented to place [Oriented to Time] : oriented to time [Calm] : calm [de-identified] : Incision clean/dry/intact; colostomy healthy and protuberant, soft, nontender, mild distention [de-identified] : No apparent distress [de-identified] : Normocephalic atraumatic [de-identified] : Moving all extremities x4

## 2022-09-01 NOTE — ASSESSMENT
[FreeTextEntry1] : Here for postoperative visit after undergoing ex lap, sigmoid colectomy and end colostomy for sigmoid volvulus on 6/6/22.  Overall, doing well and continues at [facility] rehab to try to gain strength.  He needs to continue with a healthy bowel regimen despite having the colostomy as patients with Parkinson's can certainly develop constipation despite having a colostomy.  The current regimen he is receiving while at rehab is effective and can be continued on discharge.  The colostomy is healthy and as expected, the wafer has been downsized as postoperative edema resolves.  Recommend follow-up in office in 1 month.\par \par 9/1/22 Mr. Thornton returns to the office for a POV. OVerall, doing well. As he is no longer ambulatory, advised against colostomy reversal.  With regards to his bowel regimen, recommend he continue on with his current regimen though advised that as time progresses, he may need to increase dosage of MiraLAX as Parkinson's progresses.  With regards to the colostomy prolapse, as long as it reduces on its own, would not proceed with surgical intervention.  Should the prolapse worsen and cause pain and discomfort, we can then pursue local revision which is fairly straightforward.  Follow-up as needed.

## 2022-09-19 ENCOUNTER — APPOINTMENT (OUTPATIENT)
Dept: NEUROLOGY | Facility: CLINIC | Age: 82
End: 2022-09-19

## 2022-09-19 VITALS — DIASTOLIC BLOOD PRESSURE: 80 MMHG | HEART RATE: 57 BPM | SYSTOLIC BLOOD PRESSURE: 200 MMHG | OXYGEN SATURATION: 98 %

## 2022-09-19 PROCEDURE — 64611 CHEMODENERV SALIV GLANDS: CPT

## 2022-09-19 PROCEDURE — 64616 CHEMODENERV MUSC NECK DYSTON: CPT

## 2022-09-19 PROCEDURE — 99214 OFFICE O/P EST MOD 30 MIN: CPT | Mod: 25

## 2022-09-19 NOTE — DISCUSSION/SUMMARY
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 \par \par Recommendations:\par -Patient was injected with 200 units of Botox as above, for cervical dystonia and drooling.  He tolerated the procedure well.  Anticipated risks and benefits were discussed in detail\par -Continue Sinemet 2 tablets 4 times a day\par -Continue PT OT.  Offered speech therapy wishes to hold off for now\par -Continue to follow-up with cardiology/nephrology blood pressure was high today.  As per wife his blood pressure tends to remain in that range\par -Increase melatonin from 3 mg to 5 mg at bedtime\par \par All questions were addressed and answered\par He will have a telehealth visit in 1 month for follow-up on Botox injections\par Return to clinic in 3 months for repeat Botox injections and follow-up of Parkinson's disease
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
## 2022-09-19 NOTE — PROCEDURE
[FreeTextEntry1] : Area to be injected was cleansed with alcohol wipes.  200 units of Botox were mixed in 1 cc of normal saline in 10units/0.1cc\par I injected as follows\par Right splenius capitis 80 units \par Right trapezius 40 units\par Right paraspinal 20/2  units \par Right parotid gland 30 units\par Left parotid gland 30 units\par \par Total injected 200 units\par Wasted 0 units\par

## 2022-09-19 NOTE — PHYSICAL EXAM
[General Appearance - Alert] : alert [General Appearance - In No Acute Distress] : in no acute distress [Oriented To Time, Place, And Person] : oriented to person, place, and time [Impaired Insight] : insight and judgment were intact [Affect] : the affect was normal [Person] : oriented to person [Place] : oriented to place [Time] : oriented to time [Concentration Intact] : normal concentrating ability [Comprehension] : comprehension intact [Motor Strength] : muscle strength was normal in all four extremities [Motor Handedness Right-Handed] : the patient is right hand dominant [Motor Strength Upper Extremities Bilaterally] : strength was normal in both upper extremities [Motor Strength Lower Extremities Bilaterally] : strength was normal in both lower extremities [Sensation Tactile Decrease] : light touch was intact [Coordination - Dysmetria Impaired Finger-to-Nose Bilateral] : not present [2+] : Ankle jerk left 2+ [Extraocular Movements] : extraocular movements were intact [Hearing Threshold Finger Rub Not [place]] : hearing was normal [FreeTextEntry1] : dystonia [Edema] : there was no peripheral edema [Abdomen Soft] : soft [Nail Clubbing] : no clubbing  or cyanosis of the fingernails [] : no rash

## 2022-09-28 ENCOUNTER — EMERGENCY (EMERGENCY)
Facility: HOSPITAL | Age: 82
LOS: 0 days | Discharge: HOME | End: 2022-09-28
Attending: EMERGENCY MEDICINE
Payer: MEDICARE

## 2022-09-28 VITALS
TEMPERATURE: 98 F | HEART RATE: 68 BPM | DIASTOLIC BLOOD PRESSURE: 91 MMHG | RESPIRATION RATE: 17 BRPM | OXYGEN SATURATION: 99 % | SYSTOLIC BLOOD PRESSURE: 234 MMHG

## 2022-09-28 VITALS
HEART RATE: 57 BPM | TEMPERATURE: 98 F | HEIGHT: 65 IN | SYSTOLIC BLOOD PRESSURE: 231 MMHG | DIASTOLIC BLOOD PRESSURE: 98 MMHG | WEIGHT: 149.91 LBS | RESPIRATION RATE: 16 BRPM

## 2022-09-28 DIAGNOSIS — Z20.822 CONTACT WITH AND (SUSPECTED) EXPOSURE TO COVID-19: ICD-10-CM

## 2022-09-28 DIAGNOSIS — E78.5 HYPERLIPIDEMIA, UNSPECIFIED: ICD-10-CM

## 2022-09-28 DIAGNOSIS — Z88.6 ALLERGY STATUS TO ANALGESIC AGENT: ICD-10-CM

## 2022-09-28 DIAGNOSIS — Z48.89 ENCOUNTER FOR OTHER SPECIFIED SURGICAL AFTERCARE: ICD-10-CM

## 2022-09-28 DIAGNOSIS — Z96.652 PRESENCE OF LEFT ARTIFICIAL KNEE JOINT: Chronic | ICD-10-CM

## 2022-09-28 DIAGNOSIS — N18.9 CHRONIC KIDNEY DISEASE, UNSPECIFIED: ICD-10-CM

## 2022-09-28 DIAGNOSIS — G20 PARKINSON'S DISEASE: ICD-10-CM

## 2022-09-28 DIAGNOSIS — E03.9 HYPOTHYROIDISM, UNSPECIFIED: ICD-10-CM

## 2022-09-28 DIAGNOSIS — I12.9 HYPERTENSIVE CHRONIC KIDNEY DISEASE WITH STAGE 1 THROUGH STAGE 4 CHRONIC KIDNEY DISEASE, OR UNSPECIFIED CHRONIC KIDNEY DISEASE: ICD-10-CM

## 2022-09-28 LAB
ALBUMIN SERPL ELPH-MCNC: 3.2 G/DL — LOW (ref 3.3–5)
ALP SERPL-CCNC: 119 U/L — SIGNIFICANT CHANGE UP (ref 40–120)
ALT FLD-CCNC: 11 U/L — LOW (ref 12–78)
ANION GAP SERPL CALC-SCNC: 5 MMOL/L — SIGNIFICANT CHANGE UP (ref 5–17)
APPEARANCE UR: CLEAR — SIGNIFICANT CHANGE UP
AST SERPL-CCNC: 21 U/L — SIGNIFICANT CHANGE UP (ref 15–37)
BASOPHILS # BLD AUTO: 0.03 K/UL — SIGNIFICANT CHANGE UP (ref 0–0.2)
BASOPHILS NFR BLD AUTO: 0.5 % — SIGNIFICANT CHANGE UP (ref 0–2)
BILIRUB SERPL-MCNC: 0.4 MG/DL — SIGNIFICANT CHANGE UP (ref 0.2–1.2)
BILIRUB UR-MCNC: NEGATIVE — SIGNIFICANT CHANGE UP
BUN SERPL-MCNC: 41 MG/DL — HIGH (ref 7–23)
CALCIUM SERPL-MCNC: 8.4 MG/DL — LOW (ref 8.5–10.1)
CHLORIDE SERPL-SCNC: 110 MMOL/L — HIGH (ref 96–108)
CO2 SERPL-SCNC: 28 MMOL/L — SIGNIFICANT CHANGE UP (ref 22–31)
COLOR SPEC: YELLOW — SIGNIFICANT CHANGE UP
CREAT SERPL-MCNC: 2.25 MG/DL — HIGH (ref 0.5–1.3)
DIFF PNL FLD: NEGATIVE — SIGNIFICANT CHANGE UP
EGFR: 29 ML/MIN/1.73M2 — LOW
EOSINOPHIL # BLD AUTO: 0.4 K/UL — SIGNIFICANT CHANGE UP (ref 0–0.5)
EOSINOPHIL NFR BLD AUTO: 6.7 % — HIGH (ref 0–6)
FLUAV AG NPH QL: SIGNIFICANT CHANGE UP
FLUBV AG NPH QL: SIGNIFICANT CHANGE UP
GLUCOSE SERPL-MCNC: 99 MG/DL — SIGNIFICANT CHANGE UP (ref 70–99)
GLUCOSE UR QL: NEGATIVE — SIGNIFICANT CHANGE UP
HCT VFR BLD CALC: 32.6 % — LOW (ref 39–50)
HGB BLD-MCNC: 10.1 G/DL — LOW (ref 13–17)
IMM GRANULOCYTES NFR BLD AUTO: 0.2 % — SIGNIFICANT CHANGE UP (ref 0–0.9)
KETONES UR-MCNC: NEGATIVE — SIGNIFICANT CHANGE UP
LEUKOCYTE ESTERASE UR-ACNC: NEGATIVE — SIGNIFICANT CHANGE UP
LYMPHOCYTES # BLD AUTO: 1.16 K/UL — SIGNIFICANT CHANGE UP (ref 1–3.3)
LYMPHOCYTES # BLD AUTO: 19.4 % — SIGNIFICANT CHANGE UP (ref 13–44)
MAGNESIUM SERPL-MCNC: 2.8 MG/DL — HIGH (ref 1.6–2.6)
MCHC RBC-ENTMCNC: 31 GM/DL — LOW (ref 32–36)
MCHC RBC-ENTMCNC: 31.2 PG — SIGNIFICANT CHANGE UP (ref 27–34)
MCV RBC AUTO: 100.6 FL — HIGH (ref 80–100)
MONOCYTES # BLD AUTO: 0.74 K/UL — SIGNIFICANT CHANGE UP (ref 0–0.9)
MONOCYTES NFR BLD AUTO: 12.4 % — SIGNIFICANT CHANGE UP (ref 2–14)
NEUTROPHILS # BLD AUTO: 3.64 K/UL — SIGNIFICANT CHANGE UP (ref 1.8–7.4)
NEUTROPHILS NFR BLD AUTO: 60.8 % — SIGNIFICANT CHANGE UP (ref 43–77)
NITRITE UR-MCNC: NEGATIVE — SIGNIFICANT CHANGE UP
PH UR: 6 — SIGNIFICANT CHANGE UP (ref 5–8)
PLATELET # BLD AUTO: 200 K/UL — SIGNIFICANT CHANGE UP (ref 150–400)
POTASSIUM SERPL-MCNC: 4.6 MMOL/L — SIGNIFICANT CHANGE UP (ref 3.5–5.3)
POTASSIUM SERPL-SCNC: 4.6 MMOL/L — SIGNIFICANT CHANGE UP (ref 3.5–5.3)
PROT SERPL-MCNC: 6.9 GM/DL — SIGNIFICANT CHANGE UP (ref 6–8.3)
PROT UR-MCNC: 100
RBC # BLD: 3.24 M/UL — LOW (ref 4.2–5.8)
RBC # FLD: 14.2 % — SIGNIFICANT CHANGE UP (ref 10.3–14.5)
RSV RNA NPH QL NAA+NON-PROBE: SIGNIFICANT CHANGE UP
SARS-COV-2 RNA SPEC QL NAA+PROBE: SIGNIFICANT CHANGE UP
SODIUM SERPL-SCNC: 143 MMOL/L — SIGNIFICANT CHANGE UP (ref 135–145)
SP GR SPEC: 1.01 — SIGNIFICANT CHANGE UP (ref 1.01–1.02)
UROBILINOGEN FLD QL: NEGATIVE — SIGNIFICANT CHANGE UP
WBC # BLD: 5.98 K/UL — SIGNIFICANT CHANGE UP (ref 3.8–10.5)
WBC # FLD AUTO: 5.98 K/UL — SIGNIFICANT CHANGE UP (ref 3.8–10.5)

## 2022-09-28 PROCEDURE — 80053 COMPREHEN METABOLIC PANEL: CPT

## 2022-09-28 PROCEDURE — 99284 EMERGENCY DEPT VISIT MOD MDM: CPT

## 2022-09-28 PROCEDURE — 81001 URINALYSIS AUTO W/SCOPE: CPT

## 2022-09-28 PROCEDURE — 83735 ASSAY OF MAGNESIUM: CPT

## 2022-09-28 PROCEDURE — 36415 COLL VENOUS BLD VENIPUNCTURE: CPT

## 2022-09-28 PROCEDURE — 0241U: CPT

## 2022-09-28 PROCEDURE — 86901 BLOOD TYPING SEROLOGIC RH(D): CPT

## 2022-09-28 PROCEDURE — 86850 RBC ANTIBODY SCREEN: CPT

## 2022-09-28 PROCEDURE — 99283 EMERGENCY DEPT VISIT LOW MDM: CPT

## 2022-09-28 PROCEDURE — 86900 BLOOD TYPING SEROLOGIC ABO: CPT

## 2022-09-28 PROCEDURE — 85025 COMPLETE CBC W/AUTO DIFF WBC: CPT

## 2022-09-28 NOTE — ED PROVIDER NOTE - CLINICAL SUMMARY MEDICAL DECISION MAKING FREE TEXT BOX
labs obtained and hb is stable, I d/w Dr. Seymour who is aware of the small amount of bleeding from wound and stated there is a plan in place to f/u with the wound ostomy nurse, no imaging required at this time and pt has no pain, has been HD stable, his BP was elevated but he had not yet taken his meds. He will be dc with f/u and return precautions.

## 2022-09-28 NOTE — ED ADULT NURSE NOTE - NSIMPLEMENTINTERV_GEN_ALL_ED
Implemented All Fall with Harm Risk Interventions:  [place] to call system. Call bell, personal items and telephone within reach. Instruct patient to call for assistance. Room bathroom lighting operational. Non-slip footwear when patient is off stretcher. Physically safe environment: no spills, clutter or unnecessary equipment. Stretcher in lowest position, wheels locked, appropriate side rails in place. Provide visual cue, wrist band, yellow gown, etc. Monitor gait and stability. Monitor for mental status changes and reorient to person, place, and time. Review medications for side effects contributing to fall risk. Reinforce activity limits and safety measures with patient and family. Provide visual clues: red socks.

## 2022-09-28 NOTE — ED PROVIDER NOTE - CARE PROVIDER_API CALL
Mehreen Seymour)  ColonRectal Surgery; Surgery  [address] [place] [address]  Phone: [phone]  Fax: [phone]  Follow Up Time:

## 2022-09-28 NOTE — ED PROVIDER NOTE - OBJECTIVE STATEMENT
Pt is an 82 y/o M presenting with bleeding in his colostomy, pt's wife stated it started today,. he had surgery with Dr. Seymour in June, he is not on any anticoagulants. He denied abdominal pain, nausea/vomiting. No Cp/SOB

## 2022-09-28 NOTE — ED PROVIDER NOTE - PHYSICAL EXAMINATION
Constitutional: NAD AAOx3  Eyes: PERRL, EOMI  Head: Normocephalic atraumatic  Mouth: MMM  Cardiac: regular rate   Resp: Lungs CTAB  GI: Abd s/nt/nd + colostomy present with bright red blood (minimal amount) in the bag)   Neuro: CN2-12 intact  Extremities: Intact distal pulses b/l, +1 edema b/l  Skin: No rashes

## 2022-09-28 NOTE — ED PROVIDER NOTE - NS ED ROS FT
Constitutional: No fever or chills  Eyes: No visual changes  HEENT: No throat pain  CV: No chest pain  Resp: No SOB no cough  GI: No abd pain, nausea or vomiting, + blood in coslotomy  : No dysuria  MSK: No musculoskeletal pain  Skin: No rash  Neuro: No headache

## 2022-09-28 NOTE — ED PROVIDER NOTE - PATIENT PORTAL LINK FT
You can access the FollowMyHealth Patient Portal offered by [facility] by registering at the following website: [email or web address]. By joining [employer]’s FollowMyHealth portal, you will also be able to view your health information using other applications (apps) compatible with our system.

## 2022-09-28 NOTE — ED ADULT NURSE NOTE - NS ED NURSE RECORD ANOTHER VITAL SIGN
Review of Systems:  CONSTITUTIONAL: +weakness; no fevers or chills  EYES/ENT: No visual changes;  No vertigo or throat pain   NECK: No pain or stiffness  RESPIRATORY: +cough, +wheezing, +shortness of breath with exertion  CARDIOVASCULAR: No chest pain or palpitations  GASTROINTESTINAL: No abdominal or epigastric pain. No nausea, vomiting, or hematemesis; No diarrhea or constipation.   GENITOURINARY: No dysuria, frequency or hematuria  NEUROLOGICAL: No numbness or weakness  SKIN: No itching, burning, rashes, or lesions   All other review of systems is negative unless indicated above Yes

## 2022-09-28 NOTE — ED ADULT NURSE NOTE - OBJECTIVE STATEMENT
Pt reports to ED for stoma check. Pt aox3 from [facility] assisted living, reports stoma protruding out and approx 1 oz of blood from site.  Stoma appears protruding out approx 3 in, bleeding noted from small blister, stoma pink in color.  Pt denies pain at site, skin redness noted to abdomen surrounding ostomy. Pt denies abdominal pain, nausea, vomiting.  Pt HTN upon arrival reports did not take BP medication, MD made aware.

## 2022-09-28 NOTE — ED ADULT NURSE NOTE - CHIEF COMPLAINT QUOTE
pt BIB EMS from [facility] for bleeding ileostomy site. as per wife, she called surgeon who told them to keep an eye on it but pt resides at [facility] and they wanted him to be seen in the ED. pt without complaints.

## 2022-09-28 NOTE — ED ADULT TRIAGE NOTE - CHIEF COMPLAINT QUOTE
pt BIB EMS from [facility] for bleeding ileostomy site. as per wife, she called surgeon who told them to keep an eye on it but pt resides at [facility] and they wanted him to be seen in the ED. pt without complaints.

## 2022-10-26 ENCOUNTER — NON-APPOINTMENT (OUTPATIENT)
Age: 82
End: 2022-10-26

## 2022-10-26 ENCOUNTER — APPOINTMENT (OUTPATIENT)
Dept: COLORECTAL SURGERY | Facility: CLINIC | Age: 82
End: 2022-10-26

## 2022-10-26 VITALS
RESPIRATION RATE: 16 BRPM | HEART RATE: 53 BPM | WEIGHT: 150 LBS | HEIGHT: 67 IN | BODY MASS INDEX: 23.54 KG/M2 | TEMPERATURE: 97.6 F | DIASTOLIC BLOOD PRESSURE: 79 MMHG | SYSTOLIC BLOOD PRESSURE: 204 MMHG

## 2022-10-26 DIAGNOSIS — K94.19 OTHER COMPLICATIONS OF ENTEROSTOMY: ICD-10-CM

## 2022-10-26 PROCEDURE — 99212 OFFICE O/P EST SF 10 MIN: CPT

## 2022-10-26 RX ORDER — CEPHALEXIN 500 MG/1
500 CAPSULE ORAL
Qty: 14 | Refills: 0 | Status: ACTIVE | COMMUNITY
Start: 2022-09-27

## 2022-10-26 RX ORDER — DOCUSATE SODIUM 50 MG AND SENNOSIDES 8.6 MG 8.6; 5 MG/1; MG/1
8.6-5 TABLET, FILM COATED ORAL
Qty: 100 | Refills: 0 | Status: ACTIVE | COMMUNITY
Start: 2022-09-06

## 2022-10-26 RX ORDER — ACETAMINOPHEN 325 MG/1
325 TABLET ORAL
Qty: 100 | Refills: 0 | Status: ACTIVE | COMMUNITY
Start: 2022-07-18

## 2022-10-26 RX ORDER — CHOLECALCIFEROL (VITAMIN D3) 1250 MCG
1.25 MG CAPSULE ORAL
Qty: 1 | Refills: 0 | Status: ACTIVE | COMMUNITY
Start: 2022-06-14

## 2022-10-26 RX ORDER — BISACODYL 5 MG/1
5 TABLET ORAL
Qty: 20 | Refills: 0 | Status: ACTIVE | COMMUNITY
Start: 2022-10-20

## 2022-10-26 RX ORDER — NYSTATIN 100000 1/G
100000 POWDER TOPICAL
Qty: 60 | Refills: 0 | Status: ACTIVE | COMMUNITY
Start: 2022-09-23

## 2022-10-26 RX ORDER — AMLODIPINE BESYLATE 10 MG/1
10 TABLET ORAL
Qty: 30 | Refills: 0 | Status: ACTIVE | COMMUNITY
Start: 2022-06-13

## 2022-10-26 RX ORDER — POLYETHYLENE GLYCOL 3350 17 G/17G
17 POWDER, FOR SOLUTION ORAL
Qty: 510 | Refills: 0 | Status: ACTIVE | COMMUNITY
Start: 2022-07-18

## 2022-10-26 RX ORDER — FOLIC ACID 1 MG/1
1 TABLET ORAL
Qty: 7 | Refills: 0 | Status: ACTIVE | COMMUNITY
Start: 2022-06-14

## 2022-10-26 RX ORDER — NYSTATIN 100000 [USP'U]/G
100000 CREAM TOPICAL
Qty: 60 | Refills: 0 | Status: ACTIVE | COMMUNITY
Start: 2022-08-30

## 2022-10-26 RX ORDER — HYDRALAZINE HYDROCHLORIDE 25 MG/1
25 TABLET ORAL
Qty: 21 | Refills: 0 | Status: ACTIVE | COMMUNITY
Start: 2022-06-15

## 2022-10-26 RX ORDER — SILVER SULFADIAZINE 10 MG/G
1 CREAM TOPICAL
Qty: 50 | Refills: 0 | Status: ACTIVE | COMMUNITY
Start: 2022-09-27

## 2022-10-26 RX ORDER — KETOCONAZOLE 20 MG/G
2 CREAM TOPICAL
Qty: 30 | Refills: 0 | Status: ACTIVE | COMMUNITY
Start: 2022-09-23

## 2022-10-26 RX ORDER — OXYCODONE AND ACETAMINOPHEN 5; 325 MG/1; MG/1
5-325 TABLET ORAL
Qty: 20 | Refills: 0 | Status: ACTIVE | COMMUNITY
Start: 2022-06-10

## 2022-10-26 NOTE — ASSESSMENT
[FreeTextEntry1] : Here for postoperative visit after undergoing ex lap, sigmoid colectomy and end colostomy for sigmoid volvulus on 6/6/22.  Overall, doing well and continues at [facility] rehab to try to gain strength.  He needs to continue with a healthy bowel regimen despite having the colostomy as patients with Parkinson's can certainly develop constipation despite having a colostomy.  The current regimen he is receiving while at rehab is effective and can be continued on discharge.  The colostomy is healthy and as expected, the wafer has been downsized as postoperative edema resolves.  Recommend follow-up in office in 1 month.\par \par 9/1/22 Mr. Thornton returns to the office for a POV. OVerall, doing well. As he is no longer ambulatory, advised against colostomy reversal.  With regards to his bowel regimen, recommend he continue on with his current regimen though advised that as time progresses, he may need to increase dosage of MiraLAX as Parkinson's progresses.  With regards to the colostomy prolapse, as long as it reduces on its own, would not proceed with surgical intervention.  Should the prolapse worsen and cause pain and discomfort, we can then pursue local revision which is fairly straightforward.  Follow-up as needed.\par \par 10/26/22 Mr. Thornton presents to the office for follow up for ostomy care/education/guidance. His prolapse but patent and reducible with soft stool in the bag.  He's to continue current bowel regimen and increase as necessary. If his prolapse worsens causing pain/discomfort he is to call the office/make an appointment.  Follow up PRN

## 2022-10-26 NOTE — HISTORY OF PRESENT ILLNESS
[FreeTextEntry1] : Mr. 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  He continues to reside at [facility].  Wife states that he continues to be weak and can only move several steps.  He is otherwise tolerating p.o. and has good colostomy function.  At rehab, he is on MiraLAX, Senokot as well as Violetta-Colace.\par \par 9/1/22 Mr. Thornton returns to the office for a postoperative visit accompanied by his wife and daughter.  Since his last office appointment, he is out of rehab and is back in assisted living.  He is no longer ambulatory.  His wife reports that he has a good appetite and that he appears to be gaining weight.  She notes that his colostomy will occasionally prolapse but appears to reduce on its own when he is recumbent.  Appliance is changed once daily and he has no issues with leakage.  He remains on a bowel regimen.\par \par 10/26/22 - Mr. Thornton presents to the office for follow up with his wife for ostomy care/education.  He was having some parastomal irritation that has since resolved with Silvadene cream and appliance change to [employer] moldable 2 piece system.  He does have a prolapse stoma that reduces easily when he lies down.  He's getting 5-7 days appliance wear time. Taking stool softeners.  Denies fever/chills, n/v.

## 2022-10-26 NOTE — PHYSICAL EXAM
[Abdomen Masses] : No abdominal masses [Abdomen Tenderness] : ~T No ~M abdominal tenderness [Respiratory Effort] : normal respiratory effort [No Rash or Lesion] : No rash or lesion [Alert] : alert [Oriented to Person] : oriented to person [Oriented to Place] : oriented to place [Oriented to Time] : oriented to time [Calm] : calm [de-identified] : soft, obese, non-tender, well healed surgical incision; LUQ colostomy, pink/viable/patent, prolapsed, reducible, parastomal skin intact  [de-identified] : No apparent distress [de-identified] : JUAN x4 [de-identified] : warm and dry

## 2022-10-27 ENCOUNTER — APPOINTMENT (OUTPATIENT)
Dept: NEUROLOGY | Facility: CLINIC | Age: 82
End: 2022-10-27

## 2022-10-27 PROCEDURE — 99214 OFFICE O/P EST MOD 30 MIN: CPT | Mod: 95

## 2022-10-27 NOTE — PHYSICAL EXAM
[General Appearance - Alert] : alert [General Appearance - In No Acute Distress] : in no acute distress [Oriented To Time, Place, And Person] : oriented to person, place, and time [Impaired Insight] : insight and judgment were intact [Affect] : the affect was normal [Person] : oriented to person [Place] : oriented to place [Time] : oriented to time [Concentration Intact] : normal concentrating ability [Comprehension] : comprehension intact [Motor Strength] : muscle strength was normal in all four extremities [Motor Handedness Right-Handed] : the patient is right hand dominant [Motor Strength Upper Extremities Bilaterally] : strength was normal in both upper extremities [Motor Strength Lower Extremities Bilaterally] : strength was normal in both lower extremities [Sensation Tactile Decrease] : light touch was intact [Coordination - Dysmetria Impaired Finger-to-Nose Bilateral] : not present [2+] : Ankle jerk left 2+ [Extraocular Movements] : extraocular movements were intact [Hearing Threshold Finger Rub Not [place]] : hearing was normal [FreeTextEntry1] : dystonia [Edema] : there was no peripheral edema [Abdomen Soft] : soft [Nail Clubbing] : no clubbing  or cyanosis of the fingernails [] : no rash

## 2022-10-27 NOTE — PROCEDURE
[FreeTextEntry1] : Patient was not injected today at the last visit \par \par area to be injected was cleansed with alcohol wipes.  200 units of Botox were mixed in 1 cc of normal saline in 10units/0.1cc\par I injected as follows\par Right splenius capitis 80 units \par Right trapezius 40 units\par Right paraspinal 20/2  units \par Right parotid gland 30 units\par Left parotid gland 30 units\par \par Total injected 200 units\par Wasted 0 units\par
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
## 2022-12-01 ENCOUNTER — APPOINTMENT (OUTPATIENT)
Dept: COLORECTAL SURGERY | Facility: CLINIC | Age: 82
End: 2022-12-01

## 2022-12-01 VITALS — OXYGEN SATURATION: 99 % | HEART RATE: 55 BPM | BODY MASS INDEX: 23.7 KG/M2 | HEIGHT: 67 IN | WEIGHT: 151 LBS

## 2022-12-01 PROCEDURE — 99214 OFFICE O/P EST MOD 30 MIN: CPT

## 2022-12-01 NOTE — HISTORY OF PRESENT ILLNESS
[FreeTextEntry1] : Mr. 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  He continues to reside at [facility].  Wife states that he continues to be weak and can only move several steps.  He is otherwise tolerating p.o. and has good colostomy function.  At rehab, he is on MiraLAX, Senokot as well as Violetta-Colace.\par \par 9/1/22 Mr. Thornton returns to the office for a postoperative visit accompanied by his wife and daughter.  Since his last office appointment, he is out of rehab and is back in assisted living.  He is no longer ambulatory.  His wife reports that he has a good appetite and that he appears to be gaining weight.  She notes that his colostomy will occasionally prolapse but appears to reduce on its own when he is recumbent.  Appliance is changed once daily and he has no issues with leakage.  He remains on a bowel regimen.\par \par 10/26/22 - Mr. Thornton presents to the office for follow up with his wife for ostomy care/education.  He was having some parastomal irritation that has since resolved with Silvadene cream and appliance change to ConvaTec moldable 2 piece system.  He does have a prolapse stoma that reduces easily when he lies down.  He's getting 5-7 days appliance wear time. Taking stool softeners.  Denies fever/chills, n/v. \par \par 12/1/22 Mr. Thornton returns to the office for followup. Wife reports that there has been some issues with stool leaking under the wafer. Also notes some bleeding from the stoma on occasion.

## 2022-12-01 NOTE — ASSESSMENT
[FreeTextEntry1] : Here for postoperative visit after undergoing ex lap, sigmoid colectomy and end colostomy for sigmoid volvulus on 6/6/22.  Overall, doing well and continues at [facility] rehab to try to gain strength.  He needs to continue with a healthy bowel regimen despite having the colostomy as patients with Parkinson's can certainly develop constipation despite having a colostomy.  The current regimen he is receiving while at rehab is effective and can be continued on discharge.  The colostomy is healthy and as expected, the wafer has been downsized as postoperative edema resolves.  Recommend follow-up in office in 1 month.\par \par 9/1/22 Mr. Thornton returns to the office for a POV. OVerall, doing well. As he is no longer ambulatory, advised against colostomy reversal.  With regards to his bowel regimen, recommend he continue on with his current regimen though advised that as time progresses, he may need to increase dosage of MiraLAX as Parkinson's progresses.  With regards to the colostomy prolapse, as long as it reduces on its own, would not proceed with surgical intervention.  Should the prolapse worsen and cause pain and discomfort, we can then pursue local revision which is fairly straightforward.  Follow-up as needed.\par \par 10/26/22 Mr. Thornton presents to the office for follow up for ostomy care/education/guidance. His prolapse but patent and reducible with soft stool in the bag.  He's to continue current bowel regimen and increase as necessary. If his prolapse worsens causing pain/discomfort he is to call the office/make an appointment.  Follow up PRN\par \par 12/1/22 Mr Thornton returns to the office for followup of his colostomy. Suspect leakage of stool under the wafer is secondary to appliance not being emptied often enough and stool backing up to level of colostomy. Reassured peristomal skin is intact and not macerated. Occasional stomal bleeding is likely from friction and trauma to mucosa and is self limiting. 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 Advised wife to have supplier of wafers and bags to send request/order form to our office for us to sign. All questions and concerns answered to their satisfaction. F/u as scheduled for 3/2023.

## 2022-12-01 NOTE — PHYSICAL EXAM
[Respiratory Effort] : normal respiratory effort [No Rash or Lesion] : No rash or lesion [Alert] : alert [Oriented to Person] : oriented to person [Oriented to Place] : oriented to place [Oriented to Time] : oriented to time [Calm] : calm [Abdomen Masses] : No abdominal masses [Abdomen Tenderness] : ~T No ~M abdominal tenderness [de-identified] : soft, obese, non-tender, well healed surgical incision; LUQ colostomy  pink/viable/patent, prolapsed, reducible, parastomal skin intact  [de-identified] : No apparent distress [de-identified] : NCAT [de-identified] : JUAN x4 [de-identified] : warm and dry

## 2022-12-15 RX ORDER — CARBIDOPA AND LEVODOPA 25; 100 MG/1; MG/1
25-100 TABLET ORAL
Qty: 720 | Refills: 3 | Status: ACTIVE | COMMUNITY
Start: 2021-10-11 | End: 1900-01-01

## 2022-12-19 ENCOUNTER — APPOINTMENT (OUTPATIENT)
Dept: NEUROLOGY | Facility: CLINIC | Age: 82
End: 2022-12-19

## 2022-12-19 VITALS
DIASTOLIC BLOOD PRESSURE: 78 MMHG | HEART RATE: 64 BPM | BODY MASS INDEX: 23.49 KG/M2 | SYSTOLIC BLOOD PRESSURE: 174 MMHG | WEIGHT: 150 LBS | OXYGEN SATURATION: 99 %

## 2022-12-19 PROCEDURE — 64616 CHEMODENERV MUSC NECK DYSTON: CPT

## 2022-12-19 PROCEDURE — 99214 OFFICE O/P EST MOD 30 MIN: CPT | Mod: 25

## 2022-12-19 PROCEDURE — 64611 CHEMODENERV SALIV GLANDS: CPT

## 2023-03-20 ENCOUNTER — APPOINTMENT (OUTPATIENT)
Dept: NEUROLOGY | Facility: CLINIC | Age: 83
End: 2023-03-20
Payer: MEDICARE

## 2023-03-20 VITALS
BODY MASS INDEX: 24.01 KG/M2 | WEIGHT: 153 LBS | HEART RATE: 62 BPM | SYSTOLIC BLOOD PRESSURE: 159 MMHG | OXYGEN SATURATION: 98 % | HEIGHT: 67 IN | DIASTOLIC BLOOD PRESSURE: 87 MMHG

## 2023-03-20 PROCEDURE — 64616 CHEMODENERV MUSC NECK DYSTON: CPT

## 2023-03-20 PROCEDURE — 64611 CHEMODENERV SALIV GLANDS: CPT

## 2023-03-20 PROCEDURE — 99214 OFFICE O/P EST MOD 30 MIN: CPT | Mod: 25

## 2023-03-20 NOTE — DISCUSSION/SUMMARY
[FreeTextEntry1] : Conrad Thornton is a 82 year old M with a history of chronic lower back pain in the setting of moderate to severe spinal stenosis, and Parkinson's disease diagnosed in 2012\par Having blood pressure fluctuations, orthostatic hypotension- follows with GI and nephrology\par Drooling -better with Claritin,, cannot use atropine drops per cardiology\par Constipation\par Soft speech\par Vivid dreams\par Azilect worsened dizziness stopped\par Freezing of gait\par Cervical dystonia and drooling- good response to Botox\par \par Impression: Parkinsonism- autonomic dysfunction (fluctuating blood pressure, cold) 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 \par \par Recommendations:\par -Patient was injected with 300 units of Botox today, 20 more than last visit, in the right splenius capitis.  Monitor for any s/e\par -Continue Sinemet 2 tablets 4 times a day\par -Continue PT OT.  \par -Continue melatonin  5 mg at bedtime\par \par All questions were addressed and answered\par
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
## 2023-03-20 NOTE — PROCEDURE
[FreeTextEntry1] : area to be injected was cleansed with alcohol wipes.  300 units of Botox were mixed in 1 cc of normal saline in 10units/0.1cc\par I injected as follows\par Right splenius capitis 120 units (+20 units compared to last visit)\par Right trapezius 60 units \par Right paraspinal 20/2  units \par Right parotid gland 50 units  \par Left parotid gland 50 units  \par \par Total injected 300 units\par Wasted 0 units\par

## 2023-03-20 NOTE — PHYSICAL EXAM
[General Appearance - Alert] : alert [General Appearance - In No Acute Distress] : in no acute distress [Oriented To Time, Place, And Person] : oriented to person, place, and time [Impaired Insight] : insight and judgment were intact [Affect] : the affect was normal [Person] : oriented to person [Place] : oriented to place [Time] : oriented to time [Concentration Intact] : normal concentrating ability [Comprehension] : comprehension intact [Motor Strength] : muscle strength was normal in all four extremities [Motor Handedness Right-Handed] : the patient is right hand dominant [Motor Strength Upper Extremities Bilaterally] : strength was normal in both upper extremities [Motor Strength Lower Extremities Bilaterally] : strength was normal in both lower extremities [Sensation Tactile Decrease] : light touch was intact [Coordination - Dysmetria Impaired Finger-to-Nose Bilateral] : not present [2+] : Ankle jerk left 2+ [Extraocular Movements] : extraocular movements were intact [Hearing Threshold Finger Rub Not [place]] : hearing was normal [FreeTextEntry1] : dystonia [Edema] : there was no peripheral edema [Abdomen Soft] : soft [Nail Clubbing] : no clubbing  or cyanosis of the fingernails [] : no rash

## 2023-03-30 ENCOUNTER — APPOINTMENT (OUTPATIENT)
Dept: COLORECTAL SURGERY | Facility: CLINIC | Age: 83
End: 2023-03-30
Payer: MEDICARE

## 2023-03-30 VITALS
OXYGEN SATURATION: 99 % | HEIGHT: 67 IN | WEIGHT: 150 LBS | BODY MASS INDEX: 23.54 KG/M2 | DIASTOLIC BLOOD PRESSURE: 77 MMHG | HEART RATE: 57 BPM | SYSTOLIC BLOOD PRESSURE: 154 MMHG | RESPIRATION RATE: 16 BRPM

## 2023-03-30 PROCEDURE — 99024 POSTOP FOLLOW-UP VISIT: CPT

## 2023-03-30 NOTE — ASSESSMENT
[FreeTextEntry1] : Here for postoperative visit after undergoing ex lap, sigmoid colectomy and end colostomy for sigmoid volvulus on 6/6/22.  Overall, doing well and continues at [facility] rehab to try to gain strength.  He needs to continue with a healthy bowel regimen despite having the colostomy as patients with Parkinson's can certainly develop constipation despite having a colostomy.  The current regimen he is receiving while at rehab is effective and can be continued on discharge.  The colostomy is healthy and as expected, the wafer has been downsized as postoperative edema resolves.  Recommend follow-up in office in 1 month.\par \par 9/1/22 Mr. Thornton returns to the office for a POV. OVerall, doing well. As he is no longer ambulatory, advised against colostomy reversal.  With regards to his bowel regimen, recommend he continue on with his current regimen though advised that as time progresses, he may need to increase dosage of MiraLAX as Parkinson's progresses.  With regards to the colostomy prolapse, as long as it reduces on its own, would not proceed with surgical intervention.  Should the prolapse worsen and cause pain and discomfort, we can then pursue local revision which is fairly straightforward.  Follow-up as needed.\par \par 10/26/22 Mr. Thornton presents to the office for follow up for ostomy care/education/guidance. His prolapse but patent and reducible with soft stool in the bag.  He's to continue current bowel regimen and increase as necessary. If his prolapse worsens causing pain/discomfort he is to call the office/make an appointment.  Follow up PRN\par \par 12/1/22 Mr Thornton returns to the office for followup of his colostomy. Suspect leakage of stool under the wafer is secondary to appliance not being emptied often enough and stool backing up to level of colostomy. Reassured peristomal skin is intact and not macerated. Occasional stomal bleeding is likely from friction and trauma to mucosa and is self limiting. 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 Advised wife to have supplier of wafers and bags to send request/order form to our office for us to sign. All questions and concerns answered to their satisfaction. F/u as scheduled for 3/2023.\par \par 3/30/23 Mr. Thornton returns to the office for followup visit. Overall, doing well with no issues. Reassured re: bowel regimen. F/u 6 months.

## 2023-03-30 NOTE — PHYSICAL EXAM
[Abdomen Masses] : No abdominal masses [Abdomen Tenderness] : ~T No ~M abdominal tenderness [Respiratory Effort] : normal respiratory effort [No Rash or Lesion] : No rash or lesion [Alert] : alert [Oriented to Person] : oriented to person [Oriented to Place] : oriented to place [Oriented to Time] : oriented to time [Calm] : calm [de-identified] : soft, obese, non-tender, well healed surgical incision; LUQ colostomy  pink/viable/patent, prolapsed, reducible, parastomal skin intact  [de-identified] : No apparent distress [de-identified] : NCAT [de-identified] : JUAN x4 [de-identified] : warm and dry

## 2023-03-30 NOTE — HISTORY OF PRESENT ILLNESS
[FreeTextEntry1] : Mr. 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  He continues to reside at [facility].  Wife states that he continues to be weak and can only move several steps.  He is otherwise tolerating p.o. and has good colostomy function.  At rehab, he is on MiraLAX, Senokot as well as Violetta-Colace.\par \par 9/1/22 Mr. Thornton returns to the office for a postoperative visit accompanied by his wife and daughter.  Since his last office appointment, he is out of rehab and is back in assisted living.  He is no longer ambulatory.  His wife reports that he has a good appetite and that he appears to be gaining weight.  She notes that his colostomy will occasionally prolapse but appears to reduce on its own when he is recumbent.  Appliance is changed once daily and he has no issues with leakage.  He remains on a bowel regimen.\par \par 10/26/22 - Mr. Thornton presents to the office for follow up with his wife for ostomy care/education.  He was having some parastomal irritation that has since resolved with Silvadene cream and appliance change to ConvaTec moldable 2 piece system.  He does have a prolapse stoma that reduces easily when he lies down.  He's getting 5-7 days appliance wear time. Taking stool softeners.  Denies fever/chills, n/v. \par \par 12/1/22 Mr. Thornton returns to the office for followup. Wife reports that there has been some issues with stool leaking under the wafer. Also notes some bleeding from the stoma on occasion. \par \par 3/30/23 Mr. Thornton returns to the office for followup. No complaints. Passing stools via colostomy sometimes 2-3 times daily, followed by minimal output for 2 or 3 days. Denies abdominal bloating or discomfort. Uses miralax regularly.

## 2023-05-15 NOTE — REVIEW OF SYSTEMS
Pt called in stating he needs a refill of the Bass to be sent to the [facility] on [address] in [place] 
Script sent
[FreeTextEntry1] : Pertinent positives and negatives are outlined in the HPI.

## 2023-06-05 NOTE — ED PROVIDER NOTE - PRINCIPAL DIAGNOSIS
visits, this note will serve as a discharge from care along with most recent update on progress. Encounter for post surgical wound check

## 2023-08-07 ENCOUNTER — EMERGENCY (EMERGENCY)
Facility: HOSPITAL | Age: 83
LOS: 1 days | Discharge: HOME | End: 2023-08-07
Attending: EMERGENCY MEDICINE | Admitting: EMERGENCY MEDICINE
Payer: MEDICARE

## 2023-08-07 ENCOUNTER — APPOINTMENT (OUTPATIENT)
Dept: NEUROLOGY | Facility: CLINIC | Age: 83
End: 2023-08-07
Payer: MEDICARE

## 2023-08-07 VITALS
HEIGHT: 67 IN | OXYGEN SATURATION: 97 % | WEIGHT: 152 LBS | BODY MASS INDEX: 23.86 KG/M2 | DIASTOLIC BLOOD PRESSURE: 91 MMHG | SYSTOLIC BLOOD PRESSURE: 195 MMHG | HEART RATE: 60 BPM

## 2023-08-07 VITALS
DIASTOLIC BLOOD PRESSURE: 100 MMHG | SYSTOLIC BLOOD PRESSURE: 244 MMHG | RESPIRATION RATE: 18 BRPM | TEMPERATURE: 97 F | WEIGHT: 149.91 LBS | OXYGEN SATURATION: 97 % | HEART RATE: 70 BPM | HEIGHT: 66 IN

## 2023-08-07 VITALS
OXYGEN SATURATION: 98 % | SYSTOLIC BLOOD PRESSURE: 206 MMHG | DIASTOLIC BLOOD PRESSURE: 91 MMHG | RESPIRATION RATE: 19 BRPM | TEMPERATURE: 98 F | HEART RATE: 64 BPM

## 2023-08-07 VITALS — SYSTOLIC BLOOD PRESSURE: 221 MMHG | DIASTOLIC BLOOD PRESSURE: 96 MMHG

## 2023-08-07 DIAGNOSIS — I95.1 ORTHOSTATIC HYPOTENSION: ICD-10-CM

## 2023-08-07 DIAGNOSIS — I99.8 OTHER DISORDER OF CIRCULATORY SYSTEM: ICD-10-CM

## 2023-08-07 LAB
ALBUMIN SERPL ELPH-MCNC: 3.6 G/DL — SIGNIFICANT CHANGE UP (ref 3.3–5)
ALP SERPL-CCNC: 110 U/L — SIGNIFICANT CHANGE UP (ref 40–120)
ALT FLD-CCNC: 13 U/L — SIGNIFICANT CHANGE UP (ref 12–78)
ANION GAP SERPL CALC-SCNC: 6 MMOL/L — SIGNIFICANT CHANGE UP (ref 5–17)
APPEARANCE UR: CLEAR — SIGNIFICANT CHANGE UP
APTT BLD: 31.2 SEC — SIGNIFICANT CHANGE UP (ref 24.5–35.6)
AST SERPL-CCNC: 19 U/L — SIGNIFICANT CHANGE UP (ref 15–37)
BASOPHILS # BLD AUTO: 0.04 K/UL — SIGNIFICANT CHANGE UP (ref 0–0.2)
BASOPHILS NFR BLD AUTO: 0.5 % — SIGNIFICANT CHANGE UP (ref 0–2)
BILIRUB SERPL-MCNC: 0.4 MG/DL — SIGNIFICANT CHANGE UP (ref 0.2–1.2)
BILIRUB UR-MCNC: NEGATIVE — SIGNIFICANT CHANGE UP
BLD GP AB SCN SERPL QL: SIGNIFICANT CHANGE UP
BUN SERPL-MCNC: 40 MG/DL — HIGH (ref 7–23)
CALCIUM SERPL-MCNC: 8.6 MG/DL — SIGNIFICANT CHANGE UP (ref 8.5–10.1)
CHLORIDE SERPL-SCNC: 109 MMOL/L — HIGH (ref 96–108)
CO2 SERPL-SCNC: 22 MMOL/L — SIGNIFICANT CHANGE UP (ref 22–31)
COLOR SPEC: YELLOW — SIGNIFICANT CHANGE UP
CREAT SERPL-MCNC: 2.8 MG/DL — HIGH (ref 0.5–1.3)
DIFF PNL FLD: NEGATIVE — SIGNIFICANT CHANGE UP
EGFR: 22 ML/MIN/1.73M2 — LOW
EOSINOPHIL # BLD AUTO: 0.21 K/UL — SIGNIFICANT CHANGE UP (ref 0–0.5)
EOSINOPHIL NFR BLD AUTO: 2.8 % — SIGNIFICANT CHANGE UP (ref 0–6)
EPI CELLS # UR: PRESENT
GLUCOSE SERPL-MCNC: 98 MG/DL — SIGNIFICANT CHANGE UP (ref 70–99)
GLUCOSE UR QL: NEGATIVE MG/DL — SIGNIFICANT CHANGE UP
HCT VFR BLD CALC: 37.7 % — LOW (ref 39–50)
HGB BLD-MCNC: 12 G/DL — LOW (ref 13–17)
IMM GRANULOCYTES NFR BLD AUTO: 0.3 % — SIGNIFICANT CHANGE UP (ref 0–0.9)
INR BLD: 0.84 RATIO — LOW (ref 0.85–1.18)
KETONES UR-MCNC: NEGATIVE MG/DL — SIGNIFICANT CHANGE UP
LEUKOCYTE ESTERASE UR-ACNC: NEGATIVE — SIGNIFICANT CHANGE UP
LYMPHOCYTES # BLD AUTO: 0.96 K/UL — LOW (ref 1–3.3)
LYMPHOCYTES # BLD AUTO: 12.7 % — LOW (ref 13–44)
MAGNESIUM SERPL-MCNC: 2.3 MG/DL — SIGNIFICANT CHANGE UP (ref 1.6–2.6)
MCHC RBC-ENTMCNC: 31.8 GM/DL — LOW (ref 32–36)
MCHC RBC-ENTMCNC: 32.2 PG — SIGNIFICANT CHANGE UP (ref 27–34)
MCV RBC AUTO: 101.1 FL — HIGH (ref 80–100)
MONOCYTES # BLD AUTO: 0.61 K/UL — SIGNIFICANT CHANGE UP (ref 0–0.9)
MONOCYTES NFR BLD AUTO: 8.1 % — SIGNIFICANT CHANGE UP (ref 2–14)
NEUTROPHILS # BLD AUTO: 5.7 K/UL — SIGNIFICANT CHANGE UP (ref 1.8–7.4)
NEUTROPHILS NFR BLD AUTO: 75.6 % — SIGNIFICANT CHANGE UP (ref 43–77)
NITRITE UR-MCNC: NEGATIVE — SIGNIFICANT CHANGE UP
NRBC # BLD: 0 /100 WBCS — SIGNIFICANT CHANGE UP (ref 0–0)
PH UR: 5.5 — SIGNIFICANT CHANGE UP (ref 5–8)
PLATELET # BLD AUTO: 235 K/UL — SIGNIFICANT CHANGE UP (ref 150–400)
POTASSIUM SERPL-MCNC: 3.8 MMOL/L — SIGNIFICANT CHANGE UP (ref 3.5–5.3)
POTASSIUM SERPL-SCNC: 3.8 MMOL/L — SIGNIFICANT CHANGE UP (ref 3.5–5.3)
PROT SERPL-MCNC: 7.6 G/DL — SIGNIFICANT CHANGE UP (ref 6–8.3)
PROT UR-MCNC: 300 MG/DL
PROTHROM AB SERPL-ACNC: 9.9 SEC — SIGNIFICANT CHANGE UP (ref 9.5–13)
RBC # BLD: 3.73 M/UL — LOW (ref 4.2–5.8)
RBC # FLD: 14.2 % — SIGNIFICANT CHANGE UP (ref 10.3–14.5)
RBC CASTS # UR COMP ASSIST: 4 /HPF — SIGNIFICANT CHANGE UP (ref 0–4)
SODIUM SERPL-SCNC: 137 MMOL/L — SIGNIFICANT CHANGE UP (ref 135–145)
SP GR SPEC: 1.02 — SIGNIFICANT CHANGE UP (ref 1–1.03)
TROPONIN I, HIGH SENSITIVITY RESULT: 67.7 NG/L — SIGNIFICANT CHANGE UP
UROBILINOGEN FLD QL: 0.2 MG/DL — SIGNIFICANT CHANGE UP (ref 0.2–1)
WBC # BLD: 7.54 K/UL — SIGNIFICANT CHANGE UP (ref 3.8–10.5)
WBC # FLD AUTO: 7.54 K/UL — SIGNIFICANT CHANGE UP (ref 3.8–10.5)
WBC UR QL: 2 /HPF — SIGNIFICANT CHANGE UP (ref 0–5)

## 2023-08-07 PROCEDURE — 96375 TX/PRO/DX INJ NEW DRUG ADDON: CPT

## 2023-08-07 PROCEDURE — 71045 X-RAY EXAM CHEST 1 VIEW: CPT | Mod: 26

## 2023-08-07 PROCEDURE — 85610 PROTHROMBIN TIME: CPT

## 2023-08-07 PROCEDURE — 64611 CHEMODENERV SALIV GLANDS: CPT

## 2023-08-07 PROCEDURE — 71045 X-RAY EXAM CHEST 1 VIEW: CPT

## 2023-08-07 PROCEDURE — 99285 EMERGENCY DEPT VISIT HI MDM: CPT | Mod: 25

## 2023-08-07 PROCEDURE — 83735 ASSAY OF MAGNESIUM: CPT

## 2023-08-07 PROCEDURE — 93005 ELECTROCARDIOGRAM TRACING: CPT

## 2023-08-07 PROCEDURE — 99285 EMERGENCY DEPT VISIT HI MDM: CPT

## 2023-08-07 PROCEDURE — 85730 THROMBOPLASTIN TIME PARTIAL: CPT

## 2023-08-07 PROCEDURE — 85025 COMPLETE CBC W/AUTO DIFF WBC: CPT

## 2023-08-07 PROCEDURE — 99215 OFFICE O/P EST HI 40 MIN: CPT | Mod: 25

## 2023-08-07 PROCEDURE — 86901 BLOOD TYPING SEROLOGIC RH(D): CPT

## 2023-08-07 PROCEDURE — 84484 ASSAY OF TROPONIN QUANT: CPT

## 2023-08-07 PROCEDURE — 86900 BLOOD TYPING SEROLOGIC ABO: CPT

## 2023-08-07 PROCEDURE — 96374 THER/PROPH/DIAG INJ IV PUSH: CPT

## 2023-08-07 PROCEDURE — 86850 RBC ANTIBODY SCREEN: CPT

## 2023-08-07 PROCEDURE — 36415 COLL VENOUS BLD VENIPUNCTURE: CPT

## 2023-08-07 PROCEDURE — 64616 CHEMODENERV MUSC NECK DYSTON: CPT | Mod: LT

## 2023-08-07 PROCEDURE — 93010 ELECTROCARDIOGRAM REPORT: CPT

## 2023-08-07 PROCEDURE — 80053 COMPREHEN METABOLIC PANEL: CPT

## 2023-08-07 PROCEDURE — 81001 URINALYSIS AUTO W/SCOPE: CPT

## 2023-08-07 RX ORDER — AMLODIPINE BESYLATE 2.5 MG/1
1 TABLET ORAL
Qty: 30 | Refills: 0
Start: 2023-08-07

## 2023-08-07 RX ORDER — TAMSULOSIN HYDROCHLORIDE 0.4 MG/1
0.4 CAPSULE ORAL ONCE
Refills: 0 | Status: COMPLETED | OUTPATIENT
Start: 2023-08-07 | End: 2023-08-07

## 2023-08-07 RX ORDER — HYDRALAZINE HCL 50 MG
10 TABLET ORAL ONCE
Refills: 0 | Status: COMPLETED | OUTPATIENT
Start: 2023-08-07 | End: 2023-08-07

## 2023-08-07 RX ORDER — AMLODIPINE BESYLATE 2.5 MG/1
5 TABLET ORAL ONCE
Refills: 0 | Status: COMPLETED | OUTPATIENT
Start: 2023-08-07 | End: 2023-08-07

## 2023-08-07 RX ORDER — LABETALOL HCL 100 MG
10 TABLET ORAL ONCE
Refills: 0 | Status: COMPLETED | OUTPATIENT
Start: 2023-08-07 | End: 2023-08-07

## 2023-08-07 RX ORDER — CARBIDOPA AND LEVODOPA 25; 100 MG/1; MG/1
1 TABLET ORAL ONCE
Refills: 0 | Status: COMPLETED | OUTPATIENT
Start: 2023-08-07 | End: 2023-08-07

## 2023-08-07 RX ORDER — TAMSULOSIN HYDROCHLORIDE 0.4 MG/1
1 CAPSULE ORAL
Qty: 30 | Refills: 0
Start: 2023-08-07

## 2023-08-07 RX ADMIN — Medication 0.1 MILLIGRAM(S): at 22:30

## 2023-08-07 RX ADMIN — TAMSULOSIN HYDROCHLORIDE 0.4 MILLIGRAM(S): 0.4 CAPSULE ORAL at 22:27

## 2023-08-07 RX ADMIN — AMLODIPINE BESYLATE 5 MILLIGRAM(S): 2.5 TABLET ORAL at 20:58

## 2023-08-07 RX ADMIN — CARBIDOPA AND LEVODOPA 1 TABLET(S): 25; 100 TABLET ORAL at 17:43

## 2023-08-07 RX ADMIN — Medication 10 MILLIGRAM(S): at 18:35

## 2023-08-07 RX ADMIN — Medication 10 MILLIGRAM(S): at 17:14

## 2023-08-07 RX ADMIN — AMLODIPINE BESYLATE 5 MILLIGRAM(S): 2.5 TABLET ORAL at 18:38

## 2023-08-07 NOTE — ED PROVIDER NOTE - NEUROLOGICAL, MLM
Alert and oriented, no focal deficits, no motor or sensory deficits. see msk pt also has pedal edema old

## 2023-08-07 NOTE — ED PROVIDER NOTE - CLINICAL SUMMARY MEDICAL DECISION MAKING FREE TEXT BOX
Patient is an 82-year-old male who has a history of CKD with HTN.  Parkinson's disease.  Found to be on routine evaluation to have markedly elevated blood pressure despite having taken his daily medications of both Sinemet and hydralazine.  Patient is symptomatic only with a mild headache.  No cardiovascular complaints.  No chest pain no back pain no abdominal pain no nausea vomiting.  No shortness of breath.  He has mild pedal edema.  He is mostly wheelchair-bound with a parkinsonian tremor.  Plan of care includes emergent management of his hypertensive urgency.  Rule out worsening renal function.  Cardiology consultation.  Potential for admission or discharge depending on resolution of symptoms and laboratory studies.  Will obtain an EKG, troponin, renal function test urinalysis chest x-ray administer IV blood pressure medicine and continuous cardiac monitoring.  This chart was made with dictation software and may contain typographical errors.

## 2023-08-07 NOTE — ED PROVIDER NOTE - CONSTITUTIONAL, MLM
chronically ill but otherwise well appearing, awake, alert, oriented to person, place, time/situation and in no apparent distress. normal...

## 2023-08-07 NOTE — ED PROVIDER NOTE - PROGRESS NOTE DETAILS
dr del real renal "[facility]" called [phone]  paged [place] on call doctor-discussed plan of care- hydralazine 25 mg bid.    labs in office reveal 4/2021 creat 2.3 no seent by renal since 2022  start amlodipine 5 mg may increase to 10 mg if needed.  office is closed for 2 weeks and cesar is away.  Kb aWgner copies of results reviewed with pt and wife provided aware of above plan dw renal [place] on call doctor-discussed plan of care- hydralazine 25 mg bid.    labs in office reveal 4/2021 creat 2.3 no seen by renal since 2022  start amlodipine 5 mg may increase to 10 mg if needed.  office is closed for 2 weeks and cesar is away.  Kb Wagner pt doing well bp improving will give second dose of norvasc as dw renal   and dc if bladder not distended   pt has colostomy full of stool will have nursing assist. bladder scan pt had over 230 cc urine, unable to void will place perry cath and send ua   this may be cause of worsening renal function and htn. will monitor pt feels more comfortable, clear urine out bp still elevated will start clonidine and dc with perry flomax follow up with renal in am

## 2023-08-07 NOTE — ED ADULT NURSE NOTE - CHIEF COMPLAINT QUOTE
Patient BIBA, oriented to self and place, from doctors office for hypertension to 200s systolic. Hx of Parkinsons, lives in [facility]. Patient c/o headache. Was given PO 25mg Hydralyzine at 1340h.

## 2023-08-07 NOTE — ED PROVIDER NOTE - MUSCULOSKELETAL, MLM
Spine appears normal, range of motion is not limited, no muscle or joint tenderness has mild cog wheel rigidity and resting parkinson tremor

## 2023-08-07 NOTE — ED PROVIDER NOTE - CARE PROVIDER_API CALL
Matteo Bazan  Cardiovascular Disease  [address] [place] [address]  Phone: [phone]  Fax: [phone]  Follow Up Time:     Magdalene Bettencourt  Urology  [address] [place] [address], [address]  [place] [address]  Phone: [phone]  Fax: [phone]  Follow Up Time:

## 2023-08-07 NOTE — ED ADULT NURSE NOTE - OBJECTIVE STATEMENT
Patient A&Ox2.  patient comes in with his wife at bedside. patient was at doctors office and was found to be hypertension to 200s systolic. Hx of Parkinsons, patient lives in [facility]. Patient c/o headache. Was given PO 25mg Hydralyzine at 1340h at drs office.

## 2023-08-07 NOTE — ED PROVIDER NOTE - OBJECTIVE STATEMENT
Patient is an 82-year-old male with a history of Parkinson's disease, HTN, CKD.  He is status post colostomy as treatment for bowel obstruction.  Takes hydralazine and melatonin finasteride allopurinol Sinemet and stool softeners.  His primary care physician is at the [facility] Dr. Bateman, and his neurologist is Dr. Gao.  Renal Dr. Cullen in [place].  He was seen today by his neurologist and found to have markedly elevated blood pressure despite having taken his daily blood pressure medication.  Patient complains of a mild headache but no other symptoms.  No worsening neurologic symptoms no chest pain no back pain no weakness no numbness no shortness of breath.  No recent trauma or injury.  He is wheelchair-bound.  Able to stand to transition.  And has a colostomy.  Sent in for excessively high blood pressure with headache.  History was obtained via the wife at the bedside, from the patient, and from the triage note.

## 2023-08-07 NOTE — ED PROVIDER NOTE - GASTROINTESTINAL, MLM
Abdomen soft, non-tender, no guarding. pt has a well healed series of scars with colostomy filled with gas

## 2023-08-07 NOTE — ED PROVIDER NOTE - PHYSICAL EXAMINATION
per wife, pt had botox shots in his neck and salivary glands today at the neurologist's office for neck pain and spasm and excessive salivation

## 2023-08-07 NOTE — ED ADULT TRIAGE NOTE - CHIEF COMPLAINT QUOTE
Patient BIBA, oriented to self and place, from doctors office for hypertension to 200s systolic. Hx of Parkinsons, lives in [facility]. Patient c/o headache. Was given PO 25mg Hydralyzine at 1340h.

## 2023-08-07 NOTE — ED PROVIDER NOTE - NSFOLLOWUPINSTRUCTIONS_ED_ALL_ED_FT
follow up with your nephrologist asap tepidino  start flomax 1 pill at bedtime  keep perry cath empty  start norvasc 10 mg once daily  if you have worsening symptoms or any concerns call 911 and go to nearest emergency  follow up with on call urology dr Bettencourt  follow up with on call cardiology dr Bazan    Hypertension    WHAT YOU NEED TO KNOW:    Hypertension is high blood pressure. Your blood pressure is the force of your blood moving against the walls of your arteries. Hypertension causes your blood pressure to get so high that your heart has to work much harder than normal. This can damage your heart. Hypertension that does not respond to medicines and lifestyle changes is called resistant hypertension. Hypertension is considered chronic when it continues for 3 months or longer.    DISCHARGE INSTRUCTIONS:    Call your local emergency number (911 in the ) or have someone call if:    You have chest pain.    You have any of the following signs of a heart attack:  Squeezing, pressure, or pain in your chest    You may also have any of the following:  Discomfort or pain in your back, neck, jaw, stomach, or arm    Shortness of breath    Nausea or vomiting    Lightheadedness or a sudden cold sweat    You become confused or have trouble speaking.    You suddenly feel lightheaded or have trouble breathing.  Return to the emergency department if:    You have a severe headache or vision loss.    You have weakness in an arm or leg.  Call your doctor or cardiologist if:    You feel faint, dizzy, confused, or drowsy.    You have been taking your blood pressure medicine but your pressure is higher than your provider says it should be.    You have questions or concerns about your condition or care.  Medicines: You may need any of the following:    Antihypertensives may be used to help lower your blood pressure. Several kinds of medicines are available. Your healthcare provider will choose medicines based on the kind of hypertension you have. You may need more than one type of medicine. Take the medicine exactly as directed.    Diuretics help decrease extra fluid that collects in your body. This will help lower your blood pressure. You may urinate more often while you take this medicine.    Cholesterol medicine helps lower your cholesterol level. A low cholesterol level helps prevent heart disease and makes it easier to control your blood pressure.    Take your medicine as directed. Contact your healthcare provider if you think your medicine is not helping or if you have side effects. Tell your provider if you are allergic to any medicine. Keep a list of the medicines, vitamins, and herbs you take. Include the amounts, and when and why you take them. Bring the list or the pill bottles to follow-up visits. Carry your medicine list with you in case of an emergency.  Follow up with your doctor or cardiologist as directed: You will need to return to have your blood pressure checked and to have other lab tests done. Write down your questions so you remember to ask them during your visits.    Stages of hypertension: Your healthcare provider will give you a blood pressure goal based on your age, health, and risk for cardiovascular disease. The following are general guidelines on the stages of hypertension:    Normal blood pressure is 119/79 or lower. Your healthcare provider may only check your blood pressure each year if it stays at a normal level.    Elevated blood pressure is 120/79 to 129/79. This is sometimes called prehypertension. Your healthcare provider may suggest lifestyle changes to help lower your blood pressure to a normal level. He or she may then check it again in 3 to 6 months.    Stage 1 hypertension is 130/80 to 139/89. Your provider may recommend lifestyle changes, medication, and checks every 3 to 6 months until your blood pressure is controlled.    Stage 2 hypertension is 140/90 or higher. Your provider will recommend lifestyle changes and have you take 2 kinds of hypertension medicines. You will also need to have your blood pressure checked monthly until it is controlled.  Blood Pressure Readings  Manage hypertension:    Check your blood pressure at home. Do not smoke, have caffeine, or exercise for at least 30 minutes before you check your blood pressure. Sit and rest for 5 minutes before you check your blood pressure. Extend your arm and support it on a flat surface. Your arm should be at the same level as your heart. Follow the directions that came with your blood pressure monitor. Check your blood pressure 2 times, 1 minute apart, before you take your medicine in the morning. Also check your blood pressure before your evening meal. Keep a record of your readings and bring it to your follow-up visits. Ask your healthcare provider what your blood pressure should be.  How to take a Blood Pressure      Manage any other health conditions you have. Health conditions such as diabetes can increase your risk for hypertension. Follow your healthcare provider's instructions and take all your medicines as directed.    Ask about all medicines. Certain medicines can increase your blood pressure. Examples include oral birth control pills, decongestants, herbal supplements, and NSAIDs, such as ibuprofen. Your healthcare provider can tell you which medicines are safe for you to take. This includes prescription and over-the-counter medicines.  Lifestyle changes you can make to manage hypertension: Your healthcare provider may recommend you work with a team to manage hypertension. The team may include medical experts such as a dietitian, an exercise or physical therapist, and a behavior therapist. Your family members may be included in helping you create lifestyle changes.  Ways to Lower Your Blood Pressure    Limit sodium (salt) as directed. Too much sodium can affect your fluid balance. Check labels to find low-sodium or no-salt-added foods. Some low-sodium foods use potassium salts for flavor. Too much potassium can also cause health problems. Your healthcare provider will tell you how much sodium and potassium are safe for you to have in a day. He or she may recommend that you limit sodium to 2,300 mg a day.        Follow the meal plan recommended by your healthcare provider. A dietitian or your provider can give you more information on low-sodium plans or the DASH (Dietary Approaches to Stop Hypertension) eating plan. The DASH plan is low in sodium, processed sugar, unhealthy fats, and total fat. It is high in potassium, calcium, and fiber. These can be found in vegetables, fruit, and whole-grain foods.        Be physically active throughout the day. Physical activity, such as exercise, can help control your blood pressure and your weight. Be physically active for at least 30 minutes per day, on most days of the week. Include aerobic activity, such as walking or riding a bicycle. Also include strength training at least 2 times each week. Your healthcare providers can help you create a physical activity plan.  Ways to Be Physically Active  Strength Training for Adults      Decrease stress. This may help lower your blood pressure. Learn ways to relax, such as deep breathing or listening to music.    Limit alcohol as directed. Alcohol can increase your blood pressure. A drink of alcohol is 12 ounces of beer, 5 ounces of wine, or 1½ ounces of liquor.    Do not smoke. Nicotine and other chemicals in cigarettes and cigars can increase your blood pressure and also cause lung damage. Ask your healthcare provider for information if you currently smoke and need help to quit. E-cigarettes or smokeless tobacco still contain nicotine. Talk to your healthcare provider before you use these products.  Prevent Heart Disease   © [employer] 1973, 2023     Acute Urinary Retention, Male    Acute urinary retention is when a person cannot pee (urinate) at all, or can only pee a little. This can come on all of a sudden. If it is not treated, it can lead to kidney problems or other serious problems.    What are the causes?  A problem with the tube that drains the bladder (urethra).  Problems with the nerves in the bladder.  Tumors.  Certain medicines.  An infection.  Having trouble pooping (constipation).  What increases the risk?  Older men are more at risk because their prostate gland may become larger as they age. Other conditions also can increase risk. These include:  Diseases, such as multiple sclerosis.  Injury to the spinal cord.  Diabetes.  A condition that affects the way the brain works, such as dementia.  Holding back urine due to trauma or because you do not want to use the bathroom.  What are the signs or symptoms?  Trouble peeing.  Pain in the lower belly.  How is this treated?  Treatment for this condition may include:  Medicines.  Placing a thin, germ-free tube (catheter) into the bladder to drain pee out of the body.  Therapy to treat mental health conditions.  Treatment for conditions that may cause this.  If needed, you may be treated in the hospital for kidney problems or to manage other problems.    Follow these instructions at home:  Medicines    Take over-the-counter and prescription medicines only as told by your doctor. Ask your doctor what medicines you should stay away from.  If you were given an antibiotic medicine, take it as told by your doctor. Do not stop taking it, even if you start to feel better.  General instructions    Do not smoke or use any products that contain nicotine or tobacco. If you need help quitting, ask your doctor.  Drink enough fluid to keep your pee pale yellow.  If you were sent home with a tube that drains the bladder, take care of it as told by your doctor.  Watch for changes in your symptoms. Tell your doctor about them.  If told, keep track of changes in your blood pressure at home. Tell your doctor about them.  Keep all follow-up visits.  Contact a doctor if:  You have spasms in your bladder that you cannot stop.  You leak pee when you have spasms.  Get help right away if:  You have chills or a fever.  You have blood in your pee.  You have a tube that drains pee from the bladder and these things happen:  The tube stops draining pee.  The tube falls out.  Summary  Acute urinary retention is when you cannot pee at all or you pee too little.  If this condition is not treated, it can lead to kidney problems or other serious problems.  If you were sent home with a tube (catheter) that drains the bladder, take care of it as told by your doctor.  Watch for changes in your symptoms. Tell your doctor about them.  This information is not intended to replace advice given to you by your health care provider. Make sure you discuss any questions you have with your health care provider.

## 2023-08-07 NOTE — ED ADULT NURSE NOTE - NSFALLHARMRISKINTERV_ED_ALL_ED
Assistance OOB with selected safe patient handling equipment if applicable/Assistance with ambulation/Communicate risk of Fall with Harm to all staff, patient, and family/Encourage patient to sit up slowly, dangle for a short time, stand at bedside before walking/Orthostatic vital signs/Provide visual cue: red socks, yellow wristband, yellow gown, etc/Reinforce activity limits and safety measures with patient and family/Bed in lowest position, wheels locked, appropriate side rails in place/Call bell, personal items and telephone in reach/Instruct patient to call for assistance before getting out of bed/chair/stretcher/Non-slip footwear applied when patient is off stretcher/[place] to call system/Physically safe environment - no spills, clutter or unnecessary equipment/Purposeful Proactive Rounding/Room/bathroom lighting operational, light cord in reach

## 2023-08-07 NOTE — ED PROVIDER NOTE - PATIENT PORTAL LINK FT
You can access the FollowMyHealth Patient Portal offered by [facility] by registering at the following website: [email or web address]. By joining [employer]’s FollowMyHealth portal, you will also be able to view your health information using other applications (apps) compatible with our system.

## 2023-08-07 NOTE — ED PROVIDER NOTE - CARE PROVIDERS DIRECT ADDRESSES
[email or web address]

## 2023-08-07 NOTE — ED PROVIDER NOTE - CARE PLAN
1 Principal Discharge DX:	Accelerated hypertension  Secondary Diagnosis:	Chronic kidney disease, unspecified CKD stage   Principal Discharge DX:	Accelerated hypertension  Secondary Diagnosis:	Chronic kidney disease, unspecified CKD stage  Secondary Diagnosis:	Urine retention

## 2023-08-09 PROBLEM — I95.1 ORTHOSTATIC HYPOTENSION: Status: ACTIVE | Noted: 2021-04-09

## 2023-08-09 PROBLEM — I99.8 FLUCTUATING BLOOD PRESSURE: Status: ACTIVE | Noted: 2021-04-09
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
## 2023-08-09 NOTE — DISCUSSION/SUMMARY
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   Recommendations: -Patient was injected with 300 units of Botox today, he tolerated the procedure well  -Patient's blood pressure was quite high was checked multiple times during the visit highest recorded was 237/107.  Patient was reporting of head pressure.  Denied chest pain or shortness of breath.  Took hydralazine 25 mg in the clinic blood pressure was still elevated.  EMS was called.  Patient initially reluctant but eventually agreed to go to the emergency room -Continue Sinemet 2 tablets 4 times a day -Continue PT OT.   -Increase melatonin to 10 mg at bedtime.  May consider clonazepam if REM behavior disorder persists.  Advised to maintain safe zone around the bed to avoid risk of injury  All questions were addressed and answered Total face-to-face time spent with the patient was over 1 hour, most of the time spent in counseling and coordination of care

## 2023-08-09 NOTE — PHYSICAL EXAM
[General Appearance - Alert] : alert [General Appearance - In No Acute Distress] : in no acute distress [Oriented To Time, Place, And Person] : oriented to person, place, and time [Impaired Insight] : insight and judgment were intact [Affect] : the affect was normal [Person] : oriented to person [Place] : oriented to place [Time] : oriented to time [Concentration Intact] : normal concentrating ability [Comprehension] : comprehension intact [Motor Strength] : muscle strength was normal in all four extremities [Motor Handedness Right-Handed] : the patient is right hand dominant [Sensation Tactile Decrease] : light touch was intact [2+] : Ankle jerk left 2+ [Extraocular Movements] : extraocular movements were intact [Hearing Threshold Finger Rub Not [place]] : hearing was normal [Edema] : there was no peripheral edema [Abdomen Soft] : soft [Nail Clubbing] : no clubbing  or cyanosis of the fingernails [] : no rash [Motor Strength Upper Extremities Bilaterally] : strength was normal in both upper extremities [Motor Strength Lower Extremities Bilaterally] : strength was normal in both lower extremities [Coordination - Dysmetria Impaired Finger-to-Nose Bilateral] : not present [FreeTextEntry1] : dystonia

## 2023-08-09 NOTE — PROCEDURE
[FreeTextEntry1] : area to be injected was cleansed with alcohol wipes.  300 units of Botox were mixed in 1 cc of normal saline in 10units/0.1cc I injected as follows Right splenius capitis 120 units  Right trapezius 60 units  Right paraspinal 20/2  units  Right parotid gland 50 units   Left parotid gland 50 units    Total injected 300 units Wasted 0 units

## 2023-08-23 ENCOUNTER — NON-APPOINTMENT (OUTPATIENT)
Age: 83
End: 2023-08-23

## 2023-08-27 ENCOUNTER — EMERGENCY (EMERGENCY)
Facility: HOSPITAL | Age: 83
LOS: 0 days | Discharge: HOME | End: 2023-08-27
Attending: EMERGENCY MEDICINE
Payer: MEDICARE

## 2023-08-27 VITALS
DIASTOLIC BLOOD PRESSURE: 77 MMHG | OXYGEN SATURATION: 96 % | WEIGHT: 149.91 LBS | HEIGHT: 66 IN | HEART RATE: 54 BPM | RESPIRATION RATE: 20 BRPM | SYSTOLIC BLOOD PRESSURE: 155 MMHG | TEMPERATURE: 98 F

## 2023-08-27 VITALS
HEART RATE: 61 BPM | SYSTOLIC BLOOD PRESSURE: 192 MMHG | DIASTOLIC BLOOD PRESSURE: 85 MMHG | TEMPERATURE: 97 F | OXYGEN SATURATION: 99 % | RESPIRATION RATE: 16 BRPM

## 2023-08-27 DIAGNOSIS — Z88.8 ALLERGY STATUS TO OTHER DRUGS, MEDICAMENTS AND BIOLOGICAL SUBSTANCES: ICD-10-CM

## 2023-08-27 DIAGNOSIS — Z96.652 PRESENCE OF LEFT ARTIFICIAL KNEE JOINT: ICD-10-CM

## 2023-08-27 DIAGNOSIS — Z88.6 ALLERGY STATUS TO ANALGESIC AGENT: ICD-10-CM

## 2023-08-27 DIAGNOSIS — E78.5 HYPERLIPIDEMIA, UNSPECIFIED: ICD-10-CM

## 2023-08-27 DIAGNOSIS — T83.011A BREAKDOWN (MECHANICAL) OF INDWELLING URETHRAL CATHETER, INITIAL ENCOUNTER: ICD-10-CM

## 2023-08-27 DIAGNOSIS — G20 PARKINSON'S DISEASE: ICD-10-CM

## 2023-08-27 DIAGNOSIS — N18.9 CHRONIC KIDNEY DISEASE, UNSPECIFIED: ICD-10-CM

## 2023-08-27 DIAGNOSIS — Z96.652 PRESENCE OF LEFT ARTIFICIAL KNEE JOINT: Chronic | ICD-10-CM
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
## 2023-08-27 DIAGNOSIS — E03.9 HYPOTHYROIDISM, UNSPECIFIED: ICD-10-CM

## 2023-08-27 DIAGNOSIS — U07.1 COVID-19: ICD-10-CM
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
## 2023-08-27 DIAGNOSIS — N50.819 TESTICULAR PAIN, UNSPECIFIED: ICD-10-CM

## 2023-08-27 DIAGNOSIS — I12.9 HYPERTENSIVE CHRONIC KIDNEY DISEASE WITH STAGE 1 THROUGH STAGE 4 CHRONIC KIDNEY DISEASE, OR UNSPECIFIED CHRONIC KIDNEY DISEASE: ICD-10-CM

## 2023-08-27 LAB
ALBUMIN SERPL ELPH-MCNC: 2.9 G/DL — LOW (ref 3.3–5)
ALP SERPL-CCNC: 84 U/L — SIGNIFICANT CHANGE UP (ref 40–120)
ALT FLD-CCNC: <6 U/L — LOW (ref 12–78)
ANION GAP SERPL CALC-SCNC: 3 MMOL/L — LOW (ref 5–17)
APPEARANCE UR: CLEAR — SIGNIFICANT CHANGE UP
APTT BLD: 28 SEC — SIGNIFICANT CHANGE UP (ref 24.5–35.6)
AST SERPL-CCNC: 9 U/L — LOW (ref 15–37)
BACTERIA # UR AUTO: ABNORMAL
BASOPHILS # BLD AUTO: 0.03 K/UL — SIGNIFICANT CHANGE UP (ref 0–0.2)
BASOPHILS NFR BLD AUTO: 0.4 % — SIGNIFICANT CHANGE UP (ref 0–2)
BILIRUB SERPL-MCNC: 0.5 MG/DL — SIGNIFICANT CHANGE UP (ref 0.2–1.2)
BILIRUB UR-MCNC: NEGATIVE — SIGNIFICANT CHANGE UP
BUN SERPL-MCNC: 36 MG/DL — HIGH (ref 7–23)
CALCIUM SERPL-MCNC: 8.3 MG/DL — LOW (ref 8.5–10.1)
CHLORIDE SERPL-SCNC: 113 MMOL/L — HIGH (ref 96–108)
CO2 SERPL-SCNC: 23 MMOL/L — SIGNIFICANT CHANGE UP (ref 22–31)
COLOR SPEC: YELLOW — SIGNIFICANT CHANGE UP
CREAT SERPL-MCNC: 2.48 MG/DL — HIGH (ref 0.5–1.3)
DIFF PNL FLD: ABNORMAL
EGFR: 25 ML/MIN/1.73M2 — LOW
EOSINOPHIL # BLD AUTO: 0.17 K/UL — SIGNIFICANT CHANGE UP (ref 0–0.5)
EOSINOPHIL NFR BLD AUTO: 2.4 % — SIGNIFICANT CHANGE UP (ref 0–6)
EPI CELLS # UR: SIGNIFICANT CHANGE UP
GLUCOSE SERPL-MCNC: 104 MG/DL — HIGH (ref 70–99)
GLUCOSE UR QL: NEGATIVE — SIGNIFICANT CHANGE UP
HCT VFR BLD CALC: 31.4 % — LOW (ref 39–50)
HGB BLD-MCNC: 10.3 G/DL — LOW (ref 13–17)
IMM GRANULOCYTES NFR BLD AUTO: 0.7 % — SIGNIFICANT CHANGE UP (ref 0–0.9)
INR BLD: 0.93 RATIO — SIGNIFICANT CHANGE UP (ref 0.85–1.18)
KETONES UR-MCNC: ABNORMAL
LACTATE SERPL-SCNC: 0.9 MMOL/L — SIGNIFICANT CHANGE UP (ref 0.7–2)
LEUKOCYTE ESTERASE UR-ACNC: ABNORMAL
LYMPHOCYTES # BLD AUTO: 0.55 K/UL — LOW (ref 1–3.3)
LYMPHOCYTES # BLD AUTO: 7.6 % — LOW (ref 13–44)
MCHC RBC-ENTMCNC: 32.3 PG — SIGNIFICANT CHANGE UP (ref 27–34)
MCHC RBC-ENTMCNC: 32.8 GM/DL — SIGNIFICANT CHANGE UP (ref 32–36)
MCV RBC AUTO: 98.4 FL — SIGNIFICANT CHANGE UP (ref 80–100)
MONOCYTES # BLD AUTO: 0.49 K/UL — SIGNIFICANT CHANGE UP (ref 0–0.9)
MONOCYTES NFR BLD AUTO: 6.8 % — SIGNIFICANT CHANGE UP (ref 2–14)
NEUTROPHILS # BLD AUTO: 5.93 K/UL — SIGNIFICANT CHANGE UP (ref 1.8–7.4)
NEUTROPHILS NFR BLD AUTO: 82.1 % — HIGH (ref 43–77)
NITRITE UR-MCNC: NEGATIVE — SIGNIFICANT CHANGE UP
PH UR: 5 — SIGNIFICANT CHANGE UP (ref 5–8)
PLATELET # BLD AUTO: 260 K/UL — SIGNIFICANT CHANGE UP (ref 150–400)
POTASSIUM SERPL-MCNC: 4.1 MMOL/L — SIGNIFICANT CHANGE UP (ref 3.5–5.3)
POTASSIUM SERPL-SCNC: 4.1 MMOL/L — SIGNIFICANT CHANGE UP (ref 3.5–5.3)
PROT SERPL-MCNC: 6.2 GM/DL — SIGNIFICANT CHANGE UP (ref 6–8.3)
PROT UR-MCNC: 500 MG/DL
PROTHROM AB SERPL-ACNC: 10.5 SEC — SIGNIFICANT CHANGE UP (ref 9.5–13)
RAPID RVP RESULT: DETECTED
RBC # BLD: 3.19 M/UL — LOW (ref 4.2–5.8)
RBC # FLD: 14.6 % — HIGH (ref 10.3–14.5)
RBC CASTS # UR COMP ASSIST: >50 /HPF (ref 0–4)
SARS-COV-2 RNA SPEC QL NAA+PROBE: DETECTED
SODIUM SERPL-SCNC: 139 MMOL/L — SIGNIFICANT CHANGE UP (ref 135–145)
SP GR SPEC: 1.01 — SIGNIFICANT CHANGE UP (ref 1.01–1.02)
UROBILINOGEN FLD QL: NEGATIVE — SIGNIFICANT CHANGE UP
WBC # BLD: 7.22 K/UL — SIGNIFICANT CHANGE UP (ref 3.8–10.5)
WBC # FLD AUTO: 7.22 K/UL — SIGNIFICANT CHANGE UP (ref 3.8–10.5)
WBC UR QL: SIGNIFICANT CHANGE UP /HPF (ref 0–5)

## 2023-08-27 PROCEDURE — 99285 EMERGENCY DEPT VISIT HI MDM: CPT | Mod: 25

## 2023-08-27 PROCEDURE — 93005 ELECTROCARDIOGRAM TRACING: CPT

## 2023-08-27 PROCEDURE — 87086 URINE CULTURE/COLONY COUNT: CPT

## 2023-08-27 PROCEDURE — 76870 US EXAM SCROTUM: CPT | Mod: 26

## 2023-08-27 PROCEDURE — 85610 PROTHROMBIN TIME: CPT

## 2023-08-27 PROCEDURE — 87040 BLOOD CULTURE FOR BACTERIA: CPT

## 2023-08-27 PROCEDURE — 81001 URINALYSIS AUTO W/SCOPE: CPT

## 2023-08-27 PROCEDURE — 83605 ASSAY OF LACTIC ACID: CPT

## 2023-08-27 PROCEDURE — 76870 US EXAM SCROTUM: CPT

## 2023-08-27 PROCEDURE — 80053 COMPREHEN METABOLIC PANEL: CPT

## 2023-08-27 PROCEDURE — 74176 CT ABD & PELVIS W/O CONTRAST: CPT | Mod: MA

## 2023-08-27 PROCEDURE — 99285 EMERGENCY DEPT VISIT HI MDM: CPT

## 2023-08-27 PROCEDURE — 0225U NFCT DS DNA&RNA 21 SARSCOV2: CPT

## 2023-08-27 PROCEDURE — 74176 CT ABD & PELVIS W/O CONTRAST: CPT | Mod: 26,MA

## 2023-08-27 PROCEDURE — 93010 ELECTROCARDIOGRAM REPORT: CPT

## 2023-08-27 PROCEDURE — 71045 X-RAY EXAM CHEST 1 VIEW: CPT

## 2023-08-27 PROCEDURE — 36415 COLL VENOUS BLD VENIPUNCTURE: CPT

## 2023-08-27 PROCEDURE — 85025 COMPLETE CBC W/AUTO DIFF WBC: CPT

## 2023-08-27 PROCEDURE — 71045 X-RAY EXAM CHEST 1 VIEW: CPT | Mod: 26

## 2023-08-27 PROCEDURE — 85730 THROMBOPLASTIN TIME PARTIAL: CPT

## 2023-08-27 RX ORDER — CARBIDOPA AND LEVODOPA 25; 100 MG/1; MG/1
1 TABLET ORAL ONCE
Refills: 0 | Status: COMPLETED | OUTPATIENT
Start: 2023-08-27 | End: 2023-08-27

## 2023-08-27 RX ADMIN — CARBIDOPA AND LEVODOPA 1 TABLET(S): 25; 100 TABLET ORAL at 13:36

## 2023-08-27 NOTE — ED ADULT NURSE NOTE - NSFALLRISKINTERV_ED_ALL_ED

## 2023-08-27 NOTE — ED ADULT NURSE NOTE - OBJECTIVE STATEMENT
Pt presents to ED c/o increased lethargy, decreased PO intake, increased weakness x couple of weeks. As per wife at bedside pt is not speaking as much as usual either. Wife also states  attempted to remove urinary catheter last night- dry blood observed around penis. IV established in left arm with a 20G, labs drawn and sent, call bell in reach, warm blanket provided, bed in lowest position, side rails up x2, MD evaluation in progress, pt on telemetry.

## 2023-08-27 NOTE — ED PROVIDER NOTE - CLINICAL SUMMARY MEDICAL DECISION MAKING FREE TEXT BOX
81 y/o male presents for weakness, fatigue, and testicular pain, +blood at urethral meatus. Javier examined, functioning appropriately. Will workup to r//o infectious cause of weakness, check urine, US testicles r/o epididymitis, CT abd to eval decreased ostomy output and reassess. 83 y/o male presents for weakness, fatigue, and testicular pain, +blood at urethral meatus. Javier examined, functioning appropriately. Will workup to r//o infectious cause of weakness, check urine, US testicles r/o epididymitis, CT abd to eval decreased ostomy output. no head injury to suggest [facility] neuro exam normal and baseline no concern cva and reassess.    All labs imaging reviewed by myself.  Ultrasound without epididymitis patient with small hydrocele.  CT without any acute findings discussed incidental findings with family and patient will follow-up.  UA without obvious UTI labs at baseline patient is feeling much better awake alert Javier catheter was changed no current bleeding patient and family comfortable going home will DC with follow-up and strict return precautions.

## 2023-08-27 NOTE — ED ADULT TRIAGE NOTE - CHIEF COMPLAINT QUOTE
BIBEMS for possibly attempting to pull out perry catheter. blood noted around perry site. pt accompanied by wife.

## 2023-08-27 NOTE — ED PROVIDER NOTE - PATIENT PORTAL LINK FT
You can access the FollowMyHealth Patient Portal offered by [facility] by registering at the following website: [email or web address]. By joining [facility]’s FollowMyHealth portal, you will also be able to view your health information using other applications (apps) compatible with our system.

## 2023-08-27 NOTE — ED ADULT NURSE NOTE - CHIEF COMPLAINT
The patient is a 82y Male complaining of urinary catheter complications.
This was a shared visit with the SANDOR. I reviewed and verified the documentation and independently performed the documented:

## 2023-08-27 NOTE — ED PROVIDER NOTE - NS_ ATTENDINGSCRIBEDETAILS _ED_A_ED_FT
I, Wilfredo Hope MD,  performed the initial face to face bedside interview with this patient regarding history of present illness, review of symptoms and relevant past medical, social and family history.  I completed an independent physical examination.  I was the initial provider who evaluated this patient.   I personally saw the patient and performed a substantive portion of the visit including all aspects of the medical decision making.  The history, relevant review of systems, past medical and surgical history, medical decision making, and physical examination was documented by the scribe in my presence and I attest to the accuracy of the documentation.

## 2023-08-27 NOTE — ED PROVIDER NOTE - PHYSICAL EXAMINATION
Constitutional: NAD AAOx3  Eyes: PERRLA EOMI  Head: Normocephalic atraumatic  Mouth: MMM  Cardiac: regular rate   Resp: Lungs CTAB  GI: Abd s/nt, slightly distended  : Blood around the urethral meatus, Javier in lace draining clear yellow urine, no hematuria. Mild b/l testicular TTP.   Neuro: CN2-12 intact  Skin: No visible rashes Constitutional: NAD   Eyes: PERRLA EOMI  Head: Normocephalic atraumatic  Mouth: MMM  Cardiac: regular rate   Resp: Lungs CTAB  GI: Abd s/nt, slightly distended  : Blood around the urethral meatus, Javier in place draining clear yellow urine, no hematuria. Mild b/l testicular TTP.   Neuro: CN2-12 intact  Skin: No visible rashes

## 2023-08-27 NOTE — ED PROVIDER NOTE - NSFOLLOWUPINSTRUCTIONS_ED_ALL_ED_FT
1. return for worsening symptoms or anything concerning to you  2. take all home meds as prescribed  3. follow up with your pmd call to make an appointment  4. follow up regarding CT incidental findings  5. follow up with your urologist

## 2023-08-28 LAB
CULTURE RESULTS: NO GROWTH — SIGNIFICANT CHANGE UP
SPECIMEN SOURCE: SIGNIFICANT CHANGE UP

## 2023-09-01 LAB
CULTURE RESULTS: SIGNIFICANT CHANGE UP
CULTURE RESULTS: SIGNIFICANT CHANGE UP
SPECIMEN SOURCE: SIGNIFICANT CHANGE UP
SPECIMEN SOURCE: SIGNIFICANT CHANGE UP

## 2023-09-26 RX ORDER — CIPROFLOXACIN LACTATE 400MG/40ML
1 VIAL (ML) INTRAVENOUS
Refills: 0 | DISCHARGE
Start: 2023-09-26 | End: 2023-10-02

## 2023-09-28 ENCOUNTER — INPATIENT (INPATIENT)
Facility: HOSPITAL | Age: 83
LOS: 4 days | Discharge: HOME | DRG: 698 | End: 2023-10-03
Attending: FAMILY MEDICINE | Admitting: HOSPITALIST
Payer: MEDICARE

## 2023-09-28 VITALS
DIASTOLIC BLOOD PRESSURE: 98 MMHG | HEART RATE: 69 BPM | HEIGHT: 66 IN | SYSTOLIC BLOOD PRESSURE: 226 MMHG | OXYGEN SATURATION: 98 % | TEMPERATURE: 98 F | WEIGHT: 151.9 LBS

## 2023-09-28 DIAGNOSIS — R33.9 RETENTION OF URINE, UNSPECIFIED: ICD-10-CM

## 2023-09-28 DIAGNOSIS — R31.0 GROSS HEMATURIA: ICD-10-CM

## 2023-09-28 DIAGNOSIS — Z96.652 PRESENCE OF LEFT ARTIFICIAL KNEE JOINT: Chronic | ICD-10-CM

## 2023-09-28 LAB
ALBUMIN SERPL ELPH-MCNC: 2.9 G/DL — LOW (ref 3.3–5)
ALP SERPL-CCNC: 100 U/L — SIGNIFICANT CHANGE UP (ref 40–120)
ALT FLD-CCNC: <6 U/L — LOW (ref 12–78)
ANION GAP SERPL CALC-SCNC: 5 MMOL/L — SIGNIFICANT CHANGE UP (ref 5–17)
APPEARANCE UR: ABNORMAL
APTT BLD: 33.9 SEC — SIGNIFICANT CHANGE UP (ref 24.5–35.6)
AST SERPL-CCNC: 9 U/L — LOW (ref 15–37)
BASOPHILS # BLD AUTO: 0.04 K/UL — SIGNIFICANT CHANGE UP (ref 0–0.2)
BASOPHILS NFR BLD AUTO: 0.5 % — SIGNIFICANT CHANGE UP (ref 0–2)
BILIRUB SERPL-MCNC: 0.4 MG/DL — SIGNIFICANT CHANGE UP (ref 0.2–1.2)
BILIRUB UR-MCNC: SIGNIFICANT CHANGE UP
BUN SERPL-MCNC: 31 MG/DL — HIGH (ref 7–23)
CALCIUM SERPL-MCNC: 8.7 MG/DL — SIGNIFICANT CHANGE UP (ref 8.5–10.1)
CHLORIDE SERPL-SCNC: 111 MMOL/L — HIGH (ref 96–108)
CO2 SERPL-SCNC: 25 MMOL/L — SIGNIFICANT CHANGE UP (ref 22–31)
COLOR SPEC: ABNORMAL
CREAT SERPL-MCNC: 2.5 MG/DL — HIGH (ref 0.5–1.3)
DIFF PNL FLD: SIGNIFICANT CHANGE UP
EGFR: 25 ML/MIN/1.73M2 — LOW
EOSINOPHIL # BLD AUTO: 0.35 K/UL — SIGNIFICANT CHANGE UP (ref 0–0.5)
EOSINOPHIL NFR BLD AUTO: 4.4 % — SIGNIFICANT CHANGE UP (ref 0–6)
GLUCOSE SERPL-MCNC: 109 MG/DL — HIGH (ref 70–99)
GLUCOSE UR QL: SIGNIFICANT CHANGE UP
HCT VFR BLD CALC: 31.6 % — LOW (ref 39–50)
HGB BLD-MCNC: 10.2 G/DL — LOW (ref 13–17)
IMM GRANULOCYTES NFR BLD AUTO: 0.5 % — SIGNIFICANT CHANGE UP (ref 0–0.9)
INR BLD: 0.95 RATIO — SIGNIFICANT CHANGE UP (ref 0.85–1.18)
KETONES UR-MCNC: SIGNIFICANT CHANGE UP
LEUKOCYTE ESTERASE UR-ACNC: SIGNIFICANT CHANGE UP
LYMPHOCYTES # BLD AUTO: 0.9 K/UL — LOW (ref 1–3.3)
LYMPHOCYTES # BLD AUTO: 11.3 % — LOW (ref 13–44)
MCHC RBC-ENTMCNC: 32.3 GM/DL — SIGNIFICANT CHANGE UP (ref 32–36)
MCHC RBC-ENTMCNC: 32.8 PG — SIGNIFICANT CHANGE UP (ref 27–34)
MCV RBC AUTO: 101.6 FL — HIGH (ref 80–100)
MONOCYTES # BLD AUTO: 0.53 K/UL — SIGNIFICANT CHANGE UP (ref 0–0.9)
MONOCYTES NFR BLD AUTO: 6.7 % — SIGNIFICANT CHANGE UP (ref 2–14)
NEUTROPHILS # BLD AUTO: 6.07 K/UL — SIGNIFICANT CHANGE UP (ref 1.8–7.4)
NEUTROPHILS NFR BLD AUTO: 76.6 % — SIGNIFICANT CHANGE UP (ref 43–77)
NITRITE UR-MCNC: SIGNIFICANT CHANGE UP
PH UR: SIGNIFICANT CHANGE UP (ref 5–8)
PLATELET # BLD AUTO: 256 K/UL — SIGNIFICANT CHANGE UP (ref 150–400)
POTASSIUM SERPL-MCNC: 4.3 MMOL/L — SIGNIFICANT CHANGE UP (ref 3.5–5.3)
POTASSIUM SERPL-SCNC: 4.3 MMOL/L — SIGNIFICANT CHANGE UP (ref 3.5–5.3)
PROT SERPL-MCNC: 6.9 GM/DL — SIGNIFICANT CHANGE UP (ref 6–8.3)
PROT UR-MCNC: SIGNIFICANT CHANGE UP
PROTHROM AB SERPL-ACNC: 10.7 SEC — SIGNIFICANT CHANGE UP (ref 9.5–13)
RBC # BLD: 3.11 M/UL — LOW (ref 4.2–5.8)
RBC # FLD: 15 % — HIGH (ref 10.3–14.5)
RBC CASTS # UR COMP ASSIST: >50 /HPF (ref 0–4)
SODIUM SERPL-SCNC: 141 MMOL/L — SIGNIFICANT CHANGE UP (ref 135–145)
SP GR SPEC: SIGNIFICANT CHANGE UP (ref 1.01–1.02)
UROBILINOGEN FLD QL: SIGNIFICANT CHANGE UP
WBC # BLD: 7.93 K/UL — SIGNIFICANT CHANGE UP (ref 3.8–10.5)
WBC # FLD AUTO: 7.93 K/UL — SIGNIFICANT CHANGE UP (ref 3.8–10.5)
WBC UR QL: ABNORMAL /HPF (ref 0–5)

## 2023-09-28 PROCEDURE — 99223 1ST HOSP IP/OBS HIGH 75: CPT

## 2023-09-28 PROCEDURE — 87081 CULTURE SCREEN ONLY: CPT

## 2023-09-28 PROCEDURE — 80048 BASIC METABOLIC PNL TOTAL CA: CPT

## 2023-09-28 PROCEDURE — 71045 X-RAY EXAM CHEST 1 VIEW: CPT | Mod: 26

## 2023-09-28 PROCEDURE — 36415 COLL VENOUS BLD VENIPUNCTURE: CPT

## 2023-09-28 PROCEDURE — 97530 THERAPEUTIC ACTIVITIES: CPT | Mod: GP

## 2023-09-28 PROCEDURE — 97116 GAIT TRAINING THERAPY: CPT | Mod: GP

## 2023-09-28 PROCEDURE — 83550 IRON BINDING TEST: CPT

## 2023-09-28 PROCEDURE — 74176 CT ABD & PELVIS W/O CONTRAST: CPT | Mod: 26,MG

## 2023-09-28 PROCEDURE — 83540 ASSAY OF IRON: CPT

## 2023-09-28 PROCEDURE — 99285 EMERGENCY DEPT VISIT HI MDM: CPT

## 2023-09-28 PROCEDURE — 97162 PT EVAL MOD COMPLEX 30 MIN: CPT | Mod: GP

## 2023-09-28 PROCEDURE — 82728 ASSAY OF FERRITIN: CPT

## 2023-09-28 PROCEDURE — G1004: CPT

## 2023-09-28 PROCEDURE — 85027 COMPLETE CBC AUTOMATED: CPT

## 2023-09-28 RX ORDER — HYDRALAZINE HCL 50 MG
10 TABLET ORAL ONCE
Refills: 0 | Status: COMPLETED | OUTPATIENT
Start: 2023-09-28 | End: 2023-09-28

## 2023-09-28 RX ORDER — TAMSULOSIN HYDROCHLORIDE 0.4 MG/1
0.4 CAPSULE ORAL AT BEDTIME
Refills: 0 | Status: DISCONTINUED | OUTPATIENT
Start: 2023-09-28 | End: 2023-10-03

## 2023-09-28 RX ORDER — LEVOTHYROXINE SODIUM 125 MCG
1 TABLET ORAL
Qty: 0 | Refills: 0 | DISCHARGE

## 2023-09-28 RX ORDER — ONDANSETRON 8 MG/1
4 TABLET, FILM COATED ORAL EVERY 8 HOURS
Refills: 0 | Status: DISCONTINUED | OUTPATIENT
Start: 2023-09-28 | End: 2023-10-03

## 2023-09-28 RX ORDER — SENNA PLUS 8.6 MG/1
2 TABLET ORAL AT BEDTIME
Refills: 0 | Status: DISCONTINUED | OUTPATIENT
Start: 2023-09-28 | End: 2023-10-03

## 2023-09-28 RX ORDER — FINASTERIDE 5 MG/1
5 TABLET, FILM COATED ORAL DAILY
Refills: 0 | Status: DISCONTINUED | OUTPATIENT
Start: 2023-09-28 | End: 2023-10-03

## 2023-09-28 RX ORDER — POLYETHYLENE GLYCOL 3350 17 G/17G
17 POWDER, FOR SOLUTION ORAL DAILY
Refills: 0 | Status: DISCONTINUED | OUTPATIENT
Start: 2023-09-28 | End: 2023-10-03

## 2023-09-28 RX ORDER — CARBIDOPA AND LEVODOPA 25; 100 MG/1; MG/1
2 TABLET ORAL
Refills: 0 | Status: DISCONTINUED | OUTPATIENT
Start: 2023-09-28 | End: 2023-10-03

## 2023-09-28 RX ORDER — CIPROFLOXACIN LACTATE 400MG/40ML
250 VIAL (ML) INTRAVENOUS
Refills: 0 | Status: DISCONTINUED | OUTPATIENT
Start: 2023-09-28 | End: 2023-10-03

## 2023-09-28 RX ORDER — CARBIDOPA AND LEVODOPA 25; 100 MG/1; MG/1
1 TABLET ORAL ONCE
Refills: 0 | Status: COMPLETED | OUTPATIENT
Start: 2023-09-28 | End: 2023-09-28

## 2023-09-28 RX ORDER — LANOLIN ALCOHOL/MO/W.PET/CERES
3 CREAM (GRAM) TOPICAL AT BEDTIME
Refills: 0 | Status: DISCONTINUED | OUTPATIENT
Start: 2023-09-28 | End: 2023-10-03

## 2023-09-28 RX ORDER — SIMVASTATIN 20 MG/1
10 TABLET, FILM COATED ORAL AT BEDTIME
Refills: 0 | Status: DISCONTINUED | OUTPATIENT
Start: 2023-09-28 | End: 2023-10-03

## 2023-09-28 RX ORDER — ACETAMINOPHEN 500 MG
650 TABLET ORAL EVERY 6 HOURS
Refills: 0 | Status: DISCONTINUED | OUTPATIENT
Start: 2023-09-28 | End: 2023-10-03

## 2023-09-28 RX ORDER — HYDRALAZINE HCL 50 MG
25 TABLET ORAL
Refills: 0 | Status: DISCONTINUED | OUTPATIENT
Start: 2023-09-28 | End: 2023-09-29

## 2023-09-28 RX ORDER — CHOLECALCIFEROL (VITAMIN D3) 125 MCG
1000 CAPSULE ORAL DAILY
Refills: 0 | Status: DISCONTINUED | OUTPATIENT
Start: 2023-09-28 | End: 2023-10-03

## 2023-09-28 RX ORDER — ACETAMINOPHEN 500 MG
650 TABLET ORAL ONCE
Refills: 0 | Status: DISCONTINUED | OUTPATIENT
Start: 2023-09-28 | End: 2023-09-28

## 2023-09-28 RX ORDER — CLONAZEPAM 1 MG
0.5 TABLET ORAL AT BEDTIME
Refills: 0 | Status: DISCONTINUED | OUTPATIENT
Start: 2023-09-28 | End: 2023-10-03

## 2023-09-28 RX ORDER — LEVOTHYROXINE SODIUM 125 MCG
88 TABLET ORAL DAILY
Refills: 0 | Status: DISCONTINUED | OUTPATIENT
Start: 2023-09-28 | End: 2023-10-03

## 2023-09-28 RX ORDER — FOLIC ACID 0.8 MG
1 TABLET ORAL DAILY
Refills: 0 | Status: DISCONTINUED | OUTPATIENT
Start: 2023-09-28 | End: 2023-10-03

## 2023-09-28 RX ORDER — AMLODIPINE BESYLATE 2.5 MG/1
10 TABLET ORAL DAILY
Refills: 0 | Status: DISCONTINUED | OUTPATIENT
Start: 2023-09-28 | End: 2023-10-03

## 2023-09-28 RX ADMIN — Medication 5 MILLIGRAM(S): at 23:03

## 2023-09-28 RX ADMIN — SENNA PLUS 2 TABLET(S): 8.6 TABLET ORAL at 23:03

## 2023-09-28 RX ADMIN — Medication 650 MILLIGRAM(S): at 23:08

## 2023-09-28 RX ADMIN — SIMVASTATIN 10 MILLIGRAM(S): 20 TABLET, FILM COATED ORAL at 23:47

## 2023-09-28 RX ADMIN — Medication 3 MILLIGRAM(S): at 23:06

## 2023-09-28 RX ADMIN — Medication 10 MILLIGRAM(S): at 23:04

## 2023-09-28 RX ADMIN — Medication 0.5 MILLIGRAM(S): at 23:04

## 2023-09-28 RX ADMIN — CARBIDOPA AND LEVODOPA 1 TABLET(S): 25; 100 TABLET ORAL at 17:34

## 2023-09-28 RX ADMIN — Medication 10 MILLIGRAM(S): at 16:59

## 2023-09-28 RX ADMIN — CARBIDOPA AND LEVODOPA 2 TABLET(S): 25; 100 TABLET ORAL at 23:47

## 2023-09-28 RX ADMIN — TAMSULOSIN HYDROCHLORIDE 0.4 MILLIGRAM(S): 0.4 CAPSULE ORAL at 23:03

## 2023-09-28 RX ADMIN — Medication 250 MILLIGRAM(S): at 23:47

## 2023-09-28 NOTE — ED PROVIDER NOTE - PHYSICAL EXAMINATION
Const: Well appearing, NAD  Eyes: PERRL, EOM intact  CV: RRR, no murmurs, no chest wall tenderness, distal pulses intact  Resp: CTAB, normal resp effort  GI: soft, nondistended, nontender  MSK: Full ROM, no muscle or bony deformity or tenderness  Neuro: AOx3, GCS 15, No focal deficits  Skin: No rash, laceration or abrasion  Psych: calm, cooperative, No SI, HI, hallucination.

## 2023-09-28 NOTE — ED ADULT NURSE NOTE - NSFALLRISKINTERV_ED_ALL_ED

## 2023-09-28 NOTE — ED ADULT NURSE NOTE - OBJECTIVE STATEMENT
Pt presents to er with complaints of hematuria this morning, states he was being treated for a uti, from [facility]. Denies fevers, use of blood thinners at this time. pt urinating freely gave urine sample. blood work drawn MD made aware

## 2023-09-28 NOTE — ED ADULT NURSE NOTE - CHIEF COMPLAINT QUOTE
Pt presents to er with complaints of hematuria this morning, states he was being treated for a uti, from [facility]. Denies fevers, use of blood thinners at this time.

## 2023-09-28 NOTE — ED ADULT NURSE REASSESSMENT NOTE - NS ED NURSE REASSESS COMMENT FT1
Assumed pt. care from day shift RN. Pt. in bed awake, no complaints voiced. Urologist in the room working on perry. Pt. to start on CBI.

## 2023-09-28 NOTE — ED ADULT NURSE REASSESSMENT NOTE - NS ED NURSE REASSESS COMMENT FT1
handoff report given to night shift RN. PT bp has come down. MD aware of most recent bp and is ok with it. Urologist still in room working on perry. no distress noted

## 2023-09-28 NOTE — ED PROVIDER NOTE - PROGRESS NOTE DETAILS
Dain: Spoke with urology, dr Barber. CBI to be initiated. Patietn should be clear to go if urine clears. CC:  Signout received from Dr. Gautam.  Unable to insert 3-way Javier.  paging  back. CC:  Dr. Rodriguez aware of Med admission.  Dr. Cardozo inserting 3-way Javier for CBI.

## 2023-09-28 NOTE — H&P ADULT - NSHPPHYSICALEXAM_GEN_ALL_CORE
T(C): 37.3 (09-28-23 @ 21:37), Max: 37.3 (09-28-23 @ 21:37)  HR: 73 (09-28-23 @ 21:37) (63 - 73)  BP: 182/81 (09-28-23 @ 21:37) (167/70 - 246/95)  RR: 18 (09-28-23 @ 21:37) (18 - 18)  SpO2: 97% (09-28-23 @ 21:37) (97% - 99%)    CONSTITUTIONAL: Well groomed, no apparent distress  EYES: PERRLA and symmetric, EOMI, No conjunctival or scleral injection, non-icteric  ENMT: Oral mucosa with moist membranes. Normal dentition; no pharyngeal injection or exudates             NECK: Supple, symmetric and without tracheal deviation   RESP: No respiratory distress, no use of accessory muscles; CTA b/l, no WRR  CV: RRR, +S1S2, no MRG; no peripheral edema  GI: Soft, NT, ND, no rebound, no guarding; no palpable masses;   LYMPH: No cervical LAD or tenderness;  MSK: Limited ROM in lower extremities, R>L, normal muscle strength/tone  SKIN: No rashes or ulcers noted;   NEURO: CN II-XII intact; normal reflexes in upper and lower extremities, sensation intact in upper and lower extremities b/l to light touch   PSYCH: Appropriate insight/judgment; A+O x 3, mood and affect appropriate,

## 2023-09-28 NOTE — ED ADULT NURSE NOTE - SUICIDE SCREENING QUESTION 3
----- Message from Malcolm Prasad Sr., MD sent at 7/14/2022  5:53 PM EDT -----  Contact patient.  Bacterial vaginosis noted.  Treat with Flagyl twice daily for 2 weeks.  Prescription sent to pharmacy.  
Contacted patient regarding positive BV results. Informed her that her rx was sent to the pharmacy and to take twice daily for two weeks per Dr. Prasad. Patient is aware.  
No

## 2023-09-28 NOTE — PATIENT PROFILE ADULT - FALL HARM RISK - HARM RISK INTERVENTIONS

## 2023-09-28 NOTE — PHARMACOTHERAPY INTERVENTION NOTE - COMMENTS
Medication history complete, reviewed medications with patient provided med list from [facility] and confirmed with doctor first med hx.

## 2023-09-28 NOTE — ED ADULT TRIAGE NOTE - CHIEF COMPLAINT QUOTE
Pt presents to er with complaints of hematuria this morning, states he was being treated for a uti, from [facility]. Denies fevers, use of blood thinners at this time.

## 2023-09-28 NOTE — ED ADULT NURSE REASSESSMENT NOTE - NS ED NURSE REASSESS COMMENT FT1
pt to get continuous bladder irrigation. unsuccessfully attempts to get Javier placed. MD made aware Urology consulted. PTs Bp elevated MD also made aware. hydralazine iv push given. MD contino stated to reassess as BP is coming down and to hold on any other BP meds for now

## 2023-09-28 NOTE — ED PROVIDER NOTE - OBJECTIVE STATEMENT
83 y/o male with a PMHx of CKD, HTN, HLD, hypothyroid, Parkinsons, SBO s/p colostomy presents to the ED BIBA from [facility] c/o hematuria starting this morning. Per wife at bedside, pt recently had a perry catheter removed. Pt then developed a UTI and completed 5 days of abx. Pt's urine was still cloudy and was placed on Cipro 250mg BID 2 days ago. Wife reports hematuria starting this morning. Not on anticoagulation. No other complaints at this time. Urologist: Dr. Leung.

## 2023-09-28 NOTE — ED PROVIDER NOTE - CLINICAL SUMMARY MEDICAL DECISION MAKING FREE TEXT BOX
Patient with gross hematuria. 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 Urology consulted. CBI initiated.

## 2023-09-28 NOTE — H&P ADULT - HISTORY OF PRESENT ILLNESS
81 y/o male with a PMHx of CKD, HTN, HLD, hypothyroid, Parkinsons, SBO s/p colostomy presented to the ED BIBA from [facility] with complain of hematuria starting this morning. Recently admitted to [facility] for UTI and perry was placed. Perry removed outpatient in Dr. Baxter's office. Patient has been treated 3 UTI recently.  Patient subsequently develops gross hematuria and urinary retention. Patient's urine was still cloudy and was placed on Cipro 250mg BID 2 days ago. In the ED Attempts at placement perry by ER staff unsuccessful. Urologist came by in ED and was unable to place 24F coude 3 way perry. Subsequently placed 16F perry over guidewire placed thru flexible cystoscope. Perry was irrigated several times. Few clots recovered and perry then irrigated clear. Not on anticoagulation. No other complaints at this time

## 2023-09-28 NOTE — H&P ADULT - ASSESSMENT
A/P:    1.  Hematuria  Urinary retention  Bladder tumor  -s/p catheter placement in ED by the Urologist  -improved with urinary retention and Hematura  -follow clinically and urologist consult    2.  UTI  -continue Oral Cipro to finish the course    3.  Parkinson's disease  -continue Carbidopa-Levodopa    4.  CKD stage 4  -Cr stable  -will follow     5.  SCD for DVT ppx     6.  Code status: Full code.

## 2023-09-28 NOTE — PROGRESS NOTE ADULT - SUBJECTIVE AND OBJECTIVE BOX
CHIEF COMPLAINT: gross hematuria and urinary retention    HISTORY OF PRESENT ILLNESS: Covering for Dr. Baxter. Pt hx xrt prostate many years ago in [facility]. Recently admitted to [place] for uti and perry placed. Perry removed outpatient Dr. Baxter's office. Pt has been treated 3 uti's recently.  Pt subsequently develops gross hematuria and urinary retention. Attempts at placement perry by ER staff unsuccessful. I was unable to place 24F coude 3 way perry. Subsequently placed 16F perry over guidewire placed thru flexible cystoscope. Was somewhat tight. Perry irrigated several times bulb and michelle syringes. Few clots recovered and perry then irrigated clear.     PAST MEDICAL & SURGICAL HISTORY:  HTN (hypertension)      HLD (hyperlipidemia)      Parkinson disease      CKD (chronic kidney disease)      Hypothyroidism      History of total knee replacement, left          REVIEW OF SYSTEMS:Same previous  MEDICATIONS  (STANDING):    MEDICATIONS  (PRN):  acetaminophen     Tablet .. 650 milliGRAM(s) Oral once PRN Temp greater or equal to 38C (100.4F), Mild Pain (1 - 3)      PE:Same Previous    Allergies    allieve (Rash)  Advil (Rash)    Intolerances        SOCIAL HISTORY:Same previous    FAMILY HISTORY:  No pertinent family history in first degree relatives        Vital Signs Last 24 Hrs  T(C): 36.6 (28 Sep 2023 16:50), Max: 36.7 (28 Sep 2023 13:11)  T(F): 97.9 (28 Sep 2023 16:50), Max: 98.1 (28 Sep 2023 13:11)  HR: 70 (28 Sep 2023 17:43) (63 - 70)  BP: 184/78 (28 Sep 2023 18:55) (184/78 - 246/95)  BP(mean): 110 (28 Sep 2023 18:55) (110 - 136)  RR: 18 (28 Sep 2023 16:50) (18 - 18)  SpO2: 99% (28 Sep 2023 16:50) (98% - 99%)    Parameters below as of 28 Sep 2023 16:50  Patient On (Oxygen Delivery Method): room air  O2 Flow (L/min): 99      PHYSICAL EXAM:Same previous    LABS:                        10.2   7.93  )-----------( 256      ( 28 Sep 2023 13:40 )             31.6     09-28    141  |  111<H>  |  31<H>  ----------------------------<  109<H>  4.3   |  25  |  2.50<H>    Ca    8.7      28 Sep 2023 13:40    TPro  6.9  /  Alb  2.9<L>  /  TBili  0.4  /  DBili  x   /  AST  9<L>  /  ALT  <6<L>  /  AlkPhos  100  09-28    PT/INR - ( 28 Sep 2023 13:40 )   PT: 10.7 sec;   INR: 0.95 ratio         PTT - ( 28 Sep 2023 13:40 )  PTT:33.9 sec  Urinalysis Basic - ( 28 Sep 2023 13:40 )    Color: Red / Appearance: bloody / SG: See Note / pH: x  Gluc: 109 mg/dL / Ketone: See Note  / Bili: See Note / Urobili: See Note   Blood: x / Protein: See Note / Nitrite: See Note   Leuk Esterase: See Note / RBC: >50 /HPF / WBC 6-10 /HPF   Sq Epi: x / Non Sq Epi: x / Bacteria: x      Urine Culture:     RADIOLOGY & ADDITIONAL STUDIES:

## 2023-09-29 LAB
ANION GAP SERPL CALC-SCNC: 7 MMOL/L — SIGNIFICANT CHANGE UP (ref 5–17)
BUN SERPL-MCNC: 31 MG/DL — HIGH (ref 7–23)
CALCIUM SERPL-MCNC: 8.6 MG/DL — SIGNIFICANT CHANGE UP (ref 8.5–10.1)
CHLORIDE SERPL-SCNC: 112 MMOL/L — HIGH (ref 96–108)
CO2 SERPL-SCNC: 25 MMOL/L — SIGNIFICANT CHANGE UP (ref 22–31)
CREAT SERPL-MCNC: 2.38 MG/DL — HIGH (ref 0.5–1.3)
CULTURE RESULTS: SIGNIFICANT CHANGE UP
EGFR: 27 ML/MIN/1.73M2 — LOW
GLUCOSE SERPL-MCNC: 97 MG/DL — SIGNIFICANT CHANGE UP (ref 70–99)
HCT VFR BLD CALC: 26.6 % — LOW (ref 39–50)
HGB BLD-MCNC: 8.7 G/DL — LOW (ref 13–17)
MCHC RBC-ENTMCNC: 32.7 GM/DL — SIGNIFICANT CHANGE UP (ref 32–36)
MCHC RBC-ENTMCNC: 32.7 PG — SIGNIFICANT CHANGE UP (ref 27–34)
MCV RBC AUTO: 100 FL — SIGNIFICANT CHANGE UP (ref 80–100)
PLATELET # BLD AUTO: 235 K/UL — SIGNIFICANT CHANGE UP (ref 150–400)
POTASSIUM SERPL-MCNC: 4.1 MMOL/L — SIGNIFICANT CHANGE UP (ref 3.5–5.3)
POTASSIUM SERPL-SCNC: 4.1 MMOL/L — SIGNIFICANT CHANGE UP (ref 3.5–5.3)
RBC # BLD: 2.66 M/UL — LOW (ref 4.2–5.8)
RBC # FLD: 15 % — HIGH (ref 10.3–14.5)
SODIUM SERPL-SCNC: 144 MMOL/L — SIGNIFICANT CHANGE UP (ref 135–145)
SPECIMEN SOURCE: SIGNIFICANT CHANGE UP
WBC # BLD: 7.41 K/UL — SIGNIFICANT CHANGE UP (ref 3.8–10.5)
WBC # FLD AUTO: 7.41 K/UL — SIGNIFICANT CHANGE UP (ref 3.8–10.5)

## 2023-09-29 PROCEDURE — 99232 SBSQ HOSP IP/OBS MODERATE 35: CPT

## 2023-09-29 RX ORDER — HYDRALAZINE HCL 50 MG
10 TABLET ORAL ONCE
Refills: 0 | Status: COMPLETED | OUTPATIENT
Start: 2023-09-29 | End: 2023-09-29

## 2023-09-29 RX ORDER — HYDRALAZINE HCL 50 MG
50 TABLET ORAL
Refills: 0 | Status: DISCONTINUED | OUTPATIENT
Start: 2023-09-29 | End: 2023-09-30

## 2023-09-29 RX ADMIN — Medication 1000 UNIT(S): at 09:36

## 2023-09-29 RX ADMIN — Medication 50 MILLIGRAM(S): at 20:40

## 2023-09-29 RX ADMIN — Medication 25 MILLIGRAM(S): at 06:46

## 2023-09-29 RX ADMIN — Medication 250 MILLIGRAM(S): at 20:39

## 2023-09-29 RX ADMIN — CARBIDOPA AND LEVODOPA 2 TABLET(S): 25; 100 TABLET ORAL at 06:29

## 2023-09-29 RX ADMIN — Medication 250 MILLIGRAM(S): at 09:37

## 2023-09-29 RX ADMIN — Medication 1 MILLIGRAM(S): at 09:36

## 2023-09-29 RX ADMIN — Medication 5 MILLIGRAM(S): at 20:39

## 2023-09-29 RX ADMIN — SIMVASTATIN 10 MILLIGRAM(S): 20 TABLET, FILM COATED ORAL at 20:40

## 2023-09-29 RX ADMIN — Medication 3 MILLIGRAM(S): at 20:40

## 2023-09-29 RX ADMIN — FINASTERIDE 5 MILLIGRAM(S): 5 TABLET, FILM COATED ORAL at 09:36

## 2023-09-29 RX ADMIN — CARBIDOPA AND LEVODOPA 2 TABLET(S): 25; 100 TABLET ORAL at 23:35

## 2023-09-29 RX ADMIN — Medication 88 MICROGRAM(S): at 06:46

## 2023-09-29 RX ADMIN — TAMSULOSIN HYDROCHLORIDE 0.4 MILLIGRAM(S): 0.4 CAPSULE ORAL at 20:40

## 2023-09-29 RX ADMIN — CARBIDOPA AND LEVODOPA 2 TABLET(S): 25; 100 TABLET ORAL at 12:33

## 2023-09-29 RX ADMIN — Medication 0.5 MILLIGRAM(S): at 20:39

## 2023-09-29 RX ADMIN — AMLODIPINE BESYLATE 10 MILLIGRAM(S): 2.5 TABLET ORAL at 08:40

## 2023-09-29 RX ADMIN — Medication 10 MILLIGRAM(S): at 23:35

## 2023-09-29 RX ADMIN — POLYETHYLENE GLYCOL 3350 17 GRAM(S): 17 POWDER, FOR SOLUTION ORAL at 11:56

## 2023-09-29 RX ADMIN — SENNA PLUS 2 TABLET(S): 8.6 TABLET ORAL at 20:40

## 2023-09-29 RX ADMIN — CARBIDOPA AND LEVODOPA 2 TABLET(S): 25; 100 TABLET ORAL at 17:52

## 2023-09-29 NOTE — PROGRESS NOTE ADULT - SUBJECTIVE AND OBJECTIVE BOX
HPI:  83 y/o male with a PMHx of CKD, HTN, HLD, hypothyroid, Parkinsons, SBO s/p colostomy presented to the ED BIBA from [facility] with complain of hematuria . Recently admitted to [place] for UTI and perry was placed. Perry removed outpatient in Dr. Baxter's office. Patient has been treated 3 UTI recently.  Patient subsequently develops gross hematuria and urinary retention. Patient's urine was still cloudy and was placed on Cipro 250mg BID 2 days ago. In the ED Attempts at placement perry by ER staff unsuccessful. Urologist came by in ED and was unable to place 24F coude 3 way perry. Subsequently placed 16F perry over guidewire placed thru flexible cystoscope. Perry was irrigated several times. Few clots recovered and perry then irrigated clear.        (28 Sep 2023 22:43)      Review of Systems:  CONSTITUTIONAL: No weakness, fevers or chills  EYES/ENT: No visual changes;  No vertigo or throat pain   NECK: No pain or stiffness  RESPIRATORY: No cough, wheezing, hemoptysis; No shortness of breath,   CARDIOVASCULAR: No chest pain or palpitations  GASTROINTESTINAL: No abdominal or epigastric pain. No nausea, vomiting, or hematemesis; No diarrhea or constipation.   GENITOURINARY: hematuria  NEUROLOGICAL: No numbness or weakness  SKIN: No itching, burning, rashes, or lesions   All other review of systems is negative unless indicated above    PHYSICAL EXAM:    Vital Signs Last 24 Hrs  T(C): 36.4 (29 Sep 2023 09:04), Max: 37.3 (28 Sep 2023 21:37)  T(F): 97.5 (29 Sep 2023 09:04), Max: 99.1 (28 Sep 2023 21:37)  HR: 53 (29 Sep 2023 10:33) (53 - 73)  BP: 152/65 (29 Sep 2023 10:33) (152/65 - 246/95)  BP(mean): 110 (28 Sep 2023 18:55) (110 - 136)  RR: 18 (29 Sep 2023 09:04) (18 - 18)  SpO2: 98% (29 Sep 2023 09:04) (97% - 99%)    Parameters below as of 29 Sep 2023 09:04  Patient On (Oxygen Delivery Method): room air        GENERAL: comfortable   HEAD:  Atraumatic, Normocephalic  EYES: EOMI, PERRLA, conjunctiva and sclera clear  HEENT: Moist mucous membranes  NECK: Supple, No JVD  NERVOUS SYSTEM:  Alert & Oriented X3, Motor Strength 5/5 B/L upper and lower extremities; DTRs 2+ intact and symmetric  CHEST/LUNG: Clear to auscultation bilaterally; No rales, rhonchi, wheezing, or rubs  HEART:S1S2 normal, no murmer  ABDOMEN: Soft, Nontender, Nondistended; Bowel sounds present  GENITOURINARY- , no palpable bladder  EXTREMITIES:  2+ Peripheral Pulses, No clubbing, cyanosis, or edema  MUSCULOSKELTAL- No muscle tenderness, Muscle tone normal, No joint tenderness, no Joint swelling, Joint range of motion-normal  SKIN-no rash, no lesion  PSYCH- Mood stable  LYMPH Node- No palpable lymph node    LABS:                        8.7    7.41  )-----------( 235      ( 29 Sep 2023 08:09 )             26.6     09-29    144  |  112<H>  |  31<H>  ----------------------------<  97  4.1   |  25  |  2.38<H>    Ca    8.6      29 Sep 2023 08:09    TPro  6.9  /  Alb  2.9<L>  /  TBili  0.4  /  DBili  x   /  AST  9<L>  /  ALT  <6<L>  /  AlkPhos  100  09-28    PT/INR - ( 28 Sep 2023 13:40 )   PT: 10.7 sec;   INR: 0.95 ratio         PTT - ( 28 Sep 2023 13:40 )  PTT:33.9 sec  Urinalysis Basic - ( 29 Sep 2023 08:09 )    Color: x / Appearance: x / SG: x / pH: x  Gluc: 97 mg/dL / Ketone: x  / Bili: x / Urobili: x   Blood: x / Protein: x / Nitrite: x   Leuk Esterase: x / RBC: x / WBC x   Sq Epi: x / Non Sq Epi: x / Bacteria: x        CAPILLARY BLOOD GLUCOSE                Standing medicine  acetaminophen     Tablet .. 650 milliGRAM(s) Oral every 6 hours PRN  aluminum hydroxide/magnesium hydroxide/simethicone Suspension 30 milliLiter(s) Oral every 4 hours PRN  amLODIPine   Tablet 10 milliGRAM(s) Oral daily  bisacodyl 5 milliGRAM(s) Oral at bedtime  carbidopa/levodopa  25/100 2 Tablet(s) Oral four times a day  cholecalciferol 1000 Unit(s) Oral daily  ciprofloxacin     Tablet 250 milliGRAM(s) Oral two times a day  clonazePAM  Tablet 0.5 milliGRAM(s) Oral at bedtime  finasteride 5 milliGRAM(s) Oral daily  folic acid 1 milliGRAM(s) Oral daily  hydrALAZINE 25 milliGRAM(s) Oral two times a day  levothyroxine 88 MICROGram(s) Oral daily  melatonin 3 milliGRAM(s) Oral at bedtime  ondansetron Injectable 4 milliGRAM(s) IV Push every 8 hours PRN  polyethylene glycol 3350 17 Gram(s) Oral daily  senna 2 Tablet(s) Oral at bedtime  simvastatin 10 milliGRAM(s) Oral at bedtime  tamsulosin 0.4 milliGRAM(s) Oral at bedtime

## 2023-09-29 NOTE — PROGRESS NOTE ADULT - PROBLEM SELECTOR PLAN 1
16 F perry in place. Perry irrigates clear at this time. CT reports cystitis and 1.4 lesion in bladder that will need to be explored in future. Pt will ultimately follow up with Dr. Baxter for further evaluation and treatment.
Pt currently with perry. Urine yellow fátima. Pt can be discharged home perry in place  perspective and can follow up Dr. Baxter for outpatient perry management and future outpatient cystoscopy.

## 2023-09-29 NOTE — PROGRESS NOTE ADULT - SUBJECTIVE AND OBJECTIVE BOX
CHIEF COMPLAINT: urinary retention    HISTORY OF PRESENT ILLNESS: See prior note. Javier placed over guidewire and bladder irriagted clear. Lesion seen on ct either clot or small bladder lesion    PAST MEDICAL & SURGICAL HISTORY:  HTN (hypertension)      HLD (hyperlipidemia)      Parkinson disease      CKD (chronic kidney disease)      Hypothyroidism      History of total knee replacement, left          REVIEW OF SYSTEMS:Same previous  MEDICATIONS  (STANDING):  amLODIPine   Tablet 10 milliGRAM(s) Oral daily  bisacodyl 5 milliGRAM(s) Oral at bedtime  carbidopa/levodopa  25/100 2 Tablet(s) Oral four times a day  cholecalciferol 1000 Unit(s) Oral daily  ciprofloxacin     Tablet 250 milliGRAM(s) Oral two times a day  clonazePAM  Tablet 0.5 milliGRAM(s) Oral at bedtime  finasteride 5 milliGRAM(s) Oral daily  folic acid 1 milliGRAM(s) Oral daily  hydrALAZINE 50 milliGRAM(s) Oral two times a day  levothyroxine 88 MICROGram(s) Oral daily  melatonin 3 milliGRAM(s) Oral at bedtime  polyethylene glycol 3350 17 Gram(s) Oral daily  senna 2 Tablet(s) Oral at bedtime  simvastatin 10 milliGRAM(s) Oral at bedtime  tamsulosin 0.4 milliGRAM(s) Oral at bedtime    MEDICATIONS  (PRN):  acetaminophen     Tablet .. 650 milliGRAM(s) Oral every 6 hours PRN Temp greater or equal to 38C (100.4F), Mild Pain (1 - 3)  aluminum hydroxide/magnesium hydroxide/simethicone Suspension 30 milliLiter(s) Oral every 4 hours PRN Dyspepsia  ondansetron Injectable 4 milliGRAM(s) IV Push every 8 hours PRN Nausea and/or Vomiting      PE:Same Previous    Allergies    allieve (Rash)  Advil (Rash)    Intolerances        SOCIAL HISTORY:Same previous    FAMILY HISTORY:  No pertinent family history in first degree relatives        Vital Signs Last 24 Hrs  T(C): 36.4 (29 Sep 2023 09:04), Max: 37.3 (28 Sep 2023 21:37)  T(F): 97.5 (29 Sep 2023 09:04), Max: 99.1 (28 Sep 2023 21:37)  HR: 53 (29 Sep 2023 10:33) (53 - 73)  BP: 152/65 (29 Sep 2023 10:33) (152/65 - 246/95)  BP(mean): 110 (28 Sep 2023 18:55) (110 - 136)  RR: 18 (29 Sep 2023 09:04) (18 - 18)  SpO2: 98% (29 Sep 2023 09:04) (97% - 99%)    Parameters below as of 29 Sep 2023 09:04  Patient On (Oxygen Delivery Method): room air        PHYSICAL EXAM:Same previous    LABS:                        8.7    7.41  )-----------( 235      ( 29 Sep 2023 08:09 )             26.6     09-29    144  |  112<H>  |  31<H>  ----------------------------<  97  4.1   |  25  |  2.38<H>    Ca    8.6      29 Sep 2023 08:09    TPro  6.9  /  Alb  2.9<L>  /  TBili  0.4  /  DBili  x   /  AST  9<L>  /  ALT  <6<L>  /  AlkPhos  100  09-28    PT/INR - ( 28 Sep 2023 13:40 )   PT: 10.7 sec;   INR: 0.95 ratio         PTT - ( 28 Sep 2023 13:40 )  PTT:33.9 sec  Urinalysis Basic - ( 29 Sep 2023 08:09 )    Color: x / Appearance: x / SG: x / pH: x  Gluc: 97 mg/dL / Ketone: x  / Bili: x / Urobili: x   Blood: x / Protein: x / Nitrite: x   Leuk Esterase: x / RBC: x / WBC x   Sq Epi: x / Non Sq Epi: x / Bacteria: x      Urine Culture:     RADIOLOGY & ADDITIONAL STUDIES:

## 2023-09-30 LAB
HCT VFR BLD CALC: 27.8 % — LOW (ref 39–50)
HGB BLD-MCNC: 9.1 G/DL — LOW (ref 13–17)
MCHC RBC-ENTMCNC: 32.7 GM/DL — SIGNIFICANT CHANGE UP (ref 32–36)
MCHC RBC-ENTMCNC: 32.7 PG — SIGNIFICANT CHANGE UP (ref 27–34)
MCV RBC AUTO: 100 FL — SIGNIFICANT CHANGE UP (ref 80–100)
PLATELET # BLD AUTO: 234 K/UL — SIGNIFICANT CHANGE UP (ref 150–400)
RBC # BLD: 2.78 M/UL — LOW (ref 4.2–5.8)
RBC # FLD: 15 % — HIGH (ref 10.3–14.5)
WBC # BLD: 8.08 K/UL — SIGNIFICANT CHANGE UP (ref 3.8–10.5)
WBC # FLD AUTO: 8.08 K/UL — SIGNIFICANT CHANGE UP (ref 3.8–10.5)

## 2023-09-30 PROCEDURE — 99232 SBSQ HOSP IP/OBS MODERATE 35: CPT

## 2023-09-30 RX ORDER — HYDRALAZINE HCL 50 MG
75 TABLET ORAL
Refills: 0 | Status: DISCONTINUED | OUTPATIENT
Start: 2023-09-30 | End: 2023-10-01

## 2023-09-30 RX ADMIN — Medication 250 MILLIGRAM(S): at 21:11

## 2023-09-30 RX ADMIN — Medication 0.5 MILLIGRAM(S): at 21:11

## 2023-09-30 RX ADMIN — Medication 250 MILLIGRAM(S): at 10:23

## 2023-09-30 RX ADMIN — Medication 3 MILLIGRAM(S): at 21:10

## 2023-09-30 RX ADMIN — FINASTERIDE 5 MILLIGRAM(S): 5 TABLET, FILM COATED ORAL at 10:22

## 2023-09-30 RX ADMIN — CARBIDOPA AND LEVODOPA 2 TABLET(S): 25; 100 TABLET ORAL at 14:09

## 2023-09-30 RX ADMIN — CARBIDOPA AND LEVODOPA 2 TABLET(S): 25; 100 TABLET ORAL at 23:31

## 2023-09-30 RX ADMIN — SIMVASTATIN 10 MILLIGRAM(S): 20 TABLET, FILM COATED ORAL at 21:11

## 2023-09-30 RX ADMIN — SENNA PLUS 2 TABLET(S): 8.6 TABLET ORAL at 21:11

## 2023-09-30 RX ADMIN — Medication 650 MILLIGRAM(S): at 17:04

## 2023-09-30 RX ADMIN — Medication 75 MILLIGRAM(S): at 21:10

## 2023-09-30 RX ADMIN — AMLODIPINE BESYLATE 10 MILLIGRAM(S): 2.5 TABLET ORAL at 10:22

## 2023-09-30 RX ADMIN — Medication 1 MILLIGRAM(S): at 10:22

## 2023-09-30 RX ADMIN — Medication 50 MILLIGRAM(S): at 10:22

## 2023-09-30 RX ADMIN — Medication 1000 UNIT(S): at 10:22

## 2023-09-30 RX ADMIN — POLYETHYLENE GLYCOL 3350 17 GRAM(S): 17 POWDER, FOR SOLUTION ORAL at 10:23

## 2023-09-30 RX ADMIN — CARBIDOPA AND LEVODOPA 2 TABLET(S): 25; 100 TABLET ORAL at 18:15

## 2023-09-30 RX ADMIN — Medication 5 MILLIGRAM(S): at 21:12

## 2023-09-30 RX ADMIN — CARBIDOPA AND LEVODOPA 2 TABLET(S): 25; 100 TABLET ORAL at 10:26

## 2023-09-30 RX ADMIN — TAMSULOSIN HYDROCHLORIDE 0.4 MILLIGRAM(S): 0.4 CAPSULE ORAL at 21:11

## 2023-09-30 NOTE — PROGRESS NOTE ADULT - SUBJECTIVE AND OBJECTIVE BOX
HPI:  83 y/o male with a PMHx of CKD, HTN, HLD, hypothyroid, Parkinsons, SBO s/p colostomy presented to the ED BIBA from [facility] with complain of hematuria . Recently admitted to [place] for UTI and perry was placed. Perry removed outpatient in Dr. Baxter's office. Patient has been treated 3 UTI recently.  Patient subsequently develops gross hematuria and urinary retention. Patient's urine was still cloudy and was placed on Cipro 250mg BID 2 days ago. In the ED Attempts at placement perry by ER staff unsuccessful. Urologist came by in ED and was unable to place 24F coude 3 way perry. Subsequently placed 16F perry over guidewire placed thru flexible cystoscope. Perry was irrigated several times. Few clots recovered and perry then irrigated clear.        (28 Sep 2023 22:43)      Review of Systems:  CONSTITUTIONAL: No weakness, fevers or chills  EYES/ENT: No visual changes;  No vertigo or throat pain   NECK: No pain or stiffness  RESPIRATORY: No cough, wheezing, hemoptysis; No shortness of breath,   CARDIOVASCULAR: No chest pain or palpitations  GASTROINTESTINAL: No abdominal or epigastric pain. No nausea, vomiting, or hematemesis; No diarrhea or constipation.   GENITOURINARY: hematuria  NEUROLOGICAL: No numbness or weakness  SKIN: No itching, burning, rashes, or lesions   All other review of systems is negative unless indicated above    PHYSICAL EXAM:    Vital Signs Last 24 Hrs  T(C): 36.9 (30 Sep 2023 08:17), Max: 37.1 (29 Sep 2023 20:34)  T(F): 98.4 (30 Sep 2023 08:17), Max: 98.8 (29 Sep 2023 20:34)  HR: 58 (30 Sep 2023 08:17) (53 - 101)  BP: 176/72 (30 Sep 2023 08:17) (135/57 - 197/78)  BP(mean): --  RR: 18 (30 Sep 2023 08:17) (18 - 18)  SpO2: 96% (30 Sep 2023 08:17) (96% - 99%)    Parameters below as of 30 Sep 2023 08:17  Patient On (Oxygen Delivery Method): room air            GENERAL: comfortable   HEAD:  Atraumatic, Normocephalic  EYES: EOMI, PERRLA, conjunctiva and sclera clear  HEENT: Moist mucous membranes  NECK: Supple, No JVD  NERVOUS SYSTEM:  Alert & Oriented X3, Motor Strength 5/5 B/L upper and lower extremities; DTRs 2+ intact and symmetric  CHEST/LUNG: Clear to auscultation bilaterally; No rales, rhonchi, wheezing, or rubs  HEART:S1S2 normal, no murmer  ABDOMEN: Soft, Nontender, Nondistended; Bowel sounds present  GENITOURINARY- , no palpable bladder  EXTREMITIES:  2+ Peripheral Pulses, No clubbing, cyanosis, or edema  MUSCULOSKELTAL- No muscle tenderness, Muscle tone normal, No joint tenderness, no Joint swelling, Joint range of motion-normal  SKIN-no rash, no lesion  PSYCH- Mood stable  LYMPH Node- No palpable lymph node                           8.7    7.41  )-----------( 235      ( 29 Sep 2023 08:09 )             26.6     09-29    144  |  112<H>  |  31<H>  ----------------------------<  97  4.1   |  25  |  2.38<H>    Ca    8.6      29 Sep 2023 08:09          Culture - Urine (collected 28 Sep 2023 13:40)  Source: Clean Catch Clean Catch (Midstream)  Final Report (29 Sep 2023 16:17):    <10,000 CFU/mL Normal Urogenital Lolita        Urinalysis Basic - ( 29 Sep 2023 08:09 )    Color: x / Appearance: x / SG: x / pH: x  Gluc: 97 mg/dL / Ketone: x  / Bili: x / Urobili: x   Blood: x / Protein: x / Nitrite: x   Leuk Esterase: x / RBC: x / WBC x   Sq Epi: x / Non Sq Epi: x / Bacteria: x          CAPILLARY BLOOD GLUCOSE          Culture - Urine (collected 28 Sep 2023 13:40)  Source: Clean Catch Clean Catch (Midstream)  Final Report (29 Sep 2023 16:17):    <10,000 CFU/mL Normal Urogenital Lolita      MEDICATIONS  (STANDING):  amLODIPine   Tablet 10 milliGRAM(s) Oral daily  bisacodyl 5 milliGRAM(s) Oral at bedtime  carbidopa/levodopa  25/100 2 Tablet(s) Oral four times a day  cholecalciferol 1000 Unit(s) Oral daily  ciprofloxacin     Tablet 250 milliGRAM(s) Oral two times a day  clonazePAM  Tablet 0.5 milliGRAM(s) Oral at bedtime  finasteride 5 milliGRAM(s) Oral daily  folic acid 1 milliGRAM(s) Oral daily  hydrALAZINE 75 milliGRAM(s) Oral two times a day  levothyroxine 88 MICROGram(s) Oral daily  melatonin 3 milliGRAM(s) Oral at bedtime  polyethylene glycol 3350 17 Gram(s) Oral daily  senna 2 Tablet(s) Oral at bedtime  simvastatin 10 milliGRAM(s) Oral at bedtime  tamsulosin 0.4 milliGRAM(s) Oral at bedtime    MEDICATIONS  (PRN):  acetaminophen     Tablet .. 650 milliGRAM(s) Oral every 6 hours PRN Temp greater or equal to 38C (100.4F), Mild Pain (1 - 3)  aluminum hydroxide/magnesium hydroxide/simethicone Suspension 30 milliLiter(s) Oral every 4 hours PRN Dyspepsia  ondansetron Injectable 4 milliGRAM(s) IV Push every 8 hours PRN Nausea and/or Vomiting

## 2023-10-01 PROCEDURE — 99232 SBSQ HOSP IP/OBS MODERATE 35: CPT

## 2023-10-01 RX ORDER — HYDRALAZINE HCL 50 MG
50 TABLET ORAL THREE TIMES A DAY
Refills: 0 | Status: DISCONTINUED | OUTPATIENT
Start: 2023-10-01 | End: 2023-10-03

## 2023-10-01 RX ADMIN — Medication 1000 UNIT(S): at 10:02

## 2023-10-01 RX ADMIN — SENNA PLUS 2 TABLET(S): 8.6 TABLET ORAL at 22:12

## 2023-10-01 RX ADMIN — FINASTERIDE 5 MILLIGRAM(S): 5 TABLET, FILM COATED ORAL at 10:02

## 2023-10-01 RX ADMIN — Medication 250 MILLIGRAM(S): at 22:12

## 2023-10-01 RX ADMIN — Medication 50 MILLIGRAM(S): at 17:56

## 2023-10-01 RX ADMIN — TAMSULOSIN HYDROCHLORIDE 0.4 MILLIGRAM(S): 0.4 CAPSULE ORAL at 22:12

## 2023-10-01 RX ADMIN — Medication 75 MILLIGRAM(S): at 10:03

## 2023-10-01 RX ADMIN — Medication 3 MILLIGRAM(S): at 22:12

## 2023-10-01 RX ADMIN — Medication 250 MILLIGRAM(S): at 10:02

## 2023-10-01 RX ADMIN — CARBIDOPA AND LEVODOPA 2 TABLET(S): 25; 100 TABLET ORAL at 17:53

## 2023-10-01 RX ADMIN — Medication 5 MILLIGRAM(S): at 22:12

## 2023-10-01 RX ADMIN — CARBIDOPA AND LEVODOPA 2 TABLET(S): 25; 100 TABLET ORAL at 23:31

## 2023-10-01 RX ADMIN — CARBIDOPA AND LEVODOPA 2 TABLET(S): 25; 100 TABLET ORAL at 06:39

## 2023-10-01 RX ADMIN — Medication 1 MILLIGRAM(S): at 10:02

## 2023-10-01 RX ADMIN — Medication 88 MICROGRAM(S): at 06:39

## 2023-10-01 RX ADMIN — AMLODIPINE BESYLATE 10 MILLIGRAM(S): 2.5 TABLET ORAL at 10:02

## 2023-10-01 RX ADMIN — POLYETHYLENE GLYCOL 3350 17 GRAM(S): 17 POWDER, FOR SOLUTION ORAL at 10:02

## 2023-10-01 RX ADMIN — CARBIDOPA AND LEVODOPA 2 TABLET(S): 25; 100 TABLET ORAL at 11:31

## 2023-10-01 RX ADMIN — SIMVASTATIN 10 MILLIGRAM(S): 20 TABLET, FILM COATED ORAL at 22:17

## 2023-10-01 RX ADMIN — Medication 0.5 MILLIGRAM(S): at 22:13

## 2023-10-01 RX ADMIN — Medication 50 MILLIGRAM(S): at 22:17

## 2023-10-01 NOTE — PROGRESS NOTE ADULT - SUBJECTIVE AND OBJECTIVE BOX
CC: Hematuria, Bladder tumor, Urinary retention     HPI: 81 y/o male with a PMHx of CKD, HTN, HLD, hypothyroid, Parkinsons, SBO s/p colostomy presented to the ED BIBA from [facility] with complain of hematuria starting this morning. Recently admitted to [place] for UTI and perry was placed. Perry removed outpatient in Dr. Baxter's office. Patient has been treated 3 UTI recently.  Patient subsequently develops gross hematuria and urinary retention. Patient's urine was still cloudy and was placed on Cipro 250mg BID 2 days ago. In the ED Attempts at placement perry by ER staff unsuccessful. Urologist came by in ED and was unable to place 24F coude 3 way perry. Subsequently placed 16F perry over guidewire placed thru flexible cystoscope. Perry was irrigated several times. Few clots recovered and perry then irrigated clear. Not on anticoagulation. No other complaints at this time        INTERVAL HPI/OVERNIGHT EVENTS:    Vital Signs Last 24 Hrs  T(C): 36.8 (01 Oct 2023 07:38), Max: 36.8 (01 Oct 2023 07:38)  T(F): 98.2 (01 Oct 2023 07:38), Max: 98.2 (01 Oct 2023 07:38)  HR: 57 (01 Oct 2023 07:38) (57 - 66)  BP: 164/68 (01 Oct 2023 07:38) (144/66 - 164/68)  RR: 20 (01 Oct 2023 07:38) (18 - 20)  SpO2: 97% (01 Oct 2023 07:38) (96% - 97%)    Parameters below as of 01 Oct 2023 07:38  Patient On (Oxygen Delivery Method): room air          REVIEW OF SYSTEMS:  All other review of systems is negative unless indicated above.        PHYSICAL EXAM:    General: Well developed; in no acute distress  Eyes: PERRLA, EOMI; conjunctiva and sclera clear  Head: Normocephalic; atraumatic  ENMT: No nasal discharge; airway clear  Neck: Supple; non tender; no masses  Respiratory: No wheezes, rales or rhonchi  Cardiovascular: Regular rate and rhythm. S1 and S2 Normal; No murmurs, gallops or rubs  Gastrointestinal: Soft non-tender non-distended; Normal bowel sounds  Genitourinary: No  suprapubic  tenderness  Extremities: Normal range of motion, No clubbing, cyanosis or edema  Vascular: Peripheral pulses palpable 2+ bilaterally  Neurological: Alert and oriented x4  Skin: Warm and dry. No acute rash  Lymph Nodes: No acute cervical adenopathy  Musculoskeletal: Normal muscle tone, without deformities  Psychiatric: Cooperative and appropriate                            9.1    8.08  )-----------( 234      ( 30 Sep 2023 17:11 )             27.8         MEDICATIONS  (STANDING):  amLODIPine   Tablet 10 milliGRAM(s) Oral daily  bisacodyl 5 milliGRAM(s) Oral at bedtime  carbidopa/levodopa  25/100 2 Tablet(s) Oral four times a day  cholecalciferol 1000 Unit(s) Oral daily  ciprofloxacin     Tablet 250 milliGRAM(s) Oral two times a day  clonazePAM  Tablet 0.5 milliGRAM(s) Oral at bedtime  finasteride 5 milliGRAM(s) Oral daily  folic acid 1 milliGRAM(s) Oral daily  hydrALAZINE 75 milliGRAM(s) Oral two times a day  levothyroxine 88 MICROGram(s) Oral daily  melatonin 3 milliGRAM(s) Oral at bedtime  polyethylene glycol 3350 17 Gram(s) Oral daily  senna 2 Tablet(s) Oral at bedtime  simvastatin 10 milliGRAM(s) Oral at bedtime  tamsulosin 0.4 milliGRAM(s) Oral at bedtime    MEDICATIONS  (PRN):  acetaminophen     Tablet .. 650 milliGRAM(s) Oral every 6 hours PRN Temp greater or equal to 38C (100.4F), Mild Pain (1 - 3)  aluminum hydroxide/magnesium hydroxide/simethicone Suspension 30 milliLiter(s) Oral every 4 hours PRN Dyspepsia  ondansetron Injectable 4 milliGRAM(s) IV Push every 8 hours PRN Nausea and/or Vomiting      RADIOLOGY & ADDITIONAL TESTS:    ACC: [id number] EXAM:  XR CHEST PORTABLE URGENT 1V   ORDERED BY: MICHELLE GREEN     PROCEDURE DATE:  09/28/2023          INTERPRETATION:  An AP portable chest x-ray was performed for lower   extremity edema and cough.    Comparison is made to 8/27/2023.    There is linear atelectasis in the left greater than right lower lobes   with otherwise clear lungs on both sides. No infiltrates are seen. There   is no pneumothorax. There are no pleural effusions. There is no hilar or   mediastinal widening.The cardiac silhouette is prominent but may be   partially magnified by technique. While there is some engorgement of   central pulmonary veins along with an atherosclerotic aorta, there is no   pulmonary edema. The bony thorax remains stable.    IMPRESSION:  1. Mild bibasilar subsegmental atelectasis, left greater than right.  2. The cardiac silhouette is slightly prominent, perhaps magnified by   technique and while seen with mild central pulmonary venous engorgement,   there is no pulmonary edema.        ACC: [id number] EXAM:  CT ABDOMEN AND PELVIS   ORDERED BY: MICHELLE GREEN     PROCEDURE DATE:  09/28/2023          INTERPRETATION:  CLINICAL INFORMATION: 82 years  Male with hematuria.    COMPARISON: 8/27/2023    CONTRAST/COMPLICATIONS:  IV Contrast: NONE  Oral Contrast: NONE  Complications: None reported at time of study completion    PROCEDURE:  CT of the Abdomen and Pelvis was performed.  Sagittal and coronal reformats were performed.    FINDINGS:  LOWER CHEST: Trace bilateral pleural effusions.Coronary atherosclerosis.    LIVER: Punctate calcified granuloma. Scattered subcentimeter   hypodensities too small to characterize.  BILE DUCTS: Normal caliber.  GALLBLADDER: Within normal limits.  SPLEEN: Within normal limits.  PANCREAS: Within normal limits.  ADRENALS: Within normal limits.  KIDNEYS/URETERS: Bilateral cysts and multiple bilateral indeterminate   hyperdensities. The largest in the right kidney is unchanged measuring   5.5 x 5.3 cm (2:35), previously 5.4 x 5.4 cm.    BLADDER: Diffuse wall thickening with perivesical fat stranding. Focal   right posterior bladder wall thickening measuring up to 1.4 cm (2:87)  REPRODUCTIVE ORGANS: Enlarged prostate containing fiducial markers.    BOWEL: No bowel obstruction. Appendix is not visualized. No evidence of   inflammation in the pericecal region.. Large sliding hiatus hernia   unchanged. Left lower quadrant end colostomy and rectosigmoid stump.  PERITONEUM: No ascites.  VESSELS: Within normal limits.  RETROPERITONEUM/LYMPH NODES:No lymphadenopathy.  ABDOMINAL WALL: Bilateral fat-containing inguinal hernias. Small volume   fluid in the right hernia. Left lower quadrant colostomy. Moderate stool   burden throughout the colon.  BONES: Degenerative changes most pronounced at L1-2 and L2-3. Stable L2   and L3 wedge compression deformities. Suspect prior healed L2-3 discitis.    IMPRESSION:    Focal right posterior bladder wall thickening concerning for malignancy.   There is probable acute cystitis as well. Recommend follow-up cystoscopy   after treatment to evaluate for bladder malignancy.    Enlarged prostate with fiducial markers.    Redemonstrated dominant indeterminate right renal mass. Recommend   characterization with MRI.    Left lower quadrant colostomy. Moderateconstipation unchanged. No bowel   obstruction.    Suspect prior healed L2-3 discitis.   CC: Hematuria, Bladder tumor, Urinary retention     HPI: 81 y/o male with a PMHx of CKD, HTN, HLD, hypothyroid, Parkinsons, SBO s/p colostomy presented to the ED BIBA from [facility] with complain of hematuria starting this morning. Recently admitted to [place] for UTI and perry was placed. Perry removed outpatient in Dr. Baxter's office. Patient has been treated 3 UTI recently.  Patient subsequently develops gross hematuria and urinary retention. Patient's urine was still cloudy and was placed on Cipro 250mg BID 2 days ago. In the ED Attempts at placement perry by ER staff unsuccessful. Urologist came by in ED and was unable to place 24F coude 3 way perry. Subsequently placed 16F perry over guidewire placed thru flexible cystoscope. Perry was irrigated several times. Few clots recovered and perry then irrigated clear. Not on anticoagulation. No other complaints at this time      INTERVAL HPI/ OVERNIGHT EVENTS:  chart reviewed,  Pt was seen and examined, spouse at bedside  confirmed history above as pt is unable to provide history.  Sleeping comfortably, wakes up briefly, recognized wife  but  didnt remember name.  No further bleeding, urine yellow. Pt is confused, slightly worse from baseline but as per wife had few hospital admissions and recent COVID and declined  overall.  Results and plan discussed. as per wife Pt has known renal lesion, followed by nephrologist, aware that has to further follow up  due to slight growth     Vital Signs Last 24 Hrs  T(C): 36.8 (01 Oct 2023 07:38), Max: 36.8 (01 Oct 2023 07:38)  T(F): 98.2 (01 Oct 2023 07:38), Max: 98.2 (01 Oct 2023 07:38)  HR: 57 (01 Oct 2023 07:38) (57 - 66)  BP: 164/68 (01 Oct 2023 07:38) (144/66 - 164/68)  RR: 20 (01 Oct 2023 07:38) (18 - 20)  SpO2: 97% (01 Oct 2023 07:38) (96% - 97%)    Parameters below as of 01 Oct 2023 07:38  Patient On (Oxygen Delivery Method): room air      REVIEW OF SYSTEMS:  All other review of systems is negative unless indicated above.      PHYSICAL EXAM:  General: frail elderly male,  in no acute distress  Eyes: PERRLA, conjunctiva and sclera clear  Head: Normocephalic; atraumatic  ENMT: No nasal discharge; airway clear  Neck: Supple; non tender; no masses  Respiratory: Decreased BS at bases. No wheezes, rales or rhonchi  Cardiovascular: Regular rate and rhythm. S1 and S2 Normal;  Gastrointestinal: Soft non-tender non-distended;  LLQ ostomy in place,  Normal bowel sounds  Genitourinary: No  suprapubic  tenderness  Extremities: No  edema  Vascular: Peripheral pulses palpable 2+ bilaterally  Neurological: Alert and oriented x1, confused, + rigidity   Musculoskeletal:  no joint edema or erythema     LAbs:                         9.1    8.08  )-----------( 234      ( 30 Sep 2023 17:11 )             27.8         MEDICATIONS  (STANDING):  amLODIPine   Tablet 10 milliGRAM(s) Oral daily  bisacodyl 5 milliGRAM(s) Oral at bedtime  carbidopa/levodopa  25/100 2 Tablet(s) Oral four times a day  cholecalciferol 1000 Unit(s) Oral daily  ciprofloxacin     Tablet 250 milliGRAM(s) Oral two times a day  clonazePAM  Tablet 0.5 milliGRAM(s) Oral at bedtime  finasteride 5 milliGRAM(s) Oral daily  folic acid 1 milliGRAM(s) Oral daily  hydrALAZINE 75 milliGRAM(s) Oral two times a day  levothyroxine 88 MICROGram(s) Oral daily  melatonin 3 milliGRAM(s) Oral at bedtime  polyethylene glycol 3350 17 Gram(s) Oral daily  senna 2 Tablet(s) Oral at bedtime  simvastatin 10 milliGRAM(s) Oral at bedtime  tamsulosin 0.4 milliGRAM(s) Oral at bedtime    MEDICATIONS  (PRN):  acetaminophen     Tablet .. 650 milliGRAM(s) Oral every 6 hours PRN Temp greater or equal to 38C (100.4F), Mild Pain (1 - 3)  aluminum hydroxide/magnesium hydroxide/simethicone Suspension 30 milliLiter(s) Oral every 4 hours PRN Dyspepsia  ondansetron Injectable 4 milliGRAM(s) IV Push every 8 hours PRN Nausea and/or Vomiting      RADIOLOGY & ADDITIONAL TESTS:    ACC: [id number] EXAM:  XR CHEST PORTABLE URGENT 1V   ORDERED BY: MICHELLE GREEN     PROCEDURE DATE:  09/28/2023          INTERPRETATION:  An AP portable chest x-ray was performed for lower   extremity edema and cough.    Comparison is made to 8/27/2023.    There is linear atelectasis in the left greater than right lower lobes   with otherwise clear lungs on both sides. No infiltrates are seen. There   is no pneumothorax. There are no pleural effusions. There is no hilar or   mediastinal widening.The cardiac silhouette is prominent but may be   partially magnified by technique. While there is some engorgement of   central pulmonary veins along with an atherosclerotic aorta, there is no   pulmonary edema. The bony thorax remains stable.    IMPRESSION:  1. Mild bibasilar subsegmental atelectasis, left greater than right.  2. The cardiac silhouette is slightly prominent, perhaps magnified by   technique and while seen with mild central pulmonary venous engorgement,   there is no pulmonary edema.        ACC: [id number] EXAM:  CT ABDOMEN AND PELVIS   ORDERED BY: MICHELLE GREEN     PROCEDURE DATE:  09/28/2023          INTERPRETATION:  CLINICAL INFORMATION: 82 years  Male with hematuria.    COMPARISON: 8/27/2023    CONTRAST/COMPLICATIONS:  IV Contrast: NONE  Oral Contrast: NONE  Complications: None reported at time of study completion    PROCEDURE:  CT of the Abdomen and Pelvis was performed.  Sagittal and coronal reformats were performed.    FINDINGS:  LOWER CHEST: Trace bilateral pleural effusions.Coronary atherosclerosis.    LIVER: Punctate calcified granuloma. Scattered subcentimeter   hypodensities too small to characterize.  BILE DUCTS: Normal caliber.  GALLBLADDER: Within normal limits.  SPLEEN: Within normal limits.  PANCREAS: Within normal limits.  ADRENALS: Within normal limits.  KIDNEYS/URETERS: Bilateral cysts and multiple bilateral indeterminate   hyperdensities. The largest in the right kidney is unchanged measuring   5.5 x 5.3 cm (2:35), previously 5.4 x 5.4 cm.    BLADDER: Diffuse wall thickening with perivesical fat stranding. Focal   right posterior bladder wall thickening measuring up to 1.4 cm (2:87)  REPRODUCTIVE ORGANS: Enlarged prostate containing fiducial markers.    BOWEL: No bowel obstruction. Appendix is not visualized. No evidence of   inflammation in the pericecal region.. Large sliding hiatus hernia   unchanged. Left lower quadrant end colostomy and rectosigmoid stump.  PERITONEUM: No ascites.  VESSELS: Within normal limits.  RETROPERITONEUM/LYMPH NODES:No lymphadenopathy.  ABDOMINAL WALL: Bilateral fat-containing inguinal hernias. Small volume   fluid in the right hernia. Left lower quadrant colostomy. Moderate stool   burden throughout the colon.  BONES: Degenerative changes most pronounced at L1-2 and L2-3. Stable L2   and L3 wedge compression deformities. Suspect prior healed L2-3 discitis.    IMPRESSION:    Focal right posterior bladder wall thickening concerning for malignancy.   There is probable acute cystitis as well. Recommend follow-up cystoscopy   after treatment to evaluate for bladder malignancy.    Enlarged prostate with fiducial markers.    Redemonstrated dominant indeterminate right renal mass. Recommend   characterization with MRI.    Left lower quadrant colostomy. Moderateconstipation unchanged. No bowel   obstruction.    Suspect prior healed L2-3 discitis.

## 2023-10-02 LAB
ANION GAP SERPL CALC-SCNC: 7 MMOL/L — SIGNIFICANT CHANGE UP (ref 5–17)
BUN SERPL-MCNC: 32 MG/DL — HIGH (ref 7–23)
CALCIUM SERPL-MCNC: 8.4 MG/DL — LOW (ref 8.5–10.1)
CHLORIDE SERPL-SCNC: 110 MMOL/L — HIGH (ref 96–108)
CO2 SERPL-SCNC: 23 MMOL/L — SIGNIFICANT CHANGE UP (ref 22–31)
CREAT SERPL-MCNC: 2.44 MG/DL — HIGH (ref 0.5–1.3)
EGFR: 26 ML/MIN/1.73M2 — LOW
FERRITIN SERPL-MCNC: 42 NG/ML — SIGNIFICANT CHANGE UP (ref 30–400)
GLUCOSE SERPL-MCNC: 89 MG/DL — SIGNIFICANT CHANGE UP (ref 70–99)
HCT VFR BLD CALC: 29 % — LOW (ref 39–50)
HGB BLD-MCNC: 9.3 G/DL — LOW (ref 13–17)
IRON SATN MFR SERPL: 17 % — SIGNIFICANT CHANGE UP (ref 16–55)
IRON SATN MFR SERPL: 36 UG/DL — LOW (ref 45–165)
MCHC RBC-ENTMCNC: 32.1 GM/DL — SIGNIFICANT CHANGE UP (ref 32–36)
MCHC RBC-ENTMCNC: 32.1 PG — SIGNIFICANT CHANGE UP (ref 27–34)
MCV RBC AUTO: 100 FL — SIGNIFICANT CHANGE UP (ref 80–100)
PLATELET # BLD AUTO: 238 K/UL — SIGNIFICANT CHANGE UP (ref 150–400)
POTASSIUM SERPL-MCNC: 3.9 MMOL/L — SIGNIFICANT CHANGE UP (ref 3.5–5.3)
POTASSIUM SERPL-SCNC: 3.9 MMOL/L — SIGNIFICANT CHANGE UP (ref 3.5–5.3)
RBC # BLD: 2.9 M/UL — LOW (ref 4.2–5.8)
RBC # FLD: 14.8 % — HIGH (ref 10.3–14.5)
SODIUM SERPL-SCNC: 140 MMOL/L — SIGNIFICANT CHANGE UP (ref 135–145)
TIBC SERPL-MCNC: 214 UG/DL — LOW (ref 220–430)
UIBC SERPL-MCNC: 177 UG/DL — SIGNIFICANT CHANGE UP (ref 110–370)
WBC # BLD: 6.9 K/UL — SIGNIFICANT CHANGE UP (ref 3.8–10.5)
WBC # FLD AUTO: 6.9 K/UL — SIGNIFICANT CHANGE UP (ref 3.8–10.5)

## 2023-10-02 PROCEDURE — 99232 SBSQ HOSP IP/OBS MODERATE 35: CPT

## 2023-10-02 RX ADMIN — TAMSULOSIN HYDROCHLORIDE 0.4 MILLIGRAM(S): 0.4 CAPSULE ORAL at 22:10

## 2023-10-02 RX ADMIN — Medication 88 MICROGRAM(S): at 05:44

## 2023-10-02 RX ADMIN — Medication 650 MILLIGRAM(S): at 15:04

## 2023-10-02 RX ADMIN — CARBIDOPA AND LEVODOPA 2 TABLET(S): 25; 100 TABLET ORAL at 11:25

## 2023-10-02 RX ADMIN — Medication 1000 UNIT(S): at 11:22

## 2023-10-02 RX ADMIN — SENNA PLUS 2 TABLET(S): 8.6 TABLET ORAL at 22:11

## 2023-10-02 RX ADMIN — Medication 1 MILLIGRAM(S): at 11:22

## 2023-10-02 RX ADMIN — SIMVASTATIN 10 MILLIGRAM(S): 20 TABLET, FILM COATED ORAL at 22:10

## 2023-10-02 RX ADMIN — POLYETHYLENE GLYCOL 3350 17 GRAM(S): 17 POWDER, FOR SOLUTION ORAL at 11:30

## 2023-10-02 RX ADMIN — FINASTERIDE 5 MILLIGRAM(S): 5 TABLET, FILM COATED ORAL at 11:24

## 2023-10-02 RX ADMIN — Medication 250 MILLIGRAM(S): at 11:25

## 2023-10-02 RX ADMIN — Medication 50 MILLIGRAM(S): at 05:44

## 2023-10-02 RX ADMIN — Medication 5 MILLIGRAM(S): at 22:10

## 2023-10-02 RX ADMIN — Medication 0.5 MILLIGRAM(S): at 22:10

## 2023-10-02 RX ADMIN — CARBIDOPA AND LEVODOPA 2 TABLET(S): 25; 100 TABLET ORAL at 22:12

## 2023-10-02 RX ADMIN — Medication 50 MILLIGRAM(S): at 22:09

## 2023-10-02 RX ADMIN — AMLODIPINE BESYLATE 10 MILLIGRAM(S): 2.5 TABLET ORAL at 11:22

## 2023-10-02 RX ADMIN — Medication 250 MILLIGRAM(S): at 22:10

## 2023-10-02 RX ADMIN — CARBIDOPA AND LEVODOPA 2 TABLET(S): 25; 100 TABLET ORAL at 05:44

## 2023-10-02 RX ADMIN — Medication 50 MILLIGRAM(S): at 14:57

## 2023-10-02 RX ADMIN — Medication 3 MILLIGRAM(S): at 22:09

## 2023-10-02 NOTE — PHYSICAL THERAPY INITIAL EVALUATION ADULT - DIAGNOSIS, PT EVAL
Hematuria. Acute Urinary retention.  Bladder mass. UTI before admission, S/p Javier placement with resolution of bleeding

## 2023-10-02 NOTE — PHYSICAL THERAPY INITIAL EVALUATION ADULT - PERTINENT HX OF CURRENT PROBLEM, REHAB EVAL
83 y/o male with a PMHx of CKD, HTN, HLD, hypothyroid, Parkinsons, SBO s/p colostomy presented to the ED BIBA from [facility] with complain of hematuria starting this morning. Recently admitted to [place] for UTI and perry was placed. Perry removed outpatient in Dr. Baxter's office. Patient has been treated 3 UTI recently.  Patient subsequently develops gross hematuria and urinary retention. Patient's urine was still cloudy and was placed on Cipro 250mg BID 2 days ago. In the ED Attempts at placement perry by ER staff unsuccessful. Urologist came by in ED and was unable to place 24F coude 3 way perry. Subsequently placed 16F perry over guidewire placed thru flexible cystoscope. Perry was irrigated several times. Few clots recovered and perry then irrigated clear. Not on anticoagulation. No other complaints at this time

## 2023-10-02 NOTE — PROGRESS NOTE ADULT - SUBJECTIVE AND OBJECTIVE BOX
CC: Hematuria, Bladder tumor, Urinary retention     HPI: 83 y/o male with a PMHx of CKD, HTN, HLD, hypothyroid, Parkinsons, SBO s/p colostomy presented to the ED BIBA from [facility] with complain of hematuria starting this morning. Recently admitted to [place] for UTI and perry was placed. Perry removed outpatient in Dr. Baxter's office. Patient has been treated 3 UTI recently.  Patient subsequently develops gross hematuria and urinary retention. Patient's urine was still cloudy and was placed on Cipro 250mg BID 2 days ago. In the ED Attempts at placement perry by ER staff unsuccessful. Urologist came by in ED and was unable to place 24F coude 3 way perry. Subsequently placed 16F perry over guidewire placed thru flexible cystoscope. Perry was irrigated several times. Few clots recovered and perry then irrigated clear. Not on anticoagulation. No other complaints at this time      INTERVAL HPI/ OVERNIGHT EVENTS: Pt was seen and examined reports no pain, no SOB, no blood in the urine. Wife at bedside, d/c planning discussed, she reports that wont be able to take Pt to [facility] today as does not have aids till tomorrow.     Vital Signs Last 24 Hrs  T(C): 36.3 (02 Oct 2023 08:43), Max: 36.9 (01 Oct 2023 22:15)  T(F): 97.3 (02 Oct 2023 08:43), Max: 98.4 (01 Oct 2023 22:15)  HR: 56 (02 Oct 2023 08:43) (56 - 57)  BP: 140/65 (02 Oct 2023 08:43) (140/65 - 159/64)  RR: 18 (02 Oct 2023 08:43) (18 - 18)  SpO2: 98% (02 Oct 2023 08:43) (96% - 98%)    Parameters below as of 02 Oct 2023 08:43  Patient On (Oxygen Delivery Method): room air        REVIEW OF SYSTEMS:  All other review of systems is negative unless indicated above.      PHYSICAL EXAM:  General: frail elderly male,  in no acute distress  Eyes: PERRLA, conjunctiva and sclera clear  Head: Normocephalic; atraumatic  ENMT: No nasal discharge; airway clear  Neck: Supple; non tender; no masses  Respiratory: Decreased BS at bases. No wheezes, rales or rhonchi  Cardiovascular: Regular rate and rhythm. S1 and S2 Normal;  Gastrointestinal: Soft non-tender non-distended;  LLQ ostomy in place,  Normal bowel sounds  Genitourinary: No  suprapubic  tenderness  Extremities: No  edema  Vascular: Peripheral pulses palpable 2+ bilaterally  Neurological: Alert and oriented x1, confused, + rigidity   Musculoskeletal:  no joint edema or erythema     Labs:                         9.3    6.90  )-----------( 238      ( 02 Oct 2023 08:45 )             29.0     10-02    140  |  110<H>  |  32<H>  ----------------------------<  89  3.9   |  23  |  2.44<H>    Ca    8.4<L>      02 Oct 2023 08:45                          9.1    8.08  )-----------( 234      ( 30 Sep 2023 17:11 )             27.8         MEDICATIONS  (STANDING):  amLODIPine   Tablet 10 milliGRAM(s) Oral daily  bisacodyl 5 milliGRAM(s) Oral at bedtime  carbidopa/levodopa  25/100 2 Tablet(s) Oral four times a day  cholecalciferol 1000 Unit(s) Oral daily  ciprofloxacin     Tablet 250 milliGRAM(s) Oral two times a day  clonazePAM  Tablet 0.5 milliGRAM(s) Oral at bedtime  finasteride 5 milliGRAM(s) Oral daily  folic acid 1 milliGRAM(s) Oral daily  hydrALAZINE 50 milliGRAM(s) Oral three times a day  levothyroxine 88 MICROGram(s) Oral daily  melatonin 3 milliGRAM(s) Oral at bedtime  polyethylene glycol 3350 17 Gram(s) Oral daily  senna 2 Tablet(s) Oral at bedtime  simvastatin 10 milliGRAM(s) Oral at bedtime  tamsulosin 0.4 milliGRAM(s) Oral at bedtime    MEDICATIONS  (PRN):  acetaminophen     Tablet .. 650 milliGRAM(s) Oral every 6 hours PRN Temp greater or equal to 38C (100.4F), Mild Pain (1 - 3)  aluminum hydroxide/magnesium hydroxide/simethicone Suspension 30 milliLiter(s) Oral every 4 hours PRN Dyspepsia  ondansetron Injectable 4 milliGRAM(s) IV Push every 8 hours PRN Nausea and/or Vomiting      RADIOLOGY & ADDITIONAL TESTS:    ACC: [id number] EXAM:  XR CHEST PORTABLE URGENT 1V   ORDERED BY: MICHELLE GREEN     PROCEDURE DATE:  09/28/2023          INTERPRETATION:  An AP portable chest x-ray was performed for lower   extremity edema and cough.    Comparison is made to 8/27/2023.    There is linear atelectasis in the left greater than right lower lobes   with otherwise clear lungs on both sides. No infiltrates are seen. There   is no pneumothorax. There are no pleural effusions. There is no hilar or   mediastinal widening.The cardiac silhouette is prominent but may be   partially magnified by technique. While there is some engorgement of   central pulmonary veins along with an atherosclerotic aorta, there is no   pulmonary edema. The bony thorax remains stable.    IMPRESSION:  1. Mild bibasilar subsegmental atelectasis, left greater than right.  2. The cardiac silhouette is slightly prominent, perhaps magnified by   technique and while seen with mild central pulmonary venous engorgement,   there is no pulmonary edema.        ACC: [id number] EXAM:  CT ABDOMEN AND PELVIS   ORDERED BY: MICHELLE GREEN     PROCEDURE DATE:  09/28/2023          INTERPRETATION:  CLINICAL INFORMATION: 82 years  Male with hematuria.    COMPARISON: 8/27/2023    CONTRAST/COMPLICATIONS:  IV Contrast: NONE  Oral Contrast: NONE  Complications: None reported at time of study completion    PROCEDURE:  CT of the Abdomen and Pelvis was performed.  Sagittal and coronal reformats were performed.    FINDINGS:  LOWER CHEST: Trace bilateral pleural effusions.Coronary atherosclerosis.    LIVER: Punctate calcified granuloma. Scattered subcentimeter   hypodensities too small to characterize.  BILE DUCTS: Normal caliber.  GALLBLADDER: Within normal limits.  SPLEEN: Within normal limits.  PANCREAS: Within normal limits.  ADRENALS: Within normal limits.  KIDNEYS/URETERS: Bilateral cysts and multiple bilateral indeterminate   hyperdensities. The largest in the right kidney is unchanged measuring   5.5 x 5.3 cm (2:35), previously 5.4 x 5.4 cm.    BLADDER: Diffuse wall thickening with perivesical fat stranding. Focal   right posterior bladder wall thickening measuring up to 1.4 cm (2:87)  REPRODUCTIVE ORGANS: Enlarged prostate containing fiducial markers.    BOWEL: No bowel obstruction. Appendix is not visualized. No evidence of   inflammation in the pericecal region.. Large sliding hiatus hernia   unchanged. Left lower quadrant end colostomy and rectosigmoid stump.  PERITONEUM: No ascites.  VESSELS: Within normal limits.  RETROPERITONEUM/LYMPH NODES:No lymphadenopathy.  ABDOMINAL WALL: Bilateral fat-containing inguinal hernias. Small volume   fluid in the right hernia. Left lower quadrant colostomy. Moderate stool   burden throughout the colon.  BONES: Degenerative changes most pronounced at L1-2 and L2-3. Stable L2   and L3 wedge compression deformities. Suspect prior healed L2-3 discitis.    IMPRESSION:    Focal right posterior bladder wall thickening concerning for malignancy.   There is probable acute cystitis as well. Recommend follow-up cystoscopy   after treatment to evaluate for bladder malignancy.    Enlarged prostate with fiducial markers.    Redemonstrated dominant indeterminate right renal mass. Recommend   characterization with MRI.    Left lower quadrant colostomy. Moderateconstipation unchanged. No bowel   obstruction.    Suspect prior healed L2-3 discitis.

## 2023-10-02 NOTE — PHYSICAL THERAPY INITIAL EVALUATION ADULT - ADDITIONAL COMMENTS
Pt was receiving [facility] services at [place] prior to this admission and was ambulating ~100 with RW and assist as per wife.

## 2023-10-02 NOTE — PHYSICAL THERAPY INITIAL EVALUATION ADULT - RANGE OF MOTION EXAMINATION, REHAB EVAL
b/l knee ext lacking roughly 10 deg to neutral/bilateral upper extremity ROM was WFL (within functional limits)/bilateral lower extremity ROM was WFL (within functional limits)

## 2023-10-02 NOTE — PROGRESS NOTE ADULT - REASON FOR ADMISSION
Hematuria, Bladder tumor, Urinary retention

## 2023-10-02 NOTE — PROGRESS NOTE ADULT - ASSESSMENT
A/P:    1.  Hematuria  Urinary retention  Bladder tumor  -s/p catheter placement in ED by the Urologist  -improved with urinary retention and Hematuria  -follow clinically and urologist consult    2.  UTI  -continue Oral Cipro to finish the course  -follow up culture     3.  Parkinson's disease  -continue Carbidopa-Levodopa    4.  CKD stage 4  -Cr stable  -will follow     5.  SCD for DVT ppx     #Discussed above in detail to the pt and wife at bedside, all questions have been answered   6.  Code status: Full code.     
A/P:    #  Hematuria  Urinary retention  Bladder tumor  -s/p catheter placement in ED by the Urologist  -improved with urinary retention and Hematuria  -now further management as an outpt      #Confusion -most likely hospital acquired delerium   -monitor it   -supportive care     #HTN  -still not controlled - increase hydralzine to 75 mg and monitor it     #  UTI  -continue Oral Cipro to finish the course  -follow up culture     #  Parkinson's disease  -continue Carbidopa-Levodopa    #  CKD stage 4  -Cr stable  -will follow     #SCD for DVT ppx         #Code status: Full code.     
81 y/o male with a PMHx of CKD, HTN, HLD, hypothyroid, Parkinsons, SBO s/p colostomy admitted for:       1. Hematuria. Acute Urinary retention.  Bladder mass. UTI before admission   S/p Javier placement with resolution of bleeding   UCX neg, but Pt was started on Tx outPt, will complete course of Po Cipro today    Monitor UO  C/w Flomax and finasteride   F/u with DR Leung outPt for cystoscopy         2. Confusion likely metabolic encephalopathy in settings or acute illness and BAseline Parkinson related Dementia   at baseline now   supportive care     3. HTN with elevated BP  C/w amlodipine   Hydralazine titrated to 50mg Po TID  Monitor BP better now     4. Parkinson Dz  C/w Sinemet   On Clonazepam for increased tremors after COVID as per wife.       5. Hypothyroidism   C/w Synthroid     6. CKD 4  CR stable  Monitor renal Fx      7. DVT PPx: venodynes       8. ACP: FULL CODE     Dispo; d/c planning to correction with HC in am   
81 y/o male with a PMHx of CKD, HTN, HLD, hypothyroid, Parkinsons, SBO s/p colostomy admitted for:       1. Hematuria. Acute Urinary retention.  Bladder mass. UTI before admission   S/p Javier placement with resolution of bleeding   UCX neg, but Pt was started on Tx outPt, will complete course of Po Cipro   Monitor UO  C/w Flomax and finasteride   F/u with DR Leung outPt for cystoscopy         2. Confusion likely metabolic encephalopathy in settings or acute illness and BAseline Parkinson related Dementia   supportive care       3. HTN with elevated BP  C/w amlodipine   Hydralazine titrated to 75mg PO BID   Monitor BP will further adjust meds if needed       4. Parkinson Dz  C/w Sinemet   On Clonazepam for increased tremors after COVID as per wife.       5. Hypothyroidism   C/w Synthroid     6. CKD 4  CR stable  Monitor renal Fx      7. DVT PPx: venodynes       8. ACP: FULL CODE     Dispo; d/c planning when BP stable. PT juarez

## 2023-10-03 ENCOUNTER — TRANSCRIPTION ENCOUNTER (OUTPATIENT)
Age: 83
End: 2023-10-03

## 2023-10-03 VITALS — WEIGHT: 151.9 LBS

## 2023-10-03 PROCEDURE — 99239 HOSP IP/OBS DSCHRG MGMT >30: CPT

## 2023-10-03 RX ORDER — MULTIVIT-MIN/FERROUS GLUCONATE 9 MG/15 ML
1 LIQUID (ML) ORAL DAILY
Refills: 0 | Status: CANCELLED | OUTPATIENT
Start: 2023-10-03 | End: 2023-10-03

## 2023-10-03 RX ORDER — HYDRALAZINE HCL 50 MG
1 TABLET ORAL
Qty: 90 | Refills: 0
Start: 2023-10-03 | End: 2023-11-01

## 2023-10-03 RX ORDER — THIAMINE MONONITRATE (VIT B1) 100 MG
100 TABLET ORAL DAILY
Refills: 0 | Status: CANCELLED | OUTPATIENT
Start: 2023-10-03 | End: 2023-10-03

## 2023-10-03 RX ADMIN — AMLODIPINE BESYLATE 10 MILLIGRAM(S): 2.5 TABLET ORAL at 09:31

## 2023-10-03 RX ADMIN — FINASTERIDE 5 MILLIGRAM(S): 5 TABLET, FILM COATED ORAL at 09:31

## 2023-10-03 RX ADMIN — Medication 50 MILLIGRAM(S): at 14:05

## 2023-10-03 RX ADMIN — CARBIDOPA AND LEVODOPA 2 TABLET(S): 25; 100 TABLET ORAL at 12:30

## 2023-10-03 RX ADMIN — Medication 250 MILLIGRAM(S): at 09:32

## 2023-10-03 RX ADMIN — Medication 88 MICROGRAM(S): at 06:14

## 2023-10-03 RX ADMIN — Medication 50 MILLIGRAM(S): at 06:14

## 2023-10-03 RX ADMIN — POLYETHYLENE GLYCOL 3350 17 GRAM(S): 17 POWDER, FOR SOLUTION ORAL at 09:31

## 2023-10-03 RX ADMIN — Medication 1000 UNIT(S): at 09:31

## 2023-10-03 RX ADMIN — CARBIDOPA AND LEVODOPA 2 TABLET(S): 25; 100 TABLET ORAL at 06:13

## 2023-10-03 RX ADMIN — Medication 1 MILLIGRAM(S): at 09:31

## 2023-10-03 NOTE — DIETITIAN INITIAL EVALUATION ADULT - PERTINENT MEDS FT
MEDICATIONS  (STANDING):  amLODIPine   Tablet 10 milliGRAM(s) Oral daily  bisacodyl 5 milliGRAM(s) Oral at bedtime  carbidopa/levodopa  25/100 2 Tablet(s) Oral four times a day  cholecalciferol 1000 Unit(s) Oral daily  ciprofloxacin     Tablet 250 milliGRAM(s) Oral two times a day  clonazePAM  Tablet 0.5 milliGRAM(s) Oral at bedtime  finasteride 5 milliGRAM(s) Oral daily  folic acid 1 milliGRAM(s) Oral daily  hydrALAZINE 50 milliGRAM(s) Oral three times a day  levothyroxine 88 MICROGram(s) Oral daily  melatonin 3 milliGRAM(s) Oral at bedtime  polyethylene glycol 3350 17 Gram(s) Oral daily  senna 2 Tablet(s) Oral at bedtime  simvastatin 10 milliGRAM(s) Oral at bedtime  tamsulosin 0.4 milliGRAM(s) Oral at bedtime    MEDICATIONS  (PRN):  acetaminophen     Tablet .. 650 milliGRAM(s) Oral every 6 hours PRN Temp greater or equal to 38C (100.4F), Mild Pain (1 - 3)  aluminum hydroxide/magnesium hydroxide/simethicone Suspension 30 milliLiter(s) Oral every 4 hours PRN Dyspepsia  ondansetron Injectable 4 milliGRAM(s) IV Push every 8 hours PRN Nausea and/or Vomiting

## 2023-10-03 NOTE — DISCHARGE NOTE NURSING/CASE MANAGEMENT/SOCIAL WORK - PATIENT PORTAL LINK FT
You can access the FollowMyHealth Patient Portal offered by [facility] by registering at the following website: [email or web address]. By joining [employer]’s FollowMyHealth portal, you will also be able to view your health information using other applications (apps) compatible with our system.

## 2023-10-03 NOTE — DIETITIAN INITIAL EVALUATION ADULT - NSFNSGIIOFT_GEN_A_CORE
I&O's Detail    02 Oct 2023 07:01  -  03 Oct 2023 07:00  --------------------------------------------------------  IN:  Total IN: 0 mL    OUT:    Indwelling Catheter - Urethral (mL): 850 mL  Total OUT: 850 mL    Total NET: -850 mL

## 2023-10-03 NOTE — DIETITIAN INITIAL EVALUATION ADULT - ORAL INTAKE PTA/DIET HISTORY
Pt lives at [facility]. Unable to obtain information 2/2 appears confused, AMS.

## 2023-10-03 NOTE — DIETITIAN NUTRITION RISK NOTIFICATION - ADDITIONAL COMMENTS/DIETITIAN RECOMMENDATIONS
1) Liberalize diet to regular to maximize caloric and nutrient intake.  2) Add ensure + HP shake BID (350kcal, 20g protein)  3) Monitor bowel movements, if no BM for >3 days, consider implementing bowel regimen.  4) Encourage protein-rich foods, maximize food preferences  5) MVI w/ minerals daily to ensure 100% RDA met  6) Consider adding thiamine 100 mg daily 2/2 poor PO intake/ malnutrition  7) Obtain phosphorus level 2/2 CKD  8) Monitor lytes/ min and replete prn.  9) Confirm goals of care regarding nutrition support  RD will continue to monitor PO intake, labs, hydration, and wt prn.

## 2023-10-03 NOTE — DIETITIAN INITIAL EVALUATION ADULT - PERTINENT LABORATORY DATA
10-02    140  |  110<H>  |  32<H>  ----------------------------<  89  3.9   |  23  |  2.44<H>    Ca    8.4<L>      02 Oct 2023 08:45

## 2023-10-03 NOTE — DISCHARGE NOTE PROVIDER - NSDCMRMEDTOKEN_GEN_ALL_CORE_FT
amLODIPine 10 mg oral tablet: 1 tab(s) orally once a day  bisacodyl 5 mg oral delayed release tablet: 1 tab(s) orally once a day  carbidopa-levodopa 25 mg-100 mg oral tablet: 2 tab(s) orally 4 times a day @ 0900, 1300, 1700, 2100  cholecalciferol 25 mcg (1000 intl units) oral tablet: 1 tab(s) orally once a day  clonazePAM 0.5 mg oral tablet: 1 tab(s) orally once a day (at bedtime)  finasteride 5 mg oral tablet: 1 tab(s) orally once a day  folic acid 0.8 mg oral tablet: 1 tab(s) orally once a day  levothyroxine 88 mcg (0.088 mg) oral tablet: 1 tab(s) orally 2 times a week on Sat and Sun  Melatonin 3 mg oral tablet: 1 tab(s) orally once a day (at bedtime)  MiraLax oral powder for reconstitution: 17 gram(s) orally once a day  senna (sennosides) 8.6 mg oral tablet: 1 tab(s) orally once a day (at bedtime)  simvastatin 10 mg oral tablet: 1 tab(s) orally once a day (at bedtime)  tamsulosin 0.4 mg oral capsule: 1 cap(s) orally once a day (at bedtime)  Tylenol 325 mg oral tablet: 2 tab(s) orally every 6 hours, As needed, Mild Pain (1 - 3)   amLODIPine 10 mg oral tablet: 1 tab(s) orally once a day  bisacodyl 5 mg oral delayed release tablet: 1 tab(s) orally once a day  carbidopa-levodopa 25 mg-100 mg oral tablet: 2 tab(s) orally 4 times a day @ 0900, 1300, 1700, 2100  cholecalciferol 25 mcg (1000 intl units) oral tablet: 1 tab(s) orally once a day  clonazePAM 0.5 mg oral tablet: 1 tab(s) orally once a day (at bedtime)  finasteride 5 mg oral tablet: 1 tab(s) orally once a day  folic acid 0.8 mg oral tablet: 1 tab(s) orally once a day  hydrALAZINE 50 mg oral tablet: 1 tab(s) orally 3 times a day  levothyroxine 88 mcg (0.088 mg) oral tablet: 1 tab(s) orally 2 times a week on Sat and Sun  Melatonin 3 mg oral tablet: 1 tab(s) orally once a day (at bedtime)  MiraLax oral powder for reconstitution: 17 gram(s) orally once a day  senna (sennosides) 8.6 mg oral tablet: 1 tab(s) orally once a day (at bedtime)  simvastatin 10 mg oral tablet: 1 tab(s) orally once a day (at bedtime)  tamsulosin 0.4 mg oral capsule: 1 cap(s) orally once a day (at bedtime)  Tylenol 325 mg oral tablet: 2 tab(s) orally every 6 hours, As needed, Mild Pain (1 - 3)

## 2023-10-03 NOTE — DISCHARGE NOTE PROVIDER - PROVIDER TOKENS
PROVIDER:[TOKEN:[[id number]],FOLLOWUP:[1 week]],PROVIDER:[TOKEN:[[id number]],FOLLOWUP:[1 week]]

## 2023-10-03 NOTE — DISCHARGE NOTE NURSING/CASE MANAGEMENT/SOCIAL WORK - NSDCPEFALRISK_GEN_ALL_CORE
For information on Fall & Injury Prevention, visit: [email or web address] OR  [email or web address] OR  https://www.cdc.gov/steadi/patient.html

## 2023-10-03 NOTE — DISCHARGE NOTE PROVIDER - NSDCFUSCHEDAPPT_GEN_ALL_CORE_FT
Mehreen Seymour  [facility] Physician [facility]  COLOSURG [address]  Scheduled Appointment: 11/02/2023    Sylvia Gao  [facility] Physician [facility]  NEUROLOGY [address] [facility]  Scheduled Appointment: 12/14/2023

## 2023-10-03 NOTE — DISCHARGE NOTE PROVIDER - HOSPITAL COURSE
83 y/o male with a PMHx of CKD, HTN, HLD, hypothyroid, Parkinsons, SBO s/p colostomy presented to the ED BIBA from [facility] with complain of hematuria starting this morning. Recently admitted to [place] for UTI and perry was placed. Perry removed outpatient in Dr. Baxter's office. Patient has been treated 3 UTI recently.  Patient subsequently develops gross hematuria and urinary retention. Patient's urine was still cloudy and was placed on Cipro 250mg BID 2 days ago. In the ED Attempts at placement perry by ER staff unsuccessful. Urologist came by in ED and was unable to place 24F coude 3 way perry. Subsequently placed 16F perry over guidewire placed thru flexible cystoscope. Perry was irrigated several times. Few clots recovered and perry then irrigated clear. Not on anticoagulation. No other complaints at this time    PHYSICAL EXAM:  General: frail elderly male,  in no acute distress  Eyes: PERRLA, conjunctiva and sclera clear  Head: Normocephalic; atraumatic  ENMT: No nasal discharge; airway clear  Neck: Supple; non tender; no masses  Respiratory: Decreased BS at bases. No wheezes, rales or rhonchi  Cardiovascular: Regular rate and rhythm. S1 and S2 Normal;  Gastrointestinal: Soft non-tender non-distended;  LLQ ostomy in place,  Normal bowel sounds  Genitourinary: No  suprapubic  tenderness  Extremities: No  edema  Vascular: Peripheral pulses palpable 2+ bilaterally  Neurological: Alert and oriented x1, confused, + rigidity   Musculoskeletal:  no joint edema or erythema     83 y/o male with a PMHx of CKD, HTN, HLD, hypothyroid, Parkinsons, SBO s/p colostomy admitted for:       1. Hematuria. Acute Urinary retention.  Bladder mass. UTI before admission   S/p Perry placement with resolution of bleeding   UCX neg, but Pt was started on Tx outPt, will complete course of Po Cipro today    Monitor UO  C/w Flomax and finasteride   F/u with DR Leung outPt for cystoscopy         2. Confusion likely metabolic encephalopathy in settings or acute illness and BAseline Parkinson related Dementia   at baseline now   supportive care     3. HTN with elevated BP  C/w amlodipine   Hydralazine titrated to 50mg Po TID  Monitor BP better now     4. Parkinson Dz  C/w Sinemet   On Clonazepam for increased tremors after COVID as per wife.       5. Hypothyroidism   C/w Synthroid     6. CKD 4  CR stable  Monitor renal Fx      7. DVT PPx: venodynes       8. ACP: FULL CODE     Dispo; d/c planning to snf with HC in am 83 y/o male with a PMHx of CKD, HTN, HLD, hypothyroid, Parkinsons, SBO s/p colostomy presented to the ED BIBA from [facility] with complain of hematuria  x 1 day. Recently admitted to [place] for UTI and perry was placed. Perry removed outpatient in Dr. Baxter's office.  Patient subsequently develops gross hematuria and urinary retention. Patient's urine was still cloudy and was placed on Cipro 250mg BID 2 days  PTA.   In the ED: BP elevated SBP in 200s. CT abd/pelvis done,  Focal right posterior bladder wall thickening concerning for malignancy. There is probable acute cystitis as well. Recommend follow-up cystoscopy after treatment to evaluate for bladder malignancy.  Attempts at placement Perry  by ER staff unsuccessful. Urologist came by in ED and was unable to place 24F coude 3 way perry. Subsequently placed 16F perry over guidewire placed thru flexible cystoscope. Perry was irrigated several times. Few clots recovered and perry then irrigated clear. Not on anticoagulation.   hematuria resolved.  Pt was continued in Po Cipro, UCX were sent, no growth   Pts BP meds were adjusted and now stable   Pt was seen and examined, reports feels well. SD/c planning d/w wife. Pt had private aids and wants Pt to return  to skilled nursing  with HC  Vital Signs Last 24 Hrs  T(C): 36.6 (03 Oct 2023 08:12), Max: 36.6 (03 Oct 2023 08:12)  T(F): 97.9 (03 Oct 2023 08:12), Max: 97.9 (03 Oct 2023 08:12)  HR: 65 (03 Oct 2023 08:12) (58 - 66)  BP: 153/64 (03 Oct 2023 08:12) (139/- - 159/71)  RR: 18 (03 Oct 2023 08:12) (18 - 18)  SpO2: 97% (03 Oct 2023 08:12) (97% - 97%)    Parameters below as of 03 Oct 2023 08:12  Patient On (Oxygen Delivery Method): room air    PHYSICAL EXAM:  General: frail elderly male,  in no acute distress  Eyes: PERRLA, conjunctiva and sclera clear  Head: Normocephalic; atraumatic  ENMT: No nasal discharge; airway clear  Neck: Supple; non tender; no masses  Respiratory: Decreased BS at bases. No wheezes, rales or rhonchi  Cardiovascular: Regular rate and rhythm. S1 and S2 Normal;  Gastrointestinal: Soft non-tender non-distended;  LLQ ostomy in place,  Normal bowel sounds  Genitourinary: No  suprapubic  tenderness, Perry in place, draining clear yellow urine   Extremities: No  edema  Vascular: Peripheral pulses palpable 2+ bilaterally  Neurological: Alert and oriented x1, confused, + rigidity   Musculoskeletal:  no joint edema or erythema     A/p: 83 y/o male with a PMHx of CKD, HTN, HLD, hypothyroidism, Parkinsons, SBO s/p colostomy admitted for:     1. Hematuria. Acute Urinary retention.  Bladder mass. UTI before admission   S/p Perry placement with resolution of bleeding   UCX neg, but Pt was started on Tx outPt, will complete course of Po Cipro today    Monitor UO  C/w Flomax and finasteride   F/u with DR Baxter outPt for cystoscopy       2. Confusion likely metabolic encephalopathy in settings or acute illness, Hypertensive  urgency  and Baseline Parkinson related Dementia   at baseline now   supportive care     3. HTN with hypertensive urgency on presentation   C/w amlodipine   Hydralazine titrated to 50mg Po TID  Monitor BP better now     4. Parkinson Dz  C/w Sinemet   On Clonazepam for increased tremors after COVID as per wife.   F/u with neurologist     5. Hypothyroidism   C/w Synthroid     6. CKD 4  CR stable  Monitor renal Fx    7.  ACP: FULL CODE     8. Renal mass  as per spouse not new follows with renal, has stephy     Dispo: stable for d/c to skilled nursing with HC   FAx d/c summary to PCP   Total time 38 min

## 2023-10-03 NOTE — DIETITIAN INITIAL EVALUATION ADULT - FACTORS AFF FOOD INTAKE
2/2 increased tremors reported by wife as per EMR/change in mental status/difficulty feeding self/other (specify)

## 2023-10-03 NOTE — DISCHARGE NOTE PROVIDER - CARE PROVIDER_API CALL
YULIANA ANGEL  [address] [place] [address]  Phone: [phone]  Fax: [phone]  Follow Up Time: 1 week    Jeremie Baxter  Urology  [address] [place] [address]  Phone: [phone]  Fax: [phone]  Follow Up Time: 1 week

## 2023-10-03 NOTE — DISCHARGE NOTE PROVIDER - NSDCCPCAREPLAN_GEN_ALL_CORE_FT
PRINCIPAL DISCHARGE DIAGNOSIS  Diagnosis: Persistent gross hematuria  Assessment and Plan of Treatment: C/w Javier   F/u with DR Phillips      SECONDARY DISCHARGE DIAGNOSES  Diagnosis: Bladder tumor  Assessment and Plan of Treatment: F/u with Dr Baxter for cystoscopy    Diagnosis: Renal mass  Assessment and Plan of Treatment: F/u with Nephrologist    Diagnosis: HTN (hypertension)  Assessment and Plan of Treatment: BP better   C/w amlodipine and Hydralazine

## 2023-10-03 NOTE — DIETITIAN INITIAL EVALUATION ADULT - FEEDING SKILL
Tray set-up, open all containers; meal encouragement/supervision/total assistance/age appropriate assistance

## 2023-10-03 NOTE — DIETITIAN NUTRITION RISK NOTIFICATION - TREATMENT: THE FOLLOWING DIET HAS BEEN RECOMMENDED
Diet, Renal Restrictions:   For patients receiving Renal Replacement - No Protein Restr, No Conc K, No Conc Phos, Low Sodium (09-28-23 @ 19:29) [Active]

## 2023-10-03 NOTE — DIETITIAN INITIAL EVALUATION ADULT - OTHER INFO
83 y/o male with a PMHx of CKD, HTN, HLD, hypothyroid, Parkinsons, SBO s/p colostomy presented to the ED BIBA from [facility] with complain of hematuria starting this morning. Recently admitted to [place] for UTI and perry was placed; has subsequently develops gross hematuria and urinary retention.  Admit for confusion likely metabolic encephalopathy in settings or acute illness and baseline Parkinson's related to dementia, bladder mass    Known to nutr services and dx'd w/ mal on 6/9/22; still meets criteria. Remains FULL CODE. Unable to obtain diet/wt hx 2/2 AMS, appears confused. Unable to obtain bed scale wt 2/2 pt sitting in chair. Admit wt of 151.8# taken on 9/28/23. Wt hx as per EMR: 135# (bed scale wt taken by RD on 6/9/22). Unable to identify any PERTINENT wt changes at this time. NFPE reveals mild-moderate muscle/ fat wasting - pt appears thin. W/ CKD; however, K+ WNL, no phosphorus level within labs - obtain phos level as soon as medically feasible. Liberalize diet to low fiber for now (s/p colostomy - ? unsure when pt received it) to maximize caloric and nutrient intake. Add ensure + HP shake BID (350kcal, 20g protein). See below for other recommendations.

## 2023-10-06 DIAGNOSIS — I12.9 HYPERTENSIVE CHRONIC KIDNEY DISEASE WITH STAGE 1 THROUGH STAGE 4 CHRONIC KIDNEY DISEASE, OR UNSPECIFIED CHRONIC KIDNEY DISEASE: ICD-10-CM

## 2023-10-06 DIAGNOSIS — N28.89 OTHER SPECIFIED DISORDERS OF KIDNEY AND URETER: ICD-10-CM

## 2023-10-06 DIAGNOSIS — Z88.6 ALLERGY STATUS TO ANALGESIC AGENT: ICD-10-CM

## 2023-10-06 DIAGNOSIS — R33.9 RETENTION OF URINE, UNSPECIFIED: ICD-10-CM

## 2023-10-06 DIAGNOSIS — R31.0 GROSS HEMATURIA: ICD-10-CM

## 2023-10-06 DIAGNOSIS — E44.0 MODERATE PROTEIN-CALORIE MALNUTRITION: ICD-10-CM

## 2023-10-06 DIAGNOSIS — G93.41 METABOLIC ENCEPHALOPATHY: ICD-10-CM

## 2023-10-06 DIAGNOSIS — Z71.3 DIETARY COUNSELING AND SURVEILLANCE: ICD-10-CM

## 2023-10-06 DIAGNOSIS — N32.9 BLADDER DISORDER, UNSPECIFIED: ICD-10-CM

## 2023-10-06 DIAGNOSIS — E78.5 HYPERLIPIDEMIA, UNSPECIFIED: ICD-10-CM

## 2023-10-06 DIAGNOSIS — G20.A1 PARKINSON'S DISEASE WITHOUT DYSKINESIA, WITHOUT MENTION OF FLUCTUATIONS: ICD-10-CM

## 2023-10-06 DIAGNOSIS — E03.9 HYPOTHYROIDISM, UNSPECIFIED: ICD-10-CM

## 2023-10-06 DIAGNOSIS — N39.0 URINARY TRACT INFECTION, SITE NOT SPECIFIED: ICD-10-CM

## 2023-10-06 DIAGNOSIS — F02.80 DEMENTIA IN OTHER DISEASES CLASSIFIED ELSEWHERE, UNSPECIFIED SEVERITY, WITHOUT BEHAVIORAL DISTURBANCE, PSYCHOTIC DISTURBANCE, MOOD DISTURBANCE, AND ANXIETY: ICD-10-CM

## 2023-10-06 DIAGNOSIS — N18.4 CHRONIC KIDNEY DISEASE, STAGE 4 (SEVERE): ICD-10-CM

## 2023-10-06 DIAGNOSIS — I16.0 HYPERTENSIVE URGENCY: ICD-10-CM

## 2023-10-06 DIAGNOSIS — Z90.49 ACQUIRED ABSENCE OF OTHER SPECIFIED PARTS OF DIGESTIVE TRACT: ICD-10-CM

## 2023-10-06 DIAGNOSIS — Z96.652 PRESENCE OF LEFT ARTIFICIAL KNEE JOINT: ICD-10-CM

## 2023-10-06 DIAGNOSIS — Z93.3 COLOSTOMY STATUS: ICD-10-CM

## 2023-10-06 DIAGNOSIS — Z86.16 PERSONAL HISTORY OF COVID-19: ICD-10-CM

## 2023-10-06 LAB
CULTURE RESULTS: SIGNIFICANT CHANGE UP
SPECIMEN SOURCE: SIGNIFICANT CHANGE UP

## 2023-10-18 NOTE — PROGRESS NOTE ADULT - ATTENDING COMMENTS
Spoke with patient's wife @ [phone]. Wife states his legs have been getting worse. She just got a wheelchair and had home PT 2x/week. She states that he was admitted to [facility] several months ago for the same thing and has extensive work-up and is told that this is due to his PD. Mastoid Interpolation Flap Text: A decision was made to reconstruct the defect utilizing an interpolation axial flap and a staged reconstruction.  A telfa template was made of the defect.  This telfa template was then used to outline the mastoid interpolation flap.  The donor area for the pedicle flap was then injected with anesthesia.  The flap was excised through the skin and subcutaneous tissue down to the layer of the underlying musculature.  The pedicle flap was carefully excised within this deep plane to maintain its blood supply.  The edges of the donor site were undermined.   The donor site was closed in a primary fashion.  The pedicle was then rotated into position and sutured.  Once the tube was sutured into place, adequate blood supply was confirmed with blanching and refill.  The pedicle was then wrapped with xeroform gauze and dressed appropriately with a telfa and gauze bandage to ensure continued blood supply and protect the attached pedicle.

## 2023-11-13 ENCOUNTER — OUTPATIENT (OUTPATIENT)
Dept: OUTPATIENT SERVICES | Facility: HOSPITAL | Age: 83
LOS: 1 days | End: 2023-11-13
Payer: MEDICARE

## 2023-11-13 VITALS
DIASTOLIC BLOOD PRESSURE: 76 MMHG | WEIGHT: 151.9 LBS | OXYGEN SATURATION: 100 % | SYSTOLIC BLOOD PRESSURE: 226 MMHG | HEART RATE: 62 BPM | TEMPERATURE: 97 F | RESPIRATION RATE: 16 BRPM | HEIGHT: 67 IN

## 2023-11-13 DIAGNOSIS — Z98.890 OTHER SPECIFIED POSTPROCEDURAL STATES: Chronic | ICD-10-CM

## 2023-11-13 DIAGNOSIS — N32.89 OTHER SPECIFIED DISORDERS OF BLADDER: ICD-10-CM

## 2023-11-13 DIAGNOSIS — Z96.652 PRESENCE OF LEFT ARTIFICIAL KNEE JOINT: Chronic | ICD-10-CM

## 2023-11-13 DIAGNOSIS — Z01.818 ENCOUNTER FOR OTHER PREPROCEDURAL EXAMINATION: ICD-10-CM

## 2023-11-13 DIAGNOSIS — Z85.828 PERSONAL HISTORY OF OTHER MALIGNANT NEOPLASM OF SKIN: Chronic | ICD-10-CM

## 2023-11-13 LAB
BLD GP AB SCN SERPL QL: SIGNIFICANT CHANGE UP
BLD GP AB SCN SERPL QL: SIGNIFICANT CHANGE UP

## 2023-11-13 PROCEDURE — 93010 ELECTROCARDIOGRAM REPORT: CPT

## 2023-11-13 PROCEDURE — 86901 BLOOD TYPING SEROLOGIC RH(D): CPT

## 2023-11-13 PROCEDURE — 36415 COLL VENOUS BLD VENIPUNCTURE: CPT

## 2023-11-13 PROCEDURE — 93005 ELECTROCARDIOGRAM TRACING: CPT

## 2023-11-13 PROCEDURE — 99214 OFFICE O/P EST MOD 30 MIN: CPT | Mod: 25

## 2023-11-13 PROCEDURE — 71046 X-RAY EXAM CHEST 2 VIEWS: CPT | Mod: 26

## 2023-11-13 PROCEDURE — 86900 BLOOD TYPING SEROLOGIC ABO: CPT

## 2023-11-13 PROCEDURE — 71046 X-RAY EXAM CHEST 2 VIEWS: CPT

## 2023-11-13 PROCEDURE — 86850 RBC ANTIBODY SCREEN: CPT

## 2023-11-13 RX ORDER — FOLIC ACID 0.8 MG
1 TABLET ORAL
Qty: 0 | Refills: 0 | DISCHARGE

## 2023-11-13 RX ORDER — CHOLECALCIFEROL (VITAMIN D3) 125 MCG
1 CAPSULE ORAL
Qty: 0 | Refills: 0 | DISCHARGE

## 2023-11-13 NOTE — H&P PST ADULT - NSICDXPASTSURGICALHX_GEN_ALL_CORE_FT
PAST SURGICAL HISTORY:  H/O colonoscopy     H/O hemorrhoidectomy     H/O Mohs micrographic surgery for skin cancer     History of total knee replacement, left

## 2023-11-13 NOTE — H&P PST ADULT - NSICDXPASTMEDICALHX_GEN_ALL_CORE_FT
PAST MEDICAL HISTORY:  Anemia     Arthritis     Bladder mass     CA skin, basal cell     Cancer, skin, squamous cell     CKD (chronic kidney disease)     HLD (hyperlipidemia)     HTN (hypertension)     Hypothyroidism     Melanoma of skin     Obstruction of bowel     Parkinson disease     Prostate cancer

## 2023-11-13 NOTE — H&P PST ADULT - HISTORY OF PRESENT ILLNESS
[age] y.o WD, WN male presents to PST with hx of a bladder tumor. He had gross hematuria evaluated by urology, diagnostics revealing a bladder mass. He had perry cath placed ~ 4 weeks ago. His hx is significant for bowel obstruction- colostomy, Prostate cancer, HTN, Hyperlipidemia, Parkinson's, Hypothyroid. Patient scheduled for a Transurethral Resection of Bladder Tumor

## 2023-11-13 NOTE — H&P PST ADULT - CARDIOVASCULAR COMMENTS
+1 bilateral ankle edema, non pitting +1 bilateral ankle edema, non pitting: BP systolic 220 at PST - BP medication taken while at PST, per wife Angela BP labile systolic runs 90's to 200's, to follow up with PCP at [facility] HTN- labile systolic runs 200's to 90's per wife Angela

## 2023-11-13 NOTE — H&P PST ADULT - ASSESSMENT
82 y.o  82 y.o male scheduled for Transurethral Resection of Bladder Tumor   Plan  1. Stop all NSAIDS, herbal supplements and vitamins for 7 days.  2. NPO at midnight.  3. Take the following medications Carbidopa, Levothyroxine, Hydralazine  with small sips of water on the morning of your procedure/surgery.  4.  Labs (done  EKG , CXR as per surgeon  5. PMD DWAIN Hood visit for optimization prior to surgery as per surgeon  6. Preprocedure education provided  7. Call to PCP (DWAIN Hood) re: elevated BP , spoke with Gennaro Rogers RN for Dr Hood , he will notify her       82 y.o male scheduled for Transurethral Resection of Bladder Tumor   Plan  1. Stop all NSAIDS, herbal supplements and vitamins for 7 days.  2. NPO at midnight.  3. Take the following medications Carbidopa, Levothyroxine, Hydralazine  with small sips of water on the morning of your procedure/surgery.  4.  Labs (done  EKG , CXR as per surgeon  5. PMD DWAIN Hood visit for optimization prior to surgery as per surgeon  6. Preprocedure education provided  7. Call to PCP (DWAIN Hood) re: elevated BP , spoke with Gennaro Rogers RN for Dr Hood , he will notify her       Discussed elevated BP with wife Angela, she declines ER at this time, instructed to bring patient to ER if becomes symptomatic, dizzy, CP, headache

## 2023-11-14 DIAGNOSIS — N32.89 OTHER SPECIFIED DISORDERS OF BLADDER: ICD-10-CM

## 2023-11-14 DIAGNOSIS — Z01.818 ENCOUNTER FOR OTHER PREPROCEDURAL EXAMINATION: ICD-10-CM

## 2023-11-15 ENCOUNTER — TRANSCRIPTION ENCOUNTER (OUTPATIENT)
Age: 83
End: 2023-11-15

## 2023-11-15 ENCOUNTER — OUTPATIENT (OUTPATIENT)
Dept: INPATIENT UNIT | Facility: HOSPITAL | Age: 83
LOS: 1 days | Discharge: HOME | End: 2023-11-15
Payer: MEDICARE

## 2023-11-15 VITALS
RESPIRATION RATE: 16 BRPM | DIASTOLIC BLOOD PRESSURE: 76 MMHG | SYSTOLIC BLOOD PRESSURE: 159 MMHG | HEIGHT: 68 IN | HEART RATE: 66 BPM | TEMPERATURE: 98 F | WEIGHT: 151.9 LBS

## 2023-11-15 VITALS
HEART RATE: 58 BPM | RESPIRATION RATE: 16 BRPM | DIASTOLIC BLOOD PRESSURE: 71 MMHG | SYSTOLIC BLOOD PRESSURE: 175 MMHG | TEMPERATURE: 97 F | OXYGEN SATURATION: 95 %

## 2023-11-15 DIAGNOSIS — Z98.890 OTHER SPECIFIED POSTPROCEDURAL STATES: Chronic | ICD-10-CM

## 2023-11-15 DIAGNOSIS — Z85.828 PERSONAL HISTORY OF OTHER MALIGNANT NEOPLASM OF SKIN: Chronic | ICD-10-CM

## 2023-11-15 DIAGNOSIS — Z96.652 PRESENCE OF LEFT ARTIFICIAL KNEE JOINT: Chronic | ICD-10-CM

## 2023-11-15 DIAGNOSIS — N32.89 OTHER SPECIFIED DISORDERS OF BLADDER: ICD-10-CM

## 2023-11-15 PROCEDURE — 88307 TISSUE EXAM BY PATHOLOGIST: CPT | Mod: 26

## 2023-11-15 PROCEDURE — 88307 TISSUE EXAM BY PATHOLOGIST: CPT

## 2023-11-15 RX ORDER — PHENAZOPYRIDINE HCL 100 MG
100 TABLET ORAL EVERY 8 HOURS
Refills: 0 | Status: DISCONTINUED | OUTPATIENT
Start: 2023-11-15 | End: 2023-11-15

## 2023-11-15 RX ORDER — FENTANYL CITRATE 50 UG/ML
25 INJECTION INTRAVENOUS
Refills: 0 | Status: DISCONTINUED | OUTPATIENT
Start: 2023-11-15 | End: 2023-11-15

## 2023-11-15 RX ORDER — CARBIDOPA AND LEVODOPA 25; 100 MG/1; MG/1
2 TABLET ORAL
Qty: 0 | Refills: 0 | DISCHARGE

## 2023-11-15 RX ORDER — OXYCODONE HYDROCHLORIDE 5 MG/1
5 TABLET ORAL ONCE
Refills: 0 | Status: DISCONTINUED | OUTPATIENT
Start: 2023-11-15 | End: 2023-11-15

## 2023-11-15 RX ORDER — SODIUM CHLORIDE 9 MG/ML
1000 INJECTION, SOLUTION INTRAVENOUS
Refills: 0 | Status: DISCONTINUED | OUTPATIENT
Start: 2023-11-15 | End: 2023-11-15

## 2023-11-15 RX ORDER — HYDRALAZINE HCL 50 MG
1 TABLET ORAL
Qty: 90 | Refills: 0
Start: 2023-11-15 | End: 2023-12-14

## 2023-11-15 RX ORDER — POLYETHYLENE GLYCOL 3350 17 G/17G
17 POWDER, FOR SOLUTION ORAL
Refills: 0 | DISCHARGE

## 2023-11-15 RX ORDER — ACETAMINOPHEN 500 MG
1000 TABLET ORAL ONCE
Refills: 0 | Status: COMPLETED | OUTPATIENT
Start: 2023-11-15 | End: 2023-11-15

## 2023-11-15 RX ORDER — HYDRALAZINE HCL 50 MG
5 TABLET ORAL
Refills: 0 | Status: DISCONTINUED | OUTPATIENT
Start: 2023-11-15 | End: 2023-11-15

## 2023-11-15 RX ORDER — HYDRALAZINE HCL 50 MG
1 TABLET ORAL
Qty: 90 | Refills: 0 | DISCHARGE
Start: 2023-11-15 | End: 2023-12-14

## 2023-11-15 RX ORDER — ONDANSETRON 8 MG/1
4 TABLET, FILM COATED ORAL ONCE
Refills: 0 | Status: DISCONTINUED | OUTPATIENT
Start: 2023-11-15 | End: 2023-11-15

## 2023-11-15 RX ORDER — TAMSULOSIN HYDROCHLORIDE 0.4 MG/1
1 CAPSULE ORAL
Refills: 0 | DISCHARGE

## 2023-11-15 RX ADMIN — Medication 5 MILLIGRAM(S): at 11:00

## 2023-11-15 RX ADMIN — OXYCODONE HYDROCHLORIDE 5 MILLIGRAM(S): 5 TABLET ORAL at 12:57

## 2023-11-15 RX ADMIN — FENTANYL CITRATE 25 MICROGRAM(S): 50 INJECTION INTRAVENOUS at 12:40

## 2023-11-15 RX ADMIN — Medication 5 MILLIGRAM(S): at 11:15

## 2023-11-15 RX ADMIN — Medication 5 MILLIGRAM(S): at 12:30

## 2023-11-15 RX ADMIN — OXYCODONE HYDROCHLORIDE 5 MILLIGRAM(S): 5 TABLET ORAL at 12:40

## 2023-11-15 RX ADMIN — Medication 400 MILLIGRAM(S): at 13:10

## 2023-11-15 RX ADMIN — FENTANYL CITRATE 25 MICROGRAM(S): 50 INJECTION INTRAVENOUS at 12:57

## 2023-11-15 NOTE — ASU PATIENT PROFILE, ADULT - ABILITY TO HEAR (WITH HEARING AID OR HEARING APPLIANCE IF NORMALLY USED):
parkinsons/Mildly to Moderately Impaired: difficulty hearing in some environments or speaker may need to increase volume or speak distinctly

## 2023-11-15 NOTE — ASU PATIENT PROFILE, ADULT - VISION (WITH CORRECTIVE LENSES IF THE PATIENT USUALLY WEARS THEM):
wife has glasses/Partially impaired: cannot see medication labels or newsprint, but can see obstacles in path, and the surrounding layout; can count fingers at arm's length

## 2023-11-15 NOTE — ASU DISCHARGE PLAN (ADULT/PEDIATRIC) - NURSING INSTRUCTIONS
Refer to the multicolored fact sheet for any problems you experience. If you have difficulty urinating or unable to urinate in 8 hours after surgery, call your Dr., or return to  emergency room.  Notify your Dr. if your fever is 101 or greater. If the pain medicine your  recjohnnands does not help you, or you have severe pain call your Dr.. If you cannot reach the doctor, call [facility] Emergency Department at [phone] or go to your local Emergency Department. A responsible adult should be with you for the rest of the day and night for your safety, and to help you. Apply ice to affected area 20min on and 20min off for the  first 24-48 hours. Do not apply directly to skin, place barrier between ice and skin.  Resume your medications as listed on the attached Medication Record.

## 2023-11-15 NOTE — BRIEF OPERATIVE NOTE - NSICDXBRIEFPROCEDURE_GEN_ALL_CORE_FT
PROCEDURES:  Transurethral resection of bladder tumor (TURBT) with biopsy 15-Nov-2023 10:52:04  Jeremie Baxter

## 2023-11-15 NOTE — ASU PREOP CHECKLIST - ALLERGY BAND ON
continue daley catheter consider TOV after 3 days  continue Flomax  restarted Proscar  TOV 7/22 done

## 2023-11-15 NOTE — ASU DISCHARGE PLAN (ADULT/PEDIATRIC) - NS MD DC FALL RISK RISK
For information on Fall & Injury Prevention, visit: [email or web address] OR  [email or web address] OR  https://www.cdc.gov/steadi/patient.html

## 2023-11-15 NOTE — ASU PATIENT PROFILE, ADULT - FALL HARM RISK - RISK INTERVENTIONS

## 2023-11-20 LAB
SURGICAL PATHOLOGY STUDY: SIGNIFICANT CHANGE UP
SURGICAL PATHOLOGY STUDY: SIGNIFICANT CHANGE UP

## 2023-11-21 DIAGNOSIS — E78.5 HYPERLIPIDEMIA, UNSPECIFIED: ICD-10-CM

## 2023-11-21 DIAGNOSIS — Z87.891 PERSONAL HISTORY OF NICOTINE DEPENDENCE: ICD-10-CM

## 2023-11-21 DIAGNOSIS — Z91.040 LATEX ALLERGY STATUS: ICD-10-CM

## 2023-11-21 DIAGNOSIS — Z85.828 PERSONAL HISTORY OF OTHER MALIGNANT NEOPLASM OF SKIN: ICD-10-CM

## 2023-11-21 DIAGNOSIS — M10.9 GOUT, UNSPECIFIED: ICD-10-CM

## 2023-11-21 DIAGNOSIS — Z86.16 PERSONAL HISTORY OF COVID-19: ICD-10-CM

## 2023-11-21 DIAGNOSIS — Z96.652 PRESENCE OF LEFT ARTIFICIAL KNEE JOINT: ICD-10-CM

## 2023-11-21 DIAGNOSIS — G20.A1 PARKINSON'S DISEASE WITHOUT DYSKINESIA, WITHOUT MENTION OF FLUCTUATIONS: ICD-10-CM

## 2023-11-21 DIAGNOSIS — I10 ESSENTIAL (PRIMARY) HYPERTENSION: ICD-10-CM

## 2023-11-21 DIAGNOSIS — Z91.041 RADIOGRAPHIC DYE ALLERGY STATUS: ICD-10-CM

## 2023-11-21 DIAGNOSIS — I12.9 HYPERTENSIVE CHRONIC KIDNEY DISEASE WITH STAGE 1 THROUGH STAGE 4 CHRONIC KIDNEY DISEASE, OR UNSPECIFIED CHRONIC KIDNEY DISEASE: ICD-10-CM

## 2023-11-21 DIAGNOSIS — Z88.6 ALLERGY STATUS TO ANALGESIC AGENT: ICD-10-CM

## 2023-11-21 DIAGNOSIS — Z90.49 ACQUIRED ABSENCE OF OTHER SPECIFIED PARTS OF DIGESTIVE TRACT: ICD-10-CM

## 2023-11-21 DIAGNOSIS — Z85.46 PERSONAL HISTORY OF MALIGNANT NEOPLASM OF PROSTATE: ICD-10-CM

## 2023-11-21 DIAGNOSIS — N18.9 CHRONIC KIDNEY DISEASE, UNSPECIFIED: ICD-10-CM

## 2023-11-21 DIAGNOSIS — N30.80 OTHER CYSTITIS WITHOUT HEMATURIA: ICD-10-CM

## 2023-11-21 DIAGNOSIS — E03.9 HYPOTHYROIDISM, UNSPECIFIED: ICD-10-CM

## 2023-11-21 DIAGNOSIS — D49.4 NEOPLASM OF UNSPECIFIED BEHAVIOR OF BLADDER: ICD-10-CM

## 2023-11-21 DIAGNOSIS — D64.9 ANEMIA, UNSPECIFIED: ICD-10-CM

## 2023-11-22 RX ORDER — SODIUM SULFATE, POTASSIUM SULFATE AND MAGNESIUM SULFATE 1.6; 3.13; 17.5 G/177ML; G/177ML; G/177ML
17.5-3.13-1.6 SOLUTION ORAL
Qty: 2 | Refills: 0 | Status: DISCONTINUED | COMMUNITY
Start: 2023-11-21 | End: 2023-11-22

## 2023-11-22 RX ORDER — POLYETHYLENE GLYCOL 3350 AND ELECTROLYTES WITH LEMON FLAVOR 236; 22.74; 6.74; 5.86; 2.97 G/4L; G/4L; G/4L; G/4L; G/4L
236 POWDER, FOR SOLUTION ORAL
Qty: 1 | Refills: 0 | Status: ACTIVE | COMMUNITY
Start: 2023-11-22 | End: 1900-01-01

## 2023-11-22 RX ORDER — MAGNESIUM HYDROXIDE 400 MG/5ML
1200 SUSPENSION, ORAL (FINAL DOSE FORM) ORAL
Qty: 1 | Refills: 2 | Status: ACTIVE | COMMUNITY
Start: 2023-11-22 | End: 1900-01-01

## 2023-12-07 ENCOUNTER — APPOINTMENT (OUTPATIENT)
Dept: COLORECTAL SURGERY | Facility: CLINIC | Age: 83
End: 2023-12-07

## 2023-12-08 PROBLEM — D64.9 ANEMIA, UNSPECIFIED: Chronic | Status: ACTIVE | Noted: 2023-11-13

## 2023-12-08 PROBLEM — C44.91 BASAL CELL CARCINOMA OF SKIN, UNSPECIFIED: Chronic | Status: ACTIVE | Noted: 2023-11-13

## 2023-12-08 PROBLEM — C43.9 MALIGNANT MELANOMA OF SKIN, UNSPECIFIED: Chronic | Status: ACTIVE | Noted: 2023-11-13

## 2023-12-08 PROBLEM — M19.90 UNSPECIFIED OSTEOARTHRITIS, UNSPECIFIED SITE: Chronic | Status: ACTIVE | Noted: 2023-11-13

## 2023-12-08 PROBLEM — C61 MALIGNANT NEOPLASM OF PROSTATE: Chronic | Status: ACTIVE | Noted: 2023-11-13

## 2023-12-08 PROBLEM — K56.609 UNSPECIFIED INTESTINAL OBSTRUCTION, UNSPECIFIED AS TO PARTIAL VERSUS COMPLETE OBSTRUCTION: Chronic | Status: ACTIVE | Noted: 2023-11-13

## 2023-12-08 PROBLEM — N32.89 OTHER SPECIFIED DISORDERS OF BLADDER: Chronic | Status: ACTIVE | Noted: 2023-11-13

## 2023-12-08 PROBLEM — C44.92 SQUAMOUS CELL CARCINOMA OF SKIN, UNSPECIFIED: Chronic | Status: ACTIVE | Noted: 2023-11-13

## 2024-01-01 ENCOUNTER — INPATIENT (INPATIENT)
Facility: HOSPITAL | Age: 84
LOS: 4 days | Discharge: HOSPICE | DRG: 101 | End: 2024-09-09
Attending: FAMILY MEDICINE | Admitting: HOSPITALIST
Payer: MEDICARE

## 2024-01-01 VITALS
SYSTOLIC BLOOD PRESSURE: 158 MMHG | RESPIRATION RATE: 16 BRPM | TEMPERATURE: 98 F | HEART RATE: 52 BPM | DIASTOLIC BLOOD PRESSURE: 62 MMHG | OXYGEN SATURATION: 100 %

## 2024-01-01 VITALS — DIASTOLIC BLOOD PRESSURE: 75 MMHG | SYSTOLIC BLOOD PRESSURE: 204 MMHG

## 2024-01-01 DIAGNOSIS — R56.9 UNSPECIFIED CONVULSIONS: ICD-10-CM

## 2024-01-01 DIAGNOSIS — Z98.890 OTHER SPECIFIED POSTPROCEDURAL STATES: Chronic | ICD-10-CM

## 2024-01-01 DIAGNOSIS — Z85.828 PERSONAL HISTORY OF OTHER MALIGNANT NEOPLASM OF SKIN: Chronic | ICD-10-CM

## 2024-01-01 DIAGNOSIS — Z96.652 PRESENCE OF LEFT ARTIFICIAL KNEE JOINT: Chronic | ICD-10-CM

## 2024-01-01 LAB
ADD ON TEST-SPECIMEN IN LAB: SIGNIFICANT CHANGE UP
ALBUMIN SERPL ELPH-MCNC: 3.1 G/DL — LOW (ref 3.3–5)
ALBUMIN SERPL ELPH-MCNC: 3.7 G/DL — SIGNIFICANT CHANGE UP (ref 3.3–5)
ALP SERPL-CCNC: 73 U/L — SIGNIFICANT CHANGE UP (ref 40–120)
ALP SERPL-CCNC: 74 U/L — SIGNIFICANT CHANGE UP (ref 40–120)
ALT FLD-CCNC: 10 U/L — LOW (ref 12–78)
ALT FLD-CCNC: 10 U/L — LOW (ref 12–78)
ANION GAP SERPL CALC-SCNC: 11 MMOL/L — SIGNIFICANT CHANGE UP (ref 5–17)
ANION GAP SERPL CALC-SCNC: 13 MMOL/L — SIGNIFICANT CHANGE UP (ref 5–17)
ANION GAP SERPL CALC-SCNC: 7 MMOL/L — SIGNIFICANT CHANGE UP (ref 5–17)
ANION GAP SERPL CALC-SCNC: 9 MMOL/L — SIGNIFICANT CHANGE UP (ref 5–17)
APPEARANCE UR: ABNORMAL
AST SERPL-CCNC: 18 U/L — SIGNIFICANT CHANGE UP (ref 15–37)
AST SERPL-CCNC: 22 U/L — SIGNIFICANT CHANGE UP (ref 15–37)
BACTERIA # UR AUTO: ABNORMAL /HPF
BASOPHILS # BLD AUTO: 0.02 K/UL — SIGNIFICANT CHANGE UP (ref 0–0.2)
BASOPHILS NFR BLD AUTO: 0.2 % — SIGNIFICANT CHANGE UP (ref 0–2)
BILIRUB SERPL-MCNC: 0.4 MG/DL — SIGNIFICANT CHANGE UP (ref 0.2–1.2)
BILIRUB SERPL-MCNC: 0.6 MG/DL — SIGNIFICANT CHANGE UP (ref 0.2–1.2)
BILIRUB UR-MCNC: NEGATIVE — SIGNIFICANT CHANGE UP
BUN SERPL-MCNC: 48 MG/DL — HIGH (ref 7–23)
BUN SERPL-MCNC: 50 MG/DL — HIGH (ref 7–23)
BUN SERPL-MCNC: 53 MG/DL — HIGH (ref 7–23)
BUN SERPL-MCNC: 62 MG/DL — HIGH (ref 7–23)
CALCIUM SERPL-MCNC: 8.3 MG/DL — LOW (ref 8.5–10.1)
CALCIUM SERPL-MCNC: 8.4 MG/DL — LOW (ref 8.5–10.1)
CALCIUM SERPL-MCNC: 8.5 MG/DL — SIGNIFICANT CHANGE UP (ref 8.5–10.1)
CALCIUM SERPL-MCNC: 9.1 MG/DL — SIGNIFICANT CHANGE UP (ref 8.5–10.1)
CAST: 4 /LPF — SIGNIFICANT CHANGE UP (ref 0–4)
CHLORIDE SERPL-SCNC: 109 MMOL/L — HIGH (ref 96–108)
CHLORIDE SERPL-SCNC: 113 MMOL/L — HIGH (ref 96–108)
CHLORIDE SERPL-SCNC: 118 MMOL/L — HIGH (ref 96–108)
CHLORIDE SERPL-SCNC: 126 MMOL/L — HIGH (ref 96–108)
CO2 SERPL-SCNC: 18 MMOL/L — LOW (ref 22–31)
CO2 SERPL-SCNC: 19 MMOL/L — LOW (ref 22–31)
CO2 SERPL-SCNC: 22 MMOL/L — SIGNIFICANT CHANGE UP (ref 22–31)
CO2 SERPL-SCNC: 22 MMOL/L — SIGNIFICANT CHANGE UP (ref 22–31)
COLOR SPEC: YELLOW — SIGNIFICANT CHANGE UP
COMMENT - URINE: SIGNIFICANT CHANGE UP
CREAT SERPL-MCNC: 3.17 MG/DL — HIGH (ref 0.5–1.3)
CREAT SERPL-MCNC: 3.55 MG/DL — HIGH (ref 0.5–1.3)
CREAT SERPL-MCNC: 3.56 MG/DL — HIGH (ref 0.5–1.3)
CREAT SERPL-MCNC: 3.63 MG/DL — HIGH (ref 0.5–1.3)
CULTURE RESULTS: SIGNIFICANT CHANGE UP
DIFF PNL FLD: ABNORMAL
EGFR: 16 ML/MIN/1.73M2 — LOW
EGFR: 19 ML/MIN/1.73M2 — LOW
EOSINOPHIL # BLD AUTO: 0.08 K/UL — SIGNIFICANT CHANGE UP (ref 0–0.5)
EOSINOPHIL NFR BLD AUTO: 0.9 % — SIGNIFICANT CHANGE UP (ref 0–6)
EPI CELLS # UR: PRESENT
GLUCOSE BLDC GLUCOMTR-MCNC: 103 MG/DL — HIGH (ref 70–99)
GLUCOSE SERPL-MCNC: 102 MG/DL — HIGH (ref 70–99)
GLUCOSE SERPL-MCNC: 108 MG/DL — HIGH (ref 70–99)
GLUCOSE SERPL-MCNC: 121 MG/DL — HIGH (ref 70–99)
GLUCOSE SERPL-MCNC: 94 MG/DL — SIGNIFICANT CHANGE UP (ref 70–99)
GLUCOSE UR QL: NEGATIVE MG/DL — SIGNIFICANT CHANGE UP
HCT VFR BLD CALC: 30 % — LOW (ref 39–50)
HCT VFR BLD CALC: 33.1 % — LOW (ref 39–50)
HCT VFR BLD CALC: 33.9 % — LOW (ref 39–50)
HCT VFR BLD CALC: 37.2 % — LOW (ref 39–50)
HGB BLD-MCNC: 10.4 G/DL — LOW (ref 13–17)
HGB BLD-MCNC: 10.7 G/DL — LOW (ref 13–17)
HGB BLD-MCNC: 12.1 G/DL — LOW (ref 13–17)
HGB BLD-MCNC: 9.8 G/DL — LOW (ref 13–17)
IMM GRANULOCYTES NFR BLD AUTO: 0.3 % — SIGNIFICANT CHANGE UP (ref 0–0.9)
KETONES UR-MCNC: ABNORMAL MG/DL
LACTATE SERPL-SCNC: 1.1 MMOL/L — SIGNIFICANT CHANGE UP (ref 0.7–2)
LACTATE SERPL-SCNC: 2.1 MMOL/L — HIGH (ref 0.7–2)
LACTATE SERPL-SCNC: 3.4 MMOL/L — HIGH (ref 0.7–2)
LEUKOCYTE ESTERASE UR-ACNC: ABNORMAL
LYMPHOCYTES # BLD AUTO: 0.52 K/UL — LOW (ref 1–3.3)
LYMPHOCYTES # BLD AUTO: 5.9 % — LOW (ref 13–44)
MAGNESIUM SERPL-MCNC: 2.6 MG/DL — SIGNIFICANT CHANGE UP (ref 1.6–2.6)
MCHC RBC-ENTMCNC: 31.4 GM/DL — LOW (ref 32–36)
MCHC RBC-ENTMCNC: 31.6 GM/DL — LOW (ref 32–36)
MCHC RBC-ENTMCNC: 31.9 PG — SIGNIFICANT CHANGE UP (ref 27–34)
MCHC RBC-ENTMCNC: 32.2 PG — SIGNIFICANT CHANGE UP (ref 27–34)
MCHC RBC-ENTMCNC: 32.3 PG — SIGNIFICANT CHANGE UP (ref 27–34)
MCHC RBC-ENTMCNC: 32.5 GM/DL — SIGNIFICANT CHANGE UP (ref 32–36)
MCHC RBC-ENTMCNC: 32.6 PG — SIGNIFICANT CHANGE UP (ref 27–34)
MCHC RBC-ENTMCNC: 32.7 GM/DL — SIGNIFICANT CHANGE UP (ref 32–36)
MCV RBC AUTO: 101.5 FL — HIGH (ref 80–100)
MCV RBC AUTO: 102.1 FL — HIGH (ref 80–100)
MCV RBC AUTO: 99.2 FL — SIGNIFICANT CHANGE UP (ref 80–100)
MCV RBC AUTO: 99.7 FL — SIGNIFICANT CHANGE UP (ref 80–100)
MONOCYTES # BLD AUTO: 0.44 K/UL — SIGNIFICANT CHANGE UP (ref 0–0.9)
MONOCYTES NFR BLD AUTO: 5 % — SIGNIFICANT CHANGE UP (ref 2–14)
NEUTROPHILS # BLD AUTO: 7.71 K/UL — HIGH (ref 1.8–7.4)
NEUTROPHILS NFR BLD AUTO: 87.7 % — HIGH (ref 43–77)
NITRITE UR-MCNC: NEGATIVE — SIGNIFICANT CHANGE UP
PH UR: 5.5 — SIGNIFICANT CHANGE UP (ref 5–8)
PHENYTOIN FREE SERPL-MCNC: 20.8 UG/ML — HIGH (ref 10–20)
PHENYTOIN FREE SERPL-MCNC: 21 UG/ML — HIGH (ref 10–20)
PHENYTOIN FREE SERPL-MCNC: 26.1 UG/ML — HIGH (ref 10–20)
PHOSPHATE SERPL-MCNC: 3.6 MG/DL — SIGNIFICANT CHANGE UP (ref 2.5–4.5)
PLATELET # BLD AUTO: 180 K/UL — SIGNIFICANT CHANGE UP (ref 150–400)
PLATELET # BLD AUTO: 185 K/UL — SIGNIFICANT CHANGE UP (ref 150–400)
PLATELET # BLD AUTO: 197 K/UL — SIGNIFICANT CHANGE UP (ref 150–400)
PLATELET # BLD AUTO: 198 K/UL — SIGNIFICANT CHANGE UP (ref 150–400)
POTASSIUM SERPL-MCNC: 3.6 MMOL/L — SIGNIFICANT CHANGE UP (ref 3.5–5.3)
POTASSIUM SERPL-MCNC: 3.7 MMOL/L — SIGNIFICANT CHANGE UP (ref 3.5–5.3)
POTASSIUM SERPL-MCNC: 3.8 MMOL/L — SIGNIFICANT CHANGE UP (ref 3.5–5.3)
POTASSIUM SERPL-MCNC: 5 MMOL/L — SIGNIFICANT CHANGE UP (ref 3.5–5.3)
POTASSIUM SERPL-SCNC: 3.6 MMOL/L — SIGNIFICANT CHANGE UP (ref 3.5–5.3)
POTASSIUM SERPL-SCNC: 3.7 MMOL/L — SIGNIFICANT CHANGE UP (ref 3.5–5.3)
POTASSIUM SERPL-SCNC: 3.8 MMOL/L — SIGNIFICANT CHANGE UP (ref 3.5–5.3)
POTASSIUM SERPL-SCNC: 5 MMOL/L — SIGNIFICANT CHANGE UP (ref 3.5–5.3)
PROLACTIN SERPL-MCNC: 178 NG/ML — HIGH (ref 4.1–18.4)
PROT SERPL-MCNC: 6.1 GM/DL — SIGNIFICANT CHANGE UP (ref 6–8.3)
PROT SERPL-MCNC: 7.4 GM/DL — SIGNIFICANT CHANGE UP (ref 6–8.3)
PROT UR-MCNC: 300 MG/DL
RBC # BLD: 3.01 M/UL — LOW (ref 4.2–5.8)
RBC # BLD: 3.26 M/UL — LOW (ref 4.2–5.8)
RBC # BLD: 3.32 M/UL — LOW (ref 4.2–5.8)
RBC # BLD: 3.75 M/UL — LOW (ref 4.2–5.8)
RBC # FLD: 14.8 % — HIGH (ref 10.3–14.5)
RBC # FLD: 14.9 % — HIGH (ref 10.3–14.5)
RBC # FLD: 15 % — HIGH (ref 10.3–14.5)
RBC # FLD: 15.2 % — HIGH (ref 10.3–14.5)
RBC CASTS # UR COMP ASSIST: 41 /HPF — HIGH (ref 0–4)
SODIUM SERPL-SCNC: 138 MMOL/L — SIGNIFICANT CHANGE UP (ref 135–145)
SODIUM SERPL-SCNC: 144 MMOL/L — SIGNIFICANT CHANGE UP (ref 135–145)
SODIUM SERPL-SCNC: 149 MMOL/L — HIGH (ref 135–145)
SODIUM SERPL-SCNC: 156 MMOL/L — HIGH (ref 135–145)
SP GR SPEC: 1.01 — SIGNIFICANT CHANGE UP (ref 1–1.03)
SPECIMEN SOURCE: SIGNIFICANT CHANGE UP
SQUAMOUS # UR AUTO: 1 /HPF — SIGNIFICANT CHANGE UP (ref 0–5)
T3 SERPL-MCNC: 32 NG/DL — LOW (ref 80–200)
T4 AB SER-ACNC: 5.8 UG/DL — SIGNIFICANT CHANGE UP (ref 4.6–12)
TROPONIN I, HIGH SENSITIVITY RESULT: 67.43 NG/L — SIGNIFICANT CHANGE UP
TROPONIN I, HIGH SENSITIVITY RESULT: 67.97 NG/L — SIGNIFICANT CHANGE UP
TSH SERPL-MCNC: 7.63 UU/ML — HIGH (ref 0.34–4.82)
UROBILINOGEN FLD QL: 0.2 MG/DL — SIGNIFICANT CHANGE UP (ref 0.2–1)
WBC # BLD: 7.97 K/UL — SIGNIFICANT CHANGE UP (ref 3.8–10.5)
WBC # BLD: 8.34 K/UL — SIGNIFICANT CHANGE UP (ref 3.8–10.5)
WBC # BLD: 8.8 K/UL — SIGNIFICANT CHANGE UP (ref 3.8–10.5)
WBC # BLD: 9.24 K/UL — SIGNIFICANT CHANGE UP (ref 3.8–10.5)
WBC # FLD AUTO: 7.97 K/UL — SIGNIFICANT CHANGE UP (ref 3.8–10.5)
WBC # FLD AUTO: 8.34 K/UL — SIGNIFICANT CHANGE UP (ref 3.8–10.5)
WBC # FLD AUTO: 8.8 K/UL — SIGNIFICANT CHANGE UP (ref 3.8–10.5)
WBC # FLD AUTO: 9.24 K/UL — SIGNIFICANT CHANGE UP (ref 3.8–10.5)
WBC UR QL: 804 /HPF — HIGH (ref 0–5)

## 2024-01-01 PROCEDURE — 84146 ASSAY OF PROLACTIN: CPT

## 2024-01-01 PROCEDURE — 99223 1ST HOSP IP/OBS HIGH 75: CPT

## 2024-01-01 PROCEDURE — 81001 URINALYSIS AUTO W/SCOPE: CPT

## 2024-01-01 PROCEDURE — 99232 SBSQ HOSP IP/OBS MODERATE 35: CPT

## 2024-01-01 PROCEDURE — 99233 SBSQ HOSP IP/OBS HIGH 50: CPT

## 2024-01-01 PROCEDURE — 95816 EEG AWAKE AND DROWSY: CPT

## 2024-01-01 PROCEDURE — 95700 EEG CONT REC W/VID EEG TECH: CPT

## 2024-01-01 PROCEDURE — 84436 ASSAY OF TOTAL THYROXINE: CPT

## 2024-01-01 PROCEDURE — 36415 COLL VENOUS BLD VENIPUNCTURE: CPT

## 2024-01-01 PROCEDURE — 70551 MRI BRAIN STEM W/O DYE: CPT | Mod: 26

## 2024-01-01 PROCEDURE — 80185 ASSAY OF PHENYTOIN TOTAL: CPT

## 2024-01-01 PROCEDURE — 80048 BASIC METABOLIC PNL TOTAL CA: CPT

## 2024-01-01 PROCEDURE — 92507 TX SP LANG VOICE COMM INDIV: CPT | Mod: GN

## 2024-01-01 PROCEDURE — 71045 X-RAY EXAM CHEST 1 VIEW: CPT | Mod: 26

## 2024-01-01 PROCEDURE — 95816 EEG AWAKE AND DROWSY: CPT | Mod: 26

## 2024-01-01 PROCEDURE — 82962 GLUCOSE BLOOD TEST: CPT

## 2024-01-01 PROCEDURE — 83605 ASSAY OF LACTIC ACID: CPT

## 2024-01-01 PROCEDURE — 87040 BLOOD CULTURE FOR BACTERIA: CPT

## 2024-01-01 PROCEDURE — 99285 EMERGENCY DEPT VISIT HI MDM: CPT

## 2024-01-01 PROCEDURE — 95714 VEEG EA 12-26 HR UNMNTR: CPT

## 2024-01-01 PROCEDURE — 85027 COMPLETE CBC AUTOMATED: CPT

## 2024-01-01 PROCEDURE — 92523 SPEECH SOUND LANG COMPREHEN: CPT | Mod: GN

## 2024-01-01 PROCEDURE — 84480 ASSAY TRIIODOTHYRONINE (T3): CPT

## 2024-01-01 PROCEDURE — 93306 TTE W/DOPPLER COMPLETE: CPT

## 2024-01-01 PROCEDURE — 93306 TTE W/DOPPLER COMPLETE: CPT | Mod: 26

## 2024-01-01 PROCEDURE — 99239 HOSP IP/OBS DSCHRG MGMT >30: CPT

## 2024-01-01 PROCEDURE — 70450 CT HEAD/BRAIN W/O DYE: CPT | Mod: 26,MC

## 2024-01-01 PROCEDURE — 92526 ORAL FUNCTION THERAPY: CPT | Mod: GN

## 2024-01-01 PROCEDURE — 70551 MRI BRAIN STEM W/O DYE: CPT | Mod: MC

## 2024-01-01 PROCEDURE — 80053 COMPREHEN METABOLIC PANEL: CPT

## 2024-01-01 PROCEDURE — 93010 ELECTROCARDIOGRAM REPORT: CPT

## 2024-01-01 PROCEDURE — 97162 PT EVAL MOD COMPLEX 30 MIN: CPT | Mod: GP

## 2024-01-01 PROCEDURE — 87086 URINE CULTURE/COLONY COUNT: CPT

## 2024-01-01 PROCEDURE — 92610 EVALUATE SWALLOWING FUNCTION: CPT | Mod: GN

## 2024-01-01 RX ORDER — NYSTATIN 100000/G
1 CREAM (GRAM) TOPICAL
Refills: 0 | DISCHARGE

## 2024-01-01 RX ORDER — LEVOTHYROXINE SODIUM 100 MCG
100 TABLET ORAL DAILY
Refills: 0 | Status: DISCONTINUED | OUTPATIENT
Start: 2024-01-01 | End: 2024-01-01

## 2024-01-01 RX ORDER — ESCITALOPRAM OXALATE 10 MG/1
5 TABLET ORAL DAILY
Refills: 0 | Status: DISCONTINUED | OUTPATIENT
Start: 2024-01-01 | End: 2024-01-01

## 2024-01-01 RX ORDER — FINASTERIDE 1 MG/1
5 TABLET, FILM COATED ORAL DAILY
Refills: 0 | Status: DISCONTINUED | OUTPATIENT
Start: 2024-01-01 | End: 2024-01-01

## 2024-01-01 RX ORDER — HYDRALAZINE HCL 50 MG
5 TABLET ORAL ONCE
Refills: 0 | Status: COMPLETED | OUTPATIENT
Start: 2024-01-01 | End: 2024-01-01

## 2024-01-01 RX ORDER — AMLODIPINE BESYLATE 10 MG/1
1 TABLET ORAL
Refills: 0 | DISCHARGE

## 2024-01-01 RX ORDER — LORAZEPAM 4 MG/ML
2 INJECTION INTRAMUSCULAR; INTRAVENOUS ONCE
Refills: 0 | Status: DISCONTINUED | OUTPATIENT
Start: 2024-01-01 | End: 2024-01-01

## 2024-01-01 RX ORDER — HYDRALAZINE HCL 50 MG
10 TABLET ORAL ONCE
Refills: 0 | Status: COMPLETED | OUTPATIENT
Start: 2024-01-01 | End: 2024-01-01

## 2024-01-01 RX ORDER — SIMVASTATIN 10 MG
10 TABLET ORAL AT BEDTIME
Refills: 0 | Status: DISCONTINUED | OUTPATIENT
Start: 2024-01-01 | End: 2024-01-01

## 2024-01-01 RX ORDER — HEPARIN SODIUM,BOVINE 1000/ML
5000 VIAL (ML) INJECTION EVERY 12 HOURS
Refills: 0 | Status: DISCONTINUED | OUTPATIENT
Start: 2024-01-01 | End: 2024-01-01

## 2024-01-01 RX ORDER — CARBIDOPA/LEVODOPA 25MG-250MG
2 TABLET ORAL
Refills: 0 | Status: DISCONTINUED | OUTPATIENT
Start: 2024-01-01 | End: 2024-01-01

## 2024-01-01 RX ORDER — HYDRALAZINE HCL 50 MG
3 TABLET ORAL
Refills: 0 | DISCHARGE

## 2024-01-01 RX ORDER — CLONIDINE HCL 0.2 MG
1 TABLET ORAL
Refills: 0 | Status: DISCONTINUED | OUTPATIENT
Start: 2024-01-01 | End: 2024-01-01

## 2024-01-01 RX ORDER — HYDRALAZINE HCL 50 MG
10 TABLET ORAL EVERY 4 HOURS
Refills: 0 | Status: DISCONTINUED | OUTPATIENT
Start: 2024-01-01 | End: 2024-01-01

## 2024-01-01 RX ORDER — AMLODIPINE BESYLATE 10 MG/1
5 TABLET ORAL DAILY
Refills: 0 | Status: DISCONTINUED | OUTPATIENT
Start: 2024-01-01 | End: 2024-01-01

## 2024-01-01 RX ORDER — FOSPHENYTOIN SODIUM 100MG PE/2
1000 VIAL (ML) INJECTION ONCE
Refills: 0 | Status: COMPLETED | OUTPATIENT
Start: 2024-01-01 | End: 2024-01-01

## 2024-01-01 RX ORDER — EPOETIN ALFA 3000 [IU]/ML
3000 SOLUTION INTRAVENOUS; SUBCUTANEOUS
Refills: 0 | DISCHARGE

## 2024-01-01 RX ORDER — NICARDIPINE HCL 20 MG
5 CAPSULE ORAL
Qty: 40 | Refills: 0 | Status: DISCONTINUED | OUTPATIENT
Start: 2024-01-01 | End: 2024-01-01

## 2024-01-01 RX ORDER — FOSPHENYTOIN SODIUM 100MG PE/2
100 VIAL (ML) INJECTION EVERY 8 HOURS
Refills: 0 | Status: DISCONTINUED | OUTPATIENT
Start: 2024-01-01 | End: 2024-01-01

## 2024-01-01 RX ORDER — POLYETHYLENE GLYCOL 3350 17 G/17G
17 POWDER, FOR SOLUTION ORAL
Refills: 0 | DISCHARGE

## 2024-01-01 RX ORDER — POLYETHYLENE GLYCOL 3350 17 G/17G
17 POWDER, FOR SOLUTION ORAL DAILY
Refills: 0 | Status: DISCONTINUED | OUTPATIENT
Start: 2024-01-01 | End: 2024-01-01

## 2024-01-01 RX ORDER — FOLIC ACID/MULTIVIT,IRON,MINER 0.4MG-18MG
1 TABLET,CHEWABLE ORAL
Refills: 0 | DISCHARGE

## 2024-01-01 RX ORDER — MIDAZOLAM HYDROCHLORIDE 5 MG/ML
10 INJECTION, SOLUTION INTRAMUSCULAR; INTRAVENOUS ONCE
Refills: 0 | Status: DISCONTINUED | OUTPATIENT
Start: 2024-01-01 | End: 2024-01-01

## 2024-01-01 RX ORDER — ACETAMINOPHEN 325 MG/1
650 TABLET ORAL EVERY 6 HOURS
Refills: 0 | Status: DISCONTINUED | OUTPATIENT
Start: 2024-01-01 | End: 2024-01-01

## 2024-01-01 RX ORDER — HYDRALAZINE HCL 50 MG
10 TABLET ORAL EVERY 8 HOURS
Refills: 0 | Status: DISCONTINUED | OUTPATIENT
Start: 2024-01-01 | End: 2024-01-01

## 2024-01-01 RX ORDER — NITROGLYCERIN 0.2MG/HR
0.5 PATCH, TRANSDERMAL 24 HOURS TRANSDERMAL ONCE
Refills: 0 | Status: COMPLETED | OUTPATIENT
Start: 2024-01-01 | End: 2024-01-01

## 2024-01-01 RX ORDER — KETOCONAZOLE 20 MG/G
1 CREAM TOPICAL
Refills: 0 | DISCHARGE

## 2024-01-01 RX ORDER — SODIUM CHLORIDE 9 MG/ML
1000 INJECTION INTRAMUSCULAR; INTRAVENOUS; SUBCUTANEOUS ONCE
Refills: 0 | Status: COMPLETED | OUTPATIENT
Start: 2024-01-01 | End: 2024-01-01

## 2024-01-01 RX ORDER — ESCITALOPRAM OXALATE 10 MG/1
1 TABLET ORAL
Refills: 0 | DISCHARGE

## 2024-01-01 RX ORDER — FOSPHENYTOIN SODIUM 100MG PE/2
100 VIAL (ML) INJECTION EVERY 12 HOURS
Refills: 0 | Status: DISCONTINUED | OUTPATIENT
Start: 2024-01-01 | End: 2024-01-01

## 2024-01-01 RX ORDER — CLONAZEPAM 1 MG
0.5 TABLET ORAL AT BEDTIME
Refills: 0 | Status: DISCONTINUED | OUTPATIENT
Start: 2024-01-01 | End: 2024-01-01

## 2024-01-01 RX ORDER — CLONIDINE HCL 0.2 MG
1 TABLET ORAL ONCE
Refills: 0 | Status: COMPLETED | OUTPATIENT
Start: 2024-01-01 | End: 2024-01-01

## 2024-01-01 RX ORDER — NITROGLYCERIN 0.2MG/HR
1 PATCH, TRANSDERMAL 24 HOURS TRANSDERMAL ONCE
Refills: 0 | Status: DISCONTINUED | OUTPATIENT
Start: 2024-01-01 | End: 2024-01-01

## 2024-01-01 RX ORDER — CYANOCOBALAMIN (VITAMIN B-12) 500MCG/0.1
1 GEL (ML) NASAL
Refills: 0 | DISCHARGE

## 2024-01-01 RX ORDER — FOSPHENYTOIN SODIUM 100MG PE/2
100 VIAL (ML) INJECTION
Qty: 0 | Refills: 0 | DISCHARGE
Start: 2024-01-01

## 2024-01-01 RX ORDER — AMOXICILLIN 500 MG
1 CAPSULE ORAL
Refills: 0 | DISCHARGE

## 2024-01-01 RX ORDER — LEVOTHYROXINE SODIUM 100 MCG
75 TABLET ORAL AT BEDTIME
Refills: 0 | Status: DISCONTINUED | OUTPATIENT
Start: 2024-01-01 | End: 2024-01-01

## 2024-01-01 RX ORDER — ALLOPURINOL 300 MG
100 TABLET ORAL DAILY
Refills: 0 | Status: DISCONTINUED | OUTPATIENT
Start: 2024-01-01 | End: 2024-01-01

## 2024-01-01 RX ORDER — TAMSULOSIN HYDROCHLORIDE 0.4 MG/1
0.4 CAPSULE ORAL AT BEDTIME
Refills: 0 | Status: DISCONTINUED | OUTPATIENT
Start: 2024-01-01 | End: 2024-01-01

## 2024-01-01 RX ORDER — ACETAMINOPHEN 325 MG/1
1000 TABLET ORAL ONCE
Refills: 0 | Status: DISCONTINUED | OUTPATIENT
Start: 2024-01-01 | End: 2024-01-01

## 2024-01-01 RX ORDER — CARVEDILOL 6.25 MG/1
1 TABLET ORAL
Refills: 0 | DISCHARGE

## 2024-01-01 RX ORDER — FOLIC ACID 1 MG
1 TABLET ORAL
Refills: 0 | DISCHARGE

## 2024-01-01 RX ORDER — CLOTRIMAZOLE 1 %
1 CREAM (GRAM) TOPICAL
Refills: 0 | DISCHARGE

## 2024-01-01 RX ORDER — LEVOTHYROXINE SODIUM 100 MCG
88 TABLET ORAL DAILY
Refills: 0 | Status: DISCONTINUED | OUTPATIENT
Start: 2024-01-01 | End: 2024-01-01

## 2024-01-01 RX ORDER — SODIUM CHLORIDE 9 MG/ML
1000 INJECTION INTRAMUSCULAR; INTRAVENOUS; SUBCUTANEOUS
Refills: 0 | Status: DISCONTINUED | OUTPATIENT
Start: 2024-01-01 | End: 2024-01-01

## 2024-01-01 RX ADMIN — Medication 1 PATCH: at 07:23

## 2024-01-01 RX ADMIN — Medication 104 MILLIGRAM(S) PE: at 13:03

## 2024-01-01 RX ADMIN — Medication 1 PATCH: at 12:54

## 2024-01-01 RX ADMIN — Medication 1000 MILLIGRAM(S): at 06:16

## 2024-01-01 RX ADMIN — Medication 10 MILLIGRAM(S): at 15:36

## 2024-01-01 RX ADMIN — Medication 10 MILLIGRAM(S): at 10:26

## 2024-01-01 RX ADMIN — Medication 5000 UNIT(S): at 23:59

## 2024-01-01 RX ADMIN — Medication 10 MILLIGRAM(S): at 00:01

## 2024-01-01 RX ADMIN — Medication 1 PATCH: at 19:02

## 2024-01-01 RX ADMIN — Medication 5000 UNIT(S): at 22:18

## 2024-01-01 RX ADMIN — Medication 5 MILLIGRAM(S): at 06:39

## 2024-01-01 RX ADMIN — Medication 5000 UNIT(S): at 11:38

## 2024-01-01 RX ADMIN — Medication 0.5 INCH(S): at 01:58

## 2024-01-01 RX ADMIN — Medication 75 MICROGRAM(S): at 22:33

## 2024-01-01 RX ADMIN — Medication 104 MILLIGRAM(S) PE: at 22:58

## 2024-01-01 RX ADMIN — Medication 5000 UNIT(S): at 12:54

## 2024-01-01 RX ADMIN — Medication 10 MILLIGRAM(S): at 17:20

## 2024-01-01 RX ADMIN — Medication 1000 MILLIGRAM(S): at 06:39

## 2024-01-01 RX ADMIN — Medication 104 MILLIGRAM(S) PE: at 06:35

## 2024-01-01 RX ADMIN — Medication 0.5 INCH(S): at 07:34

## 2024-01-01 RX ADMIN — Medication 104 MILLIGRAM(S) PE: at 09:35

## 2024-01-01 RX ADMIN — AMLODIPINE BESYLATE 5 MILLIGRAM(S): 10 TABLET ORAL at 00:00

## 2024-01-01 RX ADMIN — Medication 1000 MILLIGRAM(S): at 06:34

## 2024-01-01 RX ADMIN — LORAZEPAM 2 MILLIGRAM(S): 4 INJECTION INTRAMUSCULAR; INTRAVENOUS at 16:04

## 2024-01-01 RX ADMIN — Medication 104 MILLIGRAM(S) PE: at 22:17

## 2024-01-01 RX ADMIN — Medication 10 MILLIGRAM(S): at 06:01

## 2024-01-01 RX ADMIN — Medication 10 MILLIGRAM(S): at 17:46

## 2024-01-01 RX ADMIN — Medication 1 PATCH: at 18:59

## 2024-01-01 RX ADMIN — Medication 104 MILLIGRAM(S) PE: at 22:36

## 2024-01-01 RX ADMIN — Medication 10 MILLIGRAM(S): at 08:40

## 2024-01-01 RX ADMIN — Medication 104 MILLIGRAM(S) PE: at 14:01

## 2024-01-01 RX ADMIN — Medication 5 MILLIGRAM(S): at 22:07

## 2024-01-01 RX ADMIN — Medication 10 MILLIGRAM(S): at 16:43

## 2024-01-01 RX ADMIN — Medication 104 MILLIGRAM(S) PE: at 09:17

## 2024-01-01 RX ADMIN — Medication 10 MILLIGRAM(S): at 06:16

## 2024-01-01 RX ADMIN — Medication 1 PATCH: at 19:30

## 2024-01-01 RX ADMIN — Medication 104 MILLIGRAM(S) PE: at 06:44

## 2024-01-01 RX ADMIN — Medication 104 MILLIGRAM(S) PE: at 13:15

## 2024-01-01 RX ADMIN — SODIUM CHLORIDE 75 MILLILITER(S): 9 INJECTION INTRAMUSCULAR; INTRAVENOUS; SUBCUTANEOUS at 14:01

## 2024-01-01 RX ADMIN — Medication 10 MILLIGRAM(S): at 17:23

## 2024-01-01 RX ADMIN — Medication 10 MILLIGRAM(S): at 04:05

## 2024-01-01 RX ADMIN — Medication 10 MILLIGRAM(S): at 09:12

## 2024-01-01 RX ADMIN — Medication 0.5 INCH(S): at 13:58

## 2024-01-01 RX ADMIN — Medication 10 MILLIGRAM(S): at 23:47

## 2024-01-01 RX ADMIN — Medication 10 MILLIGRAM(S): at 03:49

## 2024-01-01 RX ADMIN — Medication 5000 UNIT(S): at 22:53

## 2024-01-01 RX ADMIN — Medication 5000 UNIT(S): at 09:22

## 2024-01-01 RX ADMIN — Medication 2 TABLET(S): at 23:58

## 2024-01-01 RX ADMIN — Medication 5000 UNIT(S): at 22:07

## 2024-01-01 RX ADMIN — Medication 2 TABLET(S): at 06:35

## 2024-01-01 RX ADMIN — Medication 104 MILLIGRAM(S) PE: at 21:27

## 2024-01-01 RX ADMIN — Medication 88 MICROGRAM(S): at 06:35

## 2024-01-01 RX ADMIN — Medication 10 MILLIGRAM(S): at 10:42

## 2024-01-01 RX ADMIN — Medication 10 MILLIGRAM(S): at 03:14

## 2024-01-01 RX ADMIN — Medication 140 MILLIGRAM(S) PE: at 18:58

## 2024-01-01 RX ADMIN — Medication 5000 UNIT(S): at 09:35

## 2024-01-01 RX ADMIN — Medication 10 MILLIGRAM(S): at 19:46

## 2024-01-01 RX ADMIN — Medication 10 MILLIGRAM(S): at 11:37

## 2024-01-01 RX ADMIN — Medication 5000 UNIT(S): at 23:10

## 2024-01-01 RX ADMIN — Medication 10 MILLIGRAM(S): at 01:25

## 2024-01-01 RX ADMIN — Medication 75 MICROGRAM(S): at 22:18

## 2024-01-01 RX ADMIN — Medication 10 MILLIGRAM(S): at 23:20

## 2024-01-01 RX ADMIN — SODIUM CHLORIDE 1000 MILLILITER(S): 9 INJECTION INTRAMUSCULAR; INTRAVENOUS; SUBCUTANEOUS at 11:34

## 2024-01-01 RX ADMIN — Medication 5000 UNIT(S): at 10:26

## 2024-01-01 RX ADMIN — SODIUM CHLORIDE 75 MILLILITER(S): 9 INJECTION INTRAMUSCULAR; INTRAVENOUS; SUBCUTANEOUS at 00:39

## 2024-01-17 ENCOUNTER — NON-APPOINTMENT (OUTPATIENT)
Age: 84
End: 2024-01-17

## 2024-01-17 ENCOUNTER — APPOINTMENT (OUTPATIENT)
Dept: COLORECTAL SURGERY | Facility: CLINIC | Age: 84
End: 2024-01-17
Payer: MEDICARE

## 2024-01-17 VITALS
BODY MASS INDEX: 23.54 KG/M2 | SYSTOLIC BLOOD PRESSURE: 160 MMHG | HEIGHT: 67 IN | WEIGHT: 150 LBS | RESPIRATION RATE: 16 BRPM | DIASTOLIC BLOOD PRESSURE: 90 MMHG | TEMPERATURE: 97.3 F | HEART RATE: 60 BPM

## 2024-01-17 DIAGNOSIS — Z93.3 COLOSTOMY STATUS: ICD-10-CM

## 2024-01-17 PROCEDURE — 99214 OFFICE O/P EST MOD 30 MIN: CPT

## 2024-01-17 NOTE — HISTORY OF PRESENT ILLNESS
[FreeTextEntry1] : Mr. 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  He continues to reside at [facility].  Wife states that he continues to be weak and can only move several steps.  He is otherwise tolerating p.o. and has good colostomy function.  At rehab, he is on MiraLAX, Senokot as well as Violetta-Colace.  9/1/22 Mr. Thornton returns to the office for a postoperative visit accompanied by his wife and daughter.  Since his last office appointment, he is out of rehab and is back in assisted living.  He is no longer ambulatory.  His wife reports that he has a good appetite and that he appears to be gaining weight.  She notes that his colostomy will occasionally prolapse but appears to reduce on its own when he is recumbent.  Appliance is changed once daily and he has no issues with leakage.  He remains on a bowel regimen.  10/26/22 - Mr. Thornton presents to the office for follow up with his wife for ostomy care/education.  He was having some parastomal irritation that has since resolved with Silvadene cream and appliance change to ConvaTec moldable 2 piece system.  He does have a prolapse stoma that reduces easily when he lies down.  He's getting 5-7 days appliance wear time. Taking stool softeners.  Denies fever/chills, n/v.   12/1/22 Mr. Thornton returns to the office for followup. Wife reports that there has been some issues with stool leaking under the wafer. Also notes some bleeding from the stoma on occasion.   3/30/23 Mr. Thornton returns to the office for followup. No complaints. Passing stools via colostomy sometimes 2-3 times daily, followed by minimal output for 2 or 3 days. Denies abdominal bloating or discomfort. Uses miralax regularly.  1/17/24 Mr. Thornton returns to the office for followup. He denies any significant complaints. Stools are passed regularly with the help of miralax and prn MOM. Here for followup.

## 2024-01-17 NOTE — PHYSICAL EXAM
[Abdomen Masses] : No abdominal masses [Abdomen Tenderness] : ~T No ~M abdominal tenderness [Respiratory Effort] : normal respiratory effort [No Rash or Lesion] : No rash or lesion [Alert] : alert [Oriented to Person] : oriented to person [Oriented to Place] : oriented to place [Oriented to Time] : oriented to time [Calm] : calm [de-identified] : soft,  non-tender, well healed surgical incision; LUQ colostomy  pink/viable/patent, prolapsed, reducible, parastomal skin intact  [de-identified] : No apparent distress [de-identified] : NCAT [de-identified] : JUAN x4 [de-identified] : warm and dry

## 2024-01-17 NOTE — ASSESSMENT
[FreeTextEntry1] : Here for postoperative visit after undergoing ex lap, sigmoid colectomy and end colostomy for sigmoid volvulus on 6/6/22. Overall, doing well and continues at [facility] rehab to try to gain strength. He needs to continue with a healthy bowel regimen despite having the colostomy as patients with Parkinson's can certainly develop constipation despite having a colostomy. The current regimen he is receiving while at rehab is effective and can be continued on discharge. The colostomy is healthy and as expected, the wafer has been downsized as postoperative edema resolves. Recommend follow-up in office in 1 month.  9/1/22 Mr. Thornton returns to the office for a POV. OVerall, doing well. As he is no longer ambulatory, advised against colostomy reversal. With regards to his bowel regimen, recommend he continue on with his current regimen though advised that as time progresses, he may need to increase dosage of MiraLAX as Parkinson's progresses. With regards to the colostomy prolapse, as long as it reduces on its own, would not proceed with surgical intervention. Should the prolapse worsen and cause pain and discomfort, we can then pursue local revision which is fairly straightforward. Follow-up as needed.  10/26/22 Mr. Thornton presents to the office for follow up for ostomy care/education/guidance. His prolapse but patent and reducible with soft stool in the bag. He's to continue current bowel regimen and increase as necessary. If his prolapse worsens causing pain/discomfort he is to call the office/make an appointment. Follow up PRN  12/1/22 Mr Thornton returns to the office for followup of his colostomy. Suspect leakage of stool under the wafer is secondary to appliance not being emptied often enough and stool backing up to level of colostomy. Reassured peristomal skin is intact and not macerated. Occasional stomal bleeding is likely from friction and trauma to mucosa and is self limiting. 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 Advised wife to have supplier of wafers and bags to send request/order form to our office for us to sign. All questions and concerns answered to their satisfaction. F/u as scheduled for 3/2023.  3/30/23 Mr. Thornton returns to the office for followup visit. Overall, doing well with no issues. Reassured re: bowel regimen. F/u 6 months.  1/17/24 Mr. Thornton returns to the office for followup accompanied by wife. She reports overall good bowel regimen. Colostomy will prolapse and reduce on own. No complaints of prolapsed stoma taking up too much of appliance or any other toileting concerns. D/W wife that if prolapse becomes so extensive that there is pain/discomfort, difficulties with appliance fitting, can revise stoma locally to reduce prolapse. She voiced understanding but as she is not currently having any issues, will defer this option. F/u prn.

## 2024-01-29 RX ORDER — LEVOFLOXACIN 5 MG/ML
1 INJECTION, SOLUTION INTRAVENOUS
Refills: 0 | DISCHARGE
Start: 2024-01-29 | End: 2024-02-04

## 2024-02-08 NOTE — DIETITIAN INITIAL EVALUATION ADULT - CALCULATED TO (ML/KG)
Maria Esther Barakat, thank you for seeing me today. Please feel free to call my office with any questions or concerns.   Here is our plan:    Please get kidney ultrasound, I will brenden you with the results  Try to increase you fluid intake to 2 to 3 liters a day  Take Miralax 1 cap full every day to achieve normal bowel movements as constipation can increase the likelihood of UTIs  I can place a referral to Urology if needed, will discuss  No planned follow up needed at this time but I would be happy to see you back if needled    JEFFY Murguia, CNP  Pediatric Nephrology and Hypertension   [id number] [place] [address]  (P) [phone]  (F) [phone]    Radiology Scheduling- [phone]    There are a few main reasons kids get UTIs.  1.  Bacteria from the child's skin can climb up the urinary tract. How children wipe after going to the bathroom can make a significant difference.  2.  The urinary tract inside a child's body is placed in a way that makes them more likely to get a UTI.  3.  Children learn “bad habits” such as holding their pee for too long, or rushing out of the bathroom too quickly.    You can help your child reduce their risk of developing an infection, just by changing certain behaviors. Here are some suggestions to help your child practice healthy bathroom habits, which in turn, could help prevent infections down the road:    -Use the bathroom more often. Young children are known for holding their pee for extended periods of time because they do not like to take breaks from playing. Some children, who have experienced a UTI in the past, are afraid to pee because they think it will hurt again. We recommend asking children to empty their bladders at least every 2-3 hours. When urine sits in the bladder for too long, it gives bacteria plenty of time to multiply, which can cause an infection.    -Proper wiping. Wipe front to back. It's also important to make sure your  "child knows not to reuse toilet tissue that was used to wipe their bottom to wipe their urethral area (where pee comes out).    - Urinating with your child's legs in the shape of the letter \"V\" will help make sure urine is not collecting in between their legs. You can also have your child sit o the toilet backwards, this will cause them to have to open their legs in the shape of the letter \"V\".     -Clothing choices. Cotton underpants are recommended, especially during the summer months because it allows air circulation down below. Also, tight pants such as skinny jeans, do not allow for good circulation, which can give bacteria the perfect environment to grow and can cause infection. If your child cannot control when they go to the bathroom, their underwear should be changed frequently because moisture can make bacteria grow, which can cause infection.    -No bubble baths. It's recommend that all children stay away from bubble baths full of frothy soap because it can lead to skin irritation on and around the genitalia. Showers or regular baths are fine.    -Stay hydrated. Urine should be a very light color, almost like water. The more your child drinks, the more they need to go to the bathroom. The bladder is the happiest and healthiest when it is flushed out frequently. Dark urine is more likely to burn and make a child want to hold their urine inside.    -Avoid constipation. Children who hold their stool, often hold their urine. When there is stool in the lower part of the colon, this places bacteria in closer proximity to the urethra (where pee comes out). We recommend that children have a bowel movement once daily.    -Empty the bladder completely. Some children do not empty their bladder completely after peeing. They pee just enough to make the sensation go away because they are in a hurry to go back to what they were doing, or are so used to holding on to their urine that their body tightens the bladder " muscles in the middle of peeing so they stop too early. Ask your child to “double pee” each time they go to the bathroom. Double peeing means trying to go again, immediately after they just peed, to promote better bladder emptying.   [id number]

## 2024-02-11 ENCOUNTER — INPATIENT (INPATIENT)
Facility: HOSPITAL | Age: 84
LOS: 9 days | Discharge: HOME | DRG: 304 | End: 2024-02-21
Attending: STUDENT IN AN ORGANIZED HEALTH CARE EDUCATION/TRAINING PROGRAM | Admitting: STUDENT IN AN ORGANIZED HEALTH CARE EDUCATION/TRAINING PROGRAM
Payer: MEDICARE

## 2024-02-11 VITALS
OXYGEN SATURATION: 96 % | HEART RATE: 59 BPM | SYSTOLIC BLOOD PRESSURE: 235 MMHG | RESPIRATION RATE: 18 BRPM | DIASTOLIC BLOOD PRESSURE: 81 MMHG | TEMPERATURE: 98 F

## 2024-02-11 DIAGNOSIS — Z98.890 OTHER SPECIFIED POSTPROCEDURAL STATES: Chronic | ICD-10-CM

## 2024-02-11 DIAGNOSIS — Z96.652 PRESENCE OF LEFT ARTIFICIAL KNEE JOINT: Chronic | ICD-10-CM

## 2024-02-11 DIAGNOSIS — Z85.828 PERSONAL HISTORY OF OTHER MALIGNANT NEOPLASM OF SKIN: Chronic | ICD-10-CM

## 2024-02-11 LAB
APPEARANCE UR: ABNORMAL
BACTERIA # UR AUTO: ABNORMAL /HPF
BASOPHILS # BLD AUTO: 0.04 K/UL — SIGNIFICANT CHANGE UP (ref 0–0.2)
BASOPHILS NFR BLD AUTO: 0.4 % — SIGNIFICANT CHANGE UP (ref 0–2)
BILIRUB UR-MCNC: ABNORMAL
CAST: 5 /LPF — HIGH (ref 0–4)
COLOR SPEC: ABNORMAL
COMMENT - URINE: SIGNIFICANT CHANGE UP
DIFF PNL FLD: ABNORMAL
EOSINOPHIL # BLD AUTO: 0.12 K/UL — SIGNIFICANT CHANGE UP (ref 0–0.5)
EOSINOPHIL NFR BLD AUTO: 1.2 % — SIGNIFICANT CHANGE UP (ref 0–6)
GLUCOSE UR QL: NEGATIVE MG/DL — SIGNIFICANT CHANGE UP
HCT VFR BLD CALC: 29.7 % — LOW (ref 39–50)
HGB BLD-MCNC: 9.7 G/DL — LOW (ref 13–17)
IMM GRANULOCYTES NFR BLD AUTO: 0.5 % — SIGNIFICANT CHANGE UP (ref 0–0.9)
KETONES UR-MCNC: NEGATIVE MG/DL — SIGNIFICANT CHANGE UP
LEUKOCYTE ESTERASE UR-ACNC: ABNORMAL
LYMPHOCYTES # BLD AUTO: 0.51 K/UL — LOW (ref 1–3.3)
LYMPHOCYTES # BLD AUTO: 5.1 % — LOW (ref 13–44)
MCHC RBC-ENTMCNC: 30.8 PG — SIGNIFICANT CHANGE UP (ref 27–34)
MCHC RBC-ENTMCNC: 32.7 GM/DL — SIGNIFICANT CHANGE UP (ref 32–36)
MCV RBC AUTO: 94.3 FL — SIGNIFICANT CHANGE UP (ref 80–100)
MONOCYTES # BLD AUTO: 0.49 K/UL — SIGNIFICANT CHANGE UP (ref 0–0.9)
MONOCYTES NFR BLD AUTO: 4.9 % — SIGNIFICANT CHANGE UP (ref 2–14)
NEUTROPHILS # BLD AUTO: 8.8 K/UL — HIGH (ref 1.8–7.4)
NEUTROPHILS NFR BLD AUTO: 87.9 % — HIGH (ref 43–77)
NITRITE UR-MCNC: POSITIVE
PH UR: 5 — SIGNIFICANT CHANGE UP (ref 5–8)
PLATELET # BLD AUTO: 193 K/UL — SIGNIFICANT CHANGE UP (ref 150–400)
PROT UR-MCNC: 300 MG/DL
RBC # BLD: 3.15 M/UL — LOW (ref 4.2–5.8)
RBC # FLD: 17.3 % — HIGH (ref 10.3–14.5)
RBC CASTS # UR COMP ASSIST: >1900 /HPF — HIGH (ref 0–4)
SP GR SPEC: 1.02 — SIGNIFICANT CHANGE UP (ref 1–1.03)
SQUAMOUS # UR AUTO: 1 /HPF — SIGNIFICANT CHANGE UP (ref 0–5)
UROBILINOGEN FLD QL: 0.2 MG/DL — SIGNIFICANT CHANGE UP (ref 0.2–1)
WBC # BLD: 10.01 K/UL — SIGNIFICANT CHANGE UP (ref 3.8–10.5)
WBC # FLD AUTO: 10.01 K/UL — SIGNIFICANT CHANGE UP (ref 3.8–10.5)
WBC UR QL: 362 /HPF — HIGH (ref 0–5)

## 2024-02-11 PROCEDURE — 93010 ELECTROCARDIOGRAM REPORT: CPT

## 2024-02-11 PROCEDURE — 99285 EMERGENCY DEPT VISIT HI MDM: CPT

## 2024-02-11 PROCEDURE — 71045 X-RAY EXAM CHEST 1 VIEW: CPT | Mod: 26

## 2024-02-11 RX ORDER — HYDRALAZINE HCL 50 MG
50 TABLET ORAL ONCE
Refills: 0 | Status: COMPLETED | OUTPATIENT
Start: 2024-02-11 | End: 2024-02-11

## 2024-02-11 RX ORDER — CARBIDOPA AND LEVODOPA 25; 100 MG/1; MG/1
1 TABLET ORAL ONCE
Refills: 0 | Status: COMPLETED | OUTPATIENT
Start: 2024-02-11 | End: 2024-02-11

## 2024-02-11 RX ORDER — CEFPODOXIME PROXETIL 100 MG
1 TABLET ORAL
Qty: 20 | Refills: 0
Start: 2024-02-11 | End: 2024-02-20

## 2024-02-11 RX ORDER — CEFPODOXIME PROXETIL 100 MG
200 TABLET ORAL ONCE
Refills: 0 | Status: COMPLETED | OUTPATIENT
Start: 2024-02-11 | End: 2024-02-11

## 2024-02-11 RX ADMIN — Medication 200 MILLIGRAM(S): at 22:53

## 2024-02-11 RX ADMIN — Medication 50 MILLIGRAM(S): at 18:51

## 2024-02-11 RX ADMIN — Medication 50 MILLIGRAM(S): at 23:41

## 2024-02-11 RX ADMIN — CARBIDOPA AND LEVODOPA 1 TABLET(S): 25; 100 TABLET ORAL at 18:51

## 2024-02-11 RX ADMIN — CARBIDOPA AND LEVODOPA 1 TABLET(S): 25; 100 TABLET ORAL at 19:12

## 2024-02-11 NOTE — ED ADULT NURSE NOTE - OBJECTIVE STATEMENT
Patient arrives with his wife from [facility]. Patient states today he started to have hematuria in his perry catheter bag. Dark red blood and urine present in perry bag on arrival to ED. Patients wife stated perry bag was placed one month ago. Perry catheter was removed and a new three way 22 Estonian was inserted. Patient also has a colostomy bag in place. PMH: Parkinsons Disease and HTN.

## 2024-02-11 NOTE — ED ADULT TRIAGE NOTE - CHIEF COMPLAINT QUOTE
Pt A&Ox2 at baseline mental status. BIBEMS with c/o hematuria. EMS reports around 12:30pm this afternoon the wife and aide noticed he had blood in the Javier bag. They flushed the Javier and it was still bleeding.  Javier Catheter bag noted to have dark red blood.

## 2024-02-11 NOTE — ED PROVIDER NOTE - CLINICAL SUMMARY MEDICAL DECISION MAKING FREE TEXT BOX
EKG was performed and independently interpreted by myself revealed normal sinus rhythm, poor R wave progression anterior leads, rate 61, , QRS 98, QTc 432. Lavelle Jenkins D.O. EKG was performed and independently interpreted by myself revealed normal sinus rhythm, poor R wave progression anterior leads, rate 61, , QRS 98, QTc 432. Lavelle Jenkins D.O.    Summary this is an 83-year-old male who presents with chief complaint of dark blood in Javier bag, clots.  Patient hypertensive on arrival due for blood pressure medication, Parkinson's medication.  Patient with dark blood in Javier bag with clots, and was started on CBI.  CBI was performed, and clamped.  Javier bag reevaluated very light pink urine now after an hour of being clamped.  UA positive for UTI.  He is not on any anticoagulants.  Will start antibiotics for UTI, will discharge home with antibiotics.  Recommend close follow-up with urology, PCP.  Strict return precaution given for any worsening.  Discussed with wife, patient, who understand agree with plan this time. EKG was performed and independently interpreted by myself revealed normal sinus rhythm, poor R wave progression anterior leads, rate 61, , QRS 98, QTc 432. Lavelle Jenkins D.O.    Summary this is an 83-year-old male who presents with chief complaint of dark blood in Javier bag, clots.  Patient hypertensive on arrival due for blood pressure medication, Parkinson's medication.  Patient with dark blood in Javier bag with clots, and was started on CBI.  CBI was performed, and clamped.  Javier bag reevaluated very light pink urine now after an hour of being clamped.  UA positive for UTI.  He is not on any anticoagulants.  Will start antibiotics for UTI, will discharge home with antibiotics.  Recommend close follow-up with urology, PCP.  Strict return precaution given for any worsening.  Discussed with wife, patient, who understand agree with plan this time.    0038: Boby PEREYRA: Signout received from Dr. Jenkins pending labs.  Labs noted and appears to be at his baseline.  CBI still running.  Signout given to Attending Hospitalist for admission Dr. Dominguez.

## 2024-02-11 NOTE — ED PROVIDER NOTE - CPE EDP GASTRO NORM
Corrected PDA  S/p cardiology consult with Dr Makayla Feldman and normal echo  No further follow up needed 
normal...
22-Nov-2023 09:54

## 2024-02-11 NOTE — ED PROVIDER NOTE - NSFOLLOWUPINSTRUCTIONS_ED_ALL_ED_FT
Patient has a UTI, likely cause of his bloody urine.      We performed bladder irrigation, now with light pink urine in bag, this is acceptable and patient can follow-up with PCP, and urology.    Antibiotic given in department, and antibiotic sent to pharmacy, to take as directed.    If any worsening can return to the ED.

## 2024-02-11 NOTE — ED PROVIDER NOTE - OBJECTIVE STATEMENT
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 Pt reports seeing dark red blood in his Javier bag w/ clots since noon today, flushed it at home w/o improvement. Denies fever, chills, abd pain, n/v/d, or any injures to the area. Pt noted to have dark red blood in Javier bag. Pt wife reports pt has had similar Sx in the past, was put on ABx w/ improvement in Sx however a few weeks have passed and Sx have returned. Pt not on blood thinners. Pt is scheduled to get the Javier removed next week. No other complaints at this time. Allergies: Ibuprofen. PCP: Dr. Hood. Uro: Dr. Baxter.

## 2024-02-11 NOTE — ED PROVIDER NOTE - IV ALTEPLASE EXCL REL HIDDEN
The patient is Stable - Low risk of patient condition declining or worsening    Shift Goals  Clinical Goals: stable hemodynamics  Patient Goals: Rest  Family Goals: safety    Progress made toward(s) clinical / shift goals:    Problem: Care Map:  Admission Optimal Outcome for the Heart Failure Patient  Goal: Admission:  Optimal Care of the heart failure patient  Outcome: Progressing     Problem: Care Map:  Day 1 Optimal Outcome for the Heart Failure Patient  Goal: Day 1:  Optimal Care of the heart failure patient  Outcome: Progressing     Problem: Care Map:  Day 2 Optimal Outcome for the Heart Failure Patient  Goal: Day 2:  Optimal Care of the heart failure patient  Outcome: Progressing     Problem: Care Map:  Day 3 Optimal Outcome for the Heart Failure Patient  Goal: Day 3:  Optimal Care of the heart failure patient  Outcome: Progressing     Problem: Care Map:  Day Before Discharge Optimal Outcome for the Heart Failure Patient  Goal: Day Before Discharge:  Optimal Care of the heart failure patient  Outcome: Progressing     Problem: Care Map:  Day of Discharge Optimal Outcome for the Heart Failure Patient  Goal: Day of Discharge:  Optimal Care of the heart failure patient  Outcome: Progressing     Problem: Skin Integrity  Goal: Skin integrity is maintained or improved  Outcome: Progressing     Problem: Pain - Standard  Goal: Alleviation of pain or a reduction in pain to the patient’s comfort goal  Outcome: Progressing     Problem: Fall Risk  Goal: Patient will remain free from falls  Outcome: Progressing            show

## 2024-02-11 NOTE — ED PROVIDER NOTE - PATIENT PORTAL LINK FT
You can access the FollowMyHealth Patient Portal offered by [facility] by registering at the following website: [email or web address]. By joining [employer]’s FollowMyHealth portal, you will also be able to view your health information using other applications (apps) compatible with our system.

## 2024-02-11 NOTE — ED PROVIDER NOTE - PROGRESS NOTE DETAILS
On reevaluation while waiting for transport, patient with increased bleeding from perry with clots.  Plan for continued CBI and admission to hospital at this time. Will obtain lab work, EKG, and admit.  Lavelle Jenkins D.O.

## 2024-02-12 DIAGNOSIS — I16.1 HYPERTENSIVE EMERGENCY: ICD-10-CM

## 2024-02-12 DIAGNOSIS — N39.0 URINARY TRACT INFECTION, SITE NOT SPECIFIED: ICD-10-CM

## 2024-02-12 LAB
ALBUMIN SERPL ELPH-MCNC: 3.2 G/DL — LOW (ref 3.3–5)
ALP SERPL-CCNC: 80 U/L — SIGNIFICANT CHANGE UP (ref 40–120)
ALT FLD-CCNC: <6 U/L — LOW (ref 12–78)
ANION GAP SERPL CALC-SCNC: 5 MMOL/L — SIGNIFICANT CHANGE UP (ref 5–17)
APTT BLD: 28.4 SEC — SIGNIFICANT CHANGE UP (ref 24.5–35.6)
AST SERPL-CCNC: 7 U/L — LOW (ref 15–37)
BILIRUB SERPL-MCNC: 0.4 MG/DL — SIGNIFICANT CHANGE UP (ref 0.2–1.2)
BLD GP AB SCN SERPL QL: SIGNIFICANT CHANGE UP
BUN SERPL-MCNC: 39 MG/DL — HIGH (ref 7–23)
CALCIUM SERPL-MCNC: 8.7 MG/DL — SIGNIFICANT CHANGE UP (ref 8.5–10.1)
CHLORIDE SERPL-SCNC: 110 MMOL/L — HIGH (ref 96–108)
CO2 SERPL-SCNC: 25 MMOL/L — SIGNIFICANT CHANGE UP (ref 22–31)
CREAT SERPL-MCNC: 2.84 MG/DL — HIGH (ref 0.5–1.3)
CULTURE RESULTS: SIGNIFICANT CHANGE UP
EGFR: 21 ML/MIN/1.73M2 — LOW
GLUCOSE SERPL-MCNC: 145 MG/DL — HIGH (ref 70–99)
INR BLD: 0.92 RATIO — SIGNIFICANT CHANGE UP (ref 0.85–1.18)
LACTATE SERPL-SCNC: 1.5 MMOL/L — SIGNIFICANT CHANGE UP (ref 0.7–2)
POTASSIUM SERPL-MCNC: 3.7 MMOL/L — SIGNIFICANT CHANGE UP (ref 3.5–5.3)
POTASSIUM SERPL-SCNC: 3.7 MMOL/L — SIGNIFICANT CHANGE UP (ref 3.5–5.3)
PROT SERPL-MCNC: 6.8 GM/DL — SIGNIFICANT CHANGE UP (ref 6–8.3)
PROTHROM AB SERPL-ACNC: 10.4 SEC — SIGNIFICANT CHANGE UP (ref 9.5–13)
SODIUM SERPL-SCNC: 140 MMOL/L — SIGNIFICANT CHANGE UP (ref 135–145)
SPECIMEN SOURCE: SIGNIFICANT CHANGE UP

## 2024-02-12 PROCEDURE — 70450 CT HEAD/BRAIN W/O DYE: CPT | Mod: 26

## 2024-02-12 PROCEDURE — 85027 COMPLETE CBC AUTOMATED: CPT

## 2024-02-12 PROCEDURE — 83970 ASSAY OF PARATHORMONE: CPT

## 2024-02-12 PROCEDURE — 86923 COMPATIBILITY TEST ELECTRIC: CPT

## 2024-02-12 PROCEDURE — 36415 COLL VENOUS BLD VENIPUNCTURE: CPT

## 2024-02-12 PROCEDURE — 84100 ASSAY OF PHOSPHORUS: CPT

## 2024-02-12 PROCEDURE — 85025 COMPLETE CBC W/AUTO DIFF WBC: CPT

## 2024-02-12 PROCEDURE — 86850 RBC ANTIBODY SCREEN: CPT

## 2024-02-12 PROCEDURE — 86901 BLOOD TYPING SEROLOGIC RH(D): CPT

## 2024-02-12 PROCEDURE — 36430 TRANSFUSION BLD/BLD COMPNT: CPT

## 2024-02-12 PROCEDURE — 80048 BASIC METABOLIC PNL TOTAL CA: CPT

## 2024-02-12 PROCEDURE — 70450 CT HEAD/BRAIN W/O DYE: CPT

## 2024-02-12 PROCEDURE — 74176 CT ABD & PELVIS W/O CONTRAST: CPT

## 2024-02-12 PROCEDURE — 97163 PT EVAL HIGH COMPLEX 45 MIN: CPT | Mod: GP

## 2024-02-12 PROCEDURE — 97530 THERAPEUTIC ACTIVITIES: CPT | Mod: GP

## 2024-02-12 PROCEDURE — 71045 X-RAY EXAM CHEST 1 VIEW: CPT

## 2024-02-12 PROCEDURE — P9016: CPT

## 2024-02-12 PROCEDURE — 83540 ASSAY OF IRON: CPT

## 2024-02-12 PROCEDURE — 80053 COMPREHEN METABOLIC PANEL: CPT

## 2024-02-12 PROCEDURE — 83550 IRON BINDING TEST: CPT

## 2024-02-12 PROCEDURE — 94640 AIRWAY INHALATION TREATMENT: CPT

## 2024-02-12 PROCEDURE — 83735 ASSAY OF MAGNESIUM: CPT

## 2024-02-12 PROCEDURE — 97116 GAIT TRAINING THERAPY: CPT | Mod: GP

## 2024-02-12 PROCEDURE — 85045 AUTOMATED RETICULOCYTE COUNT: CPT

## 2024-02-12 PROCEDURE — 82310 ASSAY OF CALCIUM: CPT

## 2024-02-12 PROCEDURE — 82728 ASSAY OF FERRITIN: CPT

## 2024-02-12 PROCEDURE — 86900 BLOOD TYPING SEROLOGIC ABO: CPT

## 2024-02-12 PROCEDURE — 99223 1ST HOSP IP/OBS HIGH 75: CPT

## 2024-02-12 PROCEDURE — 76770 US EXAM ABDO BACK WALL COMP: CPT

## 2024-02-12 PROCEDURE — 82962 GLUCOSE BLOOD TEST: CPT

## 2024-02-12 RX ORDER — DEXMEDETOMIDINE HYDROCHLORIDE IN 0.9% SODIUM CHLORIDE 4 UG/ML
0.2 INJECTION INTRAVENOUS
Qty: 400 | Refills: 0 | Status: DISCONTINUED | OUTPATIENT
Start: 2024-02-12 | End: 2024-02-13

## 2024-02-12 RX ORDER — ALLOPURINOL 300 MG
1 TABLET ORAL
Refills: 0 | DISCHARGE

## 2024-02-12 RX ORDER — HYDRALAZINE HCL 50 MG
10 TABLET ORAL ONCE
Refills: 0 | Status: COMPLETED | OUTPATIENT
Start: 2024-02-12 | End: 2024-02-12

## 2024-02-12 RX ORDER — CHOLECALCIFEROL (VITAMIN D3) 125 MCG
1 CAPSULE ORAL
Refills: 0 | DISCHARGE

## 2024-02-12 RX ORDER — LORATADINE 10 MG/1
1 TABLET ORAL
Refills: 0 | DISCHARGE

## 2024-02-12 RX ORDER — FLUTICASONE PROPIONATE 50 MCG
1 SPRAY, SUSPENSION NASAL
Refills: 0 | DISCHARGE

## 2024-02-12 RX ORDER — CLONAZEPAM 1 MG
1 TABLET ORAL
Refills: 0 | DISCHARGE

## 2024-02-12 RX ORDER — CLONAZEPAM 1 MG
0.5 TABLET ORAL AT BEDTIME
Refills: 0 | Status: DISCONTINUED | OUTPATIENT
Start: 2024-02-12 | End: 2024-02-19

## 2024-02-12 RX ORDER — SENNA PLUS 8.6 MG/1
1 TABLET ORAL
Refills: 0 | DISCHARGE

## 2024-02-12 RX ORDER — ACETAMINOPHEN 500 MG
650 TABLET ORAL ONCE
Refills: 0 | Status: COMPLETED | OUTPATIENT
Start: 2024-02-12 | End: 2024-02-12

## 2024-02-12 RX ORDER — FOLIC ACID 0.8 MG
1 TABLET ORAL
Refills: 0 | DISCHARGE

## 2024-02-12 RX ORDER — SENNA PLUS 8.6 MG/1
1 TABLET ORAL AT BEDTIME
Refills: 0 | Status: DISCONTINUED | OUTPATIENT
Start: 2024-02-12 | End: 2024-02-21

## 2024-02-12 RX ORDER — TAMSULOSIN HYDROCHLORIDE 0.4 MG/1
0.4 CAPSULE ORAL AT BEDTIME
Refills: 0 | Status: DISCONTINUED | OUTPATIENT
Start: 2024-02-12 | End: 2024-02-21

## 2024-02-12 RX ORDER — ALLOPURINOL 300 MG
0 TABLET ORAL
Refills: 0 | DISCHARGE

## 2024-02-12 RX ORDER — HYDRALAZINE HCL 50 MG
50 TABLET ORAL EVERY 6 HOURS
Refills: 0 | Status: DISCONTINUED | OUTPATIENT
Start: 2024-02-12 | End: 2024-02-14

## 2024-02-12 RX ORDER — PIPERACILLIN AND TAZOBACTAM 4; .5 G/20ML; G/20ML
3.38 INJECTION, POWDER, LYOPHILIZED, FOR SOLUTION INTRAVENOUS EVERY 8 HOURS
Refills: 0 | Status: DISCONTINUED | OUTPATIENT
Start: 2024-02-12 | End: 2024-02-12

## 2024-02-12 RX ORDER — FLUTICASONE PROPIONATE 50 MCG
1 SPRAY, SUSPENSION NASAL
Refills: 0 | Status: DISCONTINUED | OUTPATIENT
Start: 2024-02-12 | End: 2024-02-21

## 2024-02-12 RX ORDER — LANOLIN ALCOHOL/MO/W.PET/CERES
3 CREAM (GRAM) TOPICAL AT BEDTIME
Refills: 0 | Status: DISCONTINUED | OUTPATIENT
Start: 2024-02-12 | End: 2024-02-21

## 2024-02-12 RX ORDER — FOLIC ACID 0.8 MG
1 TABLET ORAL DAILY
Refills: 0 | Status: DISCONTINUED | OUTPATIENT
Start: 2024-02-12 | End: 2024-02-21

## 2024-02-12 RX ORDER — ACETAMINOPHEN 500 MG
650 TABLET ORAL EVERY 6 HOURS
Refills: 0 | Status: DISCONTINUED | OUTPATIENT
Start: 2024-02-12 | End: 2024-02-21

## 2024-02-12 RX ORDER — LEVOTHYROXINE SODIUM 125 MCG
88 TABLET ORAL DAILY
Refills: 0 | Status: DISCONTINUED | OUTPATIENT
Start: 2024-02-12 | End: 2024-02-21

## 2024-02-12 RX ORDER — PIPERACILLIN AND TAZOBACTAM 4; .5 G/20ML; G/20ML
3.38 INJECTION, POWDER, LYOPHILIZED, FOR SOLUTION INTRAVENOUS ONCE
Refills: 0 | Status: DISCONTINUED | OUTPATIENT
Start: 2024-02-12 | End: 2024-02-12

## 2024-02-12 RX ORDER — POLYETHYLENE GLYCOL 3350 17 G/17G
17 POWDER, FOR SOLUTION ORAL
Refills: 0 | DISCHARGE

## 2024-02-12 RX ORDER — AMLODIPINE BESYLATE 2.5 MG/1
10 TABLET ORAL DAILY
Refills: 0 | Status: DISCONTINUED | OUTPATIENT
Start: 2024-02-12 | End: 2024-02-13

## 2024-02-12 RX ORDER — CLONAZEPAM 1 MG
0.5 TABLET ORAL
Refills: 0 | DISCHARGE

## 2024-02-12 RX ORDER — LANOLIN ALCOHOL/MO/W.PET/CERES
1 CREAM (GRAM) TOPICAL
Qty: 0 | Refills: 0 | DISCHARGE

## 2024-02-12 RX ORDER — CARBIDOPA AND LEVODOPA 25; 100 MG/1; MG/1
2 TABLET ORAL
Refills: 0 | DISCHARGE

## 2024-02-12 RX ORDER — NICARDIPINE HYDROCHLORIDE 30 MG/1
5 CAPSULE, EXTENDED RELEASE ORAL
Qty: 40 | Refills: 0 | Status: DISCONTINUED | OUTPATIENT
Start: 2024-02-12 | End: 2024-02-13

## 2024-02-12 RX ORDER — POLYETHYLENE GLYCOL 3350 17 G/17G
17 POWDER, FOR SOLUTION ORAL DAILY
Refills: 0 | Status: DISCONTINUED | OUTPATIENT
Start: 2024-02-12 | End: 2024-02-21

## 2024-02-12 RX ORDER — LEVOTHYROXINE SODIUM 125 MCG
1 TABLET ORAL
Refills: 0 | DISCHARGE

## 2024-02-12 RX ORDER — CHOLECALCIFEROL (VITAMIN D3) 125 MCG
1000 CAPSULE ORAL DAILY
Refills: 0 | Status: DISCONTINUED | OUTPATIENT
Start: 2024-02-12 | End: 2024-02-18

## 2024-02-12 RX ORDER — HYDRALAZINE HCL 50 MG
50 TABLET ORAL ONCE
Refills: 0 | Status: COMPLETED | OUTPATIENT
Start: 2024-02-12 | End: 2024-02-12

## 2024-02-12 RX ORDER — SODIUM CHLORIDE 0.65 %
2 AEROSOL, SPRAY (ML) NASAL
Refills: 0 | Status: DISCONTINUED | OUTPATIENT
Start: 2024-02-12 | End: 2024-02-21

## 2024-02-12 RX ORDER — NYSTATIN 500MM UNIT
0 POWDER (EA) MISCELLANEOUS
Refills: 0 | DISCHARGE

## 2024-02-12 RX ORDER — SIMVASTATIN 20 MG/1
10 TABLET, FILM COATED ORAL AT BEDTIME
Refills: 0 | Status: DISCONTINUED | OUTPATIENT
Start: 2024-02-12 | End: 2024-02-21

## 2024-02-12 RX ORDER — FINASTERIDE 5 MG/1
5 TABLET, FILM COATED ORAL DAILY
Refills: 0 | Status: DISCONTINUED | OUTPATIENT
Start: 2024-02-12 | End: 2024-02-21

## 2024-02-12 RX ORDER — NICARDIPINE HYDROCHLORIDE 30 MG/1
5 CAPSULE, EXTENDED RELEASE ORAL
Qty: 40 | Refills: 0 | Status: DISCONTINUED | OUTPATIENT
Start: 2024-02-12 | End: 2024-02-12

## 2024-02-12 RX ORDER — CARBIDOPA AND LEVODOPA 25; 100 MG/1; MG/1
2 TABLET ORAL
Refills: 0 | Status: DISCONTINUED | OUTPATIENT
Start: 2024-02-12 | End: 2024-02-21

## 2024-02-12 RX ORDER — PIPERACILLIN AND TAZOBACTAM 4; .5 G/20ML; G/20ML
3.38 INJECTION, POWDER, LYOPHILIZED, FOR SOLUTION INTRAVENOUS EVERY 12 HOURS
Refills: 0 | Status: DISCONTINUED | OUTPATIENT
Start: 2024-02-12 | End: 2024-02-15

## 2024-02-12 RX ORDER — SODIUM CHLORIDE 0.65 %
2 AEROSOL, SPRAY (ML) NASAL
Refills: 0 | DISCHARGE

## 2024-02-12 RX ORDER — ALLOPURINOL 300 MG
100 TABLET ORAL DAILY
Refills: 0 | Status: DISCONTINUED | OUTPATIENT
Start: 2024-02-12 | End: 2024-02-21

## 2024-02-12 RX ORDER — SIMVASTATIN 20 MG/1
1 TABLET, FILM COATED ORAL
Qty: 0 | Refills: 0 | DISCHARGE

## 2024-02-12 RX ADMIN — Medication 650 MILLIGRAM(S): at 12:40

## 2024-02-12 RX ADMIN — Medication 1000 UNIT(S): at 13:39

## 2024-02-12 RX ADMIN — PIPERACILLIN AND TAZOBACTAM 25 GRAM(S): 4; .5 INJECTION, POWDER, LYOPHILIZED, FOR SOLUTION INTRAVENOUS at 14:51

## 2024-02-12 RX ADMIN — Medication 100 MILLIGRAM(S): at 13:39

## 2024-02-12 RX ADMIN — Medication 325 MILLIGRAM(S): at 03:40

## 2024-02-12 RX ADMIN — NICARDIPINE HYDROCHLORIDE 25 MG/HR: 30 CAPSULE, EXTENDED RELEASE ORAL at 10:31

## 2024-02-12 RX ADMIN — Medication 10 MILLIGRAM(S): at 06:38

## 2024-02-12 RX ADMIN — Medication 1 MILLIGRAM(S): at 13:39

## 2024-02-12 RX ADMIN — CARBIDOPA AND LEVODOPA 2 TABLET(S): 25; 100 TABLET ORAL at 13:38

## 2024-02-12 RX ADMIN — DEXMEDETOMIDINE HYDROCHLORIDE IN 0.9% SODIUM CHLORIDE 3.4 MICROGRAM(S)/KG/HR: 4 INJECTION INTRAVENOUS at 18:35

## 2024-02-12 RX ADMIN — Medication 0.1 MILLIGRAM(S): at 09:07

## 2024-02-12 RX ADMIN — FINASTERIDE 5 MILLIGRAM(S): 5 TABLET, FILM COATED ORAL at 13:39

## 2024-02-12 RX ADMIN — Medication 50 MILLIGRAM(S): at 17:02

## 2024-02-12 RX ADMIN — Medication 1 MILLIGRAM(S): at 16:38

## 2024-02-12 RX ADMIN — Medication 88 MICROGRAM(S): at 13:39

## 2024-02-12 RX ADMIN — Medication 50 MILLIGRAM(S): at 09:07

## 2024-02-12 RX ADMIN — Medication 10 MILLIGRAM(S): at 02:46

## 2024-02-12 NOTE — ED ADULT NURSE REASSESSMENT NOTE - NS ED NURSE REASSESS COMMENT FT1
high bp, sbp  >200, noted even after multiple dose of hydralazine, md case made aware, yon vivienne pharm tech to do med rec, will wait for bp med order.  pt aaox4, pt resting comfortably on stretcher, CBI con't to infuse, bright red/pink color noted.  pt denies any pain or discomfort.

## 2024-02-12 NOTE — PHARMACOTHERAPY INTERVENTION NOTE - COMMENTS
Medication history complete, reviewed medication with patients wife Angela at [phone] and confirmed with Carl and list from [facility].

## 2024-02-12 NOTE — H&P ADULT - NSHPLABSRESULTS_GEN_ALL_CORE
LABS:    CBC Full  -  ( 11 Feb 2024 23:48 )  WBC Count : 10.01 K/uL  RBC Count : 3.15 M/uL  Hemoglobin : 9.7 g/dL  Hematocrit : 29.7 %  Platelet Count - Automated : 193 K/uL  Mean Cell Volume : 94.3 fl  Mean Cell Hemoglobin : 30.8 pg  Mean Cell Hemoglobin Concentration : 32.7 gm/dL  Auto Neutrophil # : 8.80 K/uL  Auto Lymphocyte # : 0.51 K/uL  Auto Monocyte # : 0.49 K/uL  Auto Eosinophil # : 0.12 K/uL  Auto Basophil # : 0.04 K/uL  Auto Neutrophil % : 87.9 %  Auto Lymphocyte % : 5.1 %  Auto Monocyte % : 4.9 %  Auto Eosinophil % : 1.2 %  Auto Basophil % : 0.4 %    02-11    140  |  110<H>  |  39<H>  ----------------------------<  145<H>  3.7   |  25  |  2.84<H>    Ca    8.7      11 Feb 2024 23:48    TPro  6.8  /  Alb  3.2<L>  /  TBili  0.4  /  DBili  x   /  AST  7<L>  /  ALT  <6<L>  /  AlkPhos  80  02-11    PT/INR - ( 11 Feb 2024 23:48 )   PT: 10.4 sec;   INR: 0.92 ratio         PTT - ( 11 Feb 2024 23:48 )  PTT:28.4 sec  Urinalysis Basic - ( 11 Feb 2024 23:48 )    Color: x / Appearance: x / SG: x / pH: x  Gluc: 145 mg/dL / Ketone: x  / Bili: x / Urobili: x   Blood: x / Protein: x / Nitrite: x   Leuk Esterase: x / RBC: x / WBC x   Sq Epi: x / Non Sq Epi: x / Bacteria: x      CAPILLARY BLOOD GLUCOSE            LIVER FUNCTIONS - ( 11 Feb 2024 23:48 )  Alb: 3.2 g/dL / Pro: 6.8 gm/dL / ALK PHOS: 80 U/L / ALT: <6 U/L / AST: 7 U/L / GGT: x

## 2024-02-12 NOTE — H&P ADULT - ASSESSMENT
A:    83yMale  HD # 1    Here for:    1. HTN emergency  2. Hematuria  3. Anemia  4. CKD  5. UTI    This patient requires critical care for support of one or more vital organ systems with a high probability of imminent or life threatening deterioration in his/her condition    P:    HTN emergency refractory to PO, IV meds  HA    HCT to eval for ICH  Start jayant drip goal -160, alleviation of HA  Start PO anti hypertensives  Cards consult; Dr Carlos at bedside now  IS, OOB as able  Chronic perry 2/2 prostate surgery, Dr Leung, called in ER; cont CBI  CKD  BG < 180  Anemia, chronic  Med rec  UTI, Tx recently with levaquin; start pip/tazo, f/u Cx's  VTE ppx SCD only given hematuria      Dispo: Accept to critical care.    Date of entry of this note is equal to the date of services rendered    TOTAL CRITICAL CARE TIME: 115 minutes   (EXCLUSIVE of any non bundled procedures)    Note: This is a critically ill patient. Time spent has been in salvage of life, limb and vital organ systems. This time INCLUDES time spent directly as this patient's bedside with evaluation and management, review of chart including review of laboratory and imaging studies, interpretation of vital signs and cardiac output measurements, any necessary ventilator management, and time spent discussing plan of care with patient and family, including goals of care discussion. This also includes time spent in multidisciplinary discussion with care team and various consultants to optimize treatment plan. This time may NOT include various procedures, which will be noted seperately.

## 2024-02-12 NOTE — ED ADULT NURSE REASSESSMENT NOTE - NS ED NURSE REASSESS COMMENT FT1
Spoke to Anthony DEAN to admit pt to CCU, pt is allowed to eat as per PA, report given, awaiting for transporter.

## 2024-02-12 NOTE — PATIENT PROFILE ADULT - FALL HARM RISK - HARM RISK INTERVENTIONS

## 2024-02-12 NOTE — CONSULT NOTE ADULT - PROBLEM SELECTOR RECOMMENDATION 9
-severe HTN w/ headache & hematuria - d/w ICU will admit to ICU for hypertensive -severe HTN w/ headache & hematuria - d/w ICU - will admit to ICU for hypertensive  -agree w/ cardene gtt - goal mean BP reduction by 20% in 1st hr & another 20% over 23 hrs.    -Cont. OP amlodipine & hydralazine.

## 2024-02-12 NOTE — CONSULT NOTE ADULT - SUBJECTIVE AND OBJECTIVE BOX
HPI:  ICU Admit Note/H&P    S:    Pt seen and examined  HD # 1  82 y/o male w/ a PMHx of CKD, HTN, HLD, hypothyroid, Parkinson's, SBO s/p colostomy, and bladder tumor s/p resection w/ chronic Perry presents to the ED via EMS c/o hematuria. Pt reports seeing dark red blood in his Perry bag w/ clots since noon today, flushed it at home w/o improvement. Denies fever, chills, abd pain, n/v/d, or any injures to the area. Pt noted to have dark red blood in Perry bag. Pt wife reports pt has had similar Sx in the past, was put on ABx w/ improvement in Sx however a few weeks have passed and Sx have returned. Pt not on blood thinners. Pt is scheduled to get the Perry removed next week. No other complaints at this time. Allergies: Ibuprofen. PCP: Dr. Hood. Uro: Dr. Baxter.    ICU consult for treatment resistant HTN    2/12: SBP > 220 despite multiple IV, PO meds. c/o of HA. Chronic perry, hematuria, CBI ongoing.     ROS: + HA, hematuria  remainder of systems reviewed at this time, negative   (12 Feb 2024 10:47)    Contacted by hospitalist for HTN management.  I suggested cardene drip.  ICU was consulted. I saw pt at bedside w/ ICU team.  Pt reports headache no other cardiac symptoms.  Wife at bedside, she reports longstanding hx labile BPs related to parkinson's.  No exagerrated orthostasis pattern (supine hypertension w/ orthostatic hypotension)  but periods of markedly elevated pressures similar to today followed by hypotension.  Multiple measurements in the ED 200s.      PAST MEDICAL AND SURGICAL HISTORY:  PAST MEDICAL & SURGICAL HISTORY:  HTN (hypertension)      HLD (hyperlipidemia)      Parkinson disease      CKD (chronic kidney disease)      Hypothyroidism      Bladder mass      Obstruction of bowel      Prostate cancer      Melanoma of skin      CA skin, basal cell      Cancer, skin, squamous cell      Arthritis      Anemia      History of total knee replacement, left      H/O hemorrhoidectomy      H/O colonoscopy      H/O Mohs micrographic surgery for skin cancer          ALLERGIES:  Allergies    ibuprofen (Rash)  latex (Rash)  Advil (Rash)  Aleve, Ibuprofen, Motrin (Rash)    Intolerances        SOCIAL HISTORY:  Social History:      FAMILY  HISTORY:  FAMILY HISTORY:  FHx: prostate cancer        MEDICATIONS:  OUTPATIENT:  Home Medications:  allopurinol 100 mg oral tablet: 1 tab(s) orally once a day (12 Feb 2024 08:26)  bisacodyl 5 mg oral delayed release tablet: 1 tab(s) orally once a day (at bedtime) (12 Feb 2024 08:25)  carbidopa-levodopa 25 mg-100 mg oral tablet: 2 tab(s) orally 4 times a day ***breakfast, lunch, dinner, before bedtime*** (12 Feb 2024 08:25)  cholecalciferol 25 mcg (1000 intl units) oral tablet: 1 tab(s) orally once a day (12 Feb 2024 08:31)  clonazePAM 0.5 mg oral tablet: 0.5 tab(s) orally once a day (at bedtime) (12 Feb 2024 08:31)  fluticasone 50 mcg/inh nasal spray: 1 spray(s) in each nostril 2 times a day as needed (12 Feb 2024 08:31)  folic acid 0.8 mg oral tablet: 1 tab(s) orally once a day (12 Feb 2024 08:31)  hydrALAZINE 50 mg oral tablet: 1 tab(s) orally every 6 hours ***MD increased from q8h to q6h recently*** (12 Feb 2024 08:22)  levoFLOXacin 500 mg oral tablet: 1 tab(s) orally once a day x 7 days ***Course Complete*** (12 Feb 2024 08:31)  levothyroxine 88 mcg (0.088 mg) oral tablet: 1 tab(s) orally once a day (12 Feb 2024 08:30)  Melatonin 3 mg oral tablet: 1 tab(s) orally once a day (at bedtime) (12 Feb 2024 08:27)  MiraLax oral powder for reconstitution: 17 gram(s) orally once a day with breakfast (12 Feb 2024 08:31)  Saline Mist 0.65% nasal spray: 2 spray(s) intranasally 2 times a day as needed (12 Feb 2024 08:31)  senna (sennosides) 8.6 mg oral tablet: 1 tab(s) orally once a day (at bedtime) (12 Feb 2024 08:27)  simvastatin 10 mg oral tablet: 1 tab(s) orally once a day (at bedtime) (12 Feb 2024 08:27)  Tylenol 325 mg oral tablet: 2 tab(s) orally every 6 hours, As needed, Mild Pain (1 - 3) (12 Feb 2024 08:27)      INPATIENT:  MEDICATIONS  (STANDING):  allopurinol 100 milliGRAM(s) Oral daily  amLODIPine   Tablet 10 milliGRAM(s) Oral daily  bisacodyl 5 milliGRAM(s) Oral at bedtime  carbidopa/levodopa  25/100 2 Tablet(s) Oral four times a day  cholecalciferol 1000 Unit(s) Oral daily  clonazePAM  Tablet 0.5 milliGRAM(s) Oral at bedtime  finasteride 5 milliGRAM(s) Oral daily  fluticasone propionate 50 MICROgram(s)/spray Nasal Spray 1 Spray(s) Both Nostrils two times a day  folic acid 1 milliGRAM(s) Oral daily  hydrALAZINE 50 milliGRAM(s) Oral every 6 hours  levothyroxine 88 MICROGram(s) Oral daily  melatonin 3 milliGRAM(s) Oral at bedtime  niCARdipine Infusion 5 mG/Hr (25 mL/Hr) IV Continuous <Continuous>  piperacillin/tazobactam IVPB.. 3.375 Gram(s) IV Intermittent every 12 hours  polyethylene glycol 3350 17 Gram(s) Oral daily  senna 1 Tablet(s) Oral at bedtime  simvastatin 10 milliGRAM(s) Oral at bedtime  tamsulosin 0.4 milliGRAM(s) Oral at bedtime    MEDICATIONS  (PRN):  acetaminophen     Tablet .. 650 milliGRAM(s) Oral every 6 hours PRN Temp greater or equal to 38C (100.4F), Mild Pain (1 - 3), Moderate Pain (4 - 6)  sodium chloride 0.65% Nasal 2 Spray(s) Both Nostrils two times a day PRN Nasal Congestion    MEDICATIONS  (PRN):  acetaminophen     Tablet .. 650 milliGRAM(s) Oral every 6 hours PRN Temp greater or equal to 38C (100.4F), Mild Pain (1 - 3), Moderate Pain (4 - 6)  sodium chloride 0.65% Nasal 2 Spray(s) Both Nostrils two times a day PRN Nasal Congestion      REVIEW OF SYSTEMS:  ===============================  ===============================      Vital Signs Last 24 Hrs  T(C): 37.2 (12 Feb 2024 10:33), Max: 37.2 (12 Feb 2024 10:33)  T(F): 99 (12 Feb 2024 10:33), Max: 99 (12 Feb 2024 10:33)  HR: 60 (12 Feb 2024 11:23) (59 - 73)  BP: 124/58 (12 Feb 2024 11:23) (124/58 - 235/81)  BP(mean): 77 (12 Feb 2024 11:23) (77 - 122)  RR: 18 (12 Feb 2024 11:23) (12 - 19)  SpO2: 100% (12 Feb 2024 11:23) (95% - 100%)    Parameters below as of 12 Feb 2024 11:23  Patient On (Oxygen Delivery Method): room air        I&O's Summary    12 Feb 2024 07:01  -  12 Feb 2024 11:46  --------------------------------------------------------  IN: 0 mL / OUT: 4800 mL / NET: -4800 mL        I&O's Detail    12 Feb 2024 07:01  -  12 Feb 2024 11:46  --------------------------------------------------------  IN:  Total IN: 0 mL    OUT:    Continuous Bladder Irrigation (mL): 4800 mL  Total OUT: 4800 mL    Total NET: -4800 mL          PHYSICAL EXAM:    Constitutional: NAD, awake   HEENT: PERR, EOMI,  No oral cyananosis.  Neck:  supple,  No JVD  Respiratory: Breath sounds are clear bilaterally, No wheezing, rales or rhonchi  Cardiovascular: S1 and S2, regular rate and rhythm, no Murmurs, gallops or rubs  Gastrointestinal: Bowel Sounds present, soft, nontender.   Extremities: No peripheral edema. No clubbing or cyanosis.  Vascular: 2+ peripheral pulses  Neurological: A/O x 3, no focal deficits      ===============================  ===============================  LABS:                         9.7    10.01 )-----------( 193      ( 11 Feb 2024 23:48 )             29.7     11 Feb 2024 23:48    140    |  110    |  39     ----------------------------<  145    3.7     |  25     |  2.84     Ca    8.7        11 Feb 2024 23:48    TPro  6.8    /  Alb  3.2    /  TBili  0.4    /  DBili  x      /  AST  7      /  ALT  <6     /  AlkPhos  80     11 Feb 2024 23:48    PT/INR - ( 11 Feb 2024 23:48 )   PT: 10.4 sec;   INR: 0.92 ratio         PTT - ( 11 Feb 2024 23:48 )  PTT:28.4 sec    THYROID STUDIES:    ===============================  ===============================  CARDIAC BIOMARKERS:  -------  -BNP VALUES:    -------  -TROPONIN VALUES:   Troponin I, High Sensitivity Result: 67.7 ng/L (08-07-23 @ 17:15)  Troponin T, High Sensitivity: 65 ng/L *HH* [<6 - 14] (07-16-20 @ 05:30)  Troponin T, High Sensitivity: 61 ng/L *HH* [<6 - 14] (07-16-20 @ 05:30)  Troponin T, High Sensitivity: 67 ng/L *HH* [<6 - 14] (07-16-20 @ 00:57)  Troponin T, High Sensitivity: 71 ng/L *HH* [<6 - 14] (07-15-20 @ 14:50)  Troponin T, High Sensitivity: 62 ng/L *HH* [<6 - 14] (07-15-20 @ 12:30)  Troponin T, Serum: < 0.06 ng/mL [0.00 - 0.06] (05-15-18 @ 18:45)  Troponin T, Serum: < 0.06 ng/mL [0.00 - 0.06] (05-15-18 @ 12:58)  ===============================  ===============================  EKG:    NSR no ischemic changes.    ===============================  ===============================  < from: Transthoracic Echocardiogram (07.17.20 @ 17:33) >  CONCLUSIONS:  1. Mitral annular calcification, otherwise normal mitral  valve. Mild mitral regurgitation.  2. Mildly dilated left atrium.  LA volume index = 35 cc/m2.  3. Normal left ventricular internal dimensions and wall  thicknesses.  4. Normal left ventricular systolic function. No segmental  wall motion abnormalities.  5. Mild diastolic dysfunction (Stage I).  6. Normal right ventricular size and function.  *** Compared with echocardiogram of 5/16/2018, no  significant changes noted.  ------------------------------------------------------------------------  Confirmed on  7/17/2020 - 18:49:15 by Wilfredo Crespo M.D.  ------------------------------------------------------------------------    < end of copied text >

## 2024-02-12 NOTE — H&P ADULT - HISTORY OF PRESENT ILLNESS
ICU Admit Note/H&P    S:    Pt seen and examined  HD # 1  82 y/o male w/ a PMHx of CKD, HTN, HLD, hypothyroid, Parkinson's, SBO s/p colostomy, and bladder tumor s/p resection w/ chronic Perry presents to the ED via EMS c/o hematuria. Pt reports seeing dark red blood in his Perry bag w/ clots since noon today, flushed it at home w/o improvement. Denies fever, chills, abd pain, n/v/d, or any injures to the area. Pt noted to have dark red blood in Perry bag. Pt wife reports pt has had similar Sx in the past, was put on ABx w/ improvement in Sx however a few weeks have passed and Sx have returned. Pt not on blood thinners. Pt is scheduled to get the Perry removed next week. No other complaints at this time. Allergies: Ibuprofen. PCP: Dr. Hood. Uro: Dr. Baxter.    ICU consult for treatment resistant HTN    2/12: SBP > 220 despite multiple IV, PO meds. c/o of HA. Chronic perry, hematuria, CBI ongoing.     ROS: + HA, hematuria  remainder of systems reviewed at this time, negative

## 2024-02-12 NOTE — H&P ADULT - NSHPSOURCEINFORD_GEN_ALL_CORE
FIBEROPTIC BRONCHOSCOPY PROCEDURE NOTE     Date of Procedure: 01/08/2022  Performing Physician: Enrike Yee MD  Pre-Procedure Diagnosis:     Left lung collapse  Post-Procedure Diagnosis:    1. Aspiration of thick secretions / mucous plug  2. Hyperemic mucosa  3. No endobronchial tumors  Procedure:  Diagnostic Flexible Fiberoptic Bronchoscopy   Indications:  Amanda Richardson is a 59 year old female with MS on baclofen pump. Has collapse of left lung, patient was intubated for respiratory failure. A diagnostic bronchoscopy is indicated.   Preop evaluation:  Procedure:  Intravenous Sedation.    Expected level:  Deep sedation  Anesthesia:  Currently on propofol drip.   Specimen:  BAL inferior lingula area  Estimated Blood Loss:  0 ml  Complications:  none    Findings:  Vocal Cords , not seen, patient is intubated  Trachea ET tube above ron, thick secretions were aspirated  Ron , hyperemic mucosa,  thick secretions were aspirated  Right Bronchial Tree , hyperemic mucosa, mild secretions were aspirated , no endobronchial tumors  Left Bronchial Tree  hyperemic mucosa, thick secretions were aspirated , no endobronchial tumors  Procedure Details:   Procedure was done as emergency.   The patient was identified as Amanda Richardson with Date of Birth 1962 and the procedure verified as Diagnostic Flexible Fiberoptic Bronchoscopy .  A Time Out was held and the above information confirmed.  The bronchoscope was passed through the ET tube .The scope was then passed into the trachea  Careful inspection of the tracheal lumen was accomplished. The scope was sequentially passed into the left main and then left upper and lower bronchi and segmental bronchi.   Findings and specimen details recorded above.  The scope was then withdrawn and advanced into the right main bronchus   and then into the RUL, RML, and RLL bronchi and segmental bronchi.   Findings and specimen details recorded above.   Samples were  obtained from inferior lingula area.     The patient tolerated the procedure well.      Enrike Yee MD, 01/08/2022 5:00 AM    Referring Physician: * No referring provider recorded for this case *  Attending Physician: No att. providers found  Primary Care Physician: Julius Hassan           Chart(s)/Patient

## 2024-02-12 NOTE — H&P ADULT - NSHPPHYSICALEXAM_GEN_ALL_CORE
o:    Elderly M lying in bed  No JVD trachea midline  S1S2+  CTA B/L  + perry in place, CBI ongoing  No LE edema/swelling noted  Awake and alert  Skin pink, warm

## 2024-02-13 LAB
ALBUMIN SERPL ELPH-MCNC: 3.2 G/DL — LOW (ref 3.3–5)
ALP SERPL-CCNC: 78 U/L — SIGNIFICANT CHANGE UP (ref 40–120)
ALT FLD-CCNC: <6 U/L — LOW (ref 12–78)
ANION GAP SERPL CALC-SCNC: 5 MMOL/L — SIGNIFICANT CHANGE UP (ref 5–17)
AST SERPL-CCNC: 17 U/L — SIGNIFICANT CHANGE UP (ref 15–37)
BASOPHILS # BLD AUTO: 0.05 K/UL — SIGNIFICANT CHANGE UP (ref 0–0.2)
BASOPHILS NFR BLD AUTO: 0.5 % — SIGNIFICANT CHANGE UP (ref 0–2)
BILIRUB SERPL-MCNC: 0.8 MG/DL — SIGNIFICANT CHANGE UP (ref 0.2–1.2)
BUN SERPL-MCNC: 36 MG/DL — HIGH (ref 7–23)
CALCIUM SERPL-MCNC: 8.7 MG/DL — SIGNIFICANT CHANGE UP (ref 8.5–10.1)
CHLORIDE SERPL-SCNC: 112 MMOL/L — HIGH (ref 96–108)
CO2 SERPL-SCNC: 24 MMOL/L — SIGNIFICANT CHANGE UP (ref 22–31)
CREAT SERPL-MCNC: 2.84 MG/DL — HIGH (ref 0.5–1.3)
EGFR: 21 ML/MIN/1.73M2 — LOW
EOSINOPHIL # BLD AUTO: 0.16 K/UL — SIGNIFICANT CHANGE UP (ref 0–0.5)
EOSINOPHIL NFR BLD AUTO: 1.6 % — SIGNIFICANT CHANGE UP (ref 0–6)
GLUCOSE SERPL-MCNC: 112 MG/DL — HIGH (ref 70–99)
HCT VFR BLD CALC: 30.1 % — LOW (ref 39–50)
HGB BLD-MCNC: 9.7 G/DL — LOW (ref 13–17)
IMM GRANULOCYTES NFR BLD AUTO: 0.5 % — SIGNIFICANT CHANGE UP (ref 0–0.9)
LYMPHOCYTES # BLD AUTO: 0.53 K/UL — LOW (ref 1–3.3)
LYMPHOCYTES # BLD AUTO: 5.1 % — LOW (ref 13–44)
MAGNESIUM SERPL-MCNC: 2.4 MG/DL — SIGNIFICANT CHANGE UP (ref 1.6–2.6)
MCHC RBC-ENTMCNC: 30.2 PG — SIGNIFICANT CHANGE UP (ref 27–34)
MCHC RBC-ENTMCNC: 32.2 GM/DL — SIGNIFICANT CHANGE UP (ref 32–36)
MCV RBC AUTO: 93.8 FL — SIGNIFICANT CHANGE UP (ref 80–100)
MONOCYTES # BLD AUTO: 0.63 K/UL — SIGNIFICANT CHANGE UP (ref 0–0.9)
MONOCYTES NFR BLD AUTO: 6.1 % — SIGNIFICANT CHANGE UP (ref 2–14)
NEUTROPHILS # BLD AUTO: 8.88 K/UL — HIGH (ref 1.8–7.4)
NEUTROPHILS NFR BLD AUTO: 86.2 % — HIGH (ref 43–77)
PHOSPHATE SERPL-MCNC: 3.3 MG/DL — SIGNIFICANT CHANGE UP (ref 2.5–4.5)
PLATELET # BLD AUTO: 205 K/UL — SIGNIFICANT CHANGE UP (ref 150–400)
POTASSIUM SERPL-MCNC: 3.6 MMOL/L — SIGNIFICANT CHANGE UP (ref 3.5–5.3)
POTASSIUM SERPL-SCNC: 3.6 MMOL/L — SIGNIFICANT CHANGE UP (ref 3.5–5.3)
PROT SERPL-MCNC: 6.8 GM/DL — SIGNIFICANT CHANGE UP (ref 6–8.3)
RBC # BLD: 3.21 M/UL — LOW (ref 4.2–5.8)
RBC # FLD: 17.2 % — HIGH (ref 10.3–14.5)
SODIUM SERPL-SCNC: 141 MMOL/L — SIGNIFICANT CHANGE UP (ref 135–145)
WBC # BLD: 10.3 K/UL — SIGNIFICANT CHANGE UP (ref 3.8–10.5)
WBC # FLD AUTO: 10.3 K/UL — SIGNIFICANT CHANGE UP (ref 3.8–10.5)

## 2024-02-13 PROCEDURE — 70450 CT HEAD/BRAIN W/O DYE: CPT | Mod: 26

## 2024-02-13 PROCEDURE — 99233 SBSQ HOSP IP/OBS HIGH 50: CPT

## 2024-02-13 RX ORDER — HYDRALAZINE HCL 50 MG
10 TABLET ORAL ONCE
Refills: 0 | Status: COMPLETED | OUTPATIENT
Start: 2024-02-13 | End: 2024-02-13

## 2024-02-13 RX ORDER — NIFEDIPINE 30 MG
60 TABLET, EXTENDED RELEASE 24 HR ORAL DAILY
Refills: 0 | Status: DISCONTINUED | OUTPATIENT
Start: 2024-02-13 | End: 2024-02-16

## 2024-02-13 RX ORDER — QUETIAPINE FUMARATE 200 MG/1
25 TABLET, FILM COATED ORAL AT BEDTIME
Refills: 0 | Status: DISCONTINUED | OUTPATIENT
Start: 2024-02-13 | End: 2024-02-21

## 2024-02-13 RX ORDER — LABETALOL HCL 100 MG
100 TABLET ORAL EVERY 12 HOURS
Refills: 0 | Status: DISCONTINUED | OUTPATIENT
Start: 2024-02-13 | End: 2024-02-19

## 2024-02-13 RX ADMIN — PIPERACILLIN AND TAZOBACTAM 25 GRAM(S): 4; .5 INJECTION, POWDER, LYOPHILIZED, FOR SOLUTION INTRAVENOUS at 21:13

## 2024-02-13 RX ADMIN — PIPERACILLIN AND TAZOBACTAM 25 GRAM(S): 4; .5 INJECTION, POWDER, LYOPHILIZED, FOR SOLUTION INTRAVENOUS at 00:09

## 2024-02-13 RX ADMIN — Medication 10 MILLIGRAM(S): at 01:36

## 2024-02-13 RX ADMIN — Medication 88 MICROGRAM(S): at 06:27

## 2024-02-13 RX ADMIN — Medication 1 MILLIGRAM(S): at 08:44

## 2024-02-13 RX ADMIN — FINASTERIDE 5 MILLIGRAM(S): 5 TABLET, FILM COATED ORAL at 08:45

## 2024-02-13 RX ADMIN — TAMSULOSIN HYDROCHLORIDE 0.4 MILLIGRAM(S): 0.4 CAPSULE ORAL at 21:13

## 2024-02-13 RX ADMIN — SIMVASTATIN 10 MILLIGRAM(S): 20 TABLET, FILM COATED ORAL at 21:12

## 2024-02-13 RX ADMIN — AMLODIPINE BESYLATE 10 MILLIGRAM(S): 2.5 TABLET ORAL at 08:44

## 2024-02-13 RX ADMIN — Medication 100 MILLIGRAM(S): at 21:13

## 2024-02-13 RX ADMIN — Medication 50 MILLIGRAM(S): at 06:29

## 2024-02-13 RX ADMIN — Medication 50 MILLIGRAM(S): at 13:18

## 2024-02-13 RX ADMIN — Medication 50 MILLIGRAM(S): at 23:19

## 2024-02-13 RX ADMIN — CARBIDOPA AND LEVODOPA 2 TABLET(S): 25; 100 TABLET ORAL at 17:30

## 2024-02-13 RX ADMIN — Medication 100 MILLIGRAM(S): at 10:45

## 2024-02-13 RX ADMIN — NICARDIPINE HYDROCHLORIDE 25 MG/HR: 30 CAPSULE, EXTENDED RELEASE ORAL at 13:18

## 2024-02-13 RX ADMIN — Medication 50 MILLIGRAM(S): at 17:29

## 2024-02-13 RX ADMIN — Medication 0.5 MILLIGRAM(S): at 21:13

## 2024-02-13 RX ADMIN — Medication 3 MILLIGRAM(S): at 21:13

## 2024-02-13 RX ADMIN — Medication 60 MILLIGRAM(S): at 10:51

## 2024-02-13 RX ADMIN — PIPERACILLIN AND TAZOBACTAM 25 GRAM(S): 4; .5 INJECTION, POWDER, LYOPHILIZED, FOR SOLUTION INTRAVENOUS at 08:45

## 2024-02-13 RX ADMIN — CARBIDOPA AND LEVODOPA 2 TABLET(S): 25; 100 TABLET ORAL at 08:42

## 2024-02-13 RX ADMIN — CARBIDOPA AND LEVODOPA 2 TABLET(S): 25; 100 TABLET ORAL at 13:18

## 2024-02-13 RX ADMIN — Medication 1 PATCH: at 21:38

## 2024-02-13 RX ADMIN — Medication 100 MILLIGRAM(S): at 08:45

## 2024-02-13 RX ADMIN — Medication 1000 UNIT(S): at 08:44

## 2024-02-13 RX ADMIN — CARBIDOPA AND LEVODOPA 2 TABLET(S): 25; 100 TABLET ORAL at 23:18

## 2024-02-13 RX ADMIN — QUETIAPINE FUMARATE 25 MILLIGRAM(S): 200 TABLET, FILM COATED ORAL at 21:34

## 2024-02-13 NOTE — PROGRESS NOTE ADULT - ASSESSMENT
82 y/o male w/ a PMHx of CKD, HTN, HLD, hypothyroid, Parkinson's, SBO s/p colostomy, and bladder tumor s/p resection w/ chronic Perry presents to the ED via EMS c/o hematuria, cmplicated with HTN crisis, admitted to CCU for cardene drip.     #HTN emergency  #hematuria  #CKD  #altered mental status  #r/o UTI     - d/c amlodipine  - start nifedipine 60 mg daily , also started labetalol 100 mg q12h  - per cardio, started clonidine patch   - cardene drip off   - precedex drip off   - card consult apprecaited  - pending urology consult  - urine culture negative, blood culture negative  - to complete 5 days empiric antibiotic  - hold chemical dvt ppx for now, continue SCD  - perry remains

## 2024-02-13 NOTE — PROGRESS NOTE ADULT - ASSESSMENT
84 y/o male w/ a PMHx of CKD, HTN, HLD, hypothyroid, Parkinson's, SBO s/p colostomy, and bladder tumor s/p resection w/ chronic Yaya presents to the ED via EMS c/o hematuria, cmplicated with HTN crisis, admitted to CCU for cardene drip.     # HTN emergency  # Hematuria  # CKD  # Altered mental status    Neuro:  - Precedex gtt remains off, Seroquel added as needed for sleep aide  - Avoid Neuro Deliriogenic / sedative medications as able  - Aspiration Precautions, HOB > 30 degrees  - Sinemet  - Klonopin at bedtime    CV:  - Nifedipine XL, Labetalol, Clonidine Patch and Hydralazine  - Statin  - Cardiology Following    Pulm:  - Supplemental oxygen as needed to maintain SpO2 > 92%,  - Incentive spirometry                  GI:  - Senna/Miralax  - Enteral feeds as tolerated to avoid Stress ulceration and optimize nutritional state when indicated    Renal:  - Even to net negative fluid balance as tolerated by hemodynamics and renal fx.    - Preserved Renal Function Continue to monitor Bun/Cr and UOP  - Replacing electrolytes as needed with Goal K> 4, PO> 3, Mg> 2               - Strict I&O's  - Avoid Nephro toxic medication  - Renally dose meds  - Flomax/Proscar    Heme:  - SCDs for DVT/PE ppx     ID:  - Zosyn for empiric coverage  - Microbiology and Radiology reviewed   - trend CBC with diff, CMP  and fever curve    Endo:  - ISS for aggressive glycemic control to limit FS glucose to < 180mg/dl.  - Keep Euthyroid    Time spent on this patient encounter, which includes documenting this note in the electronic medical record, was 40 minutes including assessing the presenting problems with associated risks, reviewing the medical record to prepare for the encounter, and meeting face to face with the patient to obtain additional history. I have also performed an appropriate physical exam, made interventions listed and ordered and interpreted appropriate diagnostic studies as documented. To improve communication and patient safety, I have coordinated care with the multidisciplinary team including the bedside nurse, appropriate attending of record and consultants as needed.    Date of entry of this note is equal to the date of services rendered   82 y/o male w/ a PMHx of CKD, HTN, HLD, hypothyroid, Parkinson's, SBO s/p colostomy, and bladder tumor s/p resection w/ chronic Yaya presents to the ED via EMS c/o hematuria, cmplicated with HTN crisis, admitted to CCU for cardene drip.     # HTN emergency  # Hematuria  # CKD  # Altered mental status    Neuro:  - Precedex gtt remains off, Seroquel added as needed for sleep aide  - Avoid Neuro Deliriogenic / sedative medications as able  - Aspiration Precautions, HOB > 30 degrees  - Sinemet  - Klonopin at bedtime    CV:  - Nifedipine XL, Labetalol, Clonidine Patch and Hydralazine  - Statin  - Cardiology Following    Pulm:  - Supplemental oxygen as needed to maintain SpO2 > 92%,  - Incentive spirometry                  GI:  - Senna/Miralax    Renal:  -  Continue to monitor Bun/Cr and UOP  - Replacing electrolytes as needed with Goal K> 4, PO> 3, Mg> 2               - Strict I&O's  - Avoid Nephro toxic medication  - Renally dose meds  - Flomax/Proscar  - CBI remains clamped    Heme:  - SCDs for DVT/PE ppx     ID:  - Zosyn for empiric coverage  - Microbiology and Radiology reviewed   - trend CBC with diff, CMP  and fever curve    Endo:  - ISS for aggressive glycemic control to limit FS glucose to < 180mg/dl.  - Keep Euthyroid    Time spent on this patient encounter, which includes documenting this note in the electronic medical record, was 40 minutes including assessing the presenting problems with associated risks, reviewing the medical record to prepare for the encounter, and meeting face to face with the patient to obtain additional history. I have also performed an appropriate physical exam, made interventions listed and ordered and interpreted appropriate diagnostic studies as documented. To improve communication and patient safety, I have coordinated care with the multidisciplinary team including the bedside nurse, appropriate attending of record and consultants as needed.    Date of entry of this note is equal to the date of services rendered

## 2024-02-13 NOTE — PROCEDURE NOTE - NSUS ED PROC PERFORMED BY1 FT
Returned patient's call to discuss pap/HPV results and recommendations for colposcopy procedure.  Discussed rationale for further evaluation particularly in the setting of abnormal pap smears since at least 2014. We did acknowledge that it is very reassuring that she seems to have cleared the HPV virus (neg) but that her pap smear still shows abnormalities of the cells (LSIL).  She states she has been following with several doctors for other health related issues and based on what she has researched she wants to hold off on further evaluation of abnormal pap smear for now.  She understands that this would be deviating from the established guidelines for evaluation of abnormal pap smears. I asked if she would consider f/u in 3-6 months and she says she would be much more amenable to f/u in 6 months. She states she will schedule this once things settle down for her. If we do not see her for colposcopy, I emphasized that keeping up with annual WWE/repeat pap smears in timely manner is crucial. All questions answered.  She will call to schedule colpo if desired. No f/u scheduled.   Jose Tapia PA-C

## 2024-02-13 NOTE — PROGRESS NOTE ADULT - SUBJECTIVE AND OBJECTIVE BOX
HPI:  ICU Admit Note/H&P    S:    Pt seen and examined  HD # 1  82 y/o male w/ a PMHx of CKD, HTN, HLD, hypothyroid, Parkinson's, SBO s/p colostomy, and bladder tumor s/p resection w/ chronic Perry presents to the ED via EMS c/o hematuria. Pt reports seeing dark red blood in his Perry bag w/ clots since noon today, flushed it at home w/o improvement. Denies fever, chills, abd pain, n/v/d, or any injures to the area. Pt noted to have dark red blood in Perry bag. Pt wife reports pt has had similar Sx in the past, was put on ABx w/ improvement in Sx however a few weeks have passed and Sx have returned. Pt not on blood thinners. Pt is scheduled to get the Perry removed next week. No other complaints at this time. Allergies: Ibuprofen. PCP: Dr. Hood. Uro: Dr. Baxter.    ICU consult for treatment resistant HTN    2/12: SBP > 220 despite multiple IV, PO meds. c/o of HA. Chronic perry, hematuria, CBI ongoing.     ROS: + HA, hematuria  remainder of systems reviewed at this time, negative   (12 Feb 2024 10:47)    Contacted by hospitalist for HTN management.  I suggested cardene drip.  ICU was consulted. I saw pt at bedside w/ ICU team.  Pt reports headache no other cardiac symptoms.  Wife at bedside, she reports longstanding hx labile BPs related to parkinson's.  No exagerrated orthostasis pattern (supine hypertension w/ orthostatic hypotension)  but periods of markedly elevated pressures similar to today followed by hypotension.  Multiple measurements in the ED 200s.  2/13/24  Patient is feeling ok , still hypertensive  on cardene drip ,   confused , not cooperative oral intake of medication         PAST MEDICAL & SURGICAL HISTORY:  HTN (hypertension)      HLD (hyperlipidemia)      Parkinson disease      CKD (chronic kidney disease)      Hypothyroidism      Bladder mass      Obstruction of bowel      Prostate cancer      Melanoma of skin      CA skin, basal cell      Cancer, skin, squamous cell      Arthritis      Anemia      History of total knee replacement, left      H/O hemorrhoidectomy      H/O colonoscopy      H/O Mohs micrographic surgery for skin cancer          ALLERGIES:  Allergies    ibuprofen (Rash)  latex (Rash)  Advil (Rash)  Aleve, Ibuprofen, Motrin (Rash)    Intolerances              MEDICATIONS:  OUTPATIENT:  Home Medications:  allopurinol 100 mg oral tablet: 1 tab(s) orally once a day (12 Feb 2024 08:26)  bisacodyl 5 mg oral delayed release tablet: 1 tab(s) orally once a day (at bedtime) (12 Feb 2024 08:25)  carbidopa-levodopa 25 mg-100 mg oral tablet: 2 tab(s) orally 4 times a day ***breakfast, lunch, dinner, before bedtime*** (12 Feb 2024 08:25)  cholecalciferol 25 mcg (1000 intl units) oral tablet: 1 tab(s) orally once a day (12 Feb 2024 08:31)  clonazePAM 0.5 mg oral tablet: 0.5 tab(s) orally once a day (at bedtime) (12 Feb 2024 08:31)  fluticasone 50 mcg/inh nasal spray: 1 spray(s) in each nostril 2 times a day as needed (12 Feb 2024 08:31)  folic acid 0.8 mg oral tablet: 1 tab(s) orally once a day (12 Feb 2024 08:31)  hydrALAZINE 50 mg oral tablet: 1 tab(s) orally every 6 hours ***MD increased from q8h to q6h recently*** (12 Feb 2024 08:22)  levoFLOXacin 500 mg oral tablet: 1 tab(s) orally once a day x 7 days ***Course Complete*** (12 Feb 2024 08:31)  levothyroxine 88 mcg (0.088 mg) oral tablet: 1 tab(s) orally once a day (12 Feb 2024 08:30)  Melatonin 3 mg oral tablet: 1 tab(s) orally once a day (at bedtime) (12 Feb 2024 08:27)  MiraLax oral powder for reconstitution: 17 gram(s) orally once a day with breakfast (12 Feb 2024 08:31)  Saline Mist 0.65% nasal spray: 2 spray(s) intranasally 2 times a day as needed (12 Feb 2024 08:31)  senna (sennosides) 8.6 mg oral tablet: 1 tab(s) orally once a day (at bedtime) (12 Feb 2024 08:27)  simvastatin 10 mg oral tablet: 1 tab(s) orally once a day (at bedtime) (12 Feb 2024 08:27)  Tylenol 325 mg oral tablet: 2 tab(s) orally every 6 hours, As needed, Mild Pain (1 - 3) (12 Feb 2024 08:27)        MEDICATIONS  (STANDING):  allopurinol 100 milliGRAM(s) Oral daily  bisacodyl 5 milliGRAM(s) Oral at bedtime  carbidopa/levodopa  25/100 2 Tablet(s) Oral four times a day  cholecalciferol 1000 Unit(s) Oral daily  clonazePAM  Tablet 0.5 milliGRAM(s) Oral at bedtime  dexMEDEtomidine Infusion 0.2 MICROgram(s)/kG/Hr (3.4 mL/Hr) IV Continuous <Continuous>  finasteride 5 milliGRAM(s) Oral daily  fluticasone propionate 50 MICROgram(s)/spray Nasal Spray 1 Spray(s) Both Nostrils two times a day  folic acid 1 milliGRAM(s) Oral daily  hydrALAZINE 50 milliGRAM(s) Oral every 6 hours  labetalol 100 milliGRAM(s) Oral every 12 hours  levothyroxine 88 MICROGram(s) Oral daily  melatonin 3 milliGRAM(s) Oral at bedtime  niCARdipine Infusion 5 mG/Hr (25 mL/Hr) IV Continuous <Continuous>  NIFEdipine XL 60 milliGRAM(s) Oral daily  piperacillin/tazobactam IVPB.. 3.375 Gram(s) IV Intermittent every 12 hours  polyethylene glycol 3350 17 Gram(s) Oral daily  senna 1 Tablet(s) Oral at bedtime  simvastatin 10 milliGRAM(s) Oral at bedtime  tamsulosin 0.4 milliGRAM(s) Oral at bedtime    MEDICATIONS  (PRN):  acetaminophen     Tablet .. 650 milliGRAM(s) Oral every 6 hours PRN Temp greater or equal to 38C (100.4F), Mild Pain (1 - 3), Moderate Pain (4 - 6)  sodium chloride 0.65% Nasal 2 Spray(s) Both Nostrils two times a day PRN Nasal Congestion      Vital Signs Last 24 Hrs  T(C): 36.4 (13 Feb 2024 04:37), Max: 36.6 (12 Feb 2024 12:39)  T(F): 97.5 (13 Feb 2024 04:37), Max: 97.8 (12 Feb 2024 12:39)  HR: 65 (13 Feb 2024 10:00) (50 - 74)  BP: 172/70 (13 Feb 2024 10:00) (126/53 - 222/97)  BP(mean): 98 (13 Feb 2024 10:00) (74 - 125)  RR: 14 (13 Feb 2024 10:00) (10 - 27)  SpO2: 99% (13 Feb 2024 10:00) (84% - 100%)    Parameters below as of 13 Feb 2024 06:30  Patient On (Oxygen Delivery Method): room air        I&O's Summary    12 Feb 2024 07:01  -  13 Feb 2024 07:00  --------------------------------------------------------  IN: 7423.4 mL / OUT: 56413 mL / NET: -6066.6 mL                PHYSICAL EXAM:    Constitutional: NAD, awake   HEENT: PERR, EOMI,  No oral cyananosis.  Neck:  supple,  No JVD  Respiratory: Breath sounds are clear bilaterally, No wheezing, rales or rhonchi  Cardiovascular: S1 and S2, regular rate and rhythm, no Murmurs, gallops or rubs  Gastrointestinal: Bowel Sounds present, soft, nontender.   Extremities: No peripheral edema. No clubbing or cyanosis.  Vascular: 2+ peripheral pulses  Neurological: A/O x 3, no focal deficits      ===============================  ===============================  LABS:                         9.7    10.01 )-----------( 193      ( 11 Feb 2024 23:48 )             29.7     11 Feb 2024 23:48    140    |  110    |  39     ----------------------------<  145    3.7     |  25     |  2.84     Ca    8.7        11 Feb 2024 23:48    TPro  6.8    /  Alb  3.2    /  TBili  0.4    /  DBili  x      /  AST  7      /  ALT  <6     /  AlkPhos  80     11 Feb 2024 23:48    PT/INR - ( 11 Feb 2024 23:48 )   PT: 10.4 sec;   INR: 0.92 ratio         PTT - ( 11 Feb 2024 23:48 )  PTT:28.4 sec    THYROID STUDIES:    ===============================  ===============================  CARDIAC BIOMARKERS:  -------  -BNP VALUES:    -------  -TROPONIN VALUES:   Troponin I, High Sensitivity Result: 67.7 ng/L (08-07-23 @ 17:15)  Troponin T, High Sensitivity: 65 ng/L *HH* [<6 - 14] (07-16-20 @ 05:30)  Troponin T, High Sensitivity: 61 ng/L *HH* [<6 - 14] (07-16-20 @ 05:30)  Troponin T, High Sensitivity: 67 ng/L *HH* [<6 - 14] (07-16-20 @ 00:57)  Troponin T, High Sensitivity: 71 ng/L *HH* [<6 - 14] (07-15-20 @ 14:50)  Troponin T, High Sensitivity: 62 ng/L *HH* [<6 - 14] (07-15-20 @ 12:30)  Troponin T, Serum: < 0.06 ng/mL [0.00 - 0.06] (05-15-18 @ 18:45)  Troponin T, Serum: < 0.06 ng/mL [0.00 - 0.06] (05-15-18 @ 12:58)  ===============================  ===============================  EKG:    NSR no ischemic changes.    ===============================  ===============================  < from: Transthoracic Echocardiogram (07.17.20 @ 17:33) >  CONCLUSIONS:  1. Mitral annular calcification, otherwise normal mitral  valve. Mild mitral regurgitation.  2. Mildly dilated left atrium.  LA volume index = 35 cc/m2.  3. Normal left ventricular internal dimensions and wall  thicknesses.  4. Normal left ventricular systolic function. No segmental  wall motion abnormalities.  5. Mild diastolic dysfunction (Stage I).  6. Normal right ventricular size and function.  *** Compared with echocardiogram of 5/16/2018, no  significant changes noted.  ------------------------------------------------------------------------  Confirmed on  7/17/2020 - 18:49:15 by Wilfredo Crespo M.D.  ------------------------------------------------------------------------    < end of copied text >

## 2024-02-13 NOTE — PROCEDURE NOTE - NSUS ED ADDITIONAL DETAIL1 FT
Called by CCU nurses due to difficult access. Multiple attempts by multiple nurses unsuccessful. Patient has only one working PIV.   1.88 inch 20G catheter placed in the right upper arm under dynamic ultrasound guidance. Flash noted in flash chamber and catheter advanced without difficulty. Blood noted on aspiration of line, and line flushes easily. Secured in place.     Dx:  -Hypertensive Emergency     This procedure was performed independent of E&M time. Date of entry equals date of procedure.

## 2024-02-13 NOTE — PROGRESS NOTE ADULT - SUBJECTIVE AND OBJECTIVE BOX
Patient is a 83y old  Male who presents with a chief complaint of HTN emergency (13 Feb 2024 17:19)      BRIEF HOSPITAL COURSE: 84 y/o male w/ a PMHx of CKD, HTN, HLD, hypothyroid, Parkinson's, SBO s/p colostomy, and bladder tumor s/p resection w/ chronic Perry presents to the ED via EMS c/o hematuria. Pt reports seeing dark red blood in his Perry bag w/ clots since noon today, flushed it at home w/o improvement. Denies fever, chills, abd pain, n/v/d, or any injures to the area. Pt noted to have dark red blood in Perry bag. Pt wife reports pt has had similar Sx in the past, was put on ABx w/ improvement in Sx however a few weeks have passed and Sx have returned. Pt not on blood thinners. Pt is scheduled to get the Perry removed next week. No other complaints at this time. Allergies: Ibuprofen. PCP: Dr. Hood. Uro: Dr. Baxter.    ICU consult for treatment resistant HTN    2/12: SBP > 220 despite multiple IV, PO meds. c/o of HA. Chronic perry, hematuria, CBI ongoing.     ROS: + HA, hematuria  remainder of systems reviewed at this time, negative    Events last 24 hours: remains off cardene gtt, PO antihypertensive regimen    PAST MEDICAL & SURGICAL HISTORY:  HTN (hypertension)      HLD (hyperlipidemia)      Parkinson disease      CKD (chronic kidney disease)      Hypothyroidism      Bladder mass      Obstruction of bowel      Prostate cancer      Melanoma of skin      CA skin, basal cell      Cancer, skin, squamous cell      Arthritis      Anemia      History of total knee replacement, left      H/O hemorrhoidectomy      H/O colonoscopy      H/O Mohs micrographic surgery for skin cancer          Review of Systems:  CONSTITUTIONAL: No fever, chills, or fatigue.  EYES: No eye pain, visual disturbances, or discharge.  ENMT:  No difficulty hearing, tinnitus, or vertigo. No sinus or throat pain.  NECK: No pain or stiffness.  RESPIRATORY: No shortness of breath, cough, or wheezing.  CARDIOVASCULAR: No chest pain, palpitations, dizziness, or leg swelling.  GASTROINTESTINAL: No abdominal or epigastric pain. No nausea, vomiting, diarrhea, or constipation. No hematemesis, melena, or hematochezia.  GENITOURINARY: No dysuria, increased frequency, hematuria, or incontinence.  NEUROLOGICAL: No headaches, memory loss, loss of strength, numbness, or tremors.  SKIN: No itching, burning, rashes, or lesions.  MUSCULOSKELETAL: No joint pain or swelling. No muscle, back, or extremity pain.  PSYCHIATRIC: No depression, anxiety, mood swings, or difficulty sleeping.    Medications:  piperacillin/tazobactam IVPB.. 3.375 Gram(s) IV Intermittent every 12 hours    cloNIDine Patch 0.3 mG/24Hr(s) 1 patch Transdermal every 7 days  hydrALAZINE 50 milliGRAM(s) Oral every 6 hours  labetalol 100 milliGRAM(s) Oral every 12 hours  NIFEdipine XL 60 milliGRAM(s) Oral daily      acetaminophen     Tablet .. 650 milliGRAM(s) Oral every 6 hours PRN  carbidopa/levodopa  25/100 2 Tablet(s) Oral four times a day  clonazePAM  Tablet 0.5 milliGRAM(s) Oral at bedtime  melatonin 3 milliGRAM(s) Oral at bedtime  QUEtiapine 25 milliGRAM(s) Oral at bedtime PRN        bisacodyl 5 milliGRAM(s) Oral at bedtime  polyethylene glycol 3350 17 Gram(s) Oral daily  senna 1 Tablet(s) Oral at bedtime    tamsulosin 0.4 milliGRAM(s) Oral at bedtime    allopurinol 100 milliGRAM(s) Oral daily  finasteride 5 milliGRAM(s) Oral daily  levothyroxine 88 MICROGram(s) Oral daily  simvastatin 10 milliGRAM(s) Oral at bedtime    cholecalciferol 1000 Unit(s) Oral daily  folic acid 1 milliGRAM(s) Oral daily      fluticasone propionate 50 MICROgram(s)/spray Nasal Spray 1 Spray(s) Both Nostrils two times a day  sodium chloride 0.65% Nasal 2 Spray(s) Both Nostrils two times a day PRN            ICU Vital Signs Last 24 Hrs  T(C): 36.5 (13 Feb 2024 17:00), Max: 36.5 (13 Feb 2024 17:00)  T(F): 97.7 (13 Feb 2024 17:00), Max: 97.7 (13 Feb 2024 17:00)  HR: 65 (13 Feb 2024 20:00) (50 - 82)  BP: 177/71 (13 Feb 2024 20:00) (89/60 - 222/97)  BP(mean): 98 (13 Feb 2024 20:00) (71 - 125)  ABP: --  ABP(mean): --  RR: 22 (13 Feb 2024 20:00) (10 - 33)  SpO2: 100% (13 Feb 2024 20:00) (98% - 100%)    O2 Parameters below as of 13 Feb 2024 06:30  Patient On (Oxygen Delivery Method): room air                I&O's Detail    12 Feb 2024 07:01  -  13 Feb 2024 07:00  --------------------------------------------------------  IN:    Continuous Bladder Irrigation (mL): 7000 mL    Dexmedetomidine: 3.4 mL    IV PiggyBack: 100 mL    Oral Fluid: 320 mL  Total IN: 7423.4 mL    OUT:    Continuous Bladder Irrigation (mL): 09125 mL    Indwelling Catheter - Urethral (mL): 540 mL  Total OUT: 32772 mL    Total NET: -6066.6 mL      13 Feb 2024 07:01  -  13 Feb 2024 21:10  --------------------------------------------------------  IN:    Oral Fluid: 390 mL  Total IN: 390 mL    OUT:    Indwelling Catheter - Urethral (mL): 1500 mL  Total OUT: 1500 mL    Total NET: -1110 mL          LABS:                        9.7    10.30 )-----------( 205      ( 13 Feb 2024 06:26 )             30.1     02-13    141  |  112<H>  |  36<H>  ----------------------------<  112<H>  3.6   |  24  |  2.84<H>    Ca    8.7      13 Feb 2024 06:26  Phos  3.3     02-13  Mg     2.4     02-13    TPro  6.8  /  Alb  3.2<L>  /  TBili  0.8  /  DBili  x   /  AST  17  /  ALT  <6<L>  /  AlkPhos  78  02-13          CAPILLARY BLOOD GLUCOSE        PT/INR - ( 11 Feb 2024 23:48 )   PT: 10.4 sec;   INR: 0.92 ratio         PTT - ( 11 Feb 2024 23:48 )  PTT:28.4 sec  Urinalysis Basic - ( 13 Feb 2024 06:26 )    Color: x / Appearance: x / SG: x / pH: x  Gluc: 112 mg/dL / Ketone: x  / Bili: x / Urobili: x   Blood: x / Protein: x / Nitrite: x   Leuk Esterase: x / RBC: x / WBC x   Sq Epi: x / Non Sq Epi: x / Bacteria: x      CULTURES:  Culture Results:   No growth at 24 hours (02-11 @ 23:48)  Culture Results:   No growth at 24 hours (02-11 @ 23:48)  Culture Results:   <10,000 CFU/mL Normal Urogenital Lolita (02-11 @ 18:46)      Physical Examination:    General: Well appearing, lying in bed in NAD.      HEENT: Pupils equal, reactive to light. Symmetric. No scleral icterus or injection.    PULM: Clear to auscultation B/L. No wheezes, rales, or rhonchi apprecaited. No significant sputum production or increased respiratory effort.    NECK: Supple, no lymphadenopathy, trachea midline.    CVS: Regular rate and rhythm, no murmurs appreciated, +s1/s2.    ABD: Soft, nondistended, nontender, normoactive bowel sounds.    EXT: No edema, nontender.    SKIN: Warm and well perfused, no rashes noted.    NEURO: Alert & oriented x0      RADIOLOGY: < from: CT Head No Cont (02.13.24 @ 02:30) >  ACC: [id number] EXAM:  CT BRAIN   ORDERED BY: ODALIS MAY     PROCEDURE DATE:  02/13/2024          INTERPRETATION:  CLINICAL INDICATION:  AMS    TECHNIQUE: Noncontrast CT examination of the head was performed.  Coronal and sagittal reformats werealso obtained.    COMPARISON: 2/12/2024    FINDINGS:  INTRA-AXIAL: No intracranial mass effect, acute hemorrhage, midline shift   or acute transcortical infarct is seen. There are periventricular white   matter hypodensities that are nonspecific in nature but may reflect   chronic ischemic microvascular disease.  EXTRA-AXIAL: No extra-axial fluid collection is present.  VENTRICLES AND SULCI: Parenchymal volume is commensurate with patient   age. No hydrocephalus.  VISUALIZED SINUSES: Mild mucosalthickening.  VISUALIZED MASTOIDS: Opacification of the bilateral mastoids and middle   ear.  CALVARIUM: Normal.    IMPRESSION:    No evidence of acute intracranial hemorrhage, midline shift or CT   evidence of acute territorial infarct.  Bilateral mastoid and middle ear opacification. Correlate clinically for   otomastoiditis.  If the patient's symptoms persist, consider short interval follow-up head   CT or brain MRI if there are no MRI contraindications.    --- End of Report ---    < end of copied text >

## 2024-02-13 NOTE — PROGRESS NOTE ADULT - SUBJECTIVE AND OBJECTIVE BOX
Patient is a 83y old  Male who presents with a chief complaint of HTN emergency (13 Feb 2024 11:39)      BRIEF HOSPITAL COURSE: *  HD # 2  84 y/o male w/ a PMHx of CKD, HTN, HLD, hypothyroid, Parkinson's, SBO s/p colostomy, and bladder tumor s/p resection w/ chronic Perry presents to the ED via EMS c/o hematuria. Pt reports seeing dark red blood in his Perry bag w/ clots since noon today, flushed it at home w/o improvement. Denies fever, chills, abd pain, n/v/d, or any injures to the area. Pt noted to have dark red blood in Perry bag. Pt wife reports pt has had similar Sx in the past, was put on ABx w/ improvement in Sx however a few weeks have passed and Sx have returned. Pt not on blood thinners. Pt is scheduled to get the Perry removed next week. No other complaints at this time. Allergies: Ibuprofen. PCP: Dr. Hood. Uro: Dr. Baxter.    ICU consult for treatment resistant HTN    2/12: SBP > 220 despite multiple IV, PO meds. c/o of HA. Chronic perry, hematuria, CBI ongoing.     ROS: + HA, hematuria  remainder of systems reviewed at this time, negative    2/13 - cardene drip off     PAST MEDICAL & SURGICAL HISTORY:  HTN (hypertension)      HLD (hyperlipidemia)      Parkinson disease      CKD (chronic kidney disease)      Hypothyroidism      Bladder mass      Obstruction of bowel      Prostate cancer      Melanoma of skin      CA skin, basal cell      Cancer, skin, squamous cell      Arthritis      Anemia      History of total knee replacement, left      H/O hemorrhoidectomy      H/O colonoscopy      H/O Mohs micrographic surgery for skin cancer        Allergies    ibuprofen (Rash)  latex (Rash)  Advil (Rash)  Aleve, Ibuprofen, Motrin (Rash)    Intolerances      FAMILY HISTORY:  FHx: prostate cancer        Family history otherwise noncontributory.    Social History:     Review of Systems:  unabl to obtain, patient is delirioius       Medications:  piperacillin/tazobactam IVPB.. 3.375 Gram(s) IV Intermittent every 12 hours    cloNIDine Patch 0.3 mG/24Hr(s) 1 patch Transdermal every 7 days  hydrALAZINE 50 milliGRAM(s) Oral every 6 hours  labetalol 100 milliGRAM(s) Oral every 12 hours  niCARdipine Infusion 5 mG/Hr IV Continuous <Continuous>  NIFEdipine XL 60 milliGRAM(s) Oral daily      acetaminophen     Tablet .. 650 milliGRAM(s) Oral every 6 hours PRN  carbidopa/levodopa  25/100 2 Tablet(s) Oral four times a day  clonazePAM  Tablet 0.5 milliGRAM(s) Oral at bedtime  dexMEDEtomidine Infusion 0.2 MICROgram(s)/kG/Hr IV Continuous <Continuous>  melatonin 3 milliGRAM(s) Oral at bedtime        bisacodyl 5 milliGRAM(s) Oral at bedtime  polyethylene glycol 3350 17 Gram(s) Oral daily  senna 1 Tablet(s) Oral at bedtime    tamsulosin 0.4 milliGRAM(s) Oral at bedtime    allopurinol 100 milliGRAM(s) Oral daily  finasteride 5 milliGRAM(s) Oral daily  levothyroxine 88 MICROGram(s) Oral daily  simvastatin 10 milliGRAM(s) Oral at bedtime    cholecalciferol 1000 Unit(s) Oral daily  folic acid 1 milliGRAM(s) Oral daily      fluticasone propionate 50 MICROgram(s)/spray Nasal Spray 1 Spray(s) Both Nostrils two times a day  sodium chloride 0.65% Nasal 2 Spray(s) Both Nostrils two times a day PRN            ICU Vital Signs Last 24 Hrs  T(C): 36.5 (13 Feb 2024 17:00), Max: 36.5 (13 Feb 2024 17:00)  T(F): 97.7 (13 Feb 2024 17:00), Max: 97.7 (13 Feb 2024 17:00)  HR: 58 (13 Feb 2024 17:00) (50 - 82)  BP: 157/112 (13 Feb 2024 17:00) (89/60 - 222/97)  BP(mean): 117 (13 Feb 2024 17:00) (71 - 125)  ABP: --  ABP(mean): --  RR: 11 (13 Feb 2024 17:00) (10 - 28)  SpO2: 100% (13 Feb 2024 17:00) (98% - 100%)    O2 Parameters below as of 13 Feb 2024 06:30  Patient On (Oxygen Delivery Method): room air          I&O's Detail    12 Feb 2024 07:01  -  13 Feb 2024 07:00  --------------------------------------------------------  IN:    Continuous Bladder Irrigation (mL): 7000 mL    Dexmedetomidine: 3.4 mL    IV PiggyBack: 100 mL    Oral Fluid: 320 mL  Total IN: 7423.4 mL    OUT:    Continuous Bladder Irrigation (mL): 56255 mL    Indwelling Catheter - Urethral (mL): 540 mL  Total OUT: 57038 mL    Total NET: -6066.6 mL      13 Feb 2024 07:01  -  13 Feb 2024 17:20  --------------------------------------------------------  IN:    Oral Fluid: 390 mL  Total IN: 390 mL    OUT:    Indwelling Catheter - Urethral (mL): 1500 mL  Total OUT: 1500 mL    Total NET: -1110 mL            LABS:                        9.7    10.30 )-----------( 205      ( 13 Feb 2024 06:26 )             30.1     02-13    141  |  112<H>  |  36<H>  ----------------------------<  112<H>  3.6   |  24  |  2.84<H>    Ca    8.7      13 Feb 2024 06:26  Phos  3.3     02-13  Mg     2.4     02-13    TPro  6.8  /  Alb  3.2<L>  /  TBili  0.8  /  DBili  x   /  AST  17  /  ALT  <6<L>  /  AlkPhos  78  02-13          CAPILLARY BLOOD GLUCOSE        PT/INR - ( 11 Feb 2024 23:48 )   PT: 10.4 sec;   INR: 0.92 ratio         PTT - ( 11 Feb 2024 23:48 )  PTT:28.4 sec  Urinalysis Basic - ( 13 Feb 2024 06:26 )    Color: x / Appearance: x / SG: x / pH: x  Gluc: 112 mg/dL / Ketone: x  / Bili: x / Urobili: x   Blood: x / Protein: x / Nitrite: x   Leuk Esterase: x / RBC: x / WBC x   Sq Epi: x / Non Sq Epi: x / Bacteria: x      CULTURES:  Culture Results:   No growth at 24 hours (02-11 @ 23:48)  Culture Results:   No growth at 24 hours (02-11 @ 23:48)  Culture Results:   <10,000 CFU/mL Normal Urogenital Lolita (02-11 @ 18:46)      Physical Examination:  GENERAL: In NAD , on room air   HEENT: NC/AT  NECK: Supple, trachea midline  PULM: CTA b/l   CVS: +S1, S2, RRR  ABD: Soft, non-tender  EXTREMITIES: No pedal edema B/L  SKIN: No open wounds  NEURO: Grossly non-focal, AAOx0     DEVICES:     RADIOLOGY: reviewed

## 2024-02-14 LAB
ALBUMIN SERPL ELPH-MCNC: 2.8 G/DL — LOW (ref 3.3–5)
ALP SERPL-CCNC: 62 U/L — SIGNIFICANT CHANGE UP (ref 40–120)
ALT FLD-CCNC: <6 U/L — LOW (ref 12–78)
ANION GAP SERPL CALC-SCNC: 8 MMOL/L — SIGNIFICANT CHANGE UP (ref 5–17)
AST SERPL-CCNC: 25 U/L — SIGNIFICANT CHANGE UP (ref 15–37)
BILIRUB SERPL-MCNC: 0.6 MG/DL — SIGNIFICANT CHANGE UP (ref 0.2–1.2)
BUN SERPL-MCNC: 35 MG/DL — HIGH (ref 7–23)
CALCIUM SERPL-MCNC: 8.7 MG/DL — SIGNIFICANT CHANGE UP (ref 8.5–10.1)
CHLORIDE SERPL-SCNC: 114 MMOL/L — HIGH (ref 96–108)
CO2 SERPL-SCNC: 23 MMOL/L — SIGNIFICANT CHANGE UP (ref 22–31)
CREAT SERPL-MCNC: 2.98 MG/DL — HIGH (ref 0.5–1.3)
EGFR: 20 ML/MIN/1.73M2 — LOW
GLUCOSE SERPL-MCNC: 101 MG/DL — HIGH (ref 70–99)
HCT VFR BLD CALC: 27.4 % — LOW (ref 39–50)
HGB BLD-MCNC: 8.8 G/DL — LOW (ref 13–17)
MAGNESIUM SERPL-MCNC: 2.4 MG/DL — SIGNIFICANT CHANGE UP (ref 1.6–2.6)
MCHC RBC-ENTMCNC: 30.4 PG — SIGNIFICANT CHANGE UP (ref 27–34)
MCHC RBC-ENTMCNC: 32.1 GM/DL — SIGNIFICANT CHANGE UP (ref 32–36)
MCV RBC AUTO: 94.8 FL — SIGNIFICANT CHANGE UP (ref 80–100)
PHOSPHATE SERPL-MCNC: 4.1 MG/DL — SIGNIFICANT CHANGE UP (ref 2.5–4.5)
PLATELET # BLD AUTO: 190 K/UL — SIGNIFICANT CHANGE UP (ref 150–400)
POTASSIUM SERPL-MCNC: 3.3 MMOL/L — LOW (ref 3.5–5.3)
POTASSIUM SERPL-SCNC: 3.3 MMOL/L — LOW (ref 3.5–5.3)
PROT SERPL-MCNC: 6.2 GM/DL — SIGNIFICANT CHANGE UP (ref 6–8.3)
RBC # BLD: 2.89 M/UL — LOW (ref 4.2–5.8)
RBC # FLD: 17.2 % — HIGH (ref 10.3–14.5)
SODIUM SERPL-SCNC: 145 MMOL/L — SIGNIFICANT CHANGE UP (ref 135–145)
WBC # BLD: 8.49 K/UL — SIGNIFICANT CHANGE UP (ref 3.8–10.5)
WBC # FLD AUTO: 8.49 K/UL — SIGNIFICANT CHANGE UP (ref 3.8–10.5)

## 2024-02-14 PROCEDURE — 99233 SBSQ HOSP IP/OBS HIGH 50: CPT

## 2024-02-14 RX ORDER — POTASSIUM CHLORIDE 20 MEQ
40 PACKET (EA) ORAL ONCE
Refills: 0 | Status: COMPLETED | OUTPATIENT
Start: 2024-02-14 | End: 2024-02-14

## 2024-02-14 RX ORDER — HYDRALAZINE HCL 50 MG
50 TABLET ORAL EVERY 8 HOURS
Refills: 0 | Status: DISCONTINUED | OUTPATIENT
Start: 2024-02-14 | End: 2024-02-21

## 2024-02-14 RX ADMIN — SIMVASTATIN 10 MILLIGRAM(S): 20 TABLET, FILM COATED ORAL at 21:43

## 2024-02-14 RX ADMIN — Medication 60 MILLIGRAM(S): at 09:54

## 2024-02-14 RX ADMIN — Medication 100 MILLIGRAM(S): at 21:43

## 2024-02-14 RX ADMIN — CARBIDOPA AND LEVODOPA 2 TABLET(S): 25; 100 TABLET ORAL at 05:39

## 2024-02-14 RX ADMIN — TAMSULOSIN HYDROCHLORIDE 0.4 MILLIGRAM(S): 0.4 CAPSULE ORAL at 21:42

## 2024-02-14 RX ADMIN — POLYETHYLENE GLYCOL 3350 17 GRAM(S): 17 POWDER, FOR SOLUTION ORAL at 09:52

## 2024-02-14 RX ADMIN — Medication 100 MILLIGRAM(S): at 09:53

## 2024-02-14 RX ADMIN — Medication 1 SPRAY(S): at 21:45

## 2024-02-14 RX ADMIN — Medication 50 MILLIGRAM(S): at 21:42

## 2024-02-14 RX ADMIN — PIPERACILLIN AND TAZOBACTAM 25 GRAM(S): 4; .5 INJECTION, POWDER, LYOPHILIZED, FOR SOLUTION INTRAVENOUS at 09:53

## 2024-02-14 RX ADMIN — FINASTERIDE 5 MILLIGRAM(S): 5 TABLET, FILM COATED ORAL at 09:52

## 2024-02-14 RX ADMIN — Medication 0.5 MILLIGRAM(S): at 21:43

## 2024-02-14 RX ADMIN — Medication 50 MILLIGRAM(S): at 05:39

## 2024-02-14 RX ADMIN — Medication 88 MICROGRAM(S): at 05:39

## 2024-02-14 RX ADMIN — Medication 1 SPRAY(S): at 09:53

## 2024-02-14 RX ADMIN — CARBIDOPA AND LEVODOPA 2 TABLET(S): 25; 100 TABLET ORAL at 11:50

## 2024-02-14 RX ADMIN — Medication 40 MILLIEQUIVALENT(S): at 09:52

## 2024-02-14 RX ADMIN — Medication 100 MILLIGRAM(S): at 09:52

## 2024-02-14 RX ADMIN — Medication 3 MILLIGRAM(S): at 21:43

## 2024-02-14 RX ADMIN — Medication 1000 UNIT(S): at 09:52

## 2024-02-14 RX ADMIN — SENNA PLUS 1 TABLET(S): 8.6 TABLET ORAL at 21:43

## 2024-02-14 RX ADMIN — CARBIDOPA AND LEVODOPA 2 TABLET(S): 25; 100 TABLET ORAL at 18:10

## 2024-02-14 RX ADMIN — PIPERACILLIN AND TAZOBACTAM 25 GRAM(S): 4; .5 INJECTION, POWDER, LYOPHILIZED, FOR SOLUTION INTRAVENOUS at 21:43

## 2024-02-14 RX ADMIN — Medication 1 MILLIGRAM(S): at 09:52

## 2024-02-14 NOTE — PROGRESS NOTE ADULT - ASSESSMENT
82 y/o male w/ a PMHx of CKD, HTN, HLD, hypothyroid, Parkinson's, SBO s/p colostomy, and bladder tumor s/p resection w/ chronic Perry presents to the ED via EMS c/o hematuria, cmplicated with HTN crisis, admitted to CCU for cardene drip.     #HTN emergency  #hematuria  #CKD  #altered mental status  #r/o UTI       Plan:     Off nicardipine   Became hypotensive as the day progressed - d/c'd clonidine patch  Continue nifedipine and labetalol     Mentation ok, not agitated today   Continue sinemet    Urology consult was called but is still pending   Hematuria resolved, off CBI   Will maintain perry for now   Will need to change 3-way to regular prior to discharge     On empiric zosyn  WBC normal, Cx neg   Will do a 5 day course, discharge home on po abx x 1-2 days     DVT ppx with SCDs      Wife updated at bedside, likely discharge to [facility] living on 2/15

## 2024-02-14 NOTE — PHYSICAL THERAPY INITIAL EVALUATION ADULT - ACTIVE RANGE OF MOTION EXAMINATION, REHAB EVAL
except Bilat shld FE 90; Bilat knee flex 90; K knee ext -10 approx/no Active ROM deficits were identified

## 2024-02-14 NOTE — DIETITIAN INITIAL EVALUATION ADULT - OTHER INFO
84 y/o male w/ a PMHx of CKD, HTN, HLD, hypothyroid, Parkinson's, SBO s/p colostomy, and bladder tumor s/p resection w/ chronic Javier presents to the ED via EMS c/o hematuria. Pt reports seeing dark red blood in his Javier bag w/ clots since noon today, flushed it at home w/o improvement. Pt noted to have dark red blood in Javier bag. Pt wife reports pt has had similar Sx in the past, was put on ABx w/ improvement in Sx however a few weeks have passed and Sx have returned. Pt is scheduled to get the Javier removed next week. ICU consult for treatment resistant HTN. Admitted for HTN emergency, AMS, poss UTI.     Pt pleasantly confused at time of RD visit. Has been seen by RD and RD service on past admits (Oct 2023 and June 2022). Wt hx: 151.8# taken on 9/28/23; 135# on 6/9/22. Current bed scale wt taken on 2/14/24 at 135#. No pertinent wt changes. Pt reports # although ? accuracy. Currently, appears appropriate wt for ht. NFPE reveals varied degrees of muscle/ fat wasting; however, continues to meet criteria for PCM. On DASH/TLC diet, would rec'd to Liberalize diet to regular to maximize caloric and nutrient intake. Will also add ensure plus high protein BID to optimize PO intake (provides 350 kcal, 20g protein/ shake). Would suggest to Confirm goals of care regarding nutrition support - Nutrition support is not recommended due to overall declining medical status/ advanced age which evidenced based studies indicate EN is not effective in prolonging survival and improving quality of life. It can also increase risk of aspiration pneumonia as well as other related issues (infection, GI complications, and worsening/ non-healing PI's). However, will provide nutrition/ hydration within GOC. See additional recs below.

## 2024-02-14 NOTE — PHYSICAL THERAPY INITIAL EVALUATION ADULT - LEVEL OF INDEPENDENCE: SUPINE/SIT, REHAB EVAL
held transfer / gait assessment due to hypotension: 87/44; bradycardia: 45; RN Natalia present / aware

## 2024-02-14 NOTE — DIETITIAN INITIAL EVALUATION ADULT - PERTINENT LABORATORY DATA
02-14    145  |  114<H>  |  35<H>  ----------------------------<  101<H>  3.3<L>   |  23  |  2.98<H>    Ca    8.7      14 Feb 2024 05:39  Phos  4.1     02-14  Mg     2.4     02-14    TPro  6.2  /  Alb  2.8<L>  /  TBili  0.6  /  DBili  x   /  AST  25  /  ALT  <6<L>  /  AlkPhos  62  02-14

## 2024-02-14 NOTE — DIETITIAN INITIAL EVALUATION ADULT - ORAL INTAKE PTA/DIET HISTORY
from [facility] - on regular diet. Pt confused at time of visit so diet/ wt hx likely inaccurate.

## 2024-02-14 NOTE — PHYSICAL THERAPY INITIAL EVALUATION ADULT - MODALITIES TREATMENT COMMENTS
pt left in bed supine post Eval; bed alarm on; all above lines/monitors intact; spouse present; callbell in reach; dino well; denied pain

## 2024-02-14 NOTE — PROGRESS NOTE ADULT - SUBJECTIVE AND OBJECTIVE BOX
PCP:    REQUESTING PHYSICIAN:    REASON FOR CONSULT:    CHIEF COMPLAINT:    HPI:  ICU Admit Note/H&P    S:    82 y/o male w/ a PMHx of CKD, HTN, HLD, hypothyroid, Parkinson's, SBO s/p colostomy, and bladder tumor s/p resection w/ chronic Perry presents to the ED via EMS c/o hematuria. Pt reports seeing dark red blood in his Perry bag w/ clots since noon today, flushed it at home w/o improvement. Denies fever, chills, abd pain, n/v/d, or any injures to the area. Pt noted to have dark red blood in Perry bag. Pt wife reports pt has had similar Sx in the past, was put on ABx w/ improvement in Sx however a few weeks have passed and Sx have returned. Pt not on blood thinners. Pt is scheduled to get the Perry removed next week. No other complaints at this time. Allergies: Ibuprofen. PCP: Dr. Hood. Uro: Dr. Baxter.    ICU consult for treatment resistant HTN    : SBP > 220 despite multiple IV, PO meds. c/o of HA. Chronic perry, hematuria, CBI ongoing.     ROS: + HA, hematuria  remainder of systems reviewed at this time, negative   (2024 10:47)    Contacted by hospitalist for HTN management.  I suggested cardene drip.  ICU was consulted. I saw pt at bedside w/ ICU team.  Pt reports headache no other cardiac symptoms.  Wife at bedside, she reports longstanding hx labile BPs related to parkinson's.  No exagerrated orthostasis pattern (supine hypertension w/ orthostatic hypotension)  but periods of markedly elevated pressures similar to today followed by hypotension.  Multiple measurements in the ED 200s.  24  Patient is feeling ok , still hypertensive  on cardene drip ,   confused , not cooperative oral intake of medication   24 Pt is awake but confused. BP is improved.        ROS: + HA, hematuria  remainder of systems reviewed at this time, negative   (2024 10:47)      PAST MEDICAL & SURGICAL HISTORY:  HTN (hypertension)      HLD (hyperlipidemia)      Parkinson disease      CKD (chronic kidney disease)      Hypothyroidism      Bladder mass      Obstruction of bowel      Prostate cancer      Melanoma of skin      CA skin, basal cell      Cancer, skin, squamous cell      Arthritis      Anemia      History of total knee replacement, left      H/O hemorrhoidectomy      H/O colonoscopy      H/O Mohs micrographic surgery for skin cancer          SOCIAL HISTORY:    FAMILY HISTORY:  FHx: prostate cancer        ALLERGIES:  Allergies    ibuprofen (Rash)  latex (Rash)  Advil (Rash)  Aleve, Ibuprofen, Motrin (Rash)    Intolerances        MEDICATIONS:    MEDICATIONS  (STANDING):  allopurinol 100 milliGRAM(s) Oral daily  bisacodyl 5 milliGRAM(s) Oral at bedtime  carbidopa/levodopa  25/100 2 Tablet(s) Oral four times a day  cholecalciferol 1000 Unit(s) Oral daily  clonazePAM  Tablet 0.5 milliGRAM(s) Oral at bedtime  cloNIDine Patch 0.3 mG/24Hr(s) 1 patch Transdermal every 7 days  finasteride 5 milliGRAM(s) Oral daily  fluticasone propionate 50 MICROgram(s)/spray Nasal Spray 1 Spray(s) Both Nostrils two times a day  folic acid 1 milliGRAM(s) Oral daily  hydrALAZINE 50 milliGRAM(s) Oral every 6 hours  labetalol 100 milliGRAM(s) Oral every 12 hours  levothyroxine 88 MICROGram(s) Oral daily  melatonin 3 milliGRAM(s) Oral at bedtime  NIFEdipine XL 60 milliGRAM(s) Oral daily  piperacillin/tazobactam IVPB.. 3.375 Gram(s) IV Intermittent every 12 hours  polyethylene glycol 3350 17 Gram(s) Oral daily  potassium chloride    Tablet ER 40 milliEquivalent(s) Oral once  senna 1 Tablet(s) Oral at bedtime  simvastatin 10 milliGRAM(s) Oral at bedtime  tamsulosin 0.4 milliGRAM(s) Oral at bedtime    MEDICATIONS  (PRN):  acetaminophen     Tablet .. 650 milliGRAM(s) Oral every 6 hours PRN Temp greater or equal to 38C (100.4F), Mild Pain (1 - 3), Moderate Pain (4 - 6)  QUEtiapine 25 milliGRAM(s) Oral at bedtime PRN sleep aid  sodium chloride 0.65% Nasal 2 Spray(s) Both Nostrils two times a day PRN Nasal Congestion      REVIEW OF SYSTEMS:    CONSTITUTIONAL: No weakness, fevers or chills  EYES/ENT: No visual changes;  No vertigo or throat pain   NECK: No pain or stiffness  RESPIRATORY: No cough, wheezing, hemoptysis; No shortness of breath  CARDIOVASCULAR: No chest pain or palpitations  GASTROINTESTINAL: No abdominal or epigastric pain. No nausea, vomiting, or hematemesis; No diarrhea or constipation. No melena or hematochezia.  GENITOURINARY: No dysuria, frequency or hematuria  NEUROLOGICAL: No numbness or weakness  SKIN: No itching, burning, rashes, or lesions   All other review of systems is negative unless indicated above    Vital Signs Last 24 Hrs  T(C): 36.4 (2024 05:50), Max: 36.5 (2024 17:00)  T(F): 97.6 (2024 05:50), Max: 97.7 (2024 17:00)  HR: 53 (2024 09:00) (51 - 99)  BP: 147/65 (2024 08:00) (89/60 - 189/72)  BP(mean): 88 (2024 08:00) (71 - 117)  RR: 16 (2024 09:00) (11 - 33)  SpO2: 100% (2024 09:00) (97% - 100%)    Parameters below as of 2024 06:00  Patient On (Oxygen Delivery Method): room air    Daily     Daily Weight in k.4 (2024 05:50)I&O's Summary    2024 07:01  -  2024 07:00  --------------------------------------------------------  IN: 390 mL / OUT: 1975 mL / NET: -1585 mL        PHYSICAL EXAM:    Constitutional: NAD, awake and alert, well-developed  HEENT: PERR, EOMI,  No oral cyananosis.  Neck:  supple,  No JVD  Respiratory: Breath sounds are clear bilaterally, No wheezing, rales or rhonchi  Cardiovascular: S1 and S2, regular rate and rhythm, no Murmurs, gallops or rubs  Gastrointestinal: Bowel Sounds present, soft, nontender.   Extremities: No peripheral edema. No clubbing or cyanosis.  Vascular: 2+ peripheral pulses  Neurological: A/O x 3, no focal deficits  Musculoskeletal: no calf tenderness.  Skin: No rashes.      LABS: All Labs Reviewed:                        8.8    8.49  )-----------( 190      ( 2024 05:39 )             27.4                         9.7    10.30 )-----------( 205      ( 2024 06:26 )             30.1                         9.7    10.01 )-----------( 193      ( 2024 23:48 )             29.7     2024 05:39    145    |  114    |  35     ----------------------------<  101    3.3     |  23     |  2.98   2024 06:26    141    |  112    |  36     ----------------------------<  112    3.6     |  24     |  2.84   2024 23:48    140    |  110    |  39     ----------------------------<  145    3.7     |  25     |  2.84     Ca    8.7        2024 05:39  Ca    8.7        2024 06:26  Ca    8.7        2024 23:48  Phos  4.1       2024 05:39  Phos  3.3       2024 06:26  Mg     2.4       2024 05:39  Mg     2.4       2024 06:26    TPro  6.2    /  Alb  2.8    /  TBili  0.6    /  DBili  x      /  AST  25     /  ALT  <6     /  AlkPhos  62     2024 05:39  TPro  6.8    /  Alb  3.2    /  TBili  0.8    /  DBili  x      /  AST  17     /  ALT  <6     /  AlkPhos  78     2024 06:26  TPro  6.8    /  Alb  3.2    /  TBili  0.4    /  DBili  x      /  AST  7      /  ALT  <6     /  AlkPhos  80     2024 23:48          Blood Culture: Organism --  Gram Stain Blood -- Gram Stain --  Specimen Source .Blood None  Culture-Blood --    Organism --  Gram Stain Blood -- Gram Stain --  Specimen Source Clean Catch Clean Catch (Midstream)  Culture-Blood --            RADIOLOGY/EKG:      ECHO/CARDIAC CATHTERIZATION/STRESS TEST:

## 2024-02-14 NOTE — PHYSICAL THERAPY INITIAL EVALUATION ADULT - GENERAL OBSERVATIONS, REHAB EVAL
perry; bladder irrigation; HM; BP cuff; pt rec'd in bed supine; HR 45; BP 87/44; RR 19; denied pain; spouse / RN Natalia loya

## 2024-02-14 NOTE — DIETITIAN INITIAL EVALUATION ADULT - PERTINENT MEDS FT
MEDICATIONS  (STANDING):  allopurinol 100 milliGRAM(s) Oral daily  bisacodyl 5 milliGRAM(s) Oral at bedtime  carbidopa/levodopa  25/100 2 Tablet(s) Oral four times a day  cholecalciferol 1000 Unit(s) Oral daily  clonazePAM  Tablet 0.5 milliGRAM(s) Oral at bedtime  cloNIDine Patch 0.3 mG/24Hr(s) 1 patch Transdermal every 7 days  finasteride 5 milliGRAM(s) Oral daily  fluticasone propionate 50 MICROgram(s)/spray Nasal Spray 1 Spray(s) Both Nostrils two times a day  folic acid 1 milliGRAM(s) Oral daily  hydrALAZINE 50 milliGRAM(s) Oral every 6 hours  labetalol 100 milliGRAM(s) Oral every 12 hours  levothyroxine 88 MICROGram(s) Oral daily  melatonin 3 milliGRAM(s) Oral at bedtime  NIFEdipine XL 60 milliGRAM(s) Oral daily  piperacillin/tazobactam IVPB.. 3.375 Gram(s) IV Intermittent every 12 hours  polyethylene glycol 3350 17 Gram(s) Oral daily  senna 1 Tablet(s) Oral at bedtime  simvastatin 10 milliGRAM(s) Oral at bedtime  tamsulosin 0.4 milliGRAM(s) Oral at bedtime    MEDICATIONS  (PRN):  acetaminophen     Tablet .. 650 milliGRAM(s) Oral every 6 hours PRN Temp greater or equal to 38C (100.4F), Mild Pain (1 - 3), Moderate Pain (4 - 6)  QUEtiapine 25 milliGRAM(s) Oral at bedtime PRN sleep aid  sodium chloride 0.65% Nasal 2 Spray(s) Both Nostrils two times a day PRN Nasal Congestion

## 2024-02-14 NOTE — PHYSICAL THERAPY INITIAL EVALUATION ADULT - CRITERIA FOR SKILLED THERAPEUTIC INTERVENTIONS
will attempt completion of Eval @ later date when appropriate; pt currently hypotensive / bradycardic; further course of PT intervention pending

## 2024-02-14 NOTE — PROGRESS NOTE ADULT - SUBJECTIVE AND OBJECTIVE BOX
Patient is a 83y old  Male who presents with a chief complaint of Urinary tract infection     (14 Feb 2024 12:39)    24 hour events:     Allergies    ibuprofen (Rash)  latex (Rash)  Advil (Rash)  Aleve, Ibuprofen, Motrin (Rash)    Intolerances      REVIEW OF SYSTEMS: SEE BELOW       ICU Vital Signs Last 24 Hrs  T(C): 36.6 (14 Feb 2024 21:58), Max: 36.6 (14 Feb 2024 21:58)  T(F): 97.9 (14 Feb 2024 21:58), Max: 97.9 (14 Feb 2024 21:58)  HR: 58 (14 Feb 2024 20:00) (45 - 62)  BP: 147/67 (14 Feb 2024 20:00) (82/49 - 165/62)  BP(mean): 91 (14 Feb 2024 20:00) (60 - 93)  ABP: --  ABP(mean): --  RR: 16 (14 Feb 2024 20:00) (11 - 19)  SpO2: 100% (14 Feb 2024 10:00) (97% - 100%)    O2 Parameters below as of 14 Feb 2024 06:00  Patient On (Oxygen Delivery Method): room air            CAPILLARY BLOOD GLUCOSE          I&O's Summary    13 Feb 2024 07:01  -  14 Feb 2024 07:00  --------------------------------------------------------  IN: 390 mL / OUT: 1975 mL / NET: -1585 mL    14 Feb 2024 07:01  -  14 Feb 2024 22:16  --------------------------------------------------------  IN: 480 mL / OUT: 400 mL / NET: 80 mL            MEDICATIONS  (STANDING):  allopurinol 100 milliGRAM(s) Oral daily  bisacodyl 5 milliGRAM(s) Oral at bedtime  carbidopa/levodopa  25/100 2 Tablet(s) Oral four times a day  cholecalciferol 1000 Unit(s) Oral daily  clonazePAM  Tablet 0.5 milliGRAM(s) Oral at bedtime  finasteride 5 milliGRAM(s) Oral daily  fluticasone propionate 50 MICROgram(s)/spray Nasal Spray 1 Spray(s) Both Nostrils two times a day  folic acid 1 milliGRAM(s) Oral daily  hydrALAZINE 50 milliGRAM(s) Oral every 8 hours  labetalol 100 milliGRAM(s) Oral every 12 hours  levothyroxine 88 MICROGram(s) Oral daily  melatonin 3 milliGRAM(s) Oral at bedtime  NIFEdipine XL 60 milliGRAM(s) Oral daily  piperacillin/tazobactam IVPB.. 3.375 Gram(s) IV Intermittent every 12 hours  polyethylene glycol 3350 17 Gram(s) Oral daily  senna 1 Tablet(s) Oral at bedtime  simvastatin 10 milliGRAM(s) Oral at bedtime  tamsulosin 0.4 milliGRAM(s) Oral at bedtime      MEDICATIONS  (PRN):  acetaminophen     Tablet .. 650 milliGRAM(s) Oral every 6 hours PRN Temp greater or equal to 38C (100.4F), Mild Pain (1 - 3), Moderate Pain (4 - 6)  QUEtiapine 25 milliGRAM(s) Oral at bedtime PRN sleep aid  sodium chloride 0.65% Nasal 2 Spray(s) Both Nostrils two times a day PRN Nasal Congestion      PHYSICAL EXAM: SEE BELOW                          8.8    8.49  )-----------( 190      ( 14 Feb 2024 05:39 )             27.4       02-14    145  |  114<H>  |  35<H>  ----------------------------<  101<H>  3.3<L>   |  23  |  2.98<H>    Ca    8.7      14 Feb 2024 05:39  Phos  4.1     02-14  Mg     2.4     02-14    TPro  6.2  /  Alb  2.8<L>  /  TBili  0.6  /  DBili  x   /  AST  25  /  ALT  <6<L>  /  AlkPhos  62  02-14            Urinalysis Basic - ( 14 Feb 2024 05:39 )    Color: x / Appearance: x / SG: x / pH: x  Gluc: 101 mg/dL / Ketone: x  / Bili: x / Urobili: x   Blood: x / Protein: x / Nitrite: x   Leuk Esterase: x / RBC: x / WBC x   Sq Epi: x / Non Sq Epi: x / Bacteria: x      .Blood None   No growth at 48 Hours -- 02-11 @ 23:48  Clean Catch Clean Catch (Midstream)   <10,000 CFU/mL Normal Urogenital Lolita -- 02-11 @ 18:46

## 2024-02-14 NOTE — CDI QUERY NOTE - NSCDIOTHERTXTBX_GEN_ALL_CORE_HH
This patient was admitted for htn emergency, possible UTI, and documented to have AMS:    Progress Note Adult Critical Care Physician Assistant 2-13-24 @ 21:10  NEURO: Alert & oriented x0  # Altered mental status      Consult Note Adult Cardilogy Attending 2-12-24 @ 11:45  Neurological: A/O x 3, no focal deficits    Is the above clinical criteria indicative of a further diagnosis?  -Metabolic encephalopathy  -Other type of encephalopathy , please specify:

## 2024-02-14 NOTE — DIETITIAN INITIAL EVALUATION ADULT - ADD RECOMMEND
1) Liberalize diet to regular to maximize caloric and nutrient intake, 2) Encourage protein-rich foods, maximize food preferences, 3) Add ensure plus high protein BID to optimize PO intake (provides 350 kcal, 20g protein/ shake), 4) MVI w/ minerals daily to ensure 100% RDA met, 5) Consider adding thiamine 100 mg daily 2/2 poor PO intake/ malnutrition, 6) Maintain aspiration precautions, back of bed >45 degrees, 7) Confirm goals of care regarding nutrition support, 8) requires feeding assistance/ meal encouragement, 9) Consider obtaining vitamin D 25OH level to assess nutriture. RD will continue to monitor PO intake, labs, hydration, and wt prn.

## 2024-02-14 NOTE — DIETITIAN INITIAL EVALUATION ADULT - NSFNSGIIOFT_GEN_A_CORE
I&O's Detail    13 Feb 2024 07:01  -  14 Feb 2024 07:00  --------------------------------------------------------  IN:    Oral Fluid: 390 mL  Total IN: 390 mL    OUT:    Indwelling Catheter - Urethral (mL): 1975 mL  Total OUT: 1975 mL    Total NET: -1585 mL

## 2024-02-14 NOTE — PROGRESS NOTE ADULT - PROBLEM SELECTOR PLAN 1
severe HTN w/ headache & hematuria - d/w ICU - will admit to ICU for hypertensive  -agree w/ cardene gtt    encourage po medication intake ,    try clonidine  patch with close monitoring of heart rate , decrease labetalol     -Cont. OP amlodipine & hydralazine.
severe HTN w/ headache & hematuria - d/w ICU -   bp improved after clonidine patch.  -Cont. OP amlodipine & hydralazine.

## 2024-02-15 LAB
ANION GAP SERPL CALC-SCNC: 7 MMOL/L — SIGNIFICANT CHANGE UP (ref 5–17)
BUN SERPL-MCNC: 40 MG/DL — HIGH (ref 7–23)
CALCIUM SERPL-MCNC: 8.5 MG/DL — SIGNIFICANT CHANGE UP (ref 8.5–10.1)
CHLORIDE SERPL-SCNC: 115 MMOL/L — HIGH (ref 96–108)
CO2 SERPL-SCNC: 23 MMOL/L — SIGNIFICANT CHANGE UP (ref 22–31)
CREAT SERPL-MCNC: 3.45 MG/DL — HIGH (ref 0.5–1.3)
EGFR: 17 ML/MIN/1.73M2 — LOW
GLUCOSE SERPL-MCNC: 98 MG/DL — SIGNIFICANT CHANGE UP (ref 70–99)
HCT VFR BLD CALC: 28.3 % — LOW (ref 39–50)
HGB BLD-MCNC: 8.8 G/DL — LOW (ref 13–17)
MAGNESIUM SERPL-MCNC: 2.4 MG/DL — SIGNIFICANT CHANGE UP (ref 1.6–2.6)
MCHC RBC-ENTMCNC: 29.8 PG — SIGNIFICANT CHANGE UP (ref 27–34)
MCHC RBC-ENTMCNC: 31.1 GM/DL — LOW (ref 32–36)
MCV RBC AUTO: 95.9 FL — SIGNIFICANT CHANGE UP (ref 80–100)
PHOSPHATE SERPL-MCNC: 4.3 MG/DL — SIGNIFICANT CHANGE UP (ref 2.5–4.5)
PLATELET # BLD AUTO: 201 K/UL — SIGNIFICANT CHANGE UP (ref 150–400)
POTASSIUM SERPL-MCNC: 3.7 MMOL/L — SIGNIFICANT CHANGE UP (ref 3.5–5.3)
POTASSIUM SERPL-SCNC: 3.7 MMOL/L — SIGNIFICANT CHANGE UP (ref 3.5–5.3)
RBC # BLD: 2.95 M/UL — LOW (ref 4.2–5.8)
RBC # FLD: 17.4 % — HIGH (ref 10.3–14.5)
SODIUM SERPL-SCNC: 145 MMOL/L — SIGNIFICANT CHANGE UP (ref 135–145)
WBC # BLD: 7.99 K/UL — SIGNIFICANT CHANGE UP (ref 3.8–10.5)
WBC # FLD AUTO: 7.99 K/UL — SIGNIFICANT CHANGE UP (ref 3.8–10.5)

## 2024-02-15 PROCEDURE — 99233 SBSQ HOSP IP/OBS HIGH 50: CPT

## 2024-02-15 RX ORDER — CEFUROXIME AXETIL 250 MG
250 TABLET ORAL EVERY 12 HOURS
Refills: 0 | Status: COMPLETED | OUTPATIENT
Start: 2024-02-15 | End: 2024-02-17

## 2024-02-15 RX ADMIN — Medication 1 MILLIGRAM(S): at 10:59

## 2024-02-15 RX ADMIN — Medication 100 MILLIGRAM(S): at 10:59

## 2024-02-15 RX ADMIN — Medication 50 MILLIGRAM(S): at 20:19

## 2024-02-15 RX ADMIN — CARBIDOPA AND LEVODOPA 2 TABLET(S): 25; 100 TABLET ORAL at 17:31

## 2024-02-15 RX ADMIN — Medication 1 SPRAY(S): at 11:00

## 2024-02-15 RX ADMIN — Medication 3 MILLIGRAM(S): at 22:27

## 2024-02-15 RX ADMIN — Medication 100 MILLIGRAM(S): at 20:18

## 2024-02-15 RX ADMIN — Medication 250 MILLIGRAM(S): at 22:27

## 2024-02-15 RX ADMIN — CARBIDOPA AND LEVODOPA 2 TABLET(S): 25; 100 TABLET ORAL at 00:07

## 2024-02-15 RX ADMIN — CARBIDOPA AND LEVODOPA 2 TABLET(S): 25; 100 TABLET ORAL at 05:49

## 2024-02-15 RX ADMIN — Medication 5 MILLIGRAM(S): at 22:28

## 2024-02-15 RX ADMIN — Medication 50 MILLIGRAM(S): at 05:49

## 2024-02-15 RX ADMIN — Medication 1000 UNIT(S): at 10:59

## 2024-02-15 RX ADMIN — Medication 0.5 MILLIGRAM(S): at 22:28

## 2024-02-15 RX ADMIN — Medication 60 MILLIGRAM(S): at 11:02

## 2024-02-15 RX ADMIN — TAMSULOSIN HYDROCHLORIDE 0.4 MILLIGRAM(S): 0.4 CAPSULE ORAL at 22:28

## 2024-02-15 RX ADMIN — POLYETHYLENE GLYCOL 3350 17 GRAM(S): 17 POWDER, FOR SOLUTION ORAL at 11:00

## 2024-02-15 RX ADMIN — SIMVASTATIN 10 MILLIGRAM(S): 20 TABLET, FILM COATED ORAL at 22:28

## 2024-02-15 RX ADMIN — CARBIDOPA AND LEVODOPA 2 TABLET(S): 25; 100 TABLET ORAL at 11:14

## 2024-02-15 RX ADMIN — FINASTERIDE 5 MILLIGRAM(S): 5 TABLET, FILM COATED ORAL at 10:59

## 2024-02-15 RX ADMIN — PIPERACILLIN AND TAZOBACTAM 25 GRAM(S): 4; .5 INJECTION, POWDER, LYOPHILIZED, FOR SOLUTION INTRAVENOUS at 11:13

## 2024-02-15 RX ADMIN — Medication 88 MICROGRAM(S): at 05:50

## 2024-02-15 RX ADMIN — SENNA PLUS 1 TABLET(S): 8.6 TABLET ORAL at 22:28

## 2024-02-15 NOTE — PROGRESS NOTE ADULT - ASSESSMENT
82 y/o male w/ a PMHx of CKD, HTN, HLD, hypothyroid, Parkinson's, SBO s/p colostomy, and bladder tumor s/p resection w/ chronic Perry presents to the ED via EMS c/o hematuria, cmplicated with HTN crisis, admitted to CCU for cardene drip.     #HTN emergency  #hematuria  #CKD  #altered mental status  #r/o UTI       Plan:     Off nicardipine   Became hypotensive as the day progressed - d/c'd clonidine patch  Continue nifedipine and labetalol     Mentation ok, not agitated today   Continue sinemet    Urology consult was called but is still pending   Hematuria resolved, off CBI   Will maintain perry for now   Will need to change 3-way to regular prior to discharge     On empiric zosyn  WBC normal, Cx neg   Will do a 5 day course, discharge home on po abx x 1-2 days     DVT ppx with SCDs      Wife updated at bedside, likely discharge to [facility] living on 2/15

## 2024-02-15 NOTE — CONSULT NOTE ADULT - SUBJECTIVE AND OBJECTIVE BOX
84 y/o male w/ a PMHx of CKD, HTN, HLD, hypothyroid, Parkinson's, SBO s/p colostomy, and bladder tumor s/p resection w/ chronic Perry presents to the ED via EMS c/o hematuria. Pt reports seeing dark red blood in his Perry bag w/ clots since noon today, flushed it at home w/o improvement. Denies fever, chills, abd pain, n/v/d, or any injures to the area. Pt noted to have dark red blood in Perry bag. Pt wife reports pt has had similar Sx in the past, was put on ABx w/ improvement in Sx however a few weeks have passed and Sx have returned. Pt not on blood thinners. Pt is scheduled to get the Perry removed next week. No other complaints at this time. Allergies: Ibuprofen. PCP: Dr. Hood. Uro: Dr. Baxter.    ICU consult for treatment resistant HTN    2/12: SBP > 220 despite multiple IV, PO meds. c/o of HA. Chronic perry, hematuria, CBI ongoing.     ROS: + HA, hematuria  remainder of systems reviewed at this time, negative         Review of Systems:  Review of Systems: + HA, hematuria  Other Review of Systems: All other review of systems negative, except as noted in HPI      Allergies and Intolerances:        Allergies:  	latex: Latex, Rash  	ibuprofen: Drug, Rash  	Advil: Drug, Rash  	Aleve, Ibuprofen, Motrin [freetext allergy; no alerts]: Miscellaneous, Rash, Aleve, Ibuprofen, Motrin    Home Medications:   * Patient Currently Takes Medications as of 12-Feb-2024 08:31 documented in Structured Notes  · 	levoFLOXacin 500 mg oral tablet: Last Dose Taken:  , 1 tab(s) orally once a day x 7 days ***Course Complete***  · 	hydrALAZINE 50 mg oral tablet: Last Dose Taken:  , 1 tab(s) orally every 6 hours ***MD increased from q8h to q6h recently***  · 	tamsulosin 0.4 mg oral capsule: Last Dose Taken:  , 1 cap(s) orally once a day (at bedtime)  · 	Tylenol 325 mg oral tablet: Last Dose Taken:  , 2 tab(s) orally every 6 hours, As needed, Mild Pain (1 - 3)  · 	finasteride 5 mg oral tablet: Last Dose Taken:  , 1 tab(s) orally once a day  · 	carbidopa-levodopa 25 mg-100 mg oral tablet: Last Dose Taken:  , 2 tab(s) orally 4 times a day ***breakfast, lunch, dinner, before bedtime***  · 	clonazePAM 0.5 mg oral tablet: Last Dose Taken:  , 0.5 tab(s) orally once a day (at bedtime)  · 	folic acid 0.8 mg oral tablet: Last Dose Taken:  , 1 tab(s) orally once a day  · 	levothyroxine 88 mcg (0.088 mg) oral tablet: Last Dose Taken:  , 1 tab(s) orally once a day  · 	allopurinol 100 mg oral tablet: Last Dose Taken:  , 1 tab(s) orally once a day  · 	simvastatin 10 mg oral tablet: Last Dose Taken:  , 1 tab(s) orally once a day (at bedtime)  · 	senna (sennosides) 8.6 mg oral tablet: Last Dose Taken:  , 1 tab(s) orally once a day (at bedtime)  · 	bisacodyl 5 mg oral delayed release tablet: Last Dose Taken:  , 1 tab(s) orally once a day (at bedtime)  · 	cholecalciferol 25 mcg (1000 intl units) oral tablet: Last Dose Taken:  , 1 tab(s) orally once a day  · 	Melatonin 3 mg oral tablet: Last Dose Taken:  , 1 tab(s) orally once a day (at bedtime)  · 	Saline Mist 0.65% nasal spray: Last Dose Taken:  , 2 spray(s) intranasally 2 times a day as needed  · 	fluticasone 50 mcg/inh nasal spray: Last Dose Taken:  , 1 spray(s) in each nostril 2 times a day as needed  · 	MiraLax oral powder for reconstitution: Last Dose Taken:  , 17 gram(s) orally once a day with breakfast    .    Patient History:    Past Medical, Past Surgical, and Family History:  PAST MEDICAL HISTORY:  Anemia     Arthritis     Bladder mass     CA skin, basal cell     Cancer, skin, squamous cell     CKD (chronic kidney disease)     HLD (hyperlipidemia)     HTN (hypertension)     Hypothyroidism     Melanoma of skin     Obstruction of bowel     Parkinson disease     Prostate cancer.     PAST SURGICAL HISTORY:  H/O colonoscopy     H/O hemorrhoidectomy     H/O Mohs micrographic surgery for skin cancer     History of total knee replacement, left.     FAMILY HISTORY:  FHx: prostate cancer.     Social History:  · Substance use	No     Tobacco Screening:  · Core Measure Site	Yes  · Has the patient used tobacco in the past 30 days?	No    Risk Assessment:    Present on Admission:  Deep Venous Thrombosis	no  Pulmonary Embolus	no     HIV Screening:  · In accordance with [place] State law, we offer every patient who comes to our ED an HIV test. Would you like to be tested today?	Opt out    Physical Exam:   Physical Exam: o:    Elderly M lying in bed  No JVD trachea midline  S1S2+  CTA B/L  + perry in place, CBI ongoing  No LE edema/swelling noted  Awake and alert  Skin pink, warm       Labs and Results:  Labs, Radiology, Cardiology, and Other Results: LABS:    CBC Full  -  ( 11 Feb 2024 23:48 )  WBC Count : 10.01 K/uL  RBC Count : 3.15 M/uL  Hemoglobin : 9.7 g/dL  Hematocrit : 29.7 %  Platelet Count - Automated : 193 K/uL  Mean Cell Volume : 94.3 fl  Mean Cell Hemoglobin : 30.8 pg  Mean Cell Hemoglobin Concentration : 32.7 gm/dL  Auto Neutrophil # : 8.80 K/uL  Auto Lymphocyte # : 0.51 K/uL  Auto Monocyte # : 0.49 K/uL  Auto Eosinophil # : 0.12 K/uL  Auto Basophil # : 0.04 K/uL  Auto Neutrophil % : 87.9 %  Auto Lymphocyte % : 5.1 %  Auto Monocyte % : 4.9 %  Auto Eosinophil % : 1.2 %  Auto Basophil % : 0.4 %    02-11    140  |  110<H>  |  39<H>  ----------------------------<  145<H>  3.7   |  25  |  2.84<H>    Ca    8.7      11 Feb 2024 23:48    TPro  6.8  /  Alb  3.2<L>  /  TBili  0.4  /  DBili  x   /  AST  7<L>  /  ALT  <6<L>  /  AlkPhos  80  02-11    PT/INR - ( 11 Feb 2024 23:48 )   PT: 10.4 sec;   INR: 0.92 ratio         PTT - ( 11 Feb 2024 23:48 )  PTT:28.4 sec  Urinalysis Basic - ( 11 Feb 2024 23:48 )    Color: x / Appearance: x / SG: x / pH: x  Gluc: 145 mg/dL / Ketone: x  / Bili: x / Urobili: x   Blood: x / Protein: x / Nitrite: x   Leuk Esterase: x / RBC: x / WBC x   Sq Epi: x / Non Sq Epi: x / Bacteria: x      CAPILLARY BLOOD GLUCOSE            LIVER FUNCTIONS - ( 11 Feb 2024 23:48 )  Alb: 3.2 g/dL / Pro: 6.8 gm/dL / ALK PHOS: 80 U/L / ALT: <6 U/L / AST: 7 U/L / GGT: x    Assessment and Plan:   · VTE Risk Assessment	VTE Assessment already completed for this visit  · Completed VTE Risk Assessment(s)	Refer to the Assessment tab to view/cancel completed assessment.     Assessment:  · Assessment	  A:      Here for:    1. HTN emergency  2. Hematuria now clearinfection  3. Anemia  4. CKD  5. UTI?    This patient requires critical care for support of one or more vital organ systems with a high probability of imminent or life threatening deterioration in his/her condition    P:    HTN emergency refractory to PO, IV meds  HA    HCT to eval for ICH  Start jayant drip goal -160, alleviation of HA  Start PO anti hypertensives  Cards consult; Dr Carlos at bedside now  IS, OOB as able  Chronic perry 2/2 prostate surgery, Dr Leung, called in ER; cont CBI  CKD  BG < 180  Anemia, chronic  Med rec  UTI, Tx recently with levaquin; start pip/tazo, f/u Cx's  VTE ppx SCD only given hematuria   impression no evidence of infection chronic indwelling perry  with intermittent hematuria  cont perry unitl medically stable   staff may attempt to give voiding trial prior todischarge if pt condition improved reconsult prn

## 2024-02-15 NOTE — PROGRESS NOTE ADULT - SUBJECTIVE AND OBJECTIVE BOX
84 y/o male w/ a PMHx of CKD, HTN, HLD, hypothyroid, Parkinson's, SBO s/p colostomy, and bladder tumor s/p resection w/ chronic Yaay presents to the ED via EMS c/o hematuria, cmplicated with HTN crisis, admitted to CCU for cardene drip.     REVIEW OF SYSTEMS:  General: NAD, hemodynamically stable, (-)  fever, (-) chills, (-) weakness  HEENT:  Eyes:  No visual loss, blurred vision, double vision or yellow sclerae. Ears, Nose, Throat:  No hearing loss, sneezing, congestion, runny nose or sore throat.  SKIN:  No rash or itching.  CARDIOVASCULAR:  No chest pain, chest pressure or chest discomfort. No palpitations or edema.  RESPIRATORY:  No shortness of breath, cough or sputum.  GASTROINTESTINAL:  No anorexia, nausea, vomiting or diarrhea. No abdominal pain or blood.  NEUROLOGICAL:  No headache, dizziness, syncope, paralysis, ataxia, numbness or tingling in the extremities. No change in bowel or bladder control.  MUSCULOSKELETAL:  No muscle, back pain, joint pain or stiffness.  HEMATOLOGIC:  No anemia, bleeding or bruising.  LYMPHATICS:  No enlarged nodes. No history of splenectomy.  ENDOCRINOLOGIC:  No reports of sweating, cold or heat intolerance. No polyuria or polydipsia.  ALLERGIES:  No history of asthma, hives, eczema or rhinitis.    Physical Exam:   GENERAL APPEARANCE:  NAD, hemodynamically stable  T(C): 36.9 (02-15-24 @ 06:00), Max: 36.9 (02-15-24 @ 06:00)  HR: 65 (02-15-24 @ 06:00) (45 - 67)  BP: 148/82 (02-15-24 @ 06:00) (82/49 - 166/66)  RR: 17 (02-15-24 @ 06:00) (11 - 19)  SpO2: 98% (02-14-24 @ 19:00) (98% - 98%)  Wt(kg): --  HEENT:  Head is normocephalic    Skin:  Warm and dry without any rash   NECK:  Supple without lymphadenopathy.   HEART:  Regular rate and rhythm. normal S1 and S2, No M/R/G  LUNGS:  Good ins/exp effort, no W/R/R/C  ABDOMEN:  Soft, nontender, nondistended with good bowel sounds heard  EXTREMITIES:  Without cyanosis, clubbing or edema.   NEUROLOGICAL:  Gross nonfocal       CBC Full  -  ( 15 Feb 2024 05:01 )  WBC Count : 7.99 K/uL  RBC Count : 2.95 M/uL  Hemoglobin : 8.8 g/dL  Hematocrit : 28.3 %  Platelet Count - Automated : 201 K/uL  Mean Cell Volume : 95.9 fl  Mean Cell Hemoglobin : 29.8 pg  Mean Cell Hemoglobin Concentration : 31.1 gm/dL  Auto Neutrophil # : x  Auto Lymphocyte # : x  Auto Monocyte # : x  Auto Eosinophil # : x  Auto Basophil # : x  Auto Neutrophil % : x  Auto Lymphocyte % : x  Auto Monocyte % : x  Auto Eosinophil % : x  Auto Basophil % : x      Urinalysis Basic - ( 15 Feb 2024 05:01 )    Color: x / Appearance: x / SG: x / pH: x  Gluc: 98 mg/dL / Ketone: x  / Bili: x / Urobili: x   Blood: x / Protein: x / Nitrite: x   Leuk Esterase: x / RBC: x / WBC x   Sq Epi: x / Non Sq Epi: x / Bacteria: x      02-15    145  |  115<H>  |  40<H>  ----------------------------<  98  3.7   |  23  |  3.45<H>    Ca    8.5      15 Feb 2024 05:01  Phos  4.3     02-15  Mg     2.4     02-15    TPro  6.2  /  Alb  2.8<L>  /  TBili  0.6  /  DBili  x   /  AST  25  /  ALT  <6<L>  /  AlkPhos  62  02-14        # Hematuria now clearinfection  - #Chronic perry 2/2 prostate surgery, Dr Leung, called in ER; cont CBI    #  HTN emergency  - HCT to eval for ICH -  no bleeding  - hydralazine 50 mg po q8h  - labetalol 100 mg po q12h  - procardia xl 60 mg po qdaily  - Cardiology following    # Acute on chronic renal failure  - worsening renal function    # Anemia, chronic  - cotninue to monitor    PT eval today / Social work for placement.       84 y/o male w/ a PMHx of CKD, HTN, HLD, hypothyroid, Parkinson's, SBO s/p colostomy, and bladder tumor s/p resection w/ chronic Yaya presents to the ED via EMS c/o hematuria, cmplicated with HTN crisis, admitted to CCU for cardene drip.     Elderly, frail, sick. Poor historian. does not ewants ot be evaluated. perry is out. able to urinate.       REVIEW OF SYSTEMS:  - limited historian    Physical Exam:   GENERAL APPEARANCE: elderly, frail, deocnditioned.   T(C): 36.9 (02-15-24 @ 06:00), Max: 36.9 (02-15-24 @ 06:00)  HR: 65 (02-15-24 @ 06:00) (45 - 67)  BP: 148/82 (02-15-24 @ 06:00) (82/49 - 166/66)  RR: 17 (02-15-24 @ 06:00) (11 - 19)  SpO2: 98% (02-14-24 @ 19:00) (98% - 98%)  Wt(kg): --  HEENT:  Head is normocephalic    Skin: pale  NECK:  Supple without lymphadenopathy.   HEART:  Regular rate and rhythm. normal S1 and S2, No M/R/G  LUNGS:  Good ins/exp effort, no W/R/R/C  ABDOMEN:  Soft, nontender, nondistended with good bowel sounds heard  EXTREMITIES:  Without cyanosis, clubbing or edema.   NEUROLOGICAL:  Gross nonfocal       CBC Full  -  ( 15 Feb 2024 05:01 )  WBC Count : 7.99 K/uL  RBC Count : 2.95 M/uL  Hemoglobin : 8.8 g/dL  Hematocrit : 28.3 %  Platelet Count - Automated : 201 K/uL  Mean Cell Volume : 95.9 fl  Mean Cell Hemoglobin : 29.8 pg  Mean Cell Hemoglobin Concentration : 31.1 gm/dL  Auto Neutrophil # : x  Auto Lymphocyte # : x  Auto Monocyte # : x  Auto Eosinophil # : x  Auto Basophil # : x  Auto Neutrophil % : x  Auto Lymphocyte % : x  Auto Monocyte % : x  Auto Eosinophil % : x  Auto Basophil % : x      Urinalysis Basic - ( 15 Feb 2024 05:01 )    Color: x / Appearance: x / SG: x / pH: x  Gluc: 98 mg/dL / Ketone: x  / Bili: x / Urobili: x   Blood: x / Protein: x / Nitrite: x   Leuk Esterase: x / RBC: x / WBC x   Sq Epi: x / Non Sq Epi: x / Bacteria: x      02-15    145  |  115<H>  |  40<H>  ----------------------------<  98  3.7   |  23  |  3.45<H>    Ca    8.5      15 Feb 2024 05:01  Phos  4.3     02-15  Mg     2.4     02-15    TPro  6.2  /  Alb  2.8<L>  /  TBili  0.6  /  DBili  x   /  AST  25  /  ALT  <6<L>  /  AlkPhos  62  02-14        # Hematuria now clearinfection  - #Chronic perry 2/2 prostate surgery, Dr Leung, called in ER; cont CBI  - perry is out /  able to urinate    #  HTN emergency  - HCT to eval for ICH -  no bleeding  - hydralazine 50 mg po q8h  - labetalol 100 mg po q12h  - procardia xl 60 mg po qdaily  - Cardiology following    # Acute on chronic renal failure  - worsening renal function    # Anemia, chronic  - cotninue to monitor    PT eval today / Social work for placement.

## 2024-02-16 ENCOUNTER — APPOINTMENT (OUTPATIENT)
Dept: NEUROLOGY | Facility: CLINIC | Age: 84
End: 2024-02-16

## 2024-02-16 LAB
ANION GAP SERPL CALC-SCNC: 5 MMOL/L — SIGNIFICANT CHANGE UP (ref 5–17)
BUN SERPL-MCNC: 41 MG/DL — HIGH (ref 7–23)
CALCIUM SERPL-MCNC: 8.4 MG/DL — LOW (ref 8.5–10.1)
CHLORIDE SERPL-SCNC: 116 MMOL/L — HIGH (ref 96–108)
CO2 SERPL-SCNC: 22 MMOL/L — SIGNIFICANT CHANGE UP (ref 22–31)
CREAT SERPL-MCNC: 3.65 MG/DL — HIGH (ref 0.5–1.3)
EGFR: 16 ML/MIN/1.73M2 — LOW
GLUCOSE SERPL-MCNC: 90 MG/DL — SIGNIFICANT CHANGE UP (ref 70–99)
HCT VFR BLD CALC: 28.8 % — LOW (ref 39–50)
HGB BLD-MCNC: 9.4 G/DL — LOW (ref 13–17)
MCHC RBC-ENTMCNC: 31.2 PG — SIGNIFICANT CHANGE UP (ref 27–34)
MCHC RBC-ENTMCNC: 32.6 GM/DL — SIGNIFICANT CHANGE UP (ref 32–36)
MCV RBC AUTO: 95.7 FL — SIGNIFICANT CHANGE UP (ref 80–100)
PLATELET # BLD AUTO: 209 K/UL — SIGNIFICANT CHANGE UP (ref 150–400)
POTASSIUM SERPL-MCNC: 3.9 MMOL/L — SIGNIFICANT CHANGE UP (ref 3.5–5.3)
POTASSIUM SERPL-SCNC: 3.9 MMOL/L — SIGNIFICANT CHANGE UP (ref 3.5–5.3)
RBC # BLD: 3.01 M/UL — LOW (ref 4.2–5.8)
RBC # FLD: 17.3 % — HIGH (ref 10.3–14.5)
SODIUM SERPL-SCNC: 143 MMOL/L — SIGNIFICANT CHANGE UP (ref 135–145)
WBC # BLD: 7.97 K/UL — SIGNIFICANT CHANGE UP (ref 3.8–10.5)
WBC # FLD AUTO: 7.97 K/UL — SIGNIFICANT CHANGE UP (ref 3.8–10.5)

## 2024-02-16 PROCEDURE — 99497 ADVNCD CARE PLAN 30 MIN: CPT | Mod: 25

## 2024-02-16 PROCEDURE — 76770 US EXAM ABDO BACK WALL COMP: CPT | Mod: 26

## 2024-02-16 PROCEDURE — 99233 SBSQ HOSP IP/OBS HIGH 50: CPT

## 2024-02-16 PROCEDURE — 99222 1ST HOSP IP/OBS MODERATE 55: CPT | Mod: 25

## 2024-02-16 PROCEDURE — 99223 1ST HOSP IP/OBS HIGH 75: CPT

## 2024-02-16 RX ORDER — SODIUM CHLORIDE 9 MG/ML
1000 INJECTION INTRAMUSCULAR; INTRAVENOUS; SUBCUTANEOUS
Refills: 0 | Status: DISCONTINUED | OUTPATIENT
Start: 2024-02-16 | End: 2024-02-18

## 2024-02-16 RX ORDER — NIFEDIPINE 30 MG
90 TABLET, EXTENDED RELEASE 24 HR ORAL DAILY
Refills: 0 | Status: DISCONTINUED | OUTPATIENT
Start: 2024-02-16 | End: 2024-02-16

## 2024-02-16 RX ORDER — NIFEDIPINE 30 MG
60 TABLET, EXTENDED RELEASE 24 HR ORAL DAILY
Refills: 0 | Status: DISCONTINUED | OUTPATIENT
Start: 2024-02-16 | End: 2024-02-21

## 2024-02-16 RX ADMIN — FINASTERIDE 5 MILLIGRAM(S): 5 TABLET, FILM COATED ORAL at 14:16

## 2024-02-16 RX ADMIN — Medication 250 MILLIGRAM(S): at 21:31

## 2024-02-16 RX ADMIN — Medication 3 MILLIGRAM(S): at 21:31

## 2024-02-16 RX ADMIN — Medication 250 MILLIGRAM(S): at 11:40

## 2024-02-16 RX ADMIN — SENNA PLUS 1 TABLET(S): 8.6 TABLET ORAL at 21:31

## 2024-02-16 RX ADMIN — Medication 50 MILLIGRAM(S): at 06:49

## 2024-02-16 RX ADMIN — CARBIDOPA AND LEVODOPA 2 TABLET(S): 25; 100 TABLET ORAL at 18:18

## 2024-02-16 RX ADMIN — Medication 650 MILLIGRAM(S): at 14:15

## 2024-02-16 RX ADMIN — Medication 5 MILLIGRAM(S): at 21:30

## 2024-02-16 RX ADMIN — SODIUM CHLORIDE 75 MILLILITER(S): 9 INJECTION INTRAMUSCULAR; INTRAVENOUS; SUBCUTANEOUS at 19:35

## 2024-02-16 RX ADMIN — Medication 88 MICROGRAM(S): at 05:51

## 2024-02-16 RX ADMIN — Medication 1 SPRAY(S): at 14:20

## 2024-02-16 RX ADMIN — Medication 1 SPRAY(S): at 21:31

## 2024-02-16 RX ADMIN — Medication 1 MILLIGRAM(S): at 14:19

## 2024-02-16 RX ADMIN — TAMSULOSIN HYDROCHLORIDE 0.4 MILLIGRAM(S): 0.4 CAPSULE ORAL at 21:30

## 2024-02-16 RX ADMIN — Medication 1000 UNIT(S): at 14:17

## 2024-02-16 RX ADMIN — POLYETHYLENE GLYCOL 3350 17 GRAM(S): 17 POWDER, FOR SOLUTION ORAL at 18:12

## 2024-02-16 RX ADMIN — Medication 100 MILLIGRAM(S): at 21:30

## 2024-02-16 RX ADMIN — Medication 0.5 MILLIGRAM(S): at 21:31

## 2024-02-16 RX ADMIN — Medication 50 MILLIGRAM(S): at 21:30

## 2024-02-16 RX ADMIN — Medication 100 MILLIGRAM(S): at 18:11

## 2024-02-16 RX ADMIN — CARBIDOPA AND LEVODOPA 2 TABLET(S): 25; 100 TABLET ORAL at 06:49

## 2024-02-16 RX ADMIN — CARBIDOPA AND LEVODOPA 2 TABLET(S): 25; 100 TABLET ORAL at 14:18

## 2024-02-16 NOTE — CONSULT NOTE ADULT - CONVERSATION DETAILS
I met with the patient's wife and daughter-in-law at the bedside and reviewed the role of palliative medicine, patient wife shared that they live at [facility] and have aides from 7 AM - 11 PM.  The patient spends most of his days in a wheelchair but uses a walker at times to take a few steps.   We discussed Advance directives - the patient's wife shared that they went to a  and filled out a living will previously but does not have it with her.  We discussed how a Living will is a guide on how to fill out a MOLST form but that the MOLST is the medical order. The wife would like to put in place DNR/I with the trial of NIV - is aware that this does not mean do not treat.   The patient's children joined us via telephone and were also in agreement with this decision.   Emotional Support provided will continue to follow

## 2024-02-16 NOTE — PROGRESS NOTE ADULT - SUBJECTIVE AND OBJECTIVE BOX
82 y/o male w/ a PMHx of CKD, HTN, HLD, hypothyroid, Parkinson's, SBO s/p colostomy, and bladder tumor s/p resection w/ chronic Yaya presents to the ED via EMS c/o hematuria, cmplicated with HTN crisis, admitted to CCU for cardene drip.     Elderly, frail, sick. Poor historian. Does not wants ot be evaluated. Perry is out. Able to urinate.     REVIEW OF SYSTEMS:  - limited historian    Physical Exam:   GENERAL APPEARANCE: elderly, frail, deconditioned   T(C): 36.9 (02-15-24 @ 06:00), Max: 36.9 (02-15-24 @ 06:00)  HR: 65 (02-15-24 @ 06:00) (45 - 67)  BP: 148/82 (02-15-24 @ 06:00) (82/49 - 166/66)  RR: 17 (02-15-24 @ 06:00) (11 - 19)  SpO2: 98% (02-14-24 @ 19:00) (98% - 98%)  HEENT:  Head is normocephalic    Skin: pale  NECK:  Supple without lymphadenopathy.   HEART:  Regular rate and rhythm  LUNGS:  Good ins/exp effort, no W/R/R/C  ABDOMEN:  Soft, nontender, nondistended with good bowel sounds heard  EXTREMITIES:  Without cyanosis, clubbing or edema.   NEUROLOGICAL:  Gross nonfocal     Labs:  CBC Full  -  ( 16 Feb 2024 06:17 )  WBC Count : 7.97 K/uL  RBC Count : 3.01 M/uL  Hemoglobin : 9.4 g/dL  Hematocrit : 28.8 %  Platelet Count - Automated : 209 K/uL    Urinalysis Basic - ( 16 Feb 2024 06:17 )    Color: x / Appearance: x / SG: x / pH: x  Gluc: 90 mg/dL / Ketone: x  / Bili: x / Urobili: x   Blood: x / Protein: x / Nitrite: x   Leuk Esterase: x / RBC: x / WBC x   Sq Epi: x / Non Sq Epi: x / Bacteria: x    143  |  116<H>  |  41<H>  ----------------------------<  90  3.9   |  22  |  3.65<H>    Ca    8.4<L>      16 Feb 2024 06:17  Phos  4.3     02-15  Mg     2.4     02-15    # Hematuria now clear infection  - resolved  - Chronic perry 2/2 prostate surgery, Dr. Leung, called in ER; cont CBI  - Perry is out /  able to urinate  - continue with IV hydration    #  HTN emergency  - now low BP - IV hydration  - HCT to eval for ICH -  no bleeding  - hydralazine 50 mg po q8h - hold  - labetalol 100 mg po q12h - hold  - procardia xl 60 mg po daily - hold  - Cardiology following    # Acute on chronic renal failure  - worsening renal function  - continue with hydration /  discussed with nephrology  - bladder scan with 100 cc    # Anemia, chronic -  no signs of acute bleeding  - continue to monitor    # severe hypotension -  this is most likely secondary to autonomic disregulation  - this could be parkinson's related    PT eval today / Social work for placement / palliative care eval

## 2024-02-16 NOTE — CONSULT NOTE ADULT - SUBJECTIVE AND OBJECTIVE BOX
HPI: Pt is a 83y old Male with hx of CKD, HTN, HLD, hypothyroid, Parkinson's, SBO s/p colostomy, and bladder tumor s/p resection w/ chronic Javier presents to the ED via EMS c/o hematuria, complicated with HTN crisis, admitted to CCU for cardene drip. Palliative medicine consulted to help establish GOC and advance care planning     2/16/2024 patient seen and examined with wife and daughter in law at bedside, patient was lethargic briefly opened eyes but then closed them.  GOC meeting held today       PAIN: ( )Yes   (x )No  pt appears comfortable   DYSPNEA: ( ) Yes  (x ) No  Level:    PAST MEDICAL & SURGICAL HISTORY:  HTN (hypertension)  HLD (hyperlipidemia)  Parkinson disease  CKD (chronic kidney disease)  Hypothyroidism  Bladder mass  Obstruction of bowel  Prostate cancer  Melanoma of skin  CA skin, basal cell  Cancer, skin, squamous cell  Arthritis  Anemia  History of total knee replacement, left  H/O hemorrhoidectomy  H/O colonoscopy  H/O Mohs micrographic surgery for skin cancer    SOCIAL HX: lives at [facility] assisted [facility] with his wife     Hx opiate tolerance ( )YES  (x )NO    Baseline ADLs  (Prior to Admission)  ( ) Independent   (x )Dependent    FAMILY HISTORY:  FHx: prostate cancer    Review of Systems:    Unable to obtain/Limited due to: lethargic    PHYSICAL EXAM:    Vital Signs Last 24 Hrs  T(C): 36.4 (16 Feb 2024 14:07), Max: 36.8 (15 Feb 2024 19:58)  T(F): 97.5 (16 Feb 2024 14:07), Max: 98.2 (15 Feb 2024 19:58)  HR: 55 (16 Feb 2024 14:07) (49 - 64)  BP: 114/60 (16 Feb 2024 14:07) (88/52 - 190/80)  RR: 18 (16 Feb 2024 14:07) (12 - 18)  SpO2: 98% (16 Feb 2024 14:07) (94% - 98%)    Parameters below as of 16 Feb 2024 14:07  Patient On (Oxygen Delivery Method): room air    PPSV2:  30 %    General: elderly male in bed, NAD   Mental Status: lethargic   HEENT: dry oral mucosa   Lungs: diminished breath sounds   Cardiac: S1S2 +   GI: nontender, nondistended, +BS   : +voiding   Ext: mariana MARTINEZ   Neuro: parkinson       LABS:                        9.4    7.97  )-----------( 209      ( 16 Feb 2024 06:17 )             28.8     02-16    143  |  116<H>  |  41<H>  ----------------------------<  90  3.9   |  22  |  3.65<H>    Ca    8.4<L>      16 Feb 2024 06:17  Phos  4.3     02-15  Mg     2.4     02-15    Albumin: Albumin: 2.8 g/dL (02-14 @ 05:39)    Allergies    ibuprofen (Rash)  latex (Rash)  Advil (Rash)  Aleve, Ibuprofen, Motrin (Rash)    Intolerances    MEDICATIONS  (STANDING):  allopurinol 100 milliGRAM(s) Oral daily  bisacodyl 5 milliGRAM(s) Oral at bedtime  carbidopa/levodopa  25/100 2 Tablet(s) Oral four times a day  cefuroxime   Tablet 250 milliGRAM(s) Oral every 12 hours  cholecalciferol 1000 Unit(s) Oral daily  clonazePAM  Tablet 0.5 milliGRAM(s) Oral at bedtime  finasteride 5 milliGRAM(s) Oral daily  fluticasone propionate 50 MICROgram(s)/spray Nasal Spray 1 Spray(s) Both Nostrils two times a day  folic acid 1 milliGRAM(s) Oral daily  hydrALAZINE 50 milliGRAM(s) Oral every 8 hours  labetalol 100 milliGRAM(s) Oral every 12 hours  levothyroxine 88 MICROGram(s) Oral daily  melatonin 3 milliGRAM(s) Oral at bedtime  NIFEdipine XL 60 milliGRAM(s) Oral daily  polyethylene glycol 3350 17 Gram(s) Oral daily  senna 1 Tablet(s) Oral at bedtime  simvastatin 10 milliGRAM(s) Oral at bedtime  sodium chloride 0.9%. 1000 milliLiter(s) (75 mL/Hr) IV Continuous <Continuous>  tamsulosin 0.4 milliGRAM(s) Oral at bedtime    MEDICATIONS  (PRN):  acetaminophen     Tablet .. 650 milliGRAM(s) Oral every 6 hours PRN Temp greater or equal to 38C (100.4F), Mild Pain (1 - 3), Moderate Pain (4 - 6)  QUEtiapine 25 milliGRAM(s) Oral at bedtime PRN sleep aid  sodium chloride 0.65% Nasal 2 Spray(s) Both Nostrils two times a day PRN Nasal Congestion      RADIOLOGY/ADDITIONAL STUDIES:    ACC: [id number] EXAM:  CT BRAIN   ORDERED BY: ODALIS MAY   PROCEDURE DATE:  02/13/2024    INTERPRETATION:  CLINICAL INDICATION:  AMS  TECHNIQUE: Noncontrast CT examination of the head was performed.  Coronal and sagittal reformats werealso obtained.  COMPARISON: 2/12/2024  FINDINGS:  INTRA-AXIAL: No intracranial mass effect, acute hemorrhage, midline shift   or acute transcortical infarct is seen. There are periventricular white   matter hypodensities that are nonspecific in nature but may reflect   chronic ischemic microvascular disease.  EXTRA-AXIAL: No extra-axial fluid collection is present.  VENTRICLES AND SULCI: Parenchymal volume is commensurate with patient   age. No hydrocephalus.  VISUALIZED SINUSES: Mild mucosalthickening.  VISUALIZED MASTOIDS: Opacification of the bilateral mastoids and middle   ear.  CALVARIUM: Normal.    IMPRESSION:  No evidence of acute intracranial hemorrhage, midline shift or CT   evidence of acute territorial infarct.  Bilateral mastoid and middle ear opacification. Correlate clinically for   otomastoiditis.  If the patient's symptoms persist, consider short interval follow-up head   CT or brain MRI if there are no MRI contraindications.      ACC: [id number] EXAM:  XR CHEST PORTABLE URGENT 1V   ORDERED BY: ANI VILLARREAL   PROCEDURE DATE:  02/11/2024    INTERPRETATION:  TIME OF EXAM: February 11, 2024 at 11:24 PM.  CLINICAL INFORMATION: Admission prescreening.  COMPARISON:  November 13, 2023.  TECHNIQUE:   AP Portable chest x-ray.  INTERPRETATION:  Heart size and the mediastinum cannot be accurately evaluated on this   projection.  There is a calcified aorta.  An hiatus hernia is again seen.  The lungs are clear.  Nopleural effusion or pneumothorax is seen.  There is no acute bony abnormality.    IMPRESSION:  Hiatus hernia.  Clear lungs.

## 2024-02-16 NOTE — CONSULT NOTE ADULT - SUBJECTIVE AND OBJECTIVE BOX
HPI: Pt is a 83y old Male with hx of       PAIN: ( )Yes   ( )No  Level:  Location:  Intensity:    /10  Quality:  Aggravating Factors:  Alleviating Factors:  Radiation:  Duration/Timing:  Impact on ADLs:    DYSPNEA: ( ) Yes  ( ) No  Level:    PAST MEDICAL & SURGICAL HISTORY:  HTN (hypertension)  HLD (hyperlipidemia)  Parkinson disease  CKD (chronic kidney disease)  Hypothyroidism  Bladder mass  Obstruction of bowel  Prostate cancer  Melanoma of skin  CA skin, basal cell  Cancer, skin, squamous cell  Arthritis  Anemia  History of total knee replacement, left  H/O hemorrhoidectomy  H/O colonoscopy  H/O Mohs micrographic surgery for skin cancer    SOCIAL HX:    Hx opiate tolerance ( )YES  (x )NO    Baseline ADLs  (Prior to Admission)  ( ) Independent   ( )Dependent    FAMILY HISTORY:  FHx: prostate cancer    Review of Systems:    Anxiety-  Depression-  Physical Discomfort-  Dyspnea-  Constipation-  Diarrhea-  Nausea-  Vomiting-  Anorexia-  Weight Loss-   Cough-  Secretions-  Fatigue-  Weakness-  Delirium-    All other systems reviewed and negative  Unable to obtain/Limited due to:      PHYSICAL EXAM:    Vital Signs Last 24 Hrs  T(C): 36.4 (16 Feb 2024 14:07), Max: 36.8 (15 Feb 2024 19:58)  T(F): 97.5 (16 Feb 2024 14:07), Max: 98.2 (15 Feb 2024 19:58)  HR: 55 (16 Feb 2024 14:07) (49 - 64)  BP: 114/60 (16 Feb 2024 14:07) (88/52 - 190/80)  BP(mean): --  RR: 18 (16 Feb 2024 14:07) (12 - 18)  SpO2: 98% (16 Feb 2024 14:07) (94% - 98%)    Parameters below as of 16 Feb 2024 14:07  Patient On (Oxygen Delivery Method): room air      Daily     Daily     PPSV2:   %  FAST:    General:  Mental Status:  HEENT:  Lungs:  Cardiac:  GI:  :  Ext:  Neuro:      LABS:                        9.4    7.97  )-----------( 209      ( 16 Feb 2024 06:17 )             28.8     02-16    143  |  116<H>  |  41<H>  ----------------------------<  90  3.9   |  22  |  3.65<H>    Ca    8.4<L>      16 Feb 2024 06:17  Phos  4.3     02-15  Mg     2.4     02-15        Albumin: Albumin: 2.8 g/dL (02-14 @ 05:39)      Allergies    ibuprofen (Rash)  latex (Rash)  Advil (Rash)  Aleve, Ibuprofen, Motrin (Rash)    Intolerances      MEDICATIONS  (STANDING):  allopurinol 100 milliGRAM(s) Oral daily  bisacodyl 5 milliGRAM(s) Oral at bedtime  carbidopa/levodopa  25/100 2 Tablet(s) Oral four times a day  cefuroxime   Tablet 250 milliGRAM(s) Oral every 12 hours  cholecalciferol 1000 Unit(s) Oral daily  clonazePAM  Tablet 0.5 milliGRAM(s) Oral at bedtime  finasteride 5 milliGRAM(s) Oral daily  fluticasone propionate 50 MICROgram(s)/spray Nasal Spray 1 Spray(s) Both Nostrils two times a day  folic acid 1 milliGRAM(s) Oral daily  hydrALAZINE 50 milliGRAM(s) Oral every 8 hours  labetalol 100 milliGRAM(s) Oral every 12 hours  levothyroxine 88 MICROGram(s) Oral daily  melatonin 3 milliGRAM(s) Oral at bedtime  NIFEdipine XL 60 milliGRAM(s) Oral daily  polyethylene glycol 3350 17 Gram(s) Oral daily  senna 1 Tablet(s) Oral at bedtime  simvastatin 10 milliGRAM(s) Oral at bedtime  tamsulosin 0.4 milliGRAM(s) Oral at bedtime    MEDICATIONS  (PRN):  acetaminophen     Tablet .. 650 milliGRAM(s) Oral every 6 hours PRN Temp greater or equal to 38C (100.4F), Mild Pain (1 - 3), Moderate Pain (4 - 6)  QUEtiapine 25 milliGRAM(s) Oral at bedtime PRN sleep aid  sodium chloride 0.65% Nasal 2 Spray(s) Both Nostrils two times a day PRN Nasal Congestion      RADIOLOGY/ADDITIONAL STUDIES:

## 2024-02-16 NOTE — PROGRESS NOTE ADULT - ASSESSMENT
82 y/o male w/ a PMHx of CKD, HTN, HLD, hypothyroid, Parkinson's, SBO s/p colostomy, and bladder tumor s/p resection w/ chronic Perry presents to the ED via EMS c/o hematuria, cmplicated with HTN crisis, admitted to CCU for cardene drip.     #HTN emergency  #hematuria  #CKD  #altered mental status  #r/o UTI       Plan:     Off nicardipine   Became hypotensive as the day progressed - d/c'd clonidine patch  Continue nifedipine and labetalol     Mentation ok, not agitated today   Continue sinemet    Urology consult was called but is still pending   Hematuria resolved, off CBI   Will maintain perry for now   Will need to change 3-way to regular prior to discharge     On empiric zosyn  WBC normal, Cx neg   Will do a 5 day course, discharge home on po abx x 1-2 days     DVT ppx with SCDs      Wife updated at bedside, likely discharge to [facility] living on 2/15

## 2024-02-16 NOTE — CONSULT NOTE ADULT - SUBJECTIVE AND OBJECTIVE BOX
NEPHROLOGY CONSULT  HPI:    82 y/o male w/ a PMHx of CKD, HTN, HLD, hypothyroid, Parkinson's, SBO s/p colostomy, and bladder tumor s/p resection w/ chronic Perry presents to the ED via EMS c/o hematuria. Pt reports seeing dark red blood in his Perry bag w/ clots since noon today, flushed it at home w/o improvement. Denies fever, chills, abd pain, n/v/d, or any injures to the area. Pt noted to have dark red blood in Perry bag. Pt wife reports pt has had similar Sx in the past, was put on ABx w/ improvement in Sx however a few weeks have passed and Sx have returned. Pt not on blood thinners. Pt is scheduled to get the Perry removed next week. No other complaints at this time. Allergies: Ibuprofen. PCP: Dr. Hood. Uro: Dr. Baxter.    Nephrology:  Chart reviewed, pt is not verbal  Admitted w hematuria, chronic perry, s/p colostomy and bladder tumor, was treated for uncontrolled HTN in monitored unit.  CBI/ flushes ordered for clots  Perry has been since removed  I /O inaccurate due to collection error w/o perry , pt incontinent  bladder scan done by myself 116 and 117 ml        PAST MEDICAL & SURGICAL HISTORY:  HTN (hypertension)      HLD (hyperlipidemia)      Parkinson disease      CKD (chronic kidney disease)      Hypothyroidism      Bladder mass      Obstruction of bowel      Prostate cancer      Melanoma of skin      CA skin, basal cell      Cancer, skin, squamous cell      Arthritis      Anemia      History of total knee replacement, left      H/O hemorrhoidectomy      H/O colonoscopy      H/O Mohs micrographic surgery for skin cancer          FAMILY HISTORY:  FHx: prostate cancer        MEDICATIONS  (STANDING):  allopurinol 100 milliGRAM(s) Oral daily  bisacodyl 5 milliGRAM(s) Oral at bedtime  carbidopa/levodopa  25/100 2 Tablet(s) Oral four times a day  cefuroxime   Tablet 250 milliGRAM(s) Oral every 12 hours  cholecalciferol 1000 Unit(s) Oral daily  clonazePAM  Tablet 0.5 milliGRAM(s) Oral at bedtime  finasteride 5 milliGRAM(s) Oral daily  fluticasone propionate 50 MICROgram(s)/spray Nasal Spray 1 Spray(s) Both Nostrils two times a day  folic acid 1 milliGRAM(s) Oral daily  hydrALAZINE 50 milliGRAM(s) Oral every 8 hours  labetalol 100 milliGRAM(s) Oral every 12 hours  levothyroxine 88 MICROGram(s) Oral daily  melatonin 3 milliGRAM(s) Oral at bedtime  NIFEdipine XL 60 milliGRAM(s) Oral daily  polyethylene glycol 3350 17 Gram(s) Oral daily  senna 1 Tablet(s) Oral at bedtime  simvastatin 10 milliGRAM(s) Oral at bedtime  tamsulosin 0.4 milliGRAM(s) Oral at bedtime    MEDICATIONS  (PRN):  acetaminophen     Tablet .. 650 milliGRAM(s) Oral every 6 hours PRN Temp greater or equal to 38C (100.4F), Mild Pain (1 - 3), Moderate Pain (4 - 6)  QUEtiapine 25 milliGRAM(s) Oral at bedtime PRN sleep aid  sodium chloride 0.65% Nasal 2 Spray(s) Both Nostrils two times a day PRN Nasal Congestion      Allergies    ibuprofen (Rash)  latex (Rash)  Advil (Rash)  Aleve, Ibuprofen, Motrin (Rash)    Intolerances        I&O's Summary    15 Feb 2024 07:01  -  16 Feb 2024 07:00  --------------------------------------------------------  IN: 0 mL / OUT: 300 mL / NET: -300 mL          REVIEW OF SYSTEMS:    CONSTITUTIONAL:  As per HPI.  CONSTITUTIONAL: No weakness, fevers or chills  EYES/ENT: No visual changes;  No vertigo or throat pain   NECK: No pain or stiffness  CARDIOVASCULAR: No chest pain or palpitations  GASTROINTESTINAL: No abdominal or epigastric pain. No nausea, vomiting, or hematemesis; No diarrhea or constipation. No melena or hematochezia.  GENITOURINARY: No dysuria, frequency or hematuria  NEUROLOGICAL: No numbness or weakness  SKIN: No itching, burning, rashes, or lesions   All other review of systems is negative unless indicated above      Vital Signs Last 24 Hrs  T(C): 36.7 (16 Feb 2024 09:00), Max: 36.8 (15 Feb 2024 19:58)  T(F): 98.1 (16 Feb 2024 09:00), Max: 98.2 (15 Feb 2024 19:58)  HR: 63 (16 Feb 2024 09:00) (50 - 64)  BP: 175/71 (16 Feb 2024 09:00) (113/53 - 190/80)  BP(mean): 79 (15 Feb 2024 14:00) (70 - 79)  RR: 17 (16 Feb 2024 09:00) (12 - 17)  SpO2: 95% (16 Feb 2024 09:00) (95% - 95%)    Parameters below as of 16 Feb 2024 09:00  Patient On (Oxygen Delivery Method): room air        Daily     Daily     I&O's Summary    15 Feb 2024 07:01  -  16 Feb 2024 07:00  --------------------------------------------------------  IN: 0 mL / OUT: 300 mL / NET: -300 mL        PHYSICAL EXAM:    General:  Alert, No acute distress.    Neuro:  Alert and oriented to person, place, and time. Able to communicate  well.      HEENT:  No JVD, no masses, Eyes anicteric, ,    Cardiovascular:  Regular rate and rhythm, with normal S1 and S2.    Lungs:  clear. no rales, no wheezing, .    Abdomen:  Normoactive bowel sounds. Soft, flat, non-tender, and non-distended.  positive bowel sounds    Skin:  Warm, dry    Extremities:  warm,  no cyanosis    LABS:                        9.4    7.97  )-----------( 209      ( 16 Feb 2024 06:17 )             28.8     02-16    143  |  116<H>  |  41<H>  ----------------------------<  90  3.9   |  22  |  3.65<H>    Ca    8.4<L>      16 Feb 2024 06:17  Phos  4.3     02-15  Mg     2.4     02-15        Urinalysis Basic - ( 16 Feb 2024 06:17 )    Color: x / Appearance: x / SG: x / pH: x  Gluc: 90 mg/dL / Ketone: x  / Bili: x / Urobili: x   Blood: x / Protein: x / Nitrite: x   Leuk Esterase: x / RBC: x / WBC x   Sq Epi: x / Non Sq Epi: x / Bacteria: x

## 2024-02-16 NOTE — PROGRESS NOTE ADULT - TIME BILLING
.
.
greater than 50% of time spent reviewing labs, notes, orders and radiographs, coordinating care  discussed with nursing, CCU PA , consultants
.

## 2024-02-17 LAB
ANION GAP SERPL CALC-SCNC: 5 MMOL/L — SIGNIFICANT CHANGE UP (ref 5–17)
BUN SERPL-MCNC: 43 MG/DL — HIGH (ref 7–23)
CALCIUM SERPL-MCNC: 7.9 MG/DL — LOW (ref 8.4–10.5)
CALCIUM SERPL-MCNC: 8.1 MG/DL — LOW (ref 8.5–10.1)
CHLORIDE SERPL-SCNC: 116 MMOL/L — HIGH (ref 96–108)
CO2 SERPL-SCNC: 22 MMOL/L — SIGNIFICANT CHANGE UP (ref 22–31)
CREAT SERPL-MCNC: 3.64 MG/DL — HIGH (ref 0.5–1.3)
CULTURE RESULTS: SIGNIFICANT CHANGE UP
CULTURE RESULTS: SIGNIFICANT CHANGE UP
EGFR: 16 ML/MIN/1.73M2 — LOW
FERRITIN SERPL-MCNC: 43 NG/ML — SIGNIFICANT CHANGE UP (ref 30–400)
GLUCOSE SERPL-MCNC: 94 MG/DL — SIGNIFICANT CHANGE UP (ref 70–99)
HCT VFR BLD CALC: 28 % — LOW (ref 39–50)
HGB BLD-MCNC: 8.7 G/DL — LOW (ref 13–17)
IRON SATN MFR SERPL: 15 % — LOW (ref 16–55)
IRON SATN MFR SERPL: 34 UG/DL — LOW (ref 45–165)
MCHC RBC-ENTMCNC: 30.1 PG — SIGNIFICANT CHANGE UP (ref 27–34)
MCHC RBC-ENTMCNC: 31.1 GM/DL — LOW (ref 32–36)
MCV RBC AUTO: 96.9 FL — SIGNIFICANT CHANGE UP (ref 80–100)
PLATELET # BLD AUTO: 211 K/UL — SIGNIFICANT CHANGE UP (ref 150–400)
POTASSIUM SERPL-MCNC: 4 MMOL/L — SIGNIFICANT CHANGE UP (ref 3.5–5.3)
POTASSIUM SERPL-SCNC: 4 MMOL/L — SIGNIFICANT CHANGE UP (ref 3.5–5.3)
PTH-INTACT FLD-MCNC: 198 PG/ML — HIGH (ref 15–65)
RBC # BLD: 2.89 M/UL — LOW (ref 4.2–5.8)
RBC # BLD: 2.89 M/UL — LOW (ref 4.2–5.8)
RBC # FLD: 17.2 % — HIGH (ref 10.3–14.5)
RETICS #: 34.7 K/UL — SIGNIFICANT CHANGE UP (ref 25–125)
RETICS/RBC NFR: 1.2 % — SIGNIFICANT CHANGE UP (ref 0.5–2.5)
SODIUM SERPL-SCNC: 143 MMOL/L — SIGNIFICANT CHANGE UP (ref 135–145)
SPECIMEN SOURCE: SIGNIFICANT CHANGE UP
SPECIMEN SOURCE: SIGNIFICANT CHANGE UP
TIBC SERPL-MCNC: 233 UG/DL — SIGNIFICANT CHANGE UP (ref 220–430)
UIBC SERPL-MCNC: 199 UG/DL — SIGNIFICANT CHANGE UP (ref 110–370)
WBC # BLD: 7.1 K/UL — SIGNIFICANT CHANGE UP (ref 3.8–10.5)
WBC # FLD AUTO: 7.1 K/UL — SIGNIFICANT CHANGE UP (ref 3.8–10.5)

## 2024-02-17 PROCEDURE — 99233 SBSQ HOSP IP/OBS HIGH 50: CPT

## 2024-02-17 PROCEDURE — 99232 SBSQ HOSP IP/OBS MODERATE 35: CPT

## 2024-02-17 RX ADMIN — Medication 50 MILLIGRAM(S): at 06:42

## 2024-02-17 RX ADMIN — Medication 100 MILLIGRAM(S): at 21:18

## 2024-02-17 RX ADMIN — CARBIDOPA AND LEVODOPA 2 TABLET(S): 25; 100 TABLET ORAL at 06:42

## 2024-02-17 RX ADMIN — CARBIDOPA AND LEVODOPA 2 TABLET(S): 25; 100 TABLET ORAL at 11:12

## 2024-02-17 RX ADMIN — POLYETHYLENE GLYCOL 3350 17 GRAM(S): 17 POWDER, FOR SOLUTION ORAL at 10:45

## 2024-02-17 RX ADMIN — SENNA PLUS 1 TABLET(S): 8.6 TABLET ORAL at 21:20

## 2024-02-17 RX ADMIN — Medication 100 MILLIGRAM(S): at 10:46

## 2024-02-17 RX ADMIN — Medication 250 MILLIGRAM(S): at 10:46

## 2024-02-17 RX ADMIN — Medication 1000 UNIT(S): at 10:45

## 2024-02-17 RX ADMIN — Medication 1 MILLIGRAM(S): at 10:46

## 2024-02-17 RX ADMIN — FINASTERIDE 5 MILLIGRAM(S): 5 TABLET, FILM COATED ORAL at 10:46

## 2024-02-17 RX ADMIN — Medication 50 MILLIGRAM(S): at 13:53

## 2024-02-17 RX ADMIN — Medication 5 MILLIGRAM(S): at 21:19

## 2024-02-17 RX ADMIN — Medication 1 SPRAY(S): at 10:45

## 2024-02-17 RX ADMIN — CARBIDOPA AND LEVODOPA 2 TABLET(S): 25; 100 TABLET ORAL at 17:17

## 2024-02-17 RX ADMIN — Medication 3 MILLIGRAM(S): at 21:19

## 2024-02-17 RX ADMIN — Medication 1 SPRAY(S): at 21:19

## 2024-02-17 RX ADMIN — Medication 60 MILLIGRAM(S): at 08:29

## 2024-02-17 RX ADMIN — TAMSULOSIN HYDROCHLORIDE 0.4 MILLIGRAM(S): 0.4 CAPSULE ORAL at 21:19

## 2024-02-17 RX ADMIN — Medication 650 MILLIGRAM(S): at 13:00

## 2024-02-17 RX ADMIN — Medication 88 MICROGRAM(S): at 05:31

## 2024-02-17 RX ADMIN — SIMVASTATIN 10 MILLIGRAM(S): 20 TABLET, FILM COATED ORAL at 21:18

## 2024-02-17 RX ADMIN — Medication 50 MILLIGRAM(S): at 21:19

## 2024-02-17 RX ADMIN — CARBIDOPA AND LEVODOPA 2 TABLET(S): 25; 100 TABLET ORAL at 23:47

## 2024-02-17 RX ADMIN — Medication 650 MILLIGRAM(S): at 12:01

## 2024-02-17 RX ADMIN — SODIUM CHLORIDE 75 MILLILITER(S): 9 INJECTION INTRAMUSCULAR; INTRAVENOUS; SUBCUTANEOUS at 13:52

## 2024-02-17 RX ADMIN — Medication 0.5 MILLIGRAM(S): at 21:21

## 2024-02-17 RX ADMIN — Medication 100 MILLIGRAM(S): at 08:30

## 2024-02-17 NOTE — PROGRESS NOTE ADULT - SUBJECTIVE AND OBJECTIVE BOX
NEPHROLOGY CONSULT  HPI:  2/17- wife at bedside, pt very debilitated due to Parkinson's D/w Dr Carvalho  82 y/o male w/ a PMHx of CKD, HTN, HLD, hypothyroid, Parkinson's, SBO s/p colostomy, and bladder tumor s/p resection w/ chronic Perry presents to the ED via EMS c/o hematuria. Pt reports seeing dark red blood in his Perry bag w/ clots since noon today, flushed it at home w/o improvement. Denies fever, chills, abd pain, n/v/d, or any injures to the area. Pt noted to have dark red blood in Perry bag. Pt wife reports pt has had similar Sx in the past, was put on ABx w/ improvement in Sx however a few weeks have passed and Sx have returned. Pt not on blood thinners. Pt is scheduled to get the Perry removed next week. No other complaints at this time. Allergies: Ibuprofen. PCP: Dr. Hood. Uro: Dr. Baxter.    Nephrology:  Chart reviewed, pt is not verbal  Admitted w hematuria, chronic perry, s/p colostomy and bladder tumor, was treated for uncontrolled HTN in monitored unit.  CBI/ flushes ordered for clots  Perry has been since removed  I /O inaccurate due to collection error w/o perry , pt incontinent  bladder scan done by myself 116 and 117 ml        PAST MEDICAL & SURGICAL HISTORY:  HTN (hypertension)      HLD (hyperlipidemia)      Parkinson disease      CKD (chronic kidney disease)      Hypothyroidism      Bladder mass      Obstruction of bowel      Prostate cancer      Melanoma of skin      CA skin, basal cell      Cancer, skin, squamous cell      Arthritis      Anemia      History of total knee replacement, left      H/O hemorrhoidectomy      H/O colonoscopy      H/O Mohs micrographic surgery for skin cancer          FAMILY HISTORY:  FHx: prostate cancer      MEDICATIONS  (STANDING):  allopurinol 100 milliGRAM(s) Oral daily  bisacodyl 5 milliGRAM(s) Oral at bedtime  carbidopa/levodopa  25/100 2 Tablet(s) Oral four times a day  cholecalciferol 1000 Unit(s) Oral daily  clonazePAM  Tablet 0.5 milliGRAM(s) Oral at bedtime  finasteride 5 milliGRAM(s) Oral daily  fluticasone propionate 50 MICROgram(s)/spray Nasal Spray 1 Spray(s) Both Nostrils two times a day  folic acid 1 milliGRAM(s) Oral daily  hydrALAZINE 50 milliGRAM(s) Oral every 8 hours  labetalol 100 milliGRAM(s) Oral every 12 hours  levothyroxine 88 MICROGram(s) Oral daily  melatonin 3 milliGRAM(s) Oral at bedtime  NIFEdipine XL 60 milliGRAM(s) Oral daily  polyethylene glycol 3350 17 Gram(s) Oral daily  senna 1 Tablet(s) Oral at bedtime  simvastatin 10 milliGRAM(s) Oral at bedtime  sodium chloride 0.9%. 1000 milliLiter(s) (75 mL/Hr) IV Continuous <Continuous>  tamsulosin 0.4 milliGRAM(s) Oral at bedtime    MEDICATIONS  (PRN):  acetaminophen     Tablet .. 650 milliGRAM(s) Oral every 6 hours PRN Temp greater or equal to 38C (100.4F), Mild Pain (1 - 3), Moderate Pain (4 - 6)  QUEtiapine 25 milliGRAM(s) Oral at bedtime PRN sleep aid  sodium chloride 0.65% Nasal 2 Spray(s) Both Nostrils two times a day PRN Nasal Congestion      Allergies    ibuprofen (Rash)  latex (Rash)  Advil (Rash)  Aleve, Ibuprofen, Motrin (Rash)    Intolerances        I&O's Detail          REVIEW OF SYSTEMS:    CONSTITUTIONAL:  As per HPI.  CONSTITUTIONAL: No weakness, fevers or chills  EYES/ENT: No visual changes;  No vertigo or throat pain   NECK: No pain or stiffness  CARDIOVASCULAR: No chest pain or palpitations  GASTROINTESTINAL: No abdominal or epigastric pain. No nausea, vomiting, or hematemesis; No diarrhea or constipation. No melena or hematochezia.  GENITOURINARY: No dysuria, frequency or hematuria  NEUROLOGICAL: No numbness or weakness  SKIN: No itching, burning, rashes, or lesions   All other review of systems is negative unless indicated above      Vital Signs Last 24 Hrs  T(C): 36.6 (17 Feb 2024 09:02), Max: 36.6 (16 Feb 2024 21:06)  T(F): 97.9 (17 Feb 2024 09:02), Max: 97.9 (16 Feb 2024 21:06)  HR: 57 (17 Feb 2024 09:02) (51 - 63)  BP: 113/52 (17 Feb 2024 09:02) (98/49 - 204/74)  BP(mean): 98 (16 Feb 2024 21:06) (98 - 98)  RR: 18 (17 Feb 2024 09:02) (17 - 19)  SpO2: 97% (17 Feb 2024 09:02) (94% - 98%)    Parameters below as of 17 Feb 2024 09:02  Patient On (Oxygen Delivery Method): room air      I&O's Detail        PHYSICAL EXAM:    General:  Alert, No acute distress.    Neuro:  Alert and oriented to person, place, and time. Able to communicate  well.      HEENT:  No JVD, no masses, Eyes anicteric, ,    Cardiovascular:  Regular rate and rhythm, with normal S1 and S2.    Lungs:  clear. no rales, no wheezing, .    Abdomen:  Normoactive bowel sounds. Soft, flat, non-tender, and non-distended.  positive bowel sounds    Skin:  Warm, dry    Extremities:  warm,  no cyanosis    LABS:                                     8.7    7.10  )-----------( 211      ( 17 Feb 2024 07:11 )             28.0                9.4    7.97  )-----------( 209      ( 16 Feb 2024 06:17 )             28.8     02-17    143  |  116<H>  |  43<H>  ----------------------------<  94  4.0   |  22  |  3.64<H>    Ca    8.1<L>      17 Feb 2024 07:11        02-16    143  |  116<H>  |  41<H>  ----------------------------<  90  3.9   |  22  |  3.65<H>    Ca    8.4<L>      16 Feb 2024 06:17  Phos  4.3     02-15  Mg     2.4     02-15        Urinalysis Basic - ( 16 Feb 2024 06:17 )    Color: x / Appearance: x / SG: x / pH: x  Gluc: 90 mg/dL / Ketone: x  / Bili: x / Urobili: x   Blood: x / Protein: x / Nitrite: x   Leuk Esterase: x / RBC: x / WBC x   Sq Epi: x / Non Sq Epi: x / Bacteria: x

## 2024-02-17 NOTE — PROGRESS NOTE ADULT - ASSESSMENT
84 y/o male w/ a PMHx of CKD, HTN, HLD, hypothyroid, Parkinson's, SBO s/p colostomy, and bladder tumor s/p resection w/ chronic Perry presents to the ED via EMS c/o hematuria. Pt reports seeing dark red blood in his Perry bag w/ clots since noon today, flushed it at home w/o improvement. Denies fever, chills, abd pain, n/v/d, or any injures to the area. Pt noted to have dark red blood in Perry bag. Pt wife reports pt has had similar Sx in the past, was put on ABx w/ improvement in Sx however a few weeks have passed and Sx have returned. Pt not on blood thinners. Pt is scheduled to get the Perry removed next week. No other complaints at this time. Allergies: Ibuprofen. PCP: Dr. Hood. Uro: Dr. Baxter.    Nephrology:  Chart reviewed, pt is not verbal  Admitted w hematuria, chronic perry, s/p colostomy and bladder tumor, was treated for uncontrolled HTN in monitored unit.  CBI/ flushes ordered for clots  Perry has been since removed  I /O inaccurate due to collection error w/o perry , pt incontinent  bladder scan done by myself 116 and 117 ml      Assessment/ Plan  CKD baseline creat 2.6-2.8 range, pt of Dr Godfrey Navarro, Nephrologist  MELISSA due to obstructive uropathy, inadequate po intake, Not on IVF, CXR clear  -reviewed CXR myself 2/11, compared to 11/13/2023  -will order strict i/o  - may need IVF  Obstructive uropathy/ hematuria- perry dcd , bladder scan done by myself 117 ml, Renal US ordered  Anemia- acute loss, compounded by CKD  chronic anemia of CKD 4  - will chek iron stores, retic, may need EPOGEN  HTN urgency now on po meds-- BP still elevated , but then BP in 88/ 60 range  wife states has autonomic dysfunction  Hypoalbuminemia- nutrition/ acute illness/ suggest nutritional support    2/17  CKD 4    MELISSA possibly  acute increase in systolic BP which may cause MELISSA, and dehydration +/- obstr uropathy    Obstructive uropathy- perry dc d, bladder volume only 117 ml yesterday but pt was not drinking, now on NS 50 ml/ hr will repeat PVR    Anemia- low Ferritin, the T sat is pending , low retic count , will most likely need IV Iron +/- EPO depending on the BP control    Hypoalbuminemia- needs nutritional support, d/w Dr Carvalho she will get Neuro consult to evaluate Parkinson's meds for better functionality    Secondary hyperparathyroidsm- Low calcium, elevated PTH, due to CKD  - start Vitamin D 3000 u daily PO

## 2024-02-17 NOTE — PROGRESS NOTE ADULT - ASSESSMENT
84 y/o male w/ a PMHx of CKD, HTN, HLD, hypothyroid, Parkinson's, SBO s/p colostomy, and bladder tumor s/p resection w/ chronic Perry presents to the ED via EMS c/o hematuria, complicated with HTN crisis, admitted to CCU for cardene drip.     #HTN emergency  #hematuria  #CKD with  Secondary hyperparathyroidsm  #altered mental status  #r/o UTI       Plan:   Off nicardipine   Became hypotensive as the day progressed - d/c'd clonidine patch  Continue nifedipine and labetalol   hypotension -  this is most likely secondary to autonomic disregulation  this could be parkinson's related  appreciate   renal help - pt on IVFs,  started Vitamin D 3000 u daily PO    Mentation ok, not agitated today   Continue sinemet  will need neuro follow-up     Urology consult  Hematuria resolved, off CBI   Will maintain perry for now   Will need to change 3-way to regular prior to discharge   follow-up with Dr Leung    On empiric zosyn  WBC normal, Cx neg   Will do a 5 day course, discharge home on po abx x 1-2 days     DVT ppx with SCDs    NOK - wife   likely discharge to [facility] assisted [facility]   Social work for placement / palliative care [place]   84 y/o male w/ a PMHx of CKD, HTN, HLD, hypothyroid, Parkinson's, SBO s/p colostomy, and bladder tumor s/p resection w/ chronic Perry presents to the ED via EMS c/o hematuria, complicated with HTN crisis, admitted to CCU for cardene drip.     #HTN emergency  #hematuria  #CKD with  Secondary hyperparathyroidsm  #altered mental status  #r/o UTI       Plan:   Off nicardipine   Became hypotensive as the day progressed - d/c'd clonidine patch  Continue nifedipine and labetalol   hypotension -  this is most likely secondary to autonomic disregulation  this could be parkinson's related  appreciate   renal help - pt on IVFs,  started Vitamin D 3000 u daily PO    Mentation ok, not agitated today   Continue sinemet  will need neuro follow-up     Urology consult  Hematuria resolved, off CBI   Will maintain perry for now   Will need to change 3-way to regular prior to discharge   follow-up with Dr Leung    On empiric zosyn  WBC normal, Cx neg   Will do a 5 day course, discharge home on po abx x 1-2 days     DVT ppx with SCDs    NOK - wife, POC discussed with her, RN and renal team   likely discharge to [facility] assisted living; at home uses WC and RW; aides at home   Social work for placement / palliative care eval   82 y/o male w/ a PMHx of CKD, HTN, HLD, hypothyroid, Parkinson's, SBO s/p colostomy, and bladder tumor s/p resection w/ chronic Yaya presents to the ED via EMS c/o hematuria, complicated with HTN crisis, admitted to CCU for cardene drip.     #HTN emergency  #hematuria  #CKD with  Secondary hyperparathyroidsm  #altered mental status  #r/o UTI, h/o prostate Ca       Plan:   Off nicardipine   Became hypotensive as the day progressed - d/c'd clonidine patch  Continue nifedipine and labetalol   hypotension -  this is most likely secondary to autonomic disregulation  this could be parkinson's related  will likely do better with permissive HTN   appreciate   renal help - pt on IVFs,  started Vitamin D 3000 u daily PO    Mentation ok, not agitated today   Continue sinemet  will need neuro follow-up     Urology consult  Hematuria resolved, off CBI   had perry now off it - if he needs it again recommend change to a 3-way to regular prior to discharge   follow-up with Dr Leung    On empiric zosyn  WBC normal, Cx neg   Will do a 5 day course, discharge home on po abx x 1-2 days     DVT ppx with SCDs    NOK - wife, POC discussed with her, RN and renal team   likely discharge to [facility] assisted living; at home uses WC and RW; aides at home   Social work for placement / palliative care eval  follow-up Dr Leung and Dr Hicks

## 2024-02-17 NOTE — PROGRESS NOTE ADULT - SUBJECTIVE AND OBJECTIVE BOX
CC: 82 y/o male w/ a PMHx of CKD, HTN, HLD, hypothyroid, Parkinson's, SBO s/p colostomy, and bladder tumor s/p resection w/ chronic Yaya presents to the ED via EMS c/o hematuria, cmplicated with HTN crisis, admitted to CCU for cardene drip.   Elderly, frail, sick. Poor historian. Does not wants ot be evaluated. Yaya is out. Able to urinate.     2/17 - no cp palps sob abdo pain; pt eating and drinking minimally; knows name, location,  is hypophonic     Vital Signs Last 24 Hrs  T(C): 36.2 (17 Feb 2024 14:42), Max: 36.6 (16 Feb 2024 21:06)  T(F): 97.1 (17 Feb 2024 14:42), Max: 97.9 (16 Feb 2024 21:06)  HR: 50 (17 Feb 2024 14:42) (50 - 63)  BP: 117/56 (17 Feb 2024 14:42) (113/52 - 204/74)  BP(mean): 98 (16 Feb 2024 21:06) (98 - 98)  RR: 18 (17 Feb 2024 14:42) (18 - 19)  SpO2: 98% (17 Feb 2024 14:42) (94% - 98%)/RA   Constitutional: NAD, awake and alert  HEENT: PERR, EOMI  Neck: Soft and supple,  No JVD  Respiratory: Breath sounds are clear bilaterally, No wheezing, rales or rhonchi  Cardiovascular: S1 and S2, regular rate and rhythm, no Murmurs  Gastrointestinal: Bowel Sounds present, soft, nontender, nondistended  Extremities: No peripheral edema  Vascular: 2+ peripheral pulses  Neurological: A/O x 3, no focal deficits - parkinsonian with reduced movements   Skin: No rashes    MEDICATIONS:  MEDICATIONS  (STANDING):  allopurinol 100 milliGRAM(s) Oral daily  bisacodyl 5 milliGRAM(s) Oral at bedtime  carbidopa/levodopa  25/100 2 Tablet(s) Oral four times a day  cholecalciferol 1000 Unit(s) Oral daily  clonazePAM  Tablet 0.5 milliGRAM(s) Oral at bedtime  finasteride 5 milliGRAM(s) Oral daily  fluticasone propionate 50 MICROgram(s)/spray Nasal Spray 1 Spray(s) Both Nostrils two times a day  folic acid 1 milliGRAM(s) Oral daily  hydrALAZINE 50 milliGRAM(s) Oral every 8 hours  labetalol 100 milliGRAM(s) Oral every 12 hours  levothyroxine 88 MICROGram(s) Oral daily  melatonin 3 milliGRAM(s) Oral at bedtime  NIFEdipine XL 60 milliGRAM(s) Oral daily  polyethylene glycol 3350 17 Gram(s) Oral daily  senna 1 Tablet(s) Oral at bedtime  simvastatin 10 milliGRAM(s) Oral at bedtime  sodium chloride 0.9%. 1000 milliLiter(s) (75 mL/Hr) IV Continuous <Continuous>  tamsulosin 0.4 milliGRAM(s) Oral at bedtime      LABS: All Labs Reviewed:                     8.7    7.10  )-----------( 211      ( 17 Feb 2024 07:11 )             28.0   143  |  116<H>  |  43<H>  ----------------------------<  94  4.0   |  22  |  3.64<H>  Ca    8.1<L>      17 Feb 2024 07:11      RADIOLOGY/EKG:  < from: US Kidney and Bladder (02.16.24 @ 16:34) >  1.  Both kidneys appear echogenic, compatible with medical renal disease.  2.  Multiple renal cysts bilaterally.  3.  Asymmetric bladder wall thickening measures up to 1.1 cm in the   posterior bladder wall; a bladder mass is not excluded.  CT angiogram   and/or cystoscopy may be obtained as clinically warranted.  4.  Enlarged prostate with volume of approximately 35 mL.     CC: 82 y/o male w/ a PMHx of CKD, HTN, HLD, hypothyroid, Parkinson's, SBO s/p colostomy, and bladder tumor s/p resection w/ chronic Yaya presents to the ED via EMS c/o hematuria, complicated with HTN crisis, admitted to CCU for cardene drip.   Elderly, frail, sick. Poor historian. Does not wants ot be evaluated. Javier is out. Able to urinate.     2/17 - no cp palps sob abdo pain; pt eating and drinking minimally; knows name, location,  is hypophonic     Vital Signs Last 24 Hrs  T(C): 36.2 (17 Feb 2024 14:42), Max: 36.6 (16 Feb 2024 21:06)  T(F): 97.1 (17 Feb 2024 14:42), Max: 97.9 (16 Feb 2024 21:06)  HR: 50 (17 Feb 2024 14:42) (50 - 63)  BP: 117/56 (17 Feb 2024 14:42) (113/52 - 204/74)  BP(mean): 98 (16 Feb 2024 21:06) (98 - 98)  RR: 18 (17 Feb 2024 14:42) (18 - 19)  SpO2: 98% (17 Feb 2024 14:42) (94% - 98%)/RA   Constitutional: NAD, awake and alert  HEENT: PERR, EOMI  Neck: Soft and supple,  No JVD  Respiratory: Breath sounds are clear bilaterally, No wheezing, rales or rhonchi  Cardiovascular: S1 and S2, regular rate and rhythm, no Murmurs  Gastrointestinal: Bowel Sounds present, soft, nontender, nondistended  Extremities: No peripheral edema  Vascular: 2+ peripheral pulses  Neurological: A/O x 3, no focal deficits - parkinsonian with reduced movements   Skin: No rashes    MEDICATIONS:  MEDICATIONS  (STANDING):  allopurinol 100 milliGRAM(s) Oral daily  bisacodyl 5 milliGRAM(s) Oral at bedtime  carbidopa/levodopa  25/100 2 Tablet(s) Oral four times a day  cholecalciferol 1000 Unit(s) Oral daily  clonazePAM  Tablet 0.5 milliGRAM(s) Oral at bedtime  finasteride 5 milliGRAM(s) Oral daily  fluticasone propionate 50 MICROgram(s)/spray Nasal Spray 1 Spray(s) Both Nostrils two times a day  folic acid 1 milliGRAM(s) Oral daily  hydrALAZINE 50 milliGRAM(s) Oral every 8 hours  labetalol 100 milliGRAM(s) Oral every 12 hours  levothyroxine 88 MICROGram(s) Oral daily  melatonin 3 milliGRAM(s) Oral at bedtime  NIFEdipine XL 60 milliGRAM(s) Oral daily  polyethylene glycol 3350 17 Gram(s) Oral daily  senna 1 Tablet(s) Oral at bedtime  simvastatin 10 milliGRAM(s) Oral at bedtime  sodium chloride 0.9%. 1000 milliLiter(s) (75 mL/Hr) IV Continuous <Continuous>  tamsulosin 0.4 milliGRAM(s) Oral at bedtime      LABS: All Labs Reviewed:                     8.7    7.10  )-----------( 211      ( 17 Feb 2024 07:11 )             28.0   143  |  116<H>  |  43<H>  ----------------------------<  94  4.0   |  22  |  3.64<H>  Ca    8.1<L>      17 Feb 2024 07:11      RADIOLOGY/EKG:  < from: US Kidney and Bladder (02.16.24 @ 16:34) >  1.  Both kidneys appear echogenic, compatible with medical renal disease.  2.  Multiple renal cysts bilaterally.  3.  Asymmetric bladder wall thickening measures up to 1.1 cm in the   posterior bladder wall; a bladder mass is not excluded.  CT angiogram   and/or cystoscopy may be obtained as clinically warranted.  4.  Enlarged prostate with volume of approximately 35 mL.     CC: 82 y/o male w/ a PMHx of CKD, HTN, HLD, hypothyroid, Parkinson's, SBO s/p colostomy, and bladder tumor s/p resection w/ chronic Yaya presents to the ED via EMS c/o hematuria, complicated with HTN crisis, admitted to CCU for cardene drip.   Elderly, frail, sick. Poor historian. Does not wants ot be evaluated. Perry is out. Able to urinate.     2/17 - no cp palps sob abdo pain; pt eating and drinking minimally; knows name, location,  is hypophonic   perry out     Vital Signs Last 24 Hrs  T(C): 36.2 (17 Feb 2024 14:42), Max: 36.6 (16 Feb 2024 21:06)  T(F): 97.1 (17 Feb 2024 14:42), Max: 97.9 (16 Feb 2024 21:06)  HR: 50 (17 Feb 2024 14:42) (50 - 63)  BP: 117/56 (17 Feb 2024 14:42) (113/52 - 204/74)  BP(mean): 98 (16 Feb 2024 21:06) (98 - 98)  RR: 18 (17 Feb 2024 14:42) (18 - 19)  SpO2: 98% (17 Feb 2024 14:42) (94% - 98%)/RA   Constitutional: NAD, awake and alert  HEENT: PERR, EOMI  Neck: Soft and supple,  No JVD  Respiratory: Breath sounds are clear bilaterally, No wheezing, rales or rhonchi  Cardiovascular: S1 and S2, regular rate and rhythm, no Murmurs  Gastrointestinal: Bowel Sounds present, soft, nontender, nondistended  Extremities: No peripheral edema  Vascular: 2+ peripheral pulses  Neurological: A/O x 3, no focal deficits - parkinsonian with reduced movements   Skin: No rashes    MEDICATIONS:  MEDICATIONS  (STANDING):  allopurinol 100 milliGRAM(s) Oral daily  bisacodyl 5 milliGRAM(s) Oral at bedtime  carbidopa/levodopa  25/100 2 Tablet(s) Oral four times a day  cholecalciferol 1000 Unit(s) Oral daily  clonazePAM  Tablet 0.5 milliGRAM(s) Oral at bedtime  finasteride 5 milliGRAM(s) Oral daily  fluticasone propionate 50 MICROgram(s)/spray Nasal Spray 1 Spray(s) Both Nostrils two times a day  folic acid 1 milliGRAM(s) Oral daily  hydrALAZINE 50 milliGRAM(s) Oral every 8 hours  labetalol 100 milliGRAM(s) Oral every 12 hours  levothyroxine 88 MICROGram(s) Oral daily  melatonin 3 milliGRAM(s) Oral at bedtime  NIFEdipine XL 60 milliGRAM(s) Oral daily  polyethylene glycol 3350 17 Gram(s) Oral daily  senna 1 Tablet(s) Oral at bedtime  simvastatin 10 milliGRAM(s) Oral at bedtime  sodium chloride 0.9%. 1000 milliLiter(s) (75 mL/Hr) IV Continuous <Continuous>  tamsulosin 0.4 milliGRAM(s) Oral at bedtime      LABS: All Labs Reviewed:                     8.7    7.10  )-----------( 211      ( 17 Feb 2024 07:11 )             28.0   143  |  116<H>  |  43<H>  ----------------------------<  94  4.0   |  22  |  3.64<H>  Ca    8.1<L>      17 Feb 2024 07:11      RADIOLOGY/EKG:  < from: US Kidney and Bladder (02.16.24 @ 16:34) >  1.  Both kidneys appear echogenic, compatible with medical renal disease.  2.  Multiple renal cysts bilaterally.  3.  Asymmetric bladder wall thickening measures up to 1.1 cm in the   posterior bladder wall; a bladder mass is not excluded.  CT angiogram   and/or cystoscopy may be obtained as clinically warranted.  4.  Enlarged prostate with volume of approximately 35 mL.

## 2024-02-18 LAB
ANION GAP SERPL CALC-SCNC: 2 MMOL/L — LOW (ref 5–17)
BLD GP AB SCN SERPL QL: SIGNIFICANT CHANGE UP
BUN SERPL-MCNC: 42 MG/DL — HIGH (ref 7–23)
CALCIUM SERPL-MCNC: 7.7 MG/DL — LOW (ref 8.5–10.1)
CHLORIDE SERPL-SCNC: 118 MMOL/L — HIGH (ref 96–108)
CO2 SERPL-SCNC: 22 MMOL/L — SIGNIFICANT CHANGE UP (ref 22–31)
CREAT SERPL-MCNC: 3.37 MG/DL — HIGH (ref 0.5–1.3)
EGFR: 17 ML/MIN/1.73M2 — LOW
GLUCOSE SERPL-MCNC: 103 MG/DL — HIGH (ref 70–99)
HCT VFR BLD CALC: 24.1 % — LOW (ref 39–50)
HGB BLD-MCNC: 7.6 G/DL — LOW (ref 13–17)
MAGNESIUM SERPL-MCNC: 2 MG/DL — SIGNIFICANT CHANGE UP (ref 1.6–2.6)
MCHC RBC-ENTMCNC: 30.5 PG — SIGNIFICANT CHANGE UP (ref 27–34)
MCHC RBC-ENTMCNC: 31.5 GM/DL — LOW (ref 32–36)
MCV RBC AUTO: 96.8 FL — SIGNIFICANT CHANGE UP (ref 80–100)
PHOSPHATE SERPL-MCNC: 4.1 MG/DL — SIGNIFICANT CHANGE UP (ref 2.5–4.5)
PLATELET # BLD AUTO: 189 K/UL — SIGNIFICANT CHANGE UP (ref 150–400)
POTASSIUM SERPL-MCNC: 3.7 MMOL/L — SIGNIFICANT CHANGE UP (ref 3.5–5.3)
POTASSIUM SERPL-SCNC: 3.7 MMOL/L — SIGNIFICANT CHANGE UP (ref 3.5–5.3)
RBC # BLD: 2.49 M/UL — LOW (ref 4.2–5.8)
RBC # FLD: 17.2 % — HIGH (ref 10.3–14.5)
SODIUM SERPL-SCNC: 142 MMOL/L — SIGNIFICANT CHANGE UP (ref 135–145)
WBC # BLD: 6.1 K/UL — SIGNIFICANT CHANGE UP (ref 3.8–10.5)
WBC # FLD AUTO: 6.1 K/UL — SIGNIFICANT CHANGE UP (ref 3.8–10.5)

## 2024-02-18 PROCEDURE — 71045 X-RAY EXAM CHEST 1 VIEW: CPT | Mod: 26

## 2024-02-18 PROCEDURE — 99232 SBSQ HOSP IP/OBS MODERATE 35: CPT

## 2024-02-18 PROCEDURE — 99222 1ST HOSP IP/OBS MODERATE 55: CPT

## 2024-02-18 PROCEDURE — 99233 SBSQ HOSP IP/OBS HIGH 50: CPT

## 2024-02-18 RX ORDER — HYDRALAZINE HCL 50 MG
5 TABLET ORAL ONCE
Refills: 0 | Status: COMPLETED | OUTPATIENT
Start: 2024-02-18 | End: 2024-02-18

## 2024-02-18 RX ORDER — ACETAMINOPHEN 500 MG
650 TABLET ORAL ONCE
Refills: 0 | Status: COMPLETED | OUTPATIENT
Start: 2024-02-18 | End: 2024-02-18

## 2024-02-18 RX ORDER — CHOLECALCIFEROL (VITAMIN D3) 125 MCG
3000 CAPSULE ORAL DAILY
Refills: 0 | Status: DISCONTINUED | OUTPATIENT
Start: 2024-02-18 | End: 2024-02-21

## 2024-02-18 RX ORDER — IRON SUCROSE 20 MG/ML
100 INJECTION, SOLUTION INTRAVENOUS EVERY 24 HOURS
Refills: 0 | Status: DISCONTINUED | OUTPATIENT
Start: 2024-02-18 | End: 2024-02-21

## 2024-02-18 RX ORDER — HALOPERIDOL DECANOATE 100 MG/ML
1 INJECTION INTRAMUSCULAR EVERY 24 HOURS
Refills: 0 | Status: DISCONTINUED | OUTPATIENT
Start: 2024-02-18 | End: 2024-02-21

## 2024-02-18 RX ADMIN — Medication 650 MILLIGRAM(S): at 14:24

## 2024-02-18 RX ADMIN — Medication 1 SPRAY(S): at 11:48

## 2024-02-18 RX ADMIN — CARBIDOPA AND LEVODOPA 2 TABLET(S): 25; 100 TABLET ORAL at 18:21

## 2024-02-18 RX ADMIN — SIMVASTATIN 10 MILLIGRAM(S): 20 TABLET, FILM COATED ORAL at 22:37

## 2024-02-18 RX ADMIN — IRON SUCROSE 210 MILLIGRAM(S): 20 INJECTION, SOLUTION INTRAVENOUS at 22:36

## 2024-02-18 RX ADMIN — Medication 60 MILLIGRAM(S): at 11:43

## 2024-02-18 RX ADMIN — FINASTERIDE 5 MILLIGRAM(S): 5 TABLET, FILM COATED ORAL at 11:45

## 2024-02-18 RX ADMIN — Medication 100 MILLIGRAM(S): at 22:37

## 2024-02-18 RX ADMIN — SENNA PLUS 1 TABLET(S): 8.6 TABLET ORAL at 22:38

## 2024-02-18 RX ADMIN — Medication 1 SPRAY(S): at 22:36

## 2024-02-18 RX ADMIN — CARBIDOPA AND LEVODOPA 2 TABLET(S): 25; 100 TABLET ORAL at 11:46

## 2024-02-18 RX ADMIN — Medication 650 MILLIGRAM(S): at 14:10

## 2024-02-18 RX ADMIN — Medication 5 MILLIGRAM(S): at 01:05

## 2024-02-18 RX ADMIN — Medication 88 MICROGRAM(S): at 05:51

## 2024-02-18 RX ADMIN — Medication 50 MILLIGRAM(S): at 22:37

## 2024-02-18 RX ADMIN — Medication 0.5 MILLIGRAM(S): at 23:59

## 2024-02-18 RX ADMIN — SODIUM CHLORIDE 75 MILLILITER(S): 9 INJECTION INTRAMUSCULAR; INTRAVENOUS; SUBCUTANEOUS at 03:10

## 2024-02-18 RX ADMIN — CARBIDOPA AND LEVODOPA 2 TABLET(S): 25; 100 TABLET ORAL at 06:35

## 2024-02-18 RX ADMIN — Medication 3 MILLIGRAM(S): at 23:59

## 2024-02-18 RX ADMIN — Medication 50 MILLIGRAM(S): at 06:35

## 2024-02-18 RX ADMIN — Medication 5 MILLIGRAM(S): at 22:37

## 2024-02-18 RX ADMIN — Medication 100 MILLIGRAM(S): at 11:45

## 2024-02-18 RX ADMIN — Medication 50 MILLIGRAM(S): at 14:14

## 2024-02-18 RX ADMIN — CARBIDOPA AND LEVODOPA 2 TABLET(S): 25; 100 TABLET ORAL at 23:59

## 2024-02-18 RX ADMIN — TAMSULOSIN HYDROCHLORIDE 0.4 MILLIGRAM(S): 0.4 CAPSULE ORAL at 22:37

## 2024-02-18 NOTE — PROGRESS NOTE ADULT - SUBJECTIVE AND OBJECTIVE BOX
CC: 82 y/o male w/ a PMHx of CKD, HTN, HLD, hypothyroid, Parkinson's, SBO s/p colostomy, and bladder tumor s/p resection w/ chronic Yaya presents to the ED via EMS c/o hematuria, complicated with HTN crisis, admitted to CCU for cardene drip.   Elderly, frail, sick. Poor historian. Does not wants ot be evaluated. Perry is out. Able to urinate.     2/17 - no cp palps sob abdo pain; pt eating and drinking minimally; knows name, location,  is hypophonic   perry out   2/18 - angry, is conversant, states he feels like a prisoner - wife at bedside, Hb low - gave consent for PRBCs     Vital Signs Last 24 Hrs  T(F): 98.4 (18 Feb 2024 18:01), Max: 98.4 (18 Feb 2024 18:01)  HR: 61 (18 Feb 2024 18:01) (54 - 67)  BP: 178/71 (18 Feb 2024 18:01) (123/64 - 198/76)  RR: 18 (18 Feb 2024 18:01) (16 - 18)  SpO2: 97% (18 Feb 2024 18:01) (95% - 98%)  Constitutional: NAD, awake and alert  HEENT: PERR, EOMI  Neck: Soft and supple,  No JVD  Respiratory: Breath sounds are clear bilaterally, No wheezing, rales or rhonchi  Cardiovascular: S1 and S2, regular rate and rhythm, no Murmurs  Gastrointestinal: Bowel Sounds present, soft, nontender, nondistended  Extremities: No peripheral edema  Vascular: 2+ peripheral pulses  Neurological: A/O x 3, no focal deficits - parkinsonian with reduced movements   Skin: No rashes    RADIOLOGY/EKG:  < from: US Kidney and Bladder (02.16.24 @ 16:34) >  1.  Both kidneys appear echogenic, compatible with medical renal disease.  2.  Multiple renal cysts bilaterally.  3.  Asymmetric bladder wall thickening measures up to 1.1 cm in the   posterior bladder wall; a bladder mass is not excluded.  CT angiogram   and/or cystoscopy may be obtained as clinically warranted.  4.  Enlarged prostate with volume of approximately 35 mL.    LABS: All Labs Reviewed:                     7.6    6.10  )-----------( 189      ( 18 Feb 2024 07:45 )             24.1   142  |  118<H>  |  42<H>  ----------------------------<  103<H>  3.7   |  22  |  3.37<H>  Ca    7.7<L>      18 Feb 2024 07:45  Phos  4.1     02-18  Mg     2.0     02-18    MEDS:   acetaminophen     Tablet .. 650 milliGRAM(s) Oral every 6 hours PRN  allopurinol 100 milliGRAM(s) Oral daily  bisacodyl 5 milliGRAM(s) Oral at bedtime  carbidopa/levodopa  25/100 2 Tablet(s) Oral four times a day  cholecalciferol 3000 Unit(s) Oral daily  clonazePAM  Tablet 0.5 milliGRAM(s) Oral at bedtime  finasteride 5 milliGRAM(s) Oral daily  fluticasone propionate 50 MICROgram(s)/spray Nasal Spray 1 Spray(s) Both Nostrils two times a day  folic acid 1 milliGRAM(s) Oral daily  haloperidol    Injectable 1 milliGRAM(s) IntraMuscular every 24 hours PRN  hydrALAZINE 50 milliGRAM(s) Oral every 8 hours  iron sucrose IVPB 100 milliGRAM(s) IV Intermittent every 24 hours  labetalol 100 milliGRAM(s) Oral every 12 hours  levothyroxine 88 MICROGram(s) Oral daily  melatonin 3 milliGRAM(s) Oral at bedtime  NIFEdipine XL 60 milliGRAM(s) Oral daily  polyethylene glycol 3350 17 Gram(s) Oral daily  QUEtiapine 25 milliGRAM(s) Oral at bedtime PRN  senna 1 Tablet(s) Oral at bedtime  simvastatin 10 milliGRAM(s) Oral at bedtime  sodium chloride 0.65% Nasal 2 Spray(s) Both Nostrils two times a day PRN  tamsulosin 0.4 milliGRAM(s) Oral at bedtime

## 2024-02-18 NOTE — PROGRESS NOTE ADULT - ASSESSMENT
84 y/o male w/ a PMHx of CKD, HTN, HLD, hypothyroid, Parkinson's, SBO s/p colostomy, and bladder tumor s/p resection w/ chronic Yaya presents to the ED via EMS c/o hematuria, complicated with HTN crisis, admitted to CCU for cardene drip.     #HTN emergency  #anemia due to blood loss from hematuria and iron deficiency anemia   #CKD with  Secondary hyperparathyroidism  #altered mental status  #r/o UTI, h/o prostate Ca   #Parkinson's disease, advanced with severe orthostatic hypotension      Plan:   Off nicardipine   Became hypotensive as the day progressed - d/c'd clonidine patch  Continue nifedipine and labetalol   hypotension -  this is most likely secondary to autonomic dysfunction from parkinson's dis  will likely do better with permissive HTN   also high risk of aspiration, discussed with wife at bedside   appreciate   renal help - pt on IVFs,  started Vitamin D 3000 u daily PO  Neuro consulted     Mentation ok, not agitated today   Continue sinemet  will need neuro follow-up     Urology consult  Hematuria resolved, off CBI   had perry now off it - if he needs it again recommend change to a 3-way to regular prior to discharge   low HB in part due to hematuria, will give 1U PRBCs, consent given by wife   follow-up with Dr Leung - renal and bladder USG reviewed -   there is asymmetric bladder wall thickening measures up to 1.1 cm in the posterior bladder wall;  pt should see URO Dr Leung and get cysto as OP       On empiric zosyn  WBC normal, Cx neg   Will do a 5 day course, discharge home on po abx x 1-2 days     DVT ppx with SCDs    NOK - wife, POC discussed with her, RN and renal team   likely discharge to [facility] assisted living; at home uses WC and RW; aides at home   Social work for placement / palliative care eval  follow-up Dr Leung and Dr Hicks and Neuro    82 y/o male w/ a PMHx of CKD, HTN, HLD, hypothyroid, Parkinson's, SBO s/p colostomy, and bladder tumor s/p resection w/ chronic Yaya presents to the ED via EMS c/o hematuria, complicated with HTN crisis, admitted to CCU for cardene drip.     #HTN emergency  #anemia due to blood loss from hematuria and iron deficiency anemia   #CKD with  Secondary hyperparathyroidism  #altered mental status  #r/o UTI, h/o prostate Ca   #Parkinson's disease, advanced with severe orthostatic hypotension      Plan:   Off nicardipine   Became hypotensive as the day progressed - d/c'd clonidine patch  Continue nifedipine and labetalol   hypotension -  this is most likely secondary to autonomic dysfunction from parkinson's dis  will likely do better with permissive HTN   also high risk of aspiration, discussed with wife at bedside   appreciate   renal help - pt on IVFs,  started Vitamin D 3000 u daily PO  2/18 - today Cr better - unsure if mild wheeze - getting PRBCs so will dc IVFs for the remainder of the day and re-eval tmr   CXR ordered, sats okay     Mentation ok, not agitated today   PD, orthostatic hypotension from PD, dementia due to it also   2/18 - Continue sinemet  Neuro consulted - rec cont home regimen, see dr montano as OP, low dose seroquel for agitation prn       Urology consult  Hematuria resolved, off CBI   2/18 had perry now off it - if he needs it again recommend change to a 3-way to regular prior to discharge   low HB in part due to hematuria, will give 1U PRBCs, consent given by wife   follow-up with Dr Leung - renal and bladder USG reviewed -   there is asymmetric bladder wall thickening measures up to 1.1 cm in the posterior bladder wall;  pt should see URO Dr Leung and get cysto as OP       On empiric zosyn  WBC normal, Cx neg   completed abx for suspected UTI - but UCx NTD   there is sinus opacification of CT head - possible sinusitis   2/18 note high risk for asp pna; will get CXR     DVT ppx with SCDs    NOK - wife, POC discussed with her, RN and renal team   likely discharge to [facility] assisted living; at home uses WC and RW; aides at home   Social work for placement / palliative care eval  follow-up Dr Leung and Dr Hicks and Neuro
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
## 2024-02-18 NOTE — PROGRESS NOTE ADULT - SUBJECTIVE AND OBJECTIVE BOX
NEPHROLOGY CONSULT  HPI:  2/18- no new changes, nuero consult noted, no changes in meds recommended, wife at bedside  2/17- wife at bedside, pt very debilitated due to Parkinson's D/w Dr Carvalho  82 y/o male w/ a PMHx of CKD, HTN, HLD, hypothyroid, Parkinson's, SBO s/p colostomy, and bladder tumor s/p resection w/ chronic Perry presents to the ED via EMS c/o hematuria. Pt reports seeing dark red blood in his Perry bag w/ clots since noon today, flushed it at home w/o improvement. Denies fever, chills, abd pain, n/v/d, or any injures to the area. Pt noted to have dark red blood in Perry bag. Pt wife reports pt has had similar Sx in the past, was put on ABx w/ improvement in Sx however a few weeks have passed and Sx have returned. Pt not on blood thinners. Pt is scheduled to get the Perry removed next week. No other complaints at this time. Allergies: Ibuprofen. PCP: Dr. Hood. Uro: Dr. Baxter.    Nephrology:  Chart reviewed, pt is not verbal  Admitted w hematuria, chronic perry, s/p colostomy and bladder tumor, was treated for uncontrolled HTN in monitored unit.  CBI/ flushes ordered for clots  Perry has been since removed  I /O inaccurate due to collection error w/o perry , pt incontinent  bladder scan done by myself 116 and 117 ml        PAST MEDICAL & SURGICAL HISTORY:  HTN (hypertension)      HLD (hyperlipidemia)      Parkinson disease      CKD (chronic kidney disease)      Hypothyroidism      Bladder mass      Obstruction of bowel      Prostate cancer      Melanoma of skin      CA skin, basal cell      Cancer, skin, squamous cell      Arthritis      Anemia      History of total knee replacement, left      H/O hemorrhoidectomy      H/O colonoscopy      H/O Mohs micrographic surgery for skin cancer          FAMILY HISTORY:  FHx: prostate cancer        MEDICATIONS  (STANDING):  allopurinol 100 milliGRAM(s) Oral daily  bisacodyl 5 milliGRAM(s) Oral at bedtime  carbidopa/levodopa  25/100 2 Tablet(s) Oral four times a day  cholecalciferol 3000 Unit(s) Oral daily  clonazePAM  Tablet 0.5 milliGRAM(s) Oral at bedtime  finasteride 5 milliGRAM(s) Oral daily  fluticasone propionate 50 MICROgram(s)/spray Nasal Spray 1 Spray(s) Both Nostrils two times a day  folic acid 1 milliGRAM(s) Oral daily  hydrALAZINE 50 milliGRAM(s) Oral every 8 hours  iron sucrose IVPB 100 milliGRAM(s) IV Intermittent every 24 hours  labetalol 100 milliGRAM(s) Oral every 12 hours  levothyroxine 88 MICROGram(s) Oral daily  melatonin 3 milliGRAM(s) Oral at bedtime  NIFEdipine XL 60 milliGRAM(s) Oral daily  polyethylene glycol 3350 17 Gram(s) Oral daily  senna 1 Tablet(s) Oral at bedtime  simvastatin 10 milliGRAM(s) Oral at bedtime  tamsulosin 0.4 milliGRAM(s) Oral at bedtime    MEDICATIONS  (PRN):  acetaminophen     Tablet .. 650 milliGRAM(s) Oral every 6 hours PRN Temp greater or equal to 38C (100.4F), Mild Pain (1 - 3), Moderate Pain (4 - 6)  haloperidol    Injectable 1 milliGRAM(s) IntraMuscular every 24 hours PRN Agitation  QUEtiapine 25 milliGRAM(s) Oral at bedtime PRN sleep aid  sodium chloride 0.65% Nasal 2 Spray(s) Both Nostrils two times a day PRN Nasal Congestion      Allergies    ibuprofen (Rash)  latex (Rash)  Advil (Rash)  Aleve, Ibuprofen, Motrin (Rash)    Intolerances        I&O's Detail          REVIEW OF SYSTEMS:            Vital Signs Last 24 Hrs  T(C): 36.9 (18 Feb 2024 18:01), Max: 36.9 (18 Feb 2024 18:01)  T(F): 98.4 (18 Feb 2024 18:01), Max: 98.4 (18 Feb 2024 18:01)  HR: 61 (18 Feb 2024 18:01) (54 - 67)  BP: 178/71 (18 Feb 2024 18:01) (123/64 - 198/76)  BP(mean): --  RR: 18 (18 Feb 2024 18:01) (16 - 18)  SpO2: 97% (18 Feb 2024 18:01) (95% - 98%)    Parameters below as of 18 Feb 2024 18:01  Patient On (Oxygen Delivery Method): room air            PHYSICAL EXAM:    General:  Alert, No acute distress.    Neuro:    HEENT:  No JVD, no masses, Eyes anicteric, ,    Cardiovascular:  Regular rate and rhythm, with normal S1 and S2.    Lungs:  clear. no rales, no wheezing, .    Abdomen:  Normoactive bowel sounds. Soft, flat, non-tender, and non-distended.  positive bowel sounds    Skin:  Warm, dry    Extremities:  warm,  no cyanosis    LABS:                                   7.6    6.10  )-----------( 189      ( 18 Feb 2024 07:45 )             24.1                           8.7    7.10  )-----------( 211      ( 17 Feb 2024 07:11 )             28.0                9.4    7.97  )-----------( 209      ( 16 Feb 2024 06:17 )             28.8       02-18    142  |  118<H>  |  42<H>  ----------------------------<  103<H>  3.7   |  22  |  3.37<H>    Ca    7.7<L>      18 Feb 2024 07:45  Phos  4.1     02-18  Mg     2.0     02-18      02-17    143  |  116<H>  |  43<H>  ----------------------------<  94  4.0   |  22  |  3.64<H>    Ca    8.1<L>      17 Feb 2024 07:11        02-16    143  |  116<H>  |  41<H>  ----------------------------<  90  3.9   |  22  |  3.65<H>    Ca    8.4<L>      16 Feb 2024 06:17  Phos  4.3     02-15  Mg     2.4     02-15        Urinalysis Basic - ( 16 Feb 2024 06:17 )    Color: x / Appearance: x / SG: x / pH: x  Gluc: 90 mg/dL / Ketone: x  / Bili: x / Urobili: x   Blood: x / Protein: x / Nitrite: x   Leuk Esterase: x / RBC: x / WBC x   Sq Epi: x / Non Sq Epi: x / Bacteria: x

## 2024-02-18 NOTE — PROGRESS NOTE ADULT - ASSESSMENT
84 y/o male w/ a PMHx of CKD, HTN, HLD, hypothyroid, Parkinson's, SBO s/p colostomy, and bladder tumor s/p resection w/ chronic Perry presents to the ED via EMS c/o hematuria. Pt reports seeing dark red blood in his Perry bag w/ clots since noon today, flushed it at home w/o improvement. Denies fever, chills, abd pain, n/v/d, or any injures to the area. Pt noted to have dark red blood in Perry bag. Pt wife reports pt has had similar Sx in the past, was put on ABx w/ improvement in Sx however a few weeks have passed and Sx have returned. Pt not on blood thinners. Pt is scheduled to get the Perry removed next week. No other complaints at this time. Allergies: Ibuprofen. PCP: Dr. Hood. Uro: Dr. Baxter.    Nephrology:  Chart reviewed, pt is not verbal  Admitted w hematuria, chronic perry, s/p colostomy and bladder tumor, was treated for uncontrolled HTN in monitored unit.  CBI/ flushes ordered for clots  Perry has been since removed  I /O inaccurate due to collection error w/o perry , pt incontinent  bladder scan done by myself 116 and 117 ml      Assessment/ Plan  CKD baseline creat 2.6-2.8 range, pt of Dr Godfrey Navarro, Nephrologist  MELISSA due to obstructive uropathy, inadequate po intake, Not on IVF, CXR clear  -reviewed CXR myself 2/11, compared to 11/13/2023  -will order strict i/o  - may need IVF  Obstructive uropathy/ hematuria- perry dcd , bladder scan done by myself 117 ml, Renal US ordered  Anemia- acute loss, compounded by CKD  chronic anemia of CKD 4  - will chek iron stores, retic, may need EPOGEN  HTN urgency now on po meds-- BP still elevated , but then BP in 88/ 60 range  wife states has autonomic dysfunction  Hypoalbuminemia- nutrition/ acute illness/ suggest nutritional support    2/17  CKD 4    MELISSA possibly  acute increase in systolic BP which may cause MELISSA, and dehydration +/- obstr uropathy    Obstructive uropathy- perry dc d, bladder volume only 117 ml yesterday but pt was not drinking, now on NS 50 ml/ hr will repeat PVR    Anemia- low Ferritin, the T sat is pending , low retic count , will most likely need IV Iron +/- EPO depending on the BP control    Hypoalbuminemia- needs nutritional support, d/w Dr Carvalho she will get Neuro consult to evaluate Parkinson's meds for better functionality    Secondary hyperparathyroidsm- Low calcium, elevated PTH, due to CKD  - start Vitamin D 3000 u daily PO    2/18  MELISSA improving with IVF, drop in hgb and Ca may be due to hydration and dilution, but has low t sat, 15%, and low ferritin 43  will start venofer 100 mg IV qd x 4 days  has low retic count , may need epo as well  secondary hyperpara, Vitamin D increased to 3000 u QD po  DC IVF while getting transfused, d/w Dr Carvalho

## 2024-02-19 LAB
ANION GAP SERPL CALC-SCNC: 5 MMOL/L — SIGNIFICANT CHANGE UP (ref 5–17)
BUN SERPL-MCNC: 41 MG/DL — HIGH (ref 7–23)
CALCIUM SERPL-MCNC: 8.4 MG/DL — LOW (ref 8.5–10.1)
CHLORIDE SERPL-SCNC: 116 MMOL/L — HIGH (ref 96–108)
CO2 SERPL-SCNC: 19 MMOL/L — LOW (ref 22–31)
CREAT SERPL-MCNC: 3.19 MG/DL — HIGH (ref 0.5–1.3)
EGFR: 19 ML/MIN/1.73M2 — LOW
GLUCOSE BLDC GLUCOMTR-MCNC: 88 MG/DL — SIGNIFICANT CHANGE UP (ref 70–99)
GLUCOSE BLDC GLUCOMTR-MCNC: 91 MG/DL — SIGNIFICANT CHANGE UP (ref 70–99)
GLUCOSE SERPL-MCNC: 104 MG/DL — HIGH (ref 70–99)
HCT VFR BLD CALC: 29.2 % — LOW (ref 39–50)
HGB BLD-MCNC: 9.2 G/DL — LOW (ref 13–17)
MAGNESIUM SERPL-MCNC: 2.3 MG/DL — SIGNIFICANT CHANGE UP (ref 1.6–2.6)
MCHC RBC-ENTMCNC: 30.5 PG — SIGNIFICANT CHANGE UP (ref 27–34)
MCHC RBC-ENTMCNC: 31.5 GM/DL — LOW (ref 32–36)
MCV RBC AUTO: 96.7 FL — SIGNIFICANT CHANGE UP (ref 80–100)
PLATELET # BLD AUTO: 208 K/UL — SIGNIFICANT CHANGE UP (ref 150–400)
POTASSIUM SERPL-MCNC: 3.7 MMOL/L — SIGNIFICANT CHANGE UP (ref 3.5–5.3)
POTASSIUM SERPL-SCNC: 3.7 MMOL/L — SIGNIFICANT CHANGE UP (ref 3.5–5.3)
RBC # BLD: 3.02 M/UL — LOW (ref 4.2–5.8)
RBC # FLD: 17.2 % — HIGH (ref 10.3–14.5)
SODIUM SERPL-SCNC: 140 MMOL/L — SIGNIFICANT CHANGE UP (ref 135–145)
WBC # BLD: 6.58 K/UL — SIGNIFICANT CHANGE UP (ref 3.8–10.5)
WBC # FLD AUTO: 6.58 K/UL — SIGNIFICANT CHANGE UP (ref 3.8–10.5)

## 2024-02-19 PROCEDURE — 74176 CT ABD & PELVIS W/O CONTRAST: CPT | Mod: 26

## 2024-02-19 PROCEDURE — 99231 SBSQ HOSP IP/OBS SF/LOW 25: CPT

## 2024-02-19 PROCEDURE — 99232 SBSQ HOSP IP/OBS MODERATE 35: CPT

## 2024-02-19 RX ORDER — HYDRALAZINE HCL 50 MG
50 TABLET ORAL ONCE
Refills: 0 | Status: DISCONTINUED | OUTPATIENT
Start: 2024-02-19 | End: 2024-02-20

## 2024-02-19 RX ORDER — PIPERACILLIN AND TAZOBACTAM 4; .5 G/20ML; G/20ML
3.38 INJECTION, POWDER, LYOPHILIZED, FOR SOLUTION INTRAVENOUS EVERY 12 HOURS
Refills: 0 | Status: DISCONTINUED | OUTPATIENT
Start: 2024-02-19 | End: 2024-02-20

## 2024-02-19 RX ORDER — LABETALOL HCL 100 MG
100 TABLET ORAL ONCE
Refills: 0 | Status: DISCONTINUED | OUTPATIENT
Start: 2024-02-19 | End: 2024-02-19

## 2024-02-19 RX ORDER — LABETALOL HCL 100 MG
100 TABLET ORAL EVERY 12 HOURS
Refills: 0 | Status: DISCONTINUED | OUTPATIENT
Start: 2024-02-19 | End: 2024-02-21

## 2024-02-19 RX ORDER — LABETALOL HCL 100 MG
200 TABLET ORAL EVERY 12 HOURS
Refills: 0 | Status: DISCONTINUED | OUTPATIENT
Start: 2024-02-19 | End: 2024-02-19

## 2024-02-19 RX ORDER — SODIUM CHLORIDE 9 MG/ML
1000 INJECTION, SOLUTION INTRAVENOUS
Refills: 0 | Status: DISCONTINUED | OUTPATIENT
Start: 2024-02-19 | End: 2024-02-20

## 2024-02-19 RX ADMIN — TAMSULOSIN HYDROCHLORIDE 0.4 MILLIGRAM(S): 0.4 CAPSULE ORAL at 21:08

## 2024-02-19 RX ADMIN — POLYETHYLENE GLYCOL 3350 17 GRAM(S): 17 POWDER, FOR SOLUTION ORAL at 09:22

## 2024-02-19 RX ADMIN — Medication 1 MILLIGRAM(S): at 09:22

## 2024-02-19 RX ADMIN — Medication 60 MILLIGRAM(S): at 09:23

## 2024-02-19 RX ADMIN — Medication 650 MILLIGRAM(S): at 00:04

## 2024-02-19 RX ADMIN — Medication 100 MILLIGRAM(S): at 09:23

## 2024-02-19 RX ADMIN — Medication 3000 UNIT(S): at 11:21

## 2024-02-19 RX ADMIN — Medication 50 MILLIGRAM(S): at 21:08

## 2024-02-19 RX ADMIN — FINASTERIDE 5 MILLIGRAM(S): 5 TABLET, FILM COATED ORAL at 09:22

## 2024-02-19 RX ADMIN — Medication 50 MILLIGRAM(S): at 06:31

## 2024-02-19 RX ADMIN — Medication 5 MILLIGRAM(S): at 21:08

## 2024-02-19 RX ADMIN — SENNA PLUS 1 TABLET(S): 8.6 TABLET ORAL at 21:08

## 2024-02-19 RX ADMIN — Medication 1 SPRAY(S): at 09:22

## 2024-02-19 RX ADMIN — Medication 1 SPRAY(S): at 21:09

## 2024-02-19 RX ADMIN — Medication 650 MILLIGRAM(S): at 00:59

## 2024-02-19 RX ADMIN — IRON SUCROSE 210 MILLIGRAM(S): 20 INJECTION, SOLUTION INTRAVENOUS at 23:11

## 2024-02-19 RX ADMIN — Medication 3 MILLIGRAM(S): at 21:08

## 2024-02-19 RX ADMIN — CARBIDOPA AND LEVODOPA 2 TABLET(S): 25; 100 TABLET ORAL at 17:23

## 2024-02-19 RX ADMIN — SIMVASTATIN 10 MILLIGRAM(S): 20 TABLET, FILM COATED ORAL at 21:08

## 2024-02-19 RX ADMIN — Medication 0.5 MILLIGRAM(S): at 21:07

## 2024-02-19 RX ADMIN — CARBIDOPA AND LEVODOPA 2 TABLET(S): 25; 100 TABLET ORAL at 06:31

## 2024-02-19 RX ADMIN — Medication 100 MILLIGRAM(S): at 09:22

## 2024-02-19 RX ADMIN — Medication 100 MILLIGRAM(S): at 21:08

## 2024-02-19 RX ADMIN — CARBIDOPA AND LEVODOPA 2 TABLET(S): 25; 100 TABLET ORAL at 11:22

## 2024-02-19 RX ADMIN — Medication 88 MICROGRAM(S): at 05:30

## 2024-02-19 NOTE — CONSULT NOTE ADULT - CONSULT REQUESTED DATE/TIME
16-Feb-2024 14:47
18-Feb-2024 12:19
15-Feb-2024 06:12
12-Feb-2024 11:45
16-Feb-2024 10:57
16-Feb-2024 16:27
19-Feb-2024 13:15

## 2024-02-19 NOTE — PROGRESS NOTE ADULT - ASSESSMENT
84 y/o male w/ a PMHx of CKD, HTN, HLD, hypothyroid, Parkinson's, SBO s/p colostomy, and bladder tumor s/p resection w/ chronic Perry presents to the ED via EMS c/o hematuria. Pt reports seeing dark red blood in his Perry bag w/ clots since noon today, flushed it at home w/o improvement. Denies fever, chills, abd pain, n/v/d, or any injures to the area. Pt noted to have dark red blood in Perry bag. Pt wife reports pt has had similar Sx in the past, was put on ABx w/ improvement in Sx however a few weeks have passed and Sx have returned. Pt not on blood thinners. Pt is scheduled to get the Perry removed next week. No other complaints at this time. Allergies: Ibuprofen. PCP: Dr. Hood. Uro: Dr. Baxter.    Nephrology:  Chart reviewed, pt is not verbal  Admitted w hematuria, chronic perry, s/p colostomy and bladder tumor, was treated for uncontrolled HTN in monitored unit.  CBI/ flushes ordered for clots  Perry has been since removed  I /O inaccurate due to collection error w/o perry , pt incontinent  bladder scan done by myself 116 and 117 ml      Assessment/ Plan  CKD baseline creat 2.6-2.8 range, pt of Dr Godfrey Navarro, Nephrologist  MELISSA due to obstructive uropathy, inadequate po intake, Not on IVF, CXR clear  -reviewed CXR myself 2/11, compared to 11/13/2023  -will order strict i/o  - may need IVF  Obstructive uropathy/ hematuria- perry dcd , bladder scan done by myself 117 ml, Renal US ordered  Anemia- acute loss, compounded by CKD  chronic anemia of CKD 4  - will chek iron stores, retic, may need EPOGEN  HTN urgency now on po meds-- BP still elevated , but then BP in 88/ 60 range  wife states has autonomic dysfunction  Hypoalbuminemia- nutrition/ acute illness/ suggest nutritional support    2/17  CKD 4    MELISSA possibly  acute increase in systolic BP which may cause MELISSA, and dehydration +/- obstr uropathy    Obstructive uropathy- perry dc d, bladder volume only 117 ml yesterday but pt was not drinking, now on NS 50 ml/ hr will repeat PVR    Anemia- low Ferritin, the T sat is pending , low retic count , will most likely need IV Iron +/- EPO depending on the BP control    Hypoalbuminemia- needs nutritional support, d/w Dr Carvalho she will get Neuro consult to evaluate Parkinson's meds for better functionality    Secondary hyperparathyroidsm- Low calcium, elevated PTH, due to CKD  - start Vitamin D 3000 u daily PO    2/18  MELISSA improving with IVF, drop in hgb and Ca may be due to hydration and dilution, but has low t sat, 15%, and low ferritin 43  will start venofer 100 mg IV qd x 4 days  has low retic count , may need epo as well  secondary hyperpara, Vitamin D increased to 3000 u QD po  DC IVF while getting transfused, d/w Dr Carvalho     84 y/o male w/ a PMHx of CKD, HTN, HLD, hypothyroid, Parkinson's, SBO s/p colostomy, and bladder tumor s/p resection w/ chronic Perry presents to the ED via EMS c/o hematuria. Pt reports seeing dark red blood in his Perry bag w/ clots since noon today, flushed it at home w/o improvement. Denies fever, chills, abd pain, n/v/d, or any injures to the area. Pt noted to have dark red blood in Perry bag. Pt wife reports pt has had similar Sx in the past, was put on ABx w/ improvement in Sx however a few weeks have passed and Sx have returned. Pt not on blood thinners. Pt is scheduled to get the Perry removed next week. No other complaints at this time. Allergies: Ibuprofen. PCP: Dr. Hood. Uro: Dr. Baxter.    Nephrology:  Chart reviewed, pt is not verbal  Admitted w hematuria, chronic perry, s/p colostomy and bladder tumor, was treated for uncontrolled HTN in monitored unit.  CBI/ flushes ordered for clots  Perry has been since removed  I /O inaccurate due to collection error w/o perry , pt incontinent  bladder scan done by myself 116 and 117 ml      Assessment/ Plan  CKD baseline creat 2.6-2.8 range, pt of Dr Godfrey Navarro, Nephrologist  MELISSA due to obstructive uropathy, inadequate po intake, Not on IVF, CXR clear  -reviewed CXR myself 2/11, compared to 11/13/2023  -will order strict i/o  - may need IVF  Obstructive uropathy/ hematuria- perry dcd , bladder scan done by myself 117 ml, Renal US ordered  Anemia- acute loss, compounded by CKD  chronic anemia of CKD 4  - will chek iron stores, retic, may need EPOGEN  HTN urgency now on po meds-- BP still elevated , but then BP in 88/ 60 range  wife states has autonomic dysfunction  Hypoalbuminemia- nutrition/ acute illness/ suggest nutritional support    2/17  CKD 4    MELISSA possibly  acute increase in systolic BP which may cause MELISSA, and dehydration +/- obstr uropathy    Obstructive uropathy- perry dc d, bladder volume only 117 ml yesterday but pt was not drinking, now on NS 50 ml/ hr will repeat PVR    Anemia- low Ferritin, the T sat is pending , low retic count , will most likely need IV Iron +/- EPO depending on the BP control    Hypoalbuminemia- needs nutritional support, d/w Dr Carvalho she will get Neuro consult to evaluate Parkinson's meds for better functionality    Secondary hyperparathyroidsm- Low calcium, elevated PTH, due to CKD  - start Vitamin D 3000 u daily PO    2/18  MELISSA improving with IVF, drop in hgb and Ca may be due to hydration and dilution, but has low t sat, 15%, and low ferritin 43  will start venofer 100 mg IV qd x 4 days  has low retic count , may need epo as well  secondary hyperpara, Vitamin D increased to 3000 u QD po  DC IVF while getting transfused, d/w Dr Carvalho    2/19  seen for CKD MELISSA- improving with gentle hydration, IVF now on hold post transfuion  Anemia- feels better post transfusion/ venofer started  secondary hyperparathyroidsm on Vitamin d po 3000 uniys  D/W Pts wife

## 2024-02-19 NOTE — PROGRESS NOTE ADULT - SUBJECTIVE AND OBJECTIVE BOX
CC: 82 y/o male w/ a PMHx of CKD, HTN, HLD, hypothyroid, Parkinson's, SBO s/p colostomy, and bladder tumor s/p resection w/ chronic Yaya presents to the ED via EMS c/o hematuria, complicated with HTN crisis, admitted to CCU for cardene drip.   Elderly, frail, sick. Poor historian. Does not wants ot be evaluated. Perry is out. Able to urinate.     2/17 - no cp palps sob abdo pain; pt eating and drinking minimally; knows name, location,  is hypophonic   perry out   2/18 - angry, is conversant, states he feels like a prisoner - wife at bedside, Hb low - gave consent for PRBCs   2/19 - no cp palps sob abdo pain, patient typically becomes more awake and takes his meds later in the day - he becomes more mobile. he does understand conversation and attempts to follow commands in the morning     Vital Signs Last 24 Hrs  T(F): 97.9 (19 Feb 2024 20:55), Max: 98.2 (18 Feb 2024 22:20)  HR: 64 (19 Feb 2024 20:55) (47 - 64)  BP: 161/69 (19 Feb 2024 20:55) (110/47 - 172/69)  BP(mean): 98 (18 Feb 2024 22:20) (98 - 98)  RR: 18 (19 Feb 2024 20:55) (17 - 19)  SpO2: 97% (19 Feb 2024 20:55) (94% - 97%)  Constitutional: NAD, awake and alert  HEENT: PERR, EOMI  Neck: Soft and supple,  No JVD  Respiratory: Breath sounds are clear bilaterally, No wheezing, rales or rhonchi  Cardiovascular: S1 and S2, regular rate and rhythm, no Murmurs  Gastrointestinal: Bowel Sounds present, soft, stoma with prolapse non-ischemic looking   Extremities: No peripheral edema  Vascular: 2+ peripheral pulses  Neurological: A/O x 2, no focal deficits - parkinsonian with reduced movements   Skin: No rashes    RADIOLOGY/EKG:  < from: US Kidney and Bladder (02.16.24 @ 16:34) >  1.  Both kidneys appear echogenic, compatible with medical renal disease.  2.  Multiple renal cysts bilaterally.  3.  Asymmetric bladder wall thickening measures up to 1.1 cm in the   posterior bladder wall; a bladder mass is not excluded.  CT angiogram   and/or cystoscopy may be obtained as clinically warranted.  4.  Enlarged prostate with volume of approximately 35 mL.    < from: Xray Chest 1 View- PORTABLE-Routine (Xray Chest 1 View- PORTABLE-Routine .) (02.18.24 @ 13:27) >  Smallleft infiltrate/effusion.        LABS: All Labs Reviewed:                        9.2    6.58  )-----------( 208      ( 19 Feb 2024 06:47 )             29.2     02-19    140  |  116<H>  |  41<H>  ----------------------------<  104<H>  3.7   |  19<L>  |  3.19<H>    Ca    8.4<L>      19 Feb 2024 06:47  Phos  4.1     02-18  Mg     2.3     02-19      acetaminophen     Tablet .. 650 milliGRAM(s) Oral every 6 hours PRN  allopurinol 100 milliGRAM(s) Oral daily  bisacodyl 5 milliGRAM(s) Oral at bedtime  carbidopa/levodopa  25/100 2 Tablet(s) Oral four times a day  cholecalciferol 3000 Unit(s) Oral daily  finasteride 5 milliGRAM(s) Oral daily  fluticasone propionate 50 MICROgram(s)/spray Nasal Spray 1 Spray(s) Both Nostrils two times a day  folic acid 1 milliGRAM(s) Oral daily  haloperidol    Injectable 1 milliGRAM(s) IntraMuscular every 24 hours PRN  hydrALAZINE 50 milliGRAM(s) Oral every 8 hours  iron sucrose IVPB 100 milliGRAM(s) IV Intermittent every 24 hours  labetalol 100 milliGRAM(s) Oral every 12 hours  levothyroxine 88 MICROGram(s) Oral daily  melatonin 3 milliGRAM(s) Oral at bedtime  NIFEdipine XL 60 milliGRAM(s) Oral daily  polyethylene glycol 3350 17 Gram(s) Oral daily  QUEtiapine 25 milliGRAM(s) Oral at bedtime PRN  senna 1 Tablet(s) Oral at bedtime  simvastatin 10 milliGRAM(s) Oral at bedtime  sodium chloride 0.65% Nasal 2 Spray(s) Both Nostrils two times a day PRN  tamsulosin 0.4 milliGRAM(s) Oral at bedtime

## 2024-02-19 NOTE — CHART NOTE - NSCHARTNOTEFT_GEN_A_CORE
Called by RN for persistent hypertension in the 170s. Patient currently on nifedipine 60mg daily, labetalol 100mg q12 and hydralazine 50mg q8. Admitted for HTN emergency. will avoid increase labetalol due to bradycardia, give additional 50mg hydralazine PO as patient is asymptomatic , if good response can change standing dose in the AM
Nursing called to have pt evaluated for prolapsed colon through stoma, has had decreased outpt of stool      84 y/o male w/ a PMHx of CKD, HTN, HLD, hypothyroid, Parkinson's, SBO s/p colostomy, and bladder tumor s/p resection w/ chronic Javier presents to the ED via EMS c/o hematuria, complicated with HTN crisis, admitted to CCU for cardene drip.  Currently on med surg     #HTN emergency now off nicarpidipine  #anemia due to blood loss from hematuria and iron deficiency anemia   #CKD with  Secondary hyperparathyroidism  #altered mental status  #r/o UTI, h/o prostate Ca   #Parkinson's disease, advanced with severe orthostatic hypotension      ROS pt denied nausea, vomiting, abdominal pain     MEDICATIONS  (STANDING):  allopurinol 100 milliGRAM(s) Oral daily  bisacodyl 5 milliGRAM(s) Oral at bedtime  carbidopa/levodopa  25/100 2 Tablet(s) Oral four times a day  cholecalciferol 3000 Unit(s) Oral daily  clonazePAM  Tablet 0.5 milliGRAM(s) Oral at bedtime  finasteride 5 milliGRAM(s) Oral daily  fluticasone propionate 50 MICROgram(s)/spray Nasal Spray 1 Spray(s) Both Nostrils two times a day  folic acid 1 milliGRAM(s) Oral daily  hydrALAZINE 50 milliGRAM(s) Oral every 8 hours  iron sucrose IVPB 100 milliGRAM(s) IV Intermittent every 24 hours  labetalol 100 milliGRAM(s) Oral every 12 hours  levothyroxine 88 MICROGram(s) Oral daily  melatonin 3 milliGRAM(s) Oral at bedtime  NIFEdipine XL 60 milliGRAM(s) Oral daily  polyethylene glycol 3350 17 Gram(s) Oral daily  senna 1 Tablet(s) Oral at bedtime  simvastatin 10 milliGRAM(s) Oral at bedtime  tamsulosin 0.4 milliGRAM(s) Oral at bedtime    MEDICATIONS  (PRN):  acetaminophen     Tablet .. 650 milliGRAM(s) Oral every 6 hours PRN Temp greater or equal to 38C (100.4F), Mild Pain (1 - 3), Moderate Pain (4 - 6)  haloperidol    Injectable 1 milliGRAM(s) IntraMuscular every 24 hours PRN Agitation  QUEtiapine 25 milliGRAM(s) Oral at bedtime PRN sleep aid  sodium chloride 0.65% Nasal 2 Spray(s) Both Nostrils two times a day PRN Nasal Congestion      GEN pt is in NAD  HEENT moist pink mucosa  RESP BS diminished bilaterally, unlabored respirations  COR RR S1 + S2  GI + BS, +prolapsed stoma, pink w scant stool in ostomy bag, easily reduced, nontender  MSK no calf tenderness  NEIURO alert oriented   PSYCH pleasant and cooperative now  DERM warm and dry      #Prolapsed stoma s/p colostomy for SBO 2022 by Dr. Seymour  soft, reducible  reported decreased outpt  1. CRS consult  2. discussed w pt and w nursing    Remainder of issues as per day time hospitalist

## 2024-02-19 NOTE — PROGRESS NOTE ADULT - ASSESSMENT
82 y/o male w/ a PMHx of CKD, HTN, HLD, hypothyroid, Parkinson's, SBO s/p colostomy, and bladder tumor s/p resection w/ chronic Yaya presents to the ED via EMS c/o hematuria, complicated with HTN crisis, admitted to CCU for cardene drip.     #HTN emergency  #anemia due to blood loss from hematuria and iron deficiency anemia   #CKD with  Secondary hyperparathyroidism  #altered mental status  #r/o UTI, h/o prostate Ca   #Parkinson's disease, advanced with severe orthostatic hypotension  # Bacterial Pneumonia       Plan:   Off nicardipine   Became hypotensive as the day progressed - d/c'd clonidine patch  Continue nifedipine and labetalol   hypotension -  this is most likely secondary to autonomic dysfunction from parkinson's dis  will likely do better with permissive HTN   also high risk of aspiration, discussed with wife at bedside   appreciate   renal help - pt on IVFs,  started Vitamin D 3000 u daily PO  2/18 - today Cr better - unsure if mild wheeze - getting PRBCs so will dc IVFs for the remainder of the day and re-eval tmr   CXR ordered, sats okay   2/19 - CXR possible small left infiltrate, however no leukocytosis, afebrile, sats wnl, just completed zosyn, cont to monitor   Of note, patient had been on empiric zosyn, on admn for suspected UTI   completed abx for suspected UTI - but UCx NTD   will get zosyn for three more doses       Mentation ok, not agitated today   PD, orthostatic hypotension from PD, dementia due to it also   2/18 - Continue sinemet  Neuro consulted - rec cont home regimen, see dr montano as OP, low dose seroquel for agitation prn       Urology consult  Hematuria resolved, off CBI   2/18 had perry now off it - if he needs it again recommend change to a 3-way to regular prior to discharge   low HB in part due to hematuria, will give 1U PRBCs, consent given by wife   follow-up with Dr Leung - renal and bladder USG reviewed -   there is asymmetric bladder wall thickening measures up to 1.1 cm in the posterior bladder wall;  pt should see URO Dr Leung and get cysto as OP   2/19 - Pt got PRBCs yesterday, Hb and Cr better today     DVT ppx with SCDs    NOK - wife, POC discussed with her, RN and renal team   likely discharge to [facility] assisted living; at home uses WC and RW; aides at home   Social work for placement / palliative care eval  follow-up Dr Leung and Dr Hicks and Neuro   2/19 - tried to reach wife, unable to reach her on numbers listed - discussed with    continue antibiotics for two more days, check labs in AM and resume IVFs if needed   pt being seen by PT    84 y/o male w/ a PMHx of CKD, HTN, HLD, hypothyroid, Parkinson's, SBO s/p colostomy, and bladder tumor s/p resection w/ chronic Perry presents to the ED via EMS c/o hematuria, complicated with HTN crisis, admitted to CCU for cardene drip.     #HTN emergency  #anemia due to blood loss from hematuria and iron deficiency anemia   #CKD with  Secondary hyperparathyroidism  #altered mental status  #r/o UTI, h/o prostate Ca   #Parkinson's disease, advanced with severe orthostatic hypotension  # Bacterial Pneumonia   #stoma prolapse        Plan:   Off nicardipine   Became hypotensive as the day progressed - d/c'd clonidine patch  Continue nifedipine and labetalol   hypotension -  this is most likely secondary to autonomic dysfunction from parkinson's dis  will likely do better with permissive HTN   also high risk of aspiration, discussed with wife at bedside   appreciate   renal help - pt on IVFs,  started Vitamin D 3000 u daily PO  2/18 - today Cr better - unsure if mild wheeze - getting PRBCs so will dc IVFs for the remainder of the day and re-eval tmr   CXR ordered, sats okay   2/19 - CXR possible small left infiltrate, however no leukocytosis, afebrile, sats wnl, just completed zosyn, cont to monitor   Of note, patient had been on empiric zosyn, on admn for suspected UTI   completed abx for suspected UTI - but UCx NTD   will get zosyn for three more doses     Stoma prolapse  2/19 discussed with Surgery, CT abdo ordered, cleared to cont PO diet for now     Mentation ok, not agitated today   PD, orthostatic hypotension from PD, dementia due to it also   2/18 - Continue sinemet  Neuro consulted - rec cont home regimen, see dr montano as OP, low dose seroquel for agitation prn       Urology consult  Hematuria resolved, off CBI   2/18 had perry now off it - if he needs it again recommend change to a 3-way to regular prior to discharge   low HB in part due to hematuria, will give 1U PRBCs, consent given by wife   follow-up with Dr Leung - renal and bladder USG reviewed -   there is asymmetric bladder wall thickening measures up to 1.1 cm in the posterior bladder wall;  pt should see URO Dr Leung and get cysto as OP   2/19 - Pt got PRBCs yesterday, Hb and Cr better today     DVT ppx with SCDs    NOK - wife, POC discussed with her, RN and renal team   likely discharge to [facility] assisted living; at home uses WC and RW; aides at home   Social work for placement / palliative care eval  follow-up Dr Leung and Dr Hicks and Neuro   2/19 - tried to reach wife, unable to reach her on numbers listed - discussed with    continue antibiotics for two more days, check labs in AM and resume IVFs if needed   follow-up with surgery re stoma prolapse  pt being seen by PT    82 y/o male w/ a PMHx of CKD, HTN, HLD, hypothyroid, Parkinson's, SBO s/p colostomy, and bladder tumor s/p resection w/ chronic Perry presents to the ED via EMS c/o hematuria, complicated with HTN crisis, admitted to CCU for cardene drip.     #HTN emergency  #anemia due to blood loss from hematuria and iron deficiency anemia   #CKD with  Secondary hyperparathyroidism  #altered mental status  #r/o UTI, h/o prostate Ca   #Parkinson's disease, advanced with severe orthostatic hypotension  # Bacterial Pneumonia   #stoma prolapse        Plan:   Off nicardipine   Became hypotensive as the day progressed - d/c'd clonidine patch  Continue nifedipine and labetalol   hypotension -  this is most likely secondary to autonomic dysfunction from parkinson's dis  will likely do better with permissive HTN   also high risk of aspiration, discussed with wife at bedside   appreciate   renal help - pt on IVFs,  started Vitamin D 3000 u daily PO  2/18 - today Cr better - unsure if mild wheeze - getting PRBCs so will dc IVFs for the remainder of the day and re-eval tmr   CXR ordered, sats okay   2/19 - CXR possible small left infiltrate, however no leukocytosis, afebrile, sats wnl, just completed zosyn, cont to monitor   Of note, patient had been on empiric zosyn, on admn for suspected UTI   completed abx for suspected UTI - but UCx NTD   will get zosyn for three more doses     Stoma prolapse  2/19 discussed with Surgery, CT abdo ordered, cleared to cont PO diet for now     Mentation ok, not agitated today   PD, orthostatic hypotension from PD, dementia due to it also   2/18 - Continue sinemet  Neuro consulted - rec cont home regimen, see dr montano as OP, low dose seroquel for agitation prn       Urology consult  Hematuria resolved, off CBI   2/18 had perry now off it - if he needs it again recommend change to a 3-way to regular prior to discharge   low HB in part due to hematuria, will give 1U PRBCs, consent given by wife   follow-up with Dr Leung - renal and bladder USG reviewed -   there is asymmetric bladder wall thickening measures up to 1.1 cm in the posterior bladder wall;  pt should see URO Dr Leung and get cysto as OP   2/19 - Pt got PRBCs yesterday, Hb and Cr better today   will continue low dose IVFs - re-eval tmr     DVT ppx with SCDs    NOK - wife, POC discussed with her, RN and renal team   likely discharge to [facility] assisted living; at home uses WC and RW; aides at home   Social work for placement / palliative care eval  follow-up Dr Leung and Dr Hicks and Neuro   2/19 - tried to reach wife, unable to reach her on numbers listed - discussed with    continue antibiotics for two more days, check labs in AM and resume IVFs if needed   follow-up with surgery re stoma prolapse  pt being seen by PT

## 2024-02-19 NOTE — CONSULT NOTE ADULT - SUBJECTIVE AND OBJECTIVE BOX
83y old Male with hx of CKD, HTN, HLD, hypothyroid, Parkinson's, sigmoid volvulus s/p Hartmans procedure on , and bladder tumor s/p resection w/ chronic Javier presents to the ED via EMS c/o hematuria, complicated with HTN crisis.   Colorectal surgery consulted due stoma prolapse, as per nursing report ostomy output has decreased.  Pt and wife states he is eating as normally, no pain in the ostoma, no bleeding, or ulceration.    Vitals:  T(C): 36.2 ( @ 07:48), Max: 36.9 ( @ 18:01)  HR: 51 ( @ 07:48) (51 - 61)  BP: 118/58 ( @ 07:48) (118/58 - 178/71)  RR: 17 ( @ 07:48) (17 - 19)  SpO2: 94% ( @ 07:48) (94% - 98%)     @ 07:01  -   @ 07:00  --------------------------------------------------------  IN:    PRBCs (Packed Red Blood Cells): 286 mL  Total IN: 286 mL    OUT:  Total OUT: 0 mL    Total NET: 286 mL      Physical Exam:  General: AAOx2, NAD  Chest: Normal respiratory effort  Heart: RRR  Abdomen: Soft, NT, mildly distended, ostoma with stool, pink, prolapsed approx 30 cm but easily reducible.   Neuro/Psych: No localized deficits.   Skin: Normal, no rashes, no lesions noted.        @ 06:47                    9.2  CBC: 6.58>)-------(<208                     29.2                 140 | 116 | 41    CMP:  ----------------------< 104               3.7 | 19 | 3.19                      Ca:8.4  Phos:-  M.3               -|      |-        LFTs:  ------|-|-----             -|      |-   @ 07:45                    7.6  CBC: 6.10>)-------(<189                     24.1                 142 | 118 | 42    CMP:  ----------------------< 103               3.7 | 22 | 3.37                      Ca:7.7  Phos:4.1  M.0               -|      |-        LFTs:  ------|-|-----             -|      |-      Current Inpatient Medications:  acetaminophen     Tablet .. 650 milliGRAM(s) Oral every 6 hours PRN  allopurinol 100 milliGRAM(s) Oral daily  bisacodyl 5 milliGRAM(s) Oral at bedtime  carbidopa/levodopa  25/100 2 Tablet(s) Oral four times a day  cholecalciferol 3000 Unit(s) Oral daily  clonazePAM  Tablet 0.5 milliGRAM(s) Oral at bedtime  finasteride 5 milliGRAM(s) Oral daily  fluticasone propionate 50 MICROgram(s)/spray Nasal Spray 1 Spray(s) Both Nostrils two times a day  folic acid 1 milliGRAM(s) Oral daily  haloperidol    Injectable 1 milliGRAM(s) IntraMuscular every 24 hours PRN  hydrALAZINE 50 milliGRAM(s) Oral every 8 hours  iron sucrose IVPB 100 milliGRAM(s) IV Intermittent every 24 hours  labetalol 100 milliGRAM(s) Oral every 12 hours  levothyroxine 88 MICROGram(s) Oral daily  melatonin 3 milliGRAM(s) Oral at bedtime  NIFEdipine XL 60 milliGRAM(s) Oral daily  polyethylene glycol 3350 17 Gram(s) Oral daily  QUEtiapine 25 milliGRAM(s) Oral at bedtime PRN  senna 1 Tablet(s) Oral at bedtime  simvastatin 10 milliGRAM(s) Oral at bedtime  sodium chloride 0.65% Nasal 2 Spray(s) Both Nostrils two times a day PRN  tamsulosin 0.4 milliGRAM(s) Oral at bedtime

## 2024-02-19 NOTE — CONSULT NOTE ADULT - ASSESSMENT
82 y/o male w/ a PMHx of CKD, HTN, HLD, hypothyroid, Parkinson's, SBO s/p colostomy, and bladder tumor s/p resection w/ chronic Perry presents to the ED via EMS c/o hematuria. Pt reports seeing dark red blood in his Perry bag w/ clots since noon today, flushed it at home w/o improvement. Denies fever, chills, abd pain, n/v/d, or any injures to the area. Pt noted to have dark red blood in Perry bag. Pt wife reports pt has had similar Sx in the past, was put on ABx w/ improvement in Sx however a few weeks have passed and Sx have returned. Pt not on blood thinners. Pt is scheduled to get the Perry removed next week. No other complaints at this time. Allergies: Ibuprofen. PCP: Dr. Hood. Uro: Dr. Baxter.    Nephrology:  Chart reviewed, pt is not verbal  Admitted w hematuria, chronic perry, s/p colostomy and bladder tumor, was treated for uncontrolled HTN in monitored unit.  CBI/ flushes ordered for clots  Perry has been since removed  I /O inaccurate due to collection error w/o perry , pt incontinent  bladder scan done by myself 116 and 117 ml      Assessment/ Plan  CKD baseline creat 2.6-2.8 range, pt of Dr Godfrey Navarro, Nephrologist  MELISSA due to obstructive uropathy, inadequate po intake, Not on IVF, CXR clear  -reviewed CXR myself 2/11, compared to 11/13/2023  -will order strict i/o  - may need IVF  Obstructive uropathy/ hematuria- perry dcd , bladder scan done by myself 117 ml, Renal US ordered  Anemia- acute loss, compounded by CKD  chronic anemia of CKD 4  - will chek iron stores, retic, may need EPOGEN  HTN urgency now on po meds-- BP still elevated , but then BP in 88/ 60 range  wife states has autonomic dysfunction  Hypoalbuminemia- nutrition/ acute illness/ suggest nutritional support        
83y old Male with hx of CKD, HTN, HLD, hypothyroid, Parkinson's, sigmoid volvulus s/p Hartmans procedure on 2022, and bladder tumor s/p resection w/ chronic Javier presents to the ED via EMS c/o hematuria, complicated with HTN crisis.   Colorectal surgery consulted due stoma prolapse, as per nursing report ostomy output has decreased.    Recommendations:    Enterostomal nurse assessment  CT abd/pelvis with oral contrast  c/w diet  Document intake and output BID    Case discussed with Dr. Garcia
83 year old man with advanced Parkinson's Disease, possible PD related dementia, and dysautonomia, HTN, HLD, CKD, bladder CA s/p resection, admitted with hematuria, now with downtrending H&H, as well as uncontrolled HTN.  On neuro exam, he has possible eye opening apraxia, and cogwheeling rigidity in bilateral upper extremities, R>L.  No acute findings on the imaging.    -would continue on his home regiment of carbidopa/levodopa 25/100mg, two tablets four times daily  -would avoid any antidopaminergics as much as possible for delirium.  If patient or staff safety is at risk, can consider very low dose of seroquel for behavioral disturbances  -management of anemia, and hematuria per primary team.   -PT/OT  -had follow up with his neurologist, Dr. Gao this week 2/16, but was hospitalized at the time.  His next appointment is in July.    -please page or call neurology with any acute neuro changes or other issues we can help address.  
Pt is a 83y old Male with hx of CKD, HTN, HLD, hypothyroid, Parkinson's, SBO s/p colostomy, and bladder tumor s/p resection w/ chronic Javier presents to the ED via EMS c/o hematuria, complicated with HTN crisis, admitted to CCU for cardene drip. Palliative medicine consulted to help establish GOC and advance care planning     1) ARF on CKD   - monitor labs   - avoid nephrotoxic agents   - Javier now out patient able to urinate as per notes     2) parkinson's   - c/w medications   - supportive care     Process of Care  --Reviewed dx/treatment problems and alignment with Goals of Care    Physical Aspects of Care  --Pain  patient appears comfortable at this time     --Dyspnea  No SOB at this time  comfortable and in NAD    --Nausea Vomiting  denies    --Weakness  PT as tolerated     Psychological and Psychiatric Aspects of Care:   --Greif/Bereavment: emotional support provided  -Pastoral Care Available PRN     Social Aspects of Care  -SW involved     Cultural Aspects  -Primary Language: English    Goals of Care:     We discussed Palliative Care team being a supportive team when a patient has ongoing illnesses.  We also discussed that it is not an end of life care service, but can help navigate symptoms and emotional support through out their hospital stay here.      Ethical and Legal Aspects: NA    Capacity- pt at this moment was lethargic and unable to fully assess mentation - as per wife patient has some forgetful moments so may be having more of a cognitive decline     HCP/Surrogate: Wife Angela     Code Status- DNR/I with trial of NIV   MOLST-  Dispo Plan- ongoing discussions     Discussed With: Dr. Tenorio     Case coordinated with attending and SW and RN     Time Spent: 90 minutes including the care, coordination and counseling of this patient, 50% of which was spent coordinating and counseling.

## 2024-02-19 NOTE — PROGRESS NOTE ADULT - SUBJECTIVE AND OBJECTIVE BOX
NEPHROLOGY CONSULT  HPI:  2/18- no new changes, nuero consult noted, no changes in meds recommended, wife at bedside  2/17- wife at bedside, pt very debilitated due to Parkinson's D/w Dr Carvalho  82 y/o male w/ a PMHx of CKD, HTN, HLD, hypothyroid, Parkinson's, SBO s/p colostomy, and bladder tumor s/p resection w/ chronic Perry presents to the ED via EMS c/o hematuria. Pt reports seeing dark red blood in his Perry bag w/ clots since noon today, flushed it at home w/o improvement. Denies fever, chills, abd pain, n/v/d, or any injures to the area. Pt noted to have dark red blood in Perry bag. Pt wife reports pt has had similar Sx in the past, was put on ABx w/ improvement in Sx however a few weeks have passed and Sx have returned. Pt not on blood thinners. Pt is scheduled to get the Perry removed next week. No other complaints at this time. Allergies: Ibuprofen. PCP: Dr. Hood. Uro: Dr. Baxter.    Nephrology:  Chart reviewed, pt is not verbal  Admitted w hematuria, chronic perry, s/p colostomy and bladder tumor, was treated for uncontrolled HTN in monitored unit.  CBI/ flushes ordered for clots  Perry has been since removed  I /O inaccurate due to collection error w/o perry , pt incontinent  bladder scan done by myself 116 and 117 ml        PAST MEDICAL & SURGICAL HISTORY:  HTN (hypertension)      HLD (hyperlipidemia)      Parkinson disease      CKD (chronic kidney disease)      Hypothyroidism      Bladder mass      Obstruction of bowel      Prostate cancer      Melanoma of skin      CA skin, basal cell      Cancer, skin, squamous cell      Arthritis      Anemia      History of total knee replacement, left      H/O hemorrhoidectomy      H/O colonoscopy      H/O Mohs micrographic surgery for skin cancer          FAMILY HISTORY:  FHx: prostate cancer        MEDICATIONS  (STANDING):  allopurinol 100 milliGRAM(s) Oral daily  bisacodyl 5 milliGRAM(s) Oral at bedtime  carbidopa/levodopa  25/100 2 Tablet(s) Oral four times a day  cholecalciferol 3000 Unit(s) Oral daily  clonazePAM  Tablet 0.5 milliGRAM(s) Oral at bedtime  finasteride 5 milliGRAM(s) Oral daily  fluticasone propionate 50 MICROgram(s)/spray Nasal Spray 1 Spray(s) Both Nostrils two times a day  folic acid 1 milliGRAM(s) Oral daily  hydrALAZINE 50 milliGRAM(s) Oral every 8 hours  iron sucrose IVPB 100 milliGRAM(s) IV Intermittent every 24 hours  labetalol 100 milliGRAM(s) Oral every 12 hours  levothyroxine 88 MICROGram(s) Oral daily  melatonin 3 milliGRAM(s) Oral at bedtime  NIFEdipine XL 60 milliGRAM(s) Oral daily  polyethylene glycol 3350 17 Gram(s) Oral daily  senna 1 Tablet(s) Oral at bedtime  simvastatin 10 milliGRAM(s) Oral at bedtime  tamsulosin 0.4 milliGRAM(s) Oral at bedtime    MEDICATIONS  (PRN):  acetaminophen     Tablet .. 650 milliGRAM(s) Oral every 6 hours PRN Temp greater or equal to 38C (100.4F), Mild Pain (1 - 3), Moderate Pain (4 - 6)  haloperidol    Injectable 1 milliGRAM(s) IntraMuscular every 24 hours PRN Agitation  QUEtiapine 25 milliGRAM(s) Oral at bedtime PRN sleep aid  sodium chloride 0.65% Nasal 2 Spray(s) Both Nostrils two times a day PRN Nasal Congestion      Allergies    ibuprofen (Rash)  latex (Rash)  Advil (Rash)  Aleve, Ibuprofen, Motrin (Rash)    Intolerances        I&O's Detail          REVIEW OF SYSTEMS:            Vital Signs Last 24 Hrs  T(C): 36.9 (18 Feb 2024 18:01), Max: 36.9 (18 Feb 2024 18:01)  T(F): 98.4 (18 Feb 2024 18:01), Max: 98.4 (18 Feb 2024 18:01)  HR: 61 (18 Feb 2024 18:01) (54 - 67)  BP: 178/71 (18 Feb 2024 18:01) (123/64 - 198/76)  BP(mean): --  RR: 18 (18 Feb 2024 18:01) (16 - 18)  SpO2: 97% (18 Feb 2024 18:01) (95% - 98%)    Parameters below as of 18 Feb 2024 18:01  Patient On (Oxygen Delivery Method): room air            PHYSICAL EXAM:    General:  Alert, No acute distress.    Neuro:    HEENT:  No JVD, no masses, Eyes anicteric, ,    Cardiovascular:  Regular rate and rhythm, with normal S1 and S2.    Lungs:  clear. no rales, no wheezing, .    Abdomen:  Normoactive bowel sounds. Soft, flat, non-tender, and non-distended.  positive bowel sounds    Skin:  Warm, dry    Extremities:  warm,  no cyanosis    LABS:                                   7.6    6.10  )-----------( 189      ( 18 Feb 2024 07:45 )             24.1                           8.7    7.10  )-----------( 211      ( 17 Feb 2024 07:11 )             28.0                9.4    7.97  )-----------( 209      ( 16 Feb 2024 06:17 )             28.8       02-18    142  |  118<H>  |  42<H>  ----------------------------<  103<H>  3.7   |  22  |  3.37<H>    Ca    7.7<L>      18 Feb 2024 07:45  Phos  4.1     02-18  Mg     2.0     02-18      02-17    143  |  116<H>  |  43<H>  ----------------------------<  94  4.0   |  22  |  3.64<H>    Ca    8.1<L>      17 Feb 2024 07:11        02-16    143  |  116<H>  |  41<H>  ----------------------------<  90  3.9   |  22  |  3.65<H>    Ca    8.4<L>      16 Feb 2024 06:17  Phos  4.3     02-15  Mg     2.4     02-15        Urinalysis Basic - ( 16 Feb 2024 06:17 )    Color: x / Appearance: x / SG: x / pH: x  Gluc: 90 mg/dL / Ketone: x  / Bili: x / Urobili: x   Blood: x / Protein: x / Nitrite: x   Leuk Esterase: x / RBC: x / WBC x   Sq Epi: x / Non Sq Epi: x / Bacteria: x             NEPHROLOGY CONSULT  HPI:  2/19- wife at bedside pt awake alert feels much better  2/18- no new changes, neuro consult noted, no changes in meds recommended, wife at bedside  2/17- wife at bedside, pt very debilitated due to Parkinson's D/w Dr Carvalho  84 y/o male w/ a PMHx of CKD, HTN, HLD, hypothyroid, Parkinson's, SBO s/p colostomy, and bladder tumor s/p resection w/ chronic Perry presents to the ED via EMS c/o hematuria. Pt reports seeing dark red blood in his Perry bag w/ clots since noon today, flushed it at home w/o improvement. Denies fever, chills, abd pain, n/v/d, or any injures to the area. Pt noted to have dark red blood in Perry bag. Pt wife reports pt has had similar Sx in the past, was put on ABx w/ improvement in Sx however a few weeks have passed and Sx have returned. Pt not on blood thinners. Pt is scheduled to get the Perry removed next week. No other complaints at this time. Allergies: Ibuprofen. PCP: Dr. Hood. Uro: Dr. Baxter.    Nephrology:  Chart reviewed, pt is not verbal  Admitted w hematuria, chronic perry, s/p colostomy and bladder tumor, was treated for uncontrolled HTN in monitored unit.  CBI/ flushes ordered for clots  Perry has been since removed  I /O inaccurate due to collection error w/o perry , pt incontinent  bladder scan done by myself 116 and 117 ml        PAST MEDICAL & SURGICAL HISTORY:  HTN (hypertension)      HLD (hyperlipidemia)      Parkinson disease      CKD (chronic kidney disease)      Hypothyroidism      Bladder mass      Obstruction of bowel      Prostate cancer      Melanoma of skin      CA skin, basal cell      Cancer, skin, squamous cell      Arthritis      Anemia      History of total knee replacement, left      H/O hemorrhoidectomy      H/O colonoscopy      H/O Mohs micrographic surgery for skin cancer          FAMILY HISTORY:  FHx: prostate cancer      MEDICATIONS  (STANDING):  allopurinol 100 milliGRAM(s) Oral daily  bisacodyl 5 milliGRAM(s) Oral at bedtime  carbidopa/levodopa  25/100 2 Tablet(s) Oral four times a day  cholecalciferol 3000 Unit(s) Oral daily  finasteride 5 milliGRAM(s) Oral daily  fluticasone propionate 50 MICROgram(s)/spray Nasal Spray 1 Spray(s) Both Nostrils two times a day  folic acid 1 milliGRAM(s) Oral daily  hydrALAZINE 50 milliGRAM(s) Oral once  hydrALAZINE 50 milliGRAM(s) Oral every 8 hours  iron sucrose IVPB 100 milliGRAM(s) IV Intermittent every 24 hours  labetalol 100 milliGRAM(s) Oral every 12 hours  levothyroxine 88 MICROGram(s) Oral daily  melatonin 3 milliGRAM(s) Oral at bedtime  NIFEdipine XL 60 milliGRAM(s) Oral daily  piperacillin/tazobactam IVPB.. 3.375 Gram(s) IV Intermittent every 12 hours  polyethylene glycol 3350 17 Gram(s) Oral daily  senna 1 Tablet(s) Oral at bedtime  simvastatin 10 milliGRAM(s) Oral at bedtime  sodium chloride 0.45%. 1000 milliLiter(s) (50 mL/Hr) IV Continuous <Continuous>  tamsulosin 0.4 milliGRAM(s) Oral at bedtime    MEDICATIONS  (PRN):  acetaminophen     Tablet .. 650 milliGRAM(s) Oral every 6 hours PRN Temp greater or equal to 38C (100.4F), Mild Pain (1 - 3), Moderate Pain (4 - 6)  haloperidol    Injectable 1 milliGRAM(s) IntraMuscular every 24 hours PRN Agitation  QUEtiapine 25 milliGRAM(s) Oral at bedtime PRN sleep aid  sodium chloride 0.65% Nasal 2 Spray(s) Both Nostrils two times a day PRN Nasal Congestion      Allergies    ibuprofen (Rash)  latex (Rash)  Advil (Rash)  Aleve, Ibuprofen, Motrin (Rash)    Intolerances        I&O's Detail          REVIEW OF SYSTEMS:      Vital Signs Last 24 Hrs  T(C): 36.6 (19 Feb 2024 20:55), Max: 36.6 (19 Feb 2024 20:55)  T(F): 97.9 (19 Feb 2024 20:55), Max: 97.9 (19 Feb 2024 20:55)  HR: 64 (19 Feb 2024 20:55) (47 - 64)  BP: 170/98 (19 Feb 2024 22:00) (110/47 - 170/98)  BP(mean): --  RR: 18 (19 Feb 2024 20:55) (17 - 19)  SpO2: 97% (19 Feb 2024 20:55) (94% - 97%)    Parameters below as of 19 Feb 2024 20:55  Patient On (Oxygen Delivery Method): room air    PHYSICAL EXAM:    General:  Alert, No acute distress.    Neuro:    HEENT:  No JVD, no masses, Eyes anicteric, ,    Cardiovascular:  Regular rate and rhythm, with normal S1 and S2.    Lungs:  clear. no rales, no wheezing, .    Abdomen:  Normoactive bowel sounds. Soft, flat, non-tender, and non-distended.  positive bowel sounds    Skin:  Warm, dry    Extremities:  warm,  no cyanosis    LABS:                          9.2    6.58  )-----------( 208      ( 19 Feb 2024 06:47 )             29.2                                      7.6    6.10  )-----------( 189      ( 18 Feb 2024 07:45 )             24.1                           8.7    7.10  )-----------( 211      ( 17 Feb 2024 07:11 )             28.0                9.4    7.97  )-----------( 209      ( 16 Feb 2024 06:17 )             28.8     02-19    140  |  116<H>  |  41<H>  ----------------------------<  104<H>  3.7   |  19<L>  |  3.19<H>    Ca    8.4<L>      19 Feb 2024 06:47  Phos  4.1     02-18  Mg     2.3     02-19 02-18    142  |  118<H>  |  42<H>  ----------------------------<  103<H>  3.7   |  22  |  3.37<H>    Ca    7.7<L>      18 Feb 2024 07:45  Phos  4.1     02-18  Mg     2.0     02-18 02-17    143  |  116<H>  |  43<H>  ----------------------------<  94  4.0   |  22  |  3.64<H>    Ca    8.1<L>      17 Feb 2024 07:11        02-16    143  |  116<H>  |  41<H>  ----------------------------<  90  3.9   |  22  |  3.65<H>    Ca    8.4<L>      16 Feb 2024 06:17  Phos  4.3     02-15  Mg     2.4     02-15        Urinalysis Basic - ( 16 Feb 2024 06:17 )    Color: x / Appearance: x / SG: x / pH: x  Gluc: 90 mg/dL / Ketone: x  / Bili: x / Urobili: x   Blood: x / Protein: x / Nitrite: x   Leuk Esterase: x / RBC: x / WBC x   Sq Epi: x / Non Sq Epi: x / Bacteria: x

## 2024-02-19 NOTE — CONSULT NOTE ADULT - CONSULT REASON
GOC
htn perry
stoma prolapse
MELISSA/ CKD
Parkinson's medications
hypertensive urgency/emergency
GOC

## 2024-02-20 ENCOUNTER — TRANSCRIPTION ENCOUNTER (OUTPATIENT)
Age: 84
End: 2024-02-20

## 2024-02-20 LAB
ANION GAP SERPL CALC-SCNC: 4 MMOL/L — LOW (ref 5–17)
BUN SERPL-MCNC: 50 MG/DL — HIGH (ref 7–23)
CALCIUM SERPL-MCNC: 8.4 MG/DL — LOW (ref 8.5–10.1)
CHLORIDE SERPL-SCNC: 114 MMOL/L — HIGH (ref 96–108)
CO2 SERPL-SCNC: 23 MMOL/L — SIGNIFICANT CHANGE UP (ref 22–31)
CREAT SERPL-MCNC: 3.25 MG/DL — HIGH (ref 0.5–1.3)
EGFR: 18 ML/MIN/1.73M2 — LOW
GLUCOSE SERPL-MCNC: 92 MG/DL — SIGNIFICANT CHANGE UP (ref 70–99)
HCT VFR BLD CALC: 29 % — LOW (ref 39–50)
HGB BLD-MCNC: 9.2 G/DL — LOW (ref 13–17)
MAGNESIUM SERPL-MCNC: 2.2 MG/DL — SIGNIFICANT CHANGE UP (ref 1.6–2.6)
MCHC RBC-ENTMCNC: 30.5 PG — SIGNIFICANT CHANGE UP (ref 27–34)
MCHC RBC-ENTMCNC: 31.7 GM/DL — LOW (ref 32–36)
MCV RBC AUTO: 96 FL — SIGNIFICANT CHANGE UP (ref 80–100)
PLATELET # BLD AUTO: 221 K/UL — SIGNIFICANT CHANGE UP (ref 150–400)
POTASSIUM SERPL-MCNC: 4.3 MMOL/L — SIGNIFICANT CHANGE UP (ref 3.5–5.3)
POTASSIUM SERPL-SCNC: 4.3 MMOL/L — SIGNIFICANT CHANGE UP (ref 3.5–5.3)
RBC # BLD: 3.02 M/UL — LOW (ref 4.2–5.8)
RBC # FLD: 17.1 % — HIGH (ref 10.3–14.5)
SODIUM SERPL-SCNC: 141 MMOL/L — SIGNIFICANT CHANGE UP (ref 135–145)
WBC # BLD: 7.94 K/UL — SIGNIFICANT CHANGE UP (ref 3.8–10.5)
WBC # FLD AUTO: 7.94 K/UL — SIGNIFICANT CHANGE UP (ref 3.8–10.5)

## 2024-02-20 PROCEDURE — 99231 SBSQ HOSP IP/OBS SF/LOW 25: CPT | Mod: GC

## 2024-02-20 PROCEDURE — 99232 SBSQ HOSP IP/OBS MODERATE 35: CPT

## 2024-02-20 PROCEDURE — 99231 SBSQ HOSP IP/OBS SF/LOW 25: CPT

## 2024-02-20 RX ORDER — LABETALOL HCL 100 MG
1 TABLET ORAL
Qty: 60 | Refills: 0
Start: 2024-02-20 | End: 2024-03-20

## 2024-02-20 RX ORDER — CLONAZEPAM 1 MG
0.5 TABLET ORAL AT BEDTIME
Refills: 0 | Status: DISCONTINUED | OUTPATIENT
Start: 2024-02-20 | End: 2024-02-21

## 2024-02-20 RX ORDER — QUETIAPINE FUMARATE 200 MG/1
0.5 TABLET, FILM COATED ORAL
Qty: 15 | Refills: 0
Start: 2024-02-20 | End: 2024-03-20

## 2024-02-20 RX ORDER — IPRATROPIUM/ALBUTEROL SULFATE 18-103MCG
3 AEROSOL WITH ADAPTER (GRAM) INHALATION EVERY 6 HOURS
Refills: 0 | Status: DISCONTINUED | OUTPATIENT
Start: 2024-02-20 | End: 2024-02-21

## 2024-02-20 RX ADMIN — Medication 3 MILLIGRAM(S): at 21:03

## 2024-02-20 RX ADMIN — CARBIDOPA AND LEVODOPA 2 TABLET(S): 25; 100 TABLET ORAL at 17:58

## 2024-02-20 RX ADMIN — Medication 1 SPRAY(S): at 21:02

## 2024-02-20 RX ADMIN — SENNA PLUS 1 TABLET(S): 8.6 TABLET ORAL at 21:03

## 2024-02-20 RX ADMIN — CARBIDOPA AND LEVODOPA 2 TABLET(S): 25; 100 TABLET ORAL at 00:00

## 2024-02-20 RX ADMIN — Medication 100 MILLIGRAM(S): at 20:38

## 2024-02-20 RX ADMIN — QUETIAPINE FUMARATE 25 MILLIGRAM(S): 200 TABLET, FILM COATED ORAL at 00:02

## 2024-02-20 RX ADMIN — Medication 100 MILLIGRAM(S): at 10:49

## 2024-02-20 RX ADMIN — POLYETHYLENE GLYCOL 3350 17 GRAM(S): 17 POWDER, FOR SOLUTION ORAL at 10:51

## 2024-02-20 RX ADMIN — Medication 5 MILLIGRAM(S): at 21:03

## 2024-02-20 RX ADMIN — Medication 650 MILLIGRAM(S): at 12:02

## 2024-02-20 RX ADMIN — Medication 3000 UNIT(S): at 10:49

## 2024-02-20 RX ADMIN — Medication 100 MILLIGRAM(S): at 08:32

## 2024-02-20 RX ADMIN — CARBIDOPA AND LEVODOPA 2 TABLET(S): 25; 100 TABLET ORAL at 23:24

## 2024-02-20 RX ADMIN — IRON SUCROSE 210 MILLIGRAM(S): 20 INJECTION, SOLUTION INTRAVENOUS at 22:12

## 2024-02-20 RX ADMIN — Medication 650 MILLIGRAM(S): at 11:04

## 2024-02-20 RX ADMIN — CARBIDOPA AND LEVODOPA 2 TABLET(S): 25; 100 TABLET ORAL at 12:16

## 2024-02-20 RX ADMIN — Medication 50 MILLIGRAM(S): at 05:53

## 2024-02-20 RX ADMIN — Medication 1 SPRAY(S): at 10:52

## 2024-02-20 RX ADMIN — TAMSULOSIN HYDROCHLORIDE 0.4 MILLIGRAM(S): 0.4 CAPSULE ORAL at 21:03

## 2024-02-20 RX ADMIN — Medication 0.5 MILLIGRAM(S): at 21:05

## 2024-02-20 RX ADMIN — FINASTERIDE 5 MILLIGRAM(S): 5 TABLET, FILM COATED ORAL at 10:49

## 2024-02-20 RX ADMIN — Medication 88 MICROGRAM(S): at 05:53

## 2024-02-20 RX ADMIN — Medication 50 MILLIGRAM(S): at 20:38

## 2024-02-20 RX ADMIN — Medication 1 MILLIGRAM(S): at 10:49

## 2024-02-20 RX ADMIN — PIPERACILLIN AND TAZOBACTAM 25 GRAM(S): 4; .5 INJECTION, POWDER, LYOPHILIZED, FOR SOLUTION INTRAVENOUS at 00:00

## 2024-02-20 RX ADMIN — SIMVASTATIN 10 MILLIGRAM(S): 20 TABLET, FILM COATED ORAL at 21:03

## 2024-02-20 RX ADMIN — PIPERACILLIN AND TAZOBACTAM 25 GRAM(S): 4; .5 INJECTION, POWDER, LYOPHILIZED, FOR SOLUTION INTRAVENOUS at 10:52

## 2024-02-20 RX ADMIN — CARBIDOPA AND LEVODOPA 2 TABLET(S): 25; 100 TABLET ORAL at 05:54

## 2024-02-20 RX ADMIN — Medication 3 MILLILITER(S): at 12:36

## 2024-02-20 RX ADMIN — Medication 60 MILLIGRAM(S): at 08:32

## 2024-02-20 NOTE — ED PROVIDER NOTE - CPE EDP GASTRO NORM
Pt needs surgical clearance  Made a new patient appt - scanned documents into encounter   Placed in Dr. Dahl folder - appt scheduled for 3/12/24.  
normal...

## 2024-02-20 NOTE — PROGRESS NOTE ADULT - ASSESSMENT
84 y/o male w/ a PMHx of CKD, HTN, HLD, hypothyroid, Parkinson's, SBO s/p colostomy, and bladder tumor s/p resection w/ chronic Perry presents to the ED via EMS c/o hematuria, complicated with HTN crisis, admitted to CCU for cardene drip.     #HTN emergency  #anemia due to blood loss from hematuria and iron deficiency anemia   #CKD with  Secondary hyperparathyroidism  #altered mental status  #h/o prostate Ca   #Parkinson's disease, advanced with severe orthostatic hypotension  #Bacterial Pneumonia   #stoma prolapse      Plan:   -Off nicardipine   -Became hypotensive as the day progressed - d/c'd clonidine patch  -Continue nifedipine and labetalol   -hypotension -  this is most likely secondary to autonomic dysfunction from parkinson's dis  -will likely do better with permissive HTN   -also high risk of aspiration, discussed with wife at bedside   -appreciate renal help, d/w Dr. Hicks today, can hold off on IVF,   2/18 - today Cr better - unsure if mild wheeze - getting PRBCs so will dc IVFs for the remainder of the day and re-eval tmr   CXR ordered, sats okay   2/19 - CXR possible small left infiltrate, however no leukocytosis, afebrile, sats wnl, just completed zosyn, cont to monitor   Of note, patient had been on empiric zosyn, on admn for suspected UTI   completed abx for suspected UTI - but UCx NTD   will get zosyn for three more doses     Stoma prolapse  2/19 discussed with Surgery, CT abdo ordered, cleared to cont PO diet for now     Mentation ok, not agitated today   PD, orthostatic hypotension from PD, dementia due to it also   2/18 - Continue sinemet  Neuro consulted - rec cont home regimen, see dr montano as OP, low dose seroquel for agitation prn       Urology consult  Hematuria resolved, off CBI   2/18 had perry now off it - if he needs it again recommend change to a 3-way to regular prior to discharge   low HB in part due to hematuria, will give 1U PRBCs, consent given by wife   follow-up with Dr Leung - renal and bladder USG reviewed -   there is asymmetric bladder wall thickening measures up to 1.1 cm in the posterior bladder wall;  pt should see URO Dr Leung and get cysto as OP   2/19 - Pt got PRBCs yesterday, Hb and Cr better today   will continue low dose IVFs - re-eval tmr     DVT ppx with SCDs    NOK - wife, POC discussed with her, RN and renal team   likely discharge to [facility] assisted [facility]; at home uses WC and RW; aides at home   Social work for placement / palliative care eval  follow-up Dr Leung and Dr Hicks and Neuro   2/19 - tried to reach wife, unable to reach her on numbers listed - discussed with    continue antibiotics for two more days, check labs in AM and resume IVFs if needed   follow-up with surgery re stoma prolapse  pt being seen by PT          82 y/o male w/ a PMHx of CKD, HTN, HLD, hypothyroid, Parkinson's, SBO s/p colostomy, and bladder tumor s/p resection w/ chronic Yaya presents to the ED via EMS c/o hematuria, complicated with HTN crisis, admitted to CCU for cardene drip.     #HTN emergency  #anemia due to blood loss from hematuria and iron deficiency anemia   #CKD with  Secondary hyperparathyroidism  #altered mental status  #h/o prostate Ca   #Parkinson's disease, advanced with severe orthostatic hypotension  #Bacterial Pneumonia   #stoma prolapse      Plan:   -Off nicardipine   -Became hypotensive as the day progressed - d/c'd clonidine patch  -Continue nifedipine and labetalol   -hypotension -  this is most likely secondary to autonomic dysfunction from parkinson's dis  -will likely do better with permissive HTN   completed abx for suspected UTI - but UCx NTD   -appreciate renal help, d/w Dr. Hicks today    2/18 - today Cr better - unsure if mild wheeze - getting PRBCs so will dc IVFs for the remainder of the day and re-eval tmr, CXR ordered, sats okay   2/19 - CXR possible small left infiltrate, however no leukocytosis, afebrile, sats wnl, just completed zosyn, cont to monitor   2/20 - with expiratory wheezing today, d/c IVF, wife reports pt has intermittent wheezing at times, no resp distress, comfortable, breathing on RA, CT abdomen done capturing mild L pleural effusion, doubt PNA, pt is afebrile, no leukocytosis, has been on ABX, will hold off at this time. Creatinine elevated today despite overnight IVF, BP labile, low this AM, HR also fluctuating in low 50s at times, has been started on labetalol here, will add holding parameters.     Stoma prolapse  2/19 discussed with Surgery, CT abdo ordered, cleared to cont PO diet for now     Mentation ok, not agitated today   PD, orthostatic hypotension from PD, dementia due to it also   Neuro consulted - rec cont home regimen, see dr montano as OP, low dose seroquel for agitation prn     Urology consult  Hematuria resolved, off CBI   2/18 had perry now off it - if he needs it again recommend change to a 3-way to regular prior to discharge   low HB in part due to hematuria, will give 1U PRBCs, consent given by wife   follow-up with Dr Leung - renal and bladder USG reviewed   there is asymmetric bladder wall thickening measures up to 1.1 cm in the posterior bladder wall  pt should see URO Dr Leung and get cysto as OP   2/19 - Pt got PRBCs yesterday, Hb and Cr better today     DVT ppx with SCDs    NOK - wife, POC discussed with her    likely discharge to [facility] assisted living; at home uses WC and RW; aides at home   Social work for placement / palliative care eval  follow-up Dr Leung and Dr Hicks and Neuro   follow-up with surgery re stoma prolapse    DISPO: D/C tomorrow, pt needed to give aides 24h notice

## 2024-02-20 NOTE — PROGRESS NOTE ADULT - SUBJECTIVE AND OBJECTIVE BOX
HPI: 84 y/o male w/ a PMHx of CKD, HTN, HLD, hypothyroid, Parkinson's, SBO s/p colostomy, and bladder tumor s/p resection w/ chronic Javier presented to the ED via EMS c/o hematuria. Pt reports seeing dark red blood in his Javier bag w/ clots since noon today, flushed it at home w/o improvement. Denies fever, chills, abd pain, n/v/d, or any injures to the area. Pt noted to have dark red blood in Javier bag. Pt wife reports pt has had similar Sx in the past, was put on ABx w/ improvement in Sx however a few weeks have passed and Sx have returned. Pt not on blood thinners. Pt is scheduled to get the Javier removed next week. No other complaints at this time. Allergies: Ibuprofen. PCP: Dr. Hood. Uro: Dr. Baxter. ICU consult for treatment resistant HTN    Subjective: Patient seen today, wife present, feels better, sitting up in chair at bedside, no complaints to offer, BP labile, as per wife pt's usually has very elevated BP, and at times very low. Wheezing on exam today, minimal expiratory.     REVIEW OF SYSTEMS:    CONSTITUTIONAL: No weakness, fevers or chills  EYES/ENT: No visual changes;  No vertigo or throat pain   NECK: No pain or stiffness  RESPIRATORY: No cough, wheezing, hemoptysis; No shortness of breath  CARDIOVASCULAR: No chest pain or palpitations  GASTROINTESTINAL: No abdominal or epigastric pain. No nausea, vomiting, or hematemesis; No diarrhea or constipation. No melena or hematochezia.  GENITOURINARY: No dysuria, frequency or hematuria  NEUROLOGICAL: No numbness or weakness  SKIN: No itching, rashes          MEDICATIONS  (STANDING):  albuterol/ipratropium for Nebulization 3 milliLiter(s) Nebulizer every 6 hours  allopurinol 100 milliGRAM(s) Oral daily  bisacodyl 5 milliGRAM(s) Oral at bedtime  carbidopa/levodopa  25/100 2 Tablet(s) Oral four times a day  cholecalciferol 3000 Unit(s) Oral daily  clonazePAM  Tablet 0.5 milliGRAM(s) Oral at bedtime  finasteride 5 milliGRAM(s) Oral daily  fluticasone propionate 50 MICROgram(s)/spray Nasal Spray 1 Spray(s) Both Nostrils two times a day  folic acid 1 milliGRAM(s) Oral daily  hydrALAZINE 50 milliGRAM(s) Oral every 8 hours  iron sucrose IVPB 100 milliGRAM(s) IV Intermittent every 24 hours  labetalol 100 milliGRAM(s) Oral every 12 hours  levothyroxine 88 MICROGram(s) Oral daily  melatonin 3 milliGRAM(s) Oral at bedtime  NIFEdipine XL 60 milliGRAM(s) Oral daily  polyethylene glycol 3350 17 Gram(s) Oral daily  senna 1 Tablet(s) Oral at bedtime  simvastatin 10 milliGRAM(s) Oral at bedtime  tamsulosin 0.4 milliGRAM(s) Oral at bedtime    MEDICATIONS  (PRN):  acetaminophen     Tablet .. 650 milliGRAM(s) Oral every 6 hours PRN Temp greater or equal to 38C (100.4F), Mild Pain (1 - 3), Moderate Pain (4 - 6)  haloperidol    Injectable 1 milliGRAM(s) IntraMuscular every 24 hours PRN Agitation  QUEtiapine 25 milliGRAM(s) Oral at bedtime PRN sleep aid  sodium chloride 0.65% Nasal 2 Spray(s) Both Nostrils two times a day PRN Nasal Congestion      Vital Signs Last 24 Hrs  T(C): 36.4 (20 Feb 2024 15:52), Max: 36.6 (19 Feb 2024 20:55)  T(F): 97.5 (20 Feb 2024 15:52), Max: 97.9 (19 Feb 2024 20:55)  HR: 62 (20 Feb 2024 15:52) (47 - 64)  BP: 137/64 (20 Feb 2024 15:52) (89/48 - 194/72)  BP(mean): 105 (20 Feb 2024 08:25) (105 - 105)  RR: 18 (20 Feb 2024 15:52) (18 - 18)  SpO2: 97% (20 Feb 2024 15:52) (96% - 98%)    Parameters below as of 20 Feb 2024 15:52  Patient On (Oxygen Delivery Method): room air      PHYSICAL EXAM:  GENERAL: NAD, lying in bed comfortably  HEAD:  Atraumatic, Normocephalic  EYES: conjunctiva and sclera clear  ENT: Moist mucous membranes  NECK: Supple, No JVD  CHEST/LUNG: Bilateral air entry, mild expiratory wheeze - scattered, no distress. Unlabored respirations  HEART: Regular rate and rhythm; No murmurs  ABDOMEN: Bowel sounds present; Soft, Nontender, Nondistended.   EXTREMITIES:  2+ Peripheral Pulses, brisk capillary refill. No clubbing, cyanosis, or edema  NERVOUS SYSTEM:  Alert & Oriented X3, speech clear. No deficits   MSK: FROM all 4 extremities, full and equal strength          LABS:                          9.2    7.94  )-----------( 221      ( 20 Feb 2024 06:30 )             29.0     20 Feb 2024 06:30    141    |  114    |  50     ----------------------------<  92     4.3     |  23     |  3.25     Ca    8.4        20 Feb 2024 06:30  Mg     2.2       20 Feb 2024 06:30          CAPILLARY BLOOD GLUCOSE      POCT Blood Glucose.: 91 mg/dL (19 Feb 2024 23:27)  POCT Blood Glucose.: 88 mg/dL (19 Feb 2024 21:01)        Urinalysis Basic - ( 20 Feb 2024 06:30 )    Color: x / Appearance: x / SG: x / pH: x  Gluc: 92 mg/dL / Ketone: x  / Bili: x / Urobili: x   Blood: x / Protein: x / Nitrite: x   Leuk Esterase: x / RBC: x / WBC x   Sq Epi: x / Non Sq Epi: x / Bacteria: x        RADIOLOGY:

## 2024-02-20 NOTE — PROGRESS NOTE ADULT - SUBJECTIVE AND OBJECTIVE BOX
[facility]   SURGERY DAILY PROGRESS NOTE    Subjective:  Patient seen and examined on morning rounds.       MEDICATIONS  (STANDING):  allopurinol 100 milliGRAM(s) Oral daily  bisacodyl 5 milliGRAM(s) Oral at bedtime  carbidopa/levodopa  25/100 2 Tablet(s) Oral four times a day  cholecalciferol 3000 Unit(s) Oral daily  finasteride 5 milliGRAM(s) Oral daily  fluticasone propionate 50 MICROgram(s)/spray Nasal Spray 1 Spray(s) Both Nostrils two times a day  folic acid 1 milliGRAM(s) Oral daily  hydrALAZINE 50 milliGRAM(s) Oral every 8 hours  hydrALAZINE 50 milliGRAM(s) Oral once  iron sucrose IVPB 100 milliGRAM(s) IV Intermittent every 24 hours  labetalol 100 milliGRAM(s) Oral every 12 hours  levothyroxine 88 MICROGram(s) Oral daily  melatonin 3 milliGRAM(s) Oral at bedtime  NIFEdipine XL 60 milliGRAM(s) Oral daily  piperacillin/tazobactam IVPB.. 3.375 Gram(s) IV Intermittent every 12 hours  polyethylene glycol 3350 17 Gram(s) Oral daily  senna 1 Tablet(s) Oral at bedtime  simvastatin 10 milliGRAM(s) Oral at bedtime  sodium chloride 0.45%. 1000 milliLiter(s) (50 mL/Hr) IV Continuous <Continuous>  tamsulosin 0.4 milliGRAM(s) Oral at bedtime    MEDICATIONS  (PRN):  acetaminophen     Tablet .. 650 milliGRAM(s) Oral every 6 hours PRN Temp greater or equal to 38C (100.4F), Mild Pain (1 - 3), Moderate Pain (4 - 6)  haloperidol    Injectable 1 milliGRAM(s) IntraMuscular every 24 hours PRN Agitation  QUEtiapine 25 milliGRAM(s) Oral at bedtime PRN sleep aid  sodium chloride 0.65% Nasal 2 Spray(s) Both Nostrils two times a day PRN Nasal Congestion    Vital Signs Last 24 Hrs  T(C): 36.6 (19 Feb 2024 20:55), Max: 36.8 (19 Feb 2024 02:00)  T(F): 97.9 (19 Feb 2024 20:55), Max: 98.2 (19 Feb 2024 02:00)  HR: 64 (19 Feb 2024 20:55) (47 - 64)  BP: 170/98 (19 Feb 2024 22:00) (110/47 - 170/98)  BP(mean): --  RR: 18 (19 Feb 2024 20:55) (17 - 19)  SpO2: 97% (19 Feb 2024 20:55) (94% - 97%)    Parameters below as of 19 Feb 2024 20:55  Patient On (Oxygen Delivery Method): room air      I&O's Detail    18 Feb 2024 07:01  -  19 Feb 2024 07:00  --------------------------------------------------------  IN:    PRBCs (Packed Red Blood Cells): 286 mL  Total IN: 286 mL    OUT:  Total OUT: 0 mL    Total NET: 286 mL        Daily     Daily     LABS:                        9.2    6.58  )-----------( 208      ( 19 Feb 2024 06:47 )             29.2     02-19    140  |  116<H>  |  41<H>  ----------------------------<  104<H>  3.7   |  19<L>  |  3.19<H>    Ca    8.4<L>      19 Feb 2024 06:47  Phos  4.1     02-18  Mg     2.3     02-19        Urinalysis Basic - ( 19 Feb 2024 06:47 )    Color: x / Appearance: x / SG: x / pH: x  Gluc: 104 mg/dL / Ketone: x  / Bili: x / Urobili: x   Blood: x / Protein: x / Nitrite: x   Leuk Esterase: x / RBC: x / WBC x   Sq Epi: x / Non Sq Epi: x / Bacteria: x        Physical Exam:   General: AAOx2, NAD  Chest: Normal respiratory effort  Heart: RRR  Abdomen: Soft, NT, mildly distended, ostoma with stool, pink, prolapsed approx 30 cm but easily reducible.   Neuro/Psych: No localized deficits.   Skin: Normal, no rashes, no lesions noted.

## 2024-02-20 NOTE — PROGRESS NOTE ADULT - ASSESSMENT
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      Recommendations:  Enterostomal nurse assessment  CT abd/pelvis with oral contrast: Reynaldo prolapse of the left lower quadrant colostomy. Large non-obstructing parastomal hernia containing small intestine. Incidental note made of the appendix located within a right inguinal hernia.  PO diet as tolerated  Document intake and output BID  Rest of management as per primary team     Plan to be discussed with CRS team

## 2024-02-20 NOTE — PROGRESS NOTE ADULT - SUBJECTIVE AND OBJECTIVE BOX
NEPHROLOGY CONSULT  HPI:  2/20- pt awake alert, some wheezing was reported but pt states he wheezes on and off  2/19- wife at bedside pt awake alert feels much better  2/18- no new changes, neuro consult noted, no changes in meds recommended, wife at bedside  2/17- wife at bedside, pt very debilitated due to Parkinson's D/w Dr Carvalho  84 y/o male w/ a PMHx of CKD, HTN, HLD, hypothyroid, Parkinson's, SBO s/p colostomy, and bladder tumor s/p resection w/ chronic Perry presents to the ED via EMS c/o hematuria. Pt reports seeing dark red blood in his Perry bag w/ clots since noon today, flushed it at home w/o improvement. Denies fever, chills, abd pain, n/v/d, or any injures to the area. Pt noted to have dark red blood in Perry bag. Pt wife reports pt has had similar Sx in the past, was put on ABx w/ improvement in Sx however a few weeks have passed and Sx have returned. Pt not on blood thinners. Pt is scheduled to get the Perry removed next week. No other complaints at this time. Allergies: Ibuprofen. PCP: Dr. Hood. Uro: Dr. Baxter.    Nephrology:  Chart reviewed, pt is not verbal  Admitted w hematuria, chronic perry, s/p colostomy and bladder tumor, was treated for uncontrolled HTN in monitored unit.  CBI/ flushes ordered for clots  Perry has been since removed  I /O inaccurate due to collection error w/o perry , pt incontinent  bladder scan done by myself 116 and 117 ml        PAST MEDICAL & SURGICAL HISTORY:  HTN (hypertension)      HLD (hyperlipidemia)      Parkinson disease      CKD (chronic kidney disease)      Hypothyroidism      Bladder mass      Obstruction of bowel      Prostate cancer      Melanoma of skin      CA skin, basal cell      Cancer, skin, squamous cell      Arthritis      Anemia      History of total knee replacement, left      H/O hemorrhoidectomy      H/O colonoscopy      H/O Mohs micrographic surgery for skin cancer          FAMILY HISTORY:  FHx: prostate cancer      MEDICATIONS  (STANDING):  allopurinol 100 milliGRAM(s) Oral daily  bisacodyl 5 milliGRAM(s) Oral at bedtime  carbidopa/levodopa  25/100 2 Tablet(s) Oral four times a day  cholecalciferol 3000 Unit(s) Oral daily  finasteride 5 milliGRAM(s) Oral daily  fluticasone propionate 50 MICROgram(s)/spray Nasal Spray 1 Spray(s) Both Nostrils two times a day  folic acid 1 milliGRAM(s) Oral daily  hydrALAZINE 50 milliGRAM(s) Oral once  hydrALAZINE 50 milliGRAM(s) Oral every 8 hours  iron sucrose IVPB 100 milliGRAM(s) IV Intermittent every 24 hours  labetalol 100 milliGRAM(s) Oral every 12 hours  levothyroxine 88 MICROGram(s) Oral daily  melatonin 3 milliGRAM(s) Oral at bedtime  NIFEdipine XL 60 milliGRAM(s) Oral daily  piperacillin/tazobactam IVPB.. 3.375 Gram(s) IV Intermittent every 12 hours  polyethylene glycol 3350 17 Gram(s) Oral daily  senna 1 Tablet(s) Oral at bedtime  simvastatin 10 milliGRAM(s) Oral at bedtime  sodium chloride 0.45%. 1000 milliLiter(s) (50 mL/Hr) IV Continuous <Continuous>  tamsulosin 0.4 milliGRAM(s) Oral at bedtime    MEDICATIONS  (PRN):  acetaminophen     Tablet .. 650 milliGRAM(s) Oral every 6 hours PRN Temp greater or equal to 38C (100.4F), Mild Pain (1 - 3), Moderate Pain (4 - 6)  haloperidol    Injectable 1 milliGRAM(s) IntraMuscular every 24 hours PRN Agitation  QUEtiapine 25 milliGRAM(s) Oral at bedtime PRN sleep aid  sodium chloride 0.65% Nasal 2 Spray(s) Both Nostrils two times a day PRN Nasal Congestion      Allergies    ibuprofen (Rash)  latex (Rash)  Advil (Rash)  Aleve, Ibuprofen, Motrin (Rash)    Intolerances        I&O's Detail          REVIEW OF SYSTEMS:    Vital Signs Last 24 Hrs  T(C): 36.4 (20 Feb 2024 15:52), Max: 36.6 (19 Feb 2024 20:55)  T(F): 97.5 (20 Feb 2024 15:52), Max: 97.9 (19 Feb 2024 20:55)  HR: 62 (20 Feb 2024 15:52) (47 - 64)  BP: 137/64 (20 Feb 2024 15:52) (89/48 - 194/72)  BP(mean): 105 (20 Feb 2024 08:25) (105 - 105)  RR: 18 (20 Feb 2024 15:52) (18 - 18)  SpO2: 97% (20 Feb 2024 15:52) (96% - 98%)    Parameters below as of 20 Feb 2024 15:52  Patient On (Oxygen Delivery Method): room air      PHYSICAL EXAM:    General:  Alert, No acute distress.    Neuro:    HEENT:  No JVD, no masses, Eyes anicteric, ,    Cardiovascular:  Regular rate and rhythm, with normal S1 and S2.    Lungs:  clear. no rales, no wheezing, .    Abdomen:  Normoactive bowel sounds. Soft, flat, non-tender, and non-distended.  positive bowel sounds    Skin:  Warm, dry    Extremities:  warm,  no cyanosis    LABS:                          9.2    6.58  )-----------( 208      ( 19 Feb 2024 06:47 )             29.2                                      7.6    6.10  )-----------( 189      ( 18 Feb 2024 07:45 )             24.1                           8.7    7.10  )-----------( 211      ( 17 Feb 2024 07:11 )             28.0                9.4    7.97  )-----------( 209      ( 16 Feb 2024 06:17 )             28.8       02-20    141  |  114<H>  |  50<H>  ----------------------------<  92  4.3   |  23  |  3.25<H>    Ca    8.4<L>      20 Feb 2024 06:30  Mg     2.2     02-20 02-19    140  |  116<H>  |  41<H>  ----------------------------<  104<H>  3.7   |  19<L>  |  3.19<H>    Ca    8.4<L>      19 Feb 2024 06:47  Phos  4.1     02-18  Mg     2.3     02-19 02-18    142  |  118<H>  |  42<H>  ----------------------------<  103<H>  3.7   |  22  |  3.37<H>    Ca    7.7<L>      18 Feb 2024 07:45  Phos  4.1     02-18  Mg     2.0     02-18 02-17    143  |  116<H>  |  43<H>  ----------------------------<  94  4.0   |  22  |  3.64<H>    Ca    8.1<L>      17 Feb 2024 07:11        02-16    143  |  116<H>  |  41<H>  ----------------------------<  90  3.9   |  22  |  3.65<H>    Ca    8.4<L>      16 Feb 2024 06:17  Phos  4.3     02-15  Mg     2.4     02-15        Urinalysis Basic - ( 16 Feb 2024 06:17 )    Color: x / Appearance: x / SG: x / pH: x  Gluc: 90 mg/dL / Ketone: x  / Bili: x / Urobili: x   Blood: x / Protein: x / Nitrite: x   Leuk Esterase: x / RBC: x / WBC x   Sq Epi: x / Non Sq Epi: x / Bacteria: x

## 2024-02-20 NOTE — PROGRESS NOTE ADULT - NUTRITIONAL ASSESSMENT
This patient has been assessed with a concern for Malnutrition and has been determined to have a diagnosis/diagnoses of Moderate protein-calorie malnutrition.    This patient is being managed with:   Diet DASH/TLC-  Sodium & Cholesterol Restricted  Entered: Feb 12 2024 12:40AM  

## 2024-02-20 NOTE — DISCHARGE NOTE NURSING/CASE MANAGEMENT/SOCIAL WORK - PATIENT PORTAL LINK FT
You can access the FollowMyHealth Patient Portal offered by [facility] by registering at the following website: [email or web address]. By joining [employer]’s FollowMyHealth portal, you will also be able to view your health information using other applications (apps) compatible with our system.

## 2024-02-20 NOTE — PROGRESS NOTE ADULT - ASSESSMENT
82 y/o male w/ a PMHx of CKD, HTN, HLD, hypothyroid, Parkinson's, SBO s/p colostomy, and bladder tumor s/p resection w/ chronic Perry presents to the ED via EMS c/o hematuria. Pt reports seeing dark red blood in his Perry bag w/ clots since noon today, flushed it at home w/o improvement. Denies fever, chills, abd pain, n/v/d, or any injures to the area. Pt noted to have dark red blood in Perry bag. Pt wife reports pt has had similar Sx in the past, was put on ABx w/ improvement in Sx however a few weeks have passed and Sx have returned. Pt not on blood thinners. Pt is scheduled to get the Perry removed next week. No other complaints at this time. Allergies: Ibuprofen. PCP: Dr. Hood. Uro: Dr. Baxter.    Nephrology:  Chart reviewed, pt is not verbal  Admitted w hematuria, chronic perry, s/p colostomy and bladder tumor, was treated for uncontrolled HTN in monitored unit.  CBI/ flushes ordered for clots  Perry has been since removed  I /O inaccurate due to collection error w/o perry , pt incontinent  bladder scan done by myself 116 and 117 ml      Assessment/ Plan  CKD baseline creat 2.6-2.8 range, pt of Dr Godfrey Navarro, Nephrologist  MELISSA due to obstructive uropathy, inadequate po intake, Not on IVF, CXR clear  -reviewed CXR myself 2/11, compared to 11/13/2023  -will order strict i/o  - may need IVF  Obstructive uropathy/ hematuria- perry dcd , bladder scan done by myself 117 ml, Renal US ordered  Anemia- acute loss, compounded by CKD  chronic anemia of CKD 4  - will chek iron stores, retic, may need EPOGEN  HTN urgency now on po meds-- BP still elevated , but then BP in 88/ 60 range  wife states has autonomic dysfunction  Hypoalbuminemia- nutrition/ acute illness/ suggest nutritional support    2/17  CKD 4    MELISSA possibly  acute increase in systolic BP which may cause MELISSA, and dehydration +/- obstr uropathy    Obstructive uropathy- perry dc d, bladder volume only 117 ml yesterday but pt was not drinking, now on NS 50 ml/ hr will repeat PVR    Anemia- low Ferritin, the T sat is pending , low retic count , will most likely need IV Iron +/- EPO depending on the BP control    Hypoalbuminemia- needs nutritional support, d/w Dr Carvalho she will get Neuro consult to evaluate Parkinson's meds for better functionality    Secondary hyperparathyroidsm- Low calcium, elevated PTH, due to CKD  - start Vitamin D 3000 u daily PO    2/18  MELISSA improving with IVF, drop in hgb and Ca may be due to hydration and dilution, but has low t sat, 15%, and low ferritin 43  will start venofer 100 mg IV qd x 4 days  has low retic count , may need epo as well  secondary hyperpara, Vitamin D increased to 3000 u QD po  DC IVF while getting transfused, d/w Dr Carvalho    2/19  seen for CKD MELISSA- improving with gentle hydration, IVF now on hold post transfuion  Anemia- feels better post transfusion/ venofer started  secondary hyperparathyroidsm on Vitamin d po 3000 uniys  D/W Pts wife      2/20  MELISSA pts baseline creat is 2.7-2.8  This may be a new base line 3.2 range, discussed w pt and wife  Pt is for dc home  No renal barrier to dc   d/w Dr Mays  Anemia- s/p transfusion and Venofer IV  secondary hyperparathyroid- on Vitamin

## 2024-02-21 ENCOUNTER — TRANSCRIPTION ENCOUNTER (OUTPATIENT)
Age: 84
End: 2024-02-21

## 2024-02-21 VITALS — SYSTOLIC BLOOD PRESSURE: 148 MMHG | DIASTOLIC BLOOD PRESSURE: 82 MMHG

## 2024-02-21 LAB
ANION GAP SERPL CALC-SCNC: 5 MMOL/L — SIGNIFICANT CHANGE UP (ref 5–17)
BUN SERPL-MCNC: 45 MG/DL — HIGH (ref 7–23)
CALCIUM SERPL-MCNC: 8.1 MG/DL — LOW (ref 8.5–10.1)
CHLORIDE SERPL-SCNC: 115 MMOL/L — HIGH (ref 96–108)
CO2 SERPL-SCNC: 21 MMOL/L — LOW (ref 22–31)
CREAT SERPL-MCNC: 3.36 MG/DL — HIGH (ref 0.5–1.3)
EGFR: 18 ML/MIN/1.73M2 — LOW
GLUCOSE SERPL-MCNC: 89 MG/DL — SIGNIFICANT CHANGE UP (ref 70–99)
HCT VFR BLD CALC: 26.4 % — LOW (ref 39–50)
HGB BLD-MCNC: 8.4 G/DL — LOW (ref 13–17)
MCHC RBC-ENTMCNC: 30.5 PG — SIGNIFICANT CHANGE UP (ref 27–34)
MCHC RBC-ENTMCNC: 31.8 GM/DL — LOW (ref 32–36)
MCV RBC AUTO: 96 FL — SIGNIFICANT CHANGE UP (ref 80–100)
PLATELET # BLD AUTO: 223 K/UL — SIGNIFICANT CHANGE UP (ref 150–400)
POTASSIUM SERPL-MCNC: 3.8 MMOL/L — SIGNIFICANT CHANGE UP (ref 3.5–5.3)
POTASSIUM SERPL-SCNC: 3.8 MMOL/L — SIGNIFICANT CHANGE UP (ref 3.5–5.3)
RBC # BLD: 2.75 M/UL — LOW (ref 4.2–5.8)
RBC # FLD: 17 % — HIGH (ref 10.3–14.5)
SODIUM SERPL-SCNC: 141 MMOL/L — SIGNIFICANT CHANGE UP (ref 135–145)
WBC # BLD: 8.06 K/UL — SIGNIFICANT CHANGE UP (ref 3.8–10.5)
WBC # FLD AUTO: 8.06 K/UL — SIGNIFICANT CHANGE UP (ref 3.8–10.5)

## 2024-02-21 PROCEDURE — 99231 SBSQ HOSP IP/OBS SF/LOW 25: CPT | Mod: GC

## 2024-02-21 PROCEDURE — 99239 HOSP IP/OBS DSCHRG MGMT >30: CPT

## 2024-02-21 RX ORDER — NIFEDIPINE 30 MG
1 TABLET, EXTENDED RELEASE 24 HR ORAL
Qty: 30 | Refills: 0
Start: 2024-02-21 | End: 2024-03-21

## 2024-02-21 RX ADMIN — Medication 100 MILLIGRAM(S): at 09:23

## 2024-02-21 RX ADMIN — FINASTERIDE 5 MILLIGRAM(S): 5 TABLET, FILM COATED ORAL at 09:23

## 2024-02-21 RX ADMIN — CARBIDOPA AND LEVODOPA 2 TABLET(S): 25; 100 TABLET ORAL at 05:34

## 2024-02-21 RX ADMIN — Medication 1 SPRAY(S): at 09:23

## 2024-02-21 RX ADMIN — Medication 1 MILLIGRAM(S): at 09:24

## 2024-02-21 RX ADMIN — Medication 60 MILLIGRAM(S): at 09:23

## 2024-02-21 RX ADMIN — Medication 50 MILLIGRAM(S): at 05:34

## 2024-02-21 RX ADMIN — Medication 88 MICROGRAM(S): at 05:34

## 2024-02-21 RX ADMIN — POLYETHYLENE GLYCOL 3350 17 GRAM(S): 17 POWDER, FOR SOLUTION ORAL at 09:24

## 2024-02-21 RX ADMIN — Medication 3 MILLILITER(S): at 08:47

## 2024-02-21 RX ADMIN — Medication 3000 UNIT(S): at 09:24

## 2024-02-21 NOTE — DISCHARGE NOTE PROVIDER - HOSPITAL COURSE
84 y/o male w/ a PMHx of CKD, HTN, HLD, hypothyroid, Parkinson's, SBO s/p colostomy, and bladder tumor s/p resection w/ chronic Perry presented to the ED via EMS c/o hematuria. Pt reports seeing dark red blood in his Perry bag w/ clots since noon today, flushed it at home w/o improvement. Denies fever, chills, abd pain, n/v/d, or any injures to the area. Pt noted to have dark red blood in Perry bag. Pt wife reports pt has had similar Sx in the past, was put on ABx w/ improvement in Sx however a few weeks have passed and Sx have returned. Pt not on blood thinners. Pt is scheduled to get the Perry removed next week. No other complaints at this time. Allergies: Ibuprofen. PCP: Dr. Hood. Uro: Dr. Baxter. ICU consult for treatment resistant HTN    #HTN emergency  #anemia due to blood loss from hematuria and iron deficiency anemia   #CKD with  Secondary hyperparathyroidism  #altered mental status  #h/o prostate Ca   #Parkinson's disease, advanced with severe orthostatic hypotension  #Bacterial Pneumonia   #stoma prolapse      Plan:   -Off nicardipine   -Became hypotensive as the day progressed - d/c'd clonidine patch  -Continue nifedipine and labetalol   -hypotension -  this is most likely secondary to autonomic dysfunction from parkinson's dis  -will likely do better with permissive HTN   completed abx for suspected UTI - but UCx NTD   -appreciate renal help, d/w Dr. Hicks today    2/18 - today Cr better - unsure if mild wheeze - getting PRBCs so will dc IVFs for the remainder of the day and re-eval tmr, CXR ordered, sats okay   2/19 - CXR possible small left infiltrate, however no leukocytosis, afebrile, sats wnl, just completed zosyn, cont to monitor   2/20 - with expiratory wheezing today, d/c IVF, wife reports pt has intermittent wheezing at times, no resp distress, comfortable, breathing on RA, CT abdomen done capturing mild L pleural effusion, doubt PNA, pt is afebrile, no leukocytosis, has been on ABX, will hold off at this time. Creatinine elevated today despite overnight IVF, BP labile, low this AM, HR also fluctuating in low 50s at times, has been started on labetalol here, will add holding parameters.     Stoma prolapse  2/19 discussed with Surgery, CT abdo ordered, cleared to cont PO diet for now     Mentation ok, not agitated today   PD, orthostatic hypotension from PD, dementia due to it also   Neuro consulted - rec cont home regimen, see dr montano as OP, low dose seroquel for agitation prn     Urology consult  Hematuria resolved, off CBI   2/18 had perry now off it - if he needs it again recommend change to a 3-way to regular prior to discharge   low HB in part due to hematuria, will give 1U PRBCs, consent given by wife   follow-up with Dr Leung - renal and bladder USG reviewed   there is asymmetric bladder wall thickening measures up to 1.1 cm in the posterior bladder wall  pt should see URO Dr Leung and get cysto as OP   2/19 - Pt got PRBCs yesterday, Hb and Cr better today     DVT ppx with SCDs    NOK - wife, POC discussed with her    likely discharge to [facility] assisted living; at home uses WC and RW; aides at home   Social work for placement / palliative care eval  follow-up Dr Leung and Dr Hicks and Neuro   follow-up with surgery re stoma prolapse

## 2024-02-21 NOTE — DISCHARGE NOTE PROVIDER - NSDCMRMEDTOKEN_GEN_ALL_CORE_FT
allopurinol 100 mg oral tablet: 1 tab(s) orally once a day  bisacodyl 5 mg oral delayed release tablet: 1 tab(s) orally once a day (at bedtime)  carbidopa-levodopa 25 mg-100 mg oral tablet: 2 tab(s) orally 4 times a day ***breakfast, lunch, dinner, before bedtime***  cholecalciferol 25 mcg (1000 intl units) oral tablet: 1 tab(s) orally once a day  clonazePAM 0.5 mg oral tablet: 0.5 tab(s) orally once a day (at bedtime)  finasteride 5 mg oral tablet: 1 tab(s) orally once a day  fluticasone 50 mcg/inh nasal spray: 1 spray(s) in each nostril 2 times a day as needed  folic acid 0.8 mg oral tablet: 1 tab(s) orally once a day  hydrALAZINE 50 mg oral tablet: 1 tab(s) orally every 6 hours  labetalol 100 mg oral tablet: 1 tab(s) orally every 12 hours  levothyroxine 88 mcg (0.088 mg) oral tablet: 1 tab(s) orally once a day  Melatonin 3 mg oral tablet: 1 tab(s) orally once a day (at bedtime)  MiraLax oral powder for reconstitution: 17 gram(s) orally once a day with breakfast  NIFEdipine 60 mg oral tablet, extended release: 1 tab(s) orally once a day  QUEtiapine 25 mg oral tablet: 0.5 tab(s) orally once a day (at bedtime) as needed for agitation  Saline Mist 0.65% nasal spray: 2 spray(s) intranasally 2 times a day as needed  senna (sennosides) 8.6 mg oral tablet: 1 tab(s) orally once a day (at bedtime)  simvastatin 10 mg oral tablet: 1 tab(s) orally once a day (at bedtime)  tamsulosin 0.4 mg oral capsule: 1 cap(s) orally once a day (at bedtime)  Tylenol 325 mg oral tablet: 2 tab(s) orally every 6 hours, As needed, Mild Pain (1 - 3)

## 2024-02-21 NOTE — PROGRESS NOTE ADULT - PROVIDER SPECIALTY LIST ADULT
Critical Care
Critical Care
Hospitalist
Nephrology
Cardiology
Hospitalist
Nephrology
Nephrology
Cardiology
Colorectal Surgery
Hospitalist
Colorectal Surgery
Nephrology
Critical Care
Hospitalist

## 2024-02-21 NOTE — DISCHARGE NOTE PROVIDER - PROVIDER TOKENS
PROVIDER:[TOKEN:[[id number]]],PROVIDER:[TOKEN:[[id number]]],PROVIDER:[TOKEN:[[id number]]]

## 2024-02-21 NOTE — PROGRESS NOTE ADULT - REASON FOR ADMISSION
HTN emergency

## 2024-02-21 NOTE — DISCHARGE NOTE PROVIDER - ATTENDING DISCHARGE PHYSICAL EXAMINATION:
Patient seen today, feels well, no complaints.     Vital Signs Last 24 Hrs  T(C): 36.8 (20 Feb 2024 20:28), Max: 36.8 (20 Feb 2024 20:28)  T(F): 98.2 (20 Feb 2024 20:28), Max: 98.2 (20 Feb 2024 20:28)  HR: 74 (21 Feb 2024 05:32) (47 - 74)  BP: 148/82 (21 Feb 2024 07:56) (89/48 - 191/83)  RR: 18 (20 Feb 2024 20:28) (18 - 18)  SpO2: 97% (20 Feb 2024 20:28) (96% - 97%)    Parameters below as of 20 Feb 2024 20:28  Patient On (Oxygen Delivery Method): room air    PHYSICAL EXAM:  GENERAL: NAD, lying in bed comfortably  CHEST/LUNG: Clear to auscultation bilaterally; No rales, rhonchi, wheezing. Unlabored respirations  HEART: Regular rate and rhythm; No murmurs  ABDOMEN: Bowel sounds present; Soft, Nontender, Nondistended.   EXTREMITIES:  2+ Peripheral Pulses, brisk capillary refill. No clubbing, cyanosis, or edema  NERVOUS SYSTEM:  Alert & Oriented X3, speech clear. No deficits   MSK: FROM all 4 extremities, full and equal strength

## 2024-02-21 NOTE — PROGRESS NOTE ADULT - ASSESSMENT
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      Recommendations:  Enterostomal nurse assessment  CT abd/pelvis with oral contrast: Reynaldo prolapse of the left lower quadrant colostomy. Large non-obstructing parastomal hernia containing small intestine. Incidental note made of the appendix located within a right inguinal hernia.  PO diet as tolerated  Document intake and output BID  Rest of management as per primary team   Dispo as per primary team with f/u with CRS upon discharge    Plan to be discussed with CRS team

## 2024-02-21 NOTE — DISCHARGE NOTE PROVIDER - NSDCCAREPROVSEEN_GEN_ALL_CORE_FT
Randy, James Garcia, Radha Alejandre, Lacy Muse, Zachary Nunez, Tobi Baxter, Jeremie Abebe, Lorenzo Mays, Tex Hicks, Blayne Beal, Salvador BRENNAN

## 2024-02-21 NOTE — DISCHARGE NOTE PROVIDER - CARE PROVIDERS DIRECT ADDRESSES
[email or web address]

## 2024-02-21 NOTE — PROGRESS NOTE ADULT - SUBJECTIVE AND OBJECTIVE BOX
[facility]   SURGERY DAILY PROGRESS NOTE    Subjective:  Patient seen and examined on morning rounds.   Comfortable in bed, tolerating PO diet.    Physical Exam:   General: AAOx2, NAD  Chest: Normal respiratory effort  Heart: RRR  Abdomen: Soft, NT, mildly distended, ostoma with stool, pink, prolapsed approx 30 cm but easily reducible.   Neuro/Psych: No localized deficits.   Skin: Normal, no rashes, no lesions noted.       MEDICATIONS  (STANDING):  albuterol/ipratropium for Nebulization 3 milliLiter(s) Nebulizer every 6 hours  allopurinol 100 milliGRAM(s) Oral daily  bisacodyl 5 milliGRAM(s) Oral at bedtime  carbidopa/levodopa  25/100 2 Tablet(s) Oral four times a day  cholecalciferol 3000 Unit(s) Oral daily  clonazePAM  Tablet 0.5 milliGRAM(s) Oral at bedtime  finasteride 5 milliGRAM(s) Oral daily  fluticasone propionate 50 MICROgram(s)/spray Nasal Spray 1 Spray(s) Both Nostrils two times a day  folic acid 1 milliGRAM(s) Oral daily  hydrALAZINE 50 milliGRAM(s) Oral every 8 hours  iron sucrose IVPB 100 milliGRAM(s) IV Intermittent every 24 hours  labetalol 100 milliGRAM(s) Oral every 12 hours  levothyroxine 88 MICROGram(s) Oral daily  melatonin 3 milliGRAM(s) Oral at bedtime  NIFEdipine XL 60 milliGRAM(s) Oral daily  polyethylene glycol 3350 17 Gram(s) Oral daily  senna 1 Tablet(s) Oral at bedtime  simvastatin 10 milliGRAM(s) Oral at bedtime  tamsulosin 0.4 milliGRAM(s) Oral at bedtime    MEDICATIONS  (PRN):  acetaminophen     Tablet .. 650 milliGRAM(s) Oral every 6 hours PRN Temp greater or equal to 38C (100.4F), Mild Pain (1 - 3), Moderate Pain (4 - 6)  haloperidol    Injectable 1 milliGRAM(s) IntraMuscular every 24 hours PRN Agitation  QUEtiapine 25 milliGRAM(s) Oral at bedtime PRN sleep aid  sodium chloride 0.65% Nasal 2 Spray(s) Both Nostrils two times a day PRN Nasal Congestion      PAST MEDICAL & SURGICAL HISTORY:  HTN (hypertension)      HLD (hyperlipidemia)      Parkinson disease      CKD (chronic kidney disease)      Hypothyroidism      Bladder mass      Obstruction of bowel      Prostate cancer      Melanoma of skin      CA skin, basal cell      Cancer, skin, squamous cell      Arthritis      Anemia      History of total knee replacement, left      H/O hemorrhoidectomy      H/O colonoscopy      H/O Mohs micrographic surgery for skin cancer          Vital Signs Last 24 Hrs  T(C): 36.8 (20 Feb 2024 20:28), Max: 36.8 (20 Feb 2024 20:28)  T(F): 98.2 (20 Feb 2024 20:28), Max: 98.2 (20 Feb 2024 20:28)  HR: 68 (20 Feb 2024 20:28) (47 - 68)  BP: 191/83 (20 Feb 2024 20:28) (89/48 - 194/72)  BP(mean): 105 (20 Feb 2024 08:25) (105 - 105)  RR: 18 (20 Feb 2024 20:28) (18 - 18)  SpO2: 97% (20 Feb 2024 20:28) (96% - 98%)    Parameters below as of 20 Feb 2024 20:28  Patient On (Oxygen Delivery Method): room air        I&O's Summary    20 Feb 2024 07:01  -  21 Feb 2024 01:11  --------------------------------------------------------  IN: 0 mL / OUT: 80 mL / NET: -80 mL                              9.2    7.94  )-----------( 221      ( 20 Feb 2024 06:30 )             29.0     02-20    141  |  114<H>  |  50<H>  ----------------------------<  92  4.3   |  23  |  3.25<H>    Ca    8.4<L>      20 Feb 2024 06:30  Mg     2.2     02-20

## 2024-02-21 NOTE — DISCHARGE NOTE PROVIDER - CARE PROVIDER_API CALL
Blayne Hicks.  Nephrology  [address], [address]  [place] [address]  Phone: [phone]  Fax: [phone]  Follow Up Time:     Jeremie Baxter  Urology  [address] [place] [address]  Phone: [phone]  Fax: [phone]  Follow Up Time:     YULIANA ANGEL  [address] [place] [address]  Phone: [phone]  Fax: [phone]  Follow Up Time:

## 2024-02-21 NOTE — PROGRESS NOTE ADULT - ATTENDING COMMENTS
Patient seen and examined. Colostomy prolapsed but pink and healthy. Tolerating regular diet without pain. Can follow up with CRS outpatient to discuss surgical management of prolapsed colostomy
Patient seen and examined at bedside. Easily reducible stoma prolapse and parastomal hernia. No obstruction on CT. No acute surgical interventions needed at this time. Should prolapse affect ostomy appliance, ostomy function, and patient wishes to have prolapse repaired surgery would be discussed as an outpatient. Please reconsult if needed, call with any questions or concerns.

## 2024-02-21 NOTE — DISCHARGE NOTE PROVIDER - NSDCCPCAREPLAN_GEN_ALL_CORE_FT
PRINCIPAL DISCHARGE DIAGNOSIS  Diagnosis: Hematuria  Assessment and Plan of Treatment: Admitted for hematuria, now resolved, perry is out, continure to follow up with your urologist.      SECONDARY DISCHARGE DIAGNOSES  Diagnosis: Hypertensive emergency  Assessment and Plan of Treatment: Treated in ICU for elevated blood pressure, now better controlled with addition of labetalol and nifedipine. Continue to follow up with your cardiologist.    Diagnosis: MELISSA (acute kidney injury)  Assessment and Plan of Treatment: Your kidney function worsened, now stabilized. Continue to follow up with nephrologist.    Diagnosis: Anemia due to acute blood loss  Assessment and Plan of Treatment: Due to blood in urine, transfused blood during admission with improvement.    Diagnosis: Acute UTI  Assessment and Plan of Treatment: Treated with antibiotics. Course completed    Diagnosis: Orthostatic hypotension  Assessment and Plan of Treatment: You have significant drop in blood pressure with changes in position, recommend to carefully get up from lying to sitting, allow sometime while sitting then to standing, and from standing to walking to prevent severe dizziness and passing out.  Use a walker or aides for assistance when getting up.

## 2024-03-04 DIAGNOSIS — N18.9 CHRONIC KIDNEY DISEASE, UNSPECIFIED: ICD-10-CM

## 2024-03-04 DIAGNOSIS — I12.9 HYPERTENSIVE CHRONIC KIDNEY DISEASE WITH STAGE 1 THROUGH STAGE 4 CHRONIC KIDNEY DISEASE, OR UNSPECIFIED CHRONIC KIDNEY DISEASE: ICD-10-CM

## 2024-03-04 DIAGNOSIS — I16.1 HYPERTENSIVE EMERGENCY: ICD-10-CM

## 2024-03-04 DIAGNOSIS — G20.A1 PARKINSON'S DISEASE WITHOUT DYSKINESIA, WITHOUT MENTION OF FLUCTUATIONS: ICD-10-CM

## 2024-03-04 DIAGNOSIS — N17.9 ACUTE KIDNEY FAILURE, UNSPECIFIED: ICD-10-CM

## 2024-03-04 DIAGNOSIS — Z91.09 OTHER ALLERGY STATUS, OTHER THAN TO DRUGS AND BIOLOGICAL SUBSTANCES: ICD-10-CM

## 2024-03-04 DIAGNOSIS — F02.C0 DEMENTIA IN OTHER DISEASES CLASSIFIED ELSEWHERE, SEVERE, WITHOUT BEHAVIORAL DISTURBANCE, PSYCHOTIC DISTURBANCE, MOOD DISTURBANCE, AND ANXIETY: ICD-10-CM

## 2024-03-04 DIAGNOSIS — Z85.46 PERSONAL HISTORY OF MALIGNANT NEOPLASM OF PROSTATE: ICD-10-CM

## 2024-03-04 DIAGNOSIS — Z85.820 PERSONAL HISTORY OF MALIGNANT MELANOMA OF SKIN: ICD-10-CM

## 2024-03-04 DIAGNOSIS — Z91.040 LATEX ALLERGY STATUS: ICD-10-CM

## 2024-03-04 DIAGNOSIS — K94.09 OTHER COMPLICATIONS OF COLOSTOMY: ICD-10-CM

## 2024-03-04 DIAGNOSIS — E78.5 HYPERLIPIDEMIA, UNSPECIFIED: ICD-10-CM

## 2024-03-04 DIAGNOSIS — Z79.899 OTHER LONG TERM (CURRENT) DRUG THERAPY: ICD-10-CM

## 2024-03-04 DIAGNOSIS — R31.9 HEMATURIA, UNSPECIFIED: ICD-10-CM

## 2024-03-04 DIAGNOSIS — Z88.8 ALLERGY STATUS TO OTHER DRUGS, MEDICAMENTS AND BIOLOGICAL SUBSTANCES: ICD-10-CM

## 2024-03-04 DIAGNOSIS — E03.9 HYPOTHYROIDISM, UNSPECIFIED: ICD-10-CM

## 2024-03-04 DIAGNOSIS — G93.41 METABOLIC ENCEPHALOPATHY: ICD-10-CM

## 2024-03-04 DIAGNOSIS — J90 PLEURAL EFFUSION, NOT ELSEWHERE CLASSIFIED: ICD-10-CM

## 2024-03-04 DIAGNOSIS — N25.81 SECONDARY HYPERPARATHYROIDISM OF RENAL ORIGIN: ICD-10-CM

## 2024-03-04 DIAGNOSIS — I95.1 ORTHOSTATIC HYPOTENSION: ICD-10-CM

## 2024-03-04 DIAGNOSIS — N39.0 URINARY TRACT INFECTION, SITE NOT SPECIFIED: ICD-10-CM

## 2024-03-04 DIAGNOSIS — Z93.3 COLOSTOMY STATUS: ICD-10-CM

## 2024-03-04 DIAGNOSIS — Z78.1 PHYSICAL RESTRAINT STATUS: ICD-10-CM

## 2024-03-04 DIAGNOSIS — D62 ACUTE POSTHEMORRHAGIC ANEMIA: ICD-10-CM

## 2024-04-03 NOTE — DIETITIAN INITIAL EVALUATION ADULT - WEIGHT (KG)
Show Aperture Variable?: Yes Consent: The patient's consent was obtained including but not limited to risks of crusting, scabbing, blistering, scarring, darker or lighter pigmentary change, recurrence, incomplete removal and infection. Render Note In Bullet Format When Appropriate: No Application Tool (Optional): Liquid Nitrogen Sprayer Duration Of Freeze Thaw-Cycle (Seconds): 5 Post-Care Instructions: I reviewed with the patient in detail post-care instructions. Patient is to wear sunprotection, and avoid picking at any of the treated lesions. Pt may apply Vaseline to crusted or scabbing areas. Detail Level: Detailed 61.2

## 2024-07-01 ENCOUNTER — APPOINTMENT (OUTPATIENT)
Dept: NEUROLOGY | Facility: CLINIC | Age: 84
End: 2024-07-01
Payer: MEDICARE

## 2024-07-01 VITALS
HEART RATE: 51 BPM | WEIGHT: 147 LBS | BODY MASS INDEX: 23.07 KG/M2 | DIASTOLIC BLOOD PRESSURE: 69 MMHG | HEIGHT: 67 IN | SYSTOLIC BLOOD PRESSURE: 125 MMHG | OXYGEN SATURATION: 96 %

## 2024-07-01 DIAGNOSIS — G20.A1 PARKINSON'S DISEASE WITHOUT DYSKINESIA, WITHOUT MENTION OF FLUCTUATIONS: ICD-10-CM

## 2024-07-01 DIAGNOSIS — K11.7 DISTURBANCES OF SALIVARY SECRETION: ICD-10-CM

## 2024-07-01 DIAGNOSIS — G24.3 SPASMODIC TORTICOLLIS: ICD-10-CM

## 2024-07-01 PROCEDURE — 99214 OFFICE O/P EST MOD 30 MIN: CPT

## 2024-07-01 PROCEDURE — G2211 COMPLEX E/M VISIT ADD ON: CPT

## 2024-07-24 RX ORDER — GLUCOSAMINE HCL/CHONDROITIN SU 500-400 MG
3 CAPSULE ORAL AT BEDTIME
Qty: 180 | Refills: 0 | Status: ACTIVE | COMMUNITY
Start: 2024-07-24 | End: 1900-01-01

## 2024-08-04 ENCOUNTER — NON-APPOINTMENT (OUTPATIENT)
Age: 84
End: 2024-08-04

## 2024-08-20 NOTE — DISCHARGE NOTE PROVIDER - NSDCQMSTAIRS_GEN_ALL_CORE
What Type Of Note Output Would You Prefer (Optional)?: Standard Output How Severe Is Your Rash?: moderate Is This A New Presentation, Or A Follow-Up?: Rash Yes

## 2024-09-04 NOTE — ED PROVIDER NOTE - PROGRESS NOTE DETAILS
All labs and imaging personally reviewed.  Patient has an elevated lactate without infectious symptoms which is likely due to seizure-like activity.  Creatinine appears similar to baseline.  TSH elevated, may need increase in his Synthroid.  Lactate improved to 2.1 after IV fluids.  CT head without any acute intracranial findings.  Patient remains without seizure-like activity while in the ED.  Unable to obtain urine, KAT Valero bladder scan with these in the bladder, unable to straight cath due to large prostate.  Given new seizure-like activity, would admit patient for further management.  EEG ordered, will hold AEDs at this time.  Spoke with Dr. Thompson from neurology, she is in agreement with this plan and will consult.  I, Jose Alfredo Alonzo, personally discussed this patient's care with Dr. Nur who recommends admit to [facility].

## 2024-09-04 NOTE — PROVIDER CONTACT NOTE (CRITICAL VALUE NOTIFICATION) - DATE AND TIME:
Writer called pt to inform him that the [facility] has received a referral to get an appt set up for him to be seen at the clinic. Pt unavailable, line rang busy. Writer sent letter in the mail with callback number instructing him to call the office to set up an appt.
04-Sep-2024 10:47

## 2024-09-04 NOTE — CONSULT NOTE ADULT - ASSESSMENT
83-year-old M with PMHx of Parkinson's, dementia, HTN, HLD, CKD IV, hypothyroid, bladder mass s/p resection 02/2024, prostate ca presents to the  ED from [facility] for possible seizure-like episode, was witnessed by aide, no trauma as patient was seated the whole time, unknown how long the episode lasted. When wife arrived at bedside, patient appeared confused. In ED, while awaiting admission, EEG was done, patient had another episode of seizure-like activity with generalized shaking, drooling/frothing from the mouth with unresponsiveness, he was given Ativan 2 Mg x 1  CTH negative for acute pathology. Labs notable for Cr 3.63, Lactate 3.4 -> 2.1 s/p 1L IVF, TSH 7.63.      EEG done is limited at there is motion artifact, no gross seizures noted, however, underlying seizure activity may be obscured      # New onset seizure x 2, rule out any triggers i.e. infection    – Given possible 2 seizures, would start an AED, as renal function is very poor, will defer levetiracetam, as discussed with hospitalist Dr. Nur load with fosphenytoin 1 g followed by 100 mg 3 times daily  – May switch over to another AED once patient stabilized  – Will follow-up with 24-hour EEG monitoring in a.m.    #Parkinson's disease with dementia    – Continue carbidopa levodopa, if NPO can give Parcopa sublingual    Above D/W pts wife and Dr. Nur

## 2024-09-04 NOTE — ED ADULT NURSE NOTE - SUICIDE SCREENING QUESTION 1
Patient unable to complete debilitated/Pt appears thin. Visually pt appears to have mild to moderate muscle wasting to his temporals, and mild to moderate fat loss to ribs, with "barrel chested" appearance noted. Pt with COPD noted./other (specify) Height: 64 inches, Weight: 128 pounds  BMI: 22.0 kg/m2 IBW: 130 pounds (+/-10%), %IBW: 98%  Pertinent Info: 1+ edema to R leg, 2+ edema to L leg last noted 2/18, no pressure injuries noted at this time in nursing flow sheet.

## 2024-09-04 NOTE — ED PROVIDER NOTE - CLINICAL SUMMARY MEDICAL DECISION MAKING FREE TEXT BOX
83-year-old male presenting with possible new onset seizure, no trauma during event.  Wife states patient not at his baseline.  Differential including but not limited to: Epilepsy with primary seizure, malignancy, electrolyte abnormality, doubt encephalopathy/encephalitis.  Will evaluate with blood work, CT head, urinalysis, symptomatic treatment, reassess.

## 2024-09-04 NOTE — ED ADULT NURSE NOTE - OBJECTIVE STATEMENT
pt presents to ED from [facility] with wife. pt has hx dementia. wife states pt woke up normal today and was then noted to have seizure like activity and shaking by aide. pt was seated in recliner and did not fall. pt agitated, cursing , pulling at lines. wife states " he normally talks n makes sense today he is confused and agitated. md Alonzo aware. . pt placed on safety watch. pt changed, ostomy bag changed. pt has a protruding ostomy. pt placed on [place] catheter. warm blankets applied. wife at bedside.

## 2024-09-04 NOTE — H&P ADULT - HISTORY OF PRESENT ILLNESS
83-year-old male with PMHx of Parkinson's, dementia, HTN, HLD, CKD IV, hypothyroid, bladder mass s/p resection 02/2024, SBO s/p colostomy, prostate cancer s/p resection, melanoma, arthritis, anemia (gets routine CBC check) presents to the  ED from [facility] for possible seizure-like episode. Per wife at bedside, patient was sitting upright in a chair when he had an episode of shaking.  Patient has no reported history of seizures in the past.  Episode was witnessed by aide, no trauma as patient was seated the whole time, unknown how long the episode lasted.  When wife arrived at bedside, patient appeared confused from his baseline. No reported fever, rash, nausea, vomiting.  Wife does note patient has had decreased p.o. intake over the past few months with unintentional 20lb weight loss, PCP was considering obtaining CT chest/abdomen/pelvis to evaluate for malignancy. No other acute complaints at this time.     In ED, /62, HR 52, Rest of VSS. CTH negative for acute pathology. CXR negative. Labs notable for Cr 3.63 (baseline appears 3-3.5 range), Hgb 12.1, Lactate 3.4 -> 2.1 s/p 1L IVF, TSH 7.63. Neurology consulted in ER, plan to admit for EEG and MRI brain, held off on Keppra load at this time.     While awaiting admission, RRT was called for another episode of seizure like activity with generalized shaking, drooling, decreased mentation, improved with IV ativan 2mg x1. Also noted to have likely reflex bradycardia to low 30s during RRT, improved on its own. Pt is DNR/DNI, admitted to Telemetry for further management.

## 2024-09-04 NOTE — RAPID RESPONSE TEAM SUMMARY - NSSITUATIONBACKGROUNDRRT_GEN_ALL_CORE
82yo M from [facility] with PMH: CKD, HTN, HLD, hypothyroid, Parkinson's, SBO s/p colostomy, and bladder tumor s/p resection w/ chronic Javier   Presented for possible seizure activity at NH, seen by Neurology, and just admitted to medical service.   While still in the ED had another possible seizure episode, rapid response called.   On arrival to the ED, patient is savage to 30s, with SBP 99/64, Oxygen saturation 96% on room air, becoming more responsive during exam.  Vitals stable, DNR/DNI, no active seizure at present.  Medical team will admit to telemetry, order EEG and rediscuss with neurology to consider starting Keppra at this time

## 2024-09-04 NOTE — ED ADULT TRIAGE NOTE - CHIEF COMPLAINT QUOTE
pt presents to ED from [facility] with wife. pt has hx dementia. wife states pt woke up normal today and was then noted to have seizure like activity and shaking by aide. pt was seated in recliner and did not fall. in ED pt appears post-ictal, no hx seizures. responds to verbal and painful stimuli.

## 2024-09-04 NOTE — PATIENT PROFILE ADULT - FALL HARM RISK - HARM RISK INTERVENTIONS

## 2024-09-04 NOTE — PHARMACOTHERAPY INTERVENTION NOTE - COMMENTS
Medication reconciliation completed.  Reviewed Medication list and confirmed med allergies with list from Care Facility "[facility]"; confirmed with Dr. Wong MedHx & wife at bedside.  Wife confirms that pt was seen by Dr. Judy Law on 8/19/24 & prescribed 3 new meds; Depakote ER 250mg, Aricept 5mg, & Lexapro 5mg.  Pt has ONLY started Lexapro so far.

## 2024-09-04 NOTE — ED PROVIDER NOTE - PHYSICAL EXAMINATION
GENERAL: awake, repeating "hey", non-toxic appearing, no acute distress  HEENT: NCAT, EOMI, oral mucosa moist, normal conjunctiva  RESP: CTAB, no respiratory distress, no wheezes/rhonchi/rales  CV: RRR, no murmurs/rubs/gallops  ABDOMEN: soft, non-tender, non-distended, no guarding, no rebound tenderness, LLQ ostomy pink w/ some protrusion  MSK: no visible deformities  NEURO: no focal sensory or motor deficits, CN 2-12 grossly intact  SKIN: warm, normal color, well perfused, no rash  PSYCH: dementia

## 2024-09-04 NOTE — ED PROVIDER NOTE - OBJECTIVE STATEMENT
83-year-old male PMH Parkinson's, HTN, HLD, CKD, hypothyroid, bladder mass, bowel obstruction, prostate cancer, melanoma, arthritis, anemia (gets routine CBC check) presents to the emergency department for possible seizure-like episode.  Per wife at bedside, patient was sitting upright in a chair when he had an episode of shaking.  Patient has no reported history of seizures in the past.  Episode was witnessed by aide, no trauma as patient was seated the whole time, unknown how long the episode lasted.  When wife arrived at bedside, patient appeared confused from his baseline.  No reported fever, rash, nausea, vomiting.  Wife does note patient has had decreased p.o. intake over the past few weeks with weight loss, PCP was considering obtaining CT chest/abdomen/pelvis to evaluate for malignancy.

## 2024-09-04 NOTE — H&P ADULT - NSHPADDITIONALINFOADULT_GEN_ALL_CORE
Pt seen and examined during RRT. drooling/foaming with right arm twitching noted by nurse and EEG tech. Reflex bradycardia noted, after Zoll pads placed, HR spontaneously recovered to 50s. per wife, no known cardiac issues. Pt sedated , per wife, pt has advanced dementia and cannot provide any history. 2mg Ativan given  Keppra load aborted give n known CKD/Scr 3.6  Dilatin 1g load then 100 q8h thereafter  Ativan prn  D/W ICU and neurology  MRI/EEG  UA + -> start ceftriaxone and follow up urine cultures  TTE, hold BB, check TFTs, may need cardiology if bradycardia persists or abnl echo findings    Time spent: 95 min discussing MOLST (65 min admission with additional 35 min discussing goc/molst with wife) form and plan with nursing staff, PA, family and consultants

## 2024-09-04 NOTE — ED ADULT NURSE NOTE - CHIEF COMPLAINT QUOTE
pt presents to ED from [facility] with wife. pt has hx dementia. wife states pt woke up normal today and was then noted to have seizure like activity and shaking by aide. pt was seated in recliner and did not fall. in ED pt appears post-ictal, no hx seizures. responds to verbal and painful stimuli.

## 2024-09-04 NOTE — H&P ADULT - NSHPPHYSICALEXAM_GEN_ALL_CORE
Constitutional: NAD, tired appearing, deconditioned/frail, intermittently agitated  HEENT: PERRLA, EOMI, dry MM  Respiratory: Breath sounds are clear bilaterally, No wheezing, rales or rhonchi  Cardiovascular: S1 and S2, sinus bradycardia, no murmurs, gallops or rubs  Gastrointestinal: +BS, soft, non-tender, non-distended, +Colostomy  Extremities: No peripheral edema, +DP pulses b/l  Neurological: A&O x 0-1, no focal deficits   Musculoskeletal: 5/5 strength b/l upper and lower extremities  Skin: Normal, skin warm and dry

## 2024-09-04 NOTE — H&P ADULT - NSHPSOCIALHISTORY_GEN_ALL_CORE
Former heavy smoker, quit ~40 years ago per wife  Denies current alcohol or drug use    Lives at [facility]

## 2024-09-04 NOTE — H&P ADULT - ASSESSMENT
83-year-old male with PMHx of Parkinson's, dementia, HTN, HLD, CKD IV, hypothyroid, bladder mass s/p resection 02/2024, SBO s/p colostomy, prostate cancer s/p resection, melanoma, arthritis, anemia (gets routine CBC check) presents to the  ED from [facility] for possible seizure-like episode. Admitted for:    #Seizure like activity  #Post-ictal state likely superimposed on baseline Parkinson's dementia   - admit to telemetry   - CT head negative for acute pathology  - Lactate 3.4 -> 2.1 s/p 1L IVF  - Neurology following Dr. Meier, will hold off on Keppra load given significant CKD, new onset seizures  - F/u EEG  - F/u MRI brain   - Continue IV ativan PRN for seizure activity   - Continue 1:1 observation for safety  - Continue home parkinson's meds    #HTN/HLD  #Reflex bradycardia  - HR 52 on arrival, dropped down into 30s during RRT   - Continue tele monitoring  - Hold Coreg   - C/w Amlodipine, statin   - C/w IV hydralazine 10mg q8 with parameters (takes 75mg PO TID at home)  - Check TTE, consider cardiology consult if abnormal, has seen Dr De La Cruz in past     #CKD   - Cr 3.63 (baseline appears 3-3.5 range)    #Hypothyroidism   - TSH 7.63 , check T4/T3  - Continue synthroid, may need dose adjustment     #FTT, poor PO intake, unintentional weight loss  - Encourage oral intake  - swallow eval ordered, f/u   - Continue gentle IVF hydration   - Dietician consulted  - Can f/u with PCP for further management to r/o underlying malignancy     #BPH, hx prostate cancer, hx bladder mass s/p resection  - Continue proscar, flomax   - Check UA    #DNR/DNI  - MOLST in chart    #DVT ppx  - heparin SQ   83-year-old male with PMHx of Parkinson's, dementia, HTN, HLD, CKD IV, hypothyroid, bladder mass s/p resection 02/2024, SBO s/p colostomy, prostate cancer s/p resection, melanoma, arthritis, anemia (gets routine CBC check) presents to the  ED from [facility] for possible seizure-like episode. Admitted for:    #Seizure like activity  #Post-ictal state likely superimposed on baseline Parkinson's dementia   - admit to telemetry   - CT head negative for acute pathology  - Lactate 3.4 -> 2.1 s/p 1L IVF  - Neurology following Dr. Meier, will hold off on Keppra load given significant CKD, new onset seizures  - F/u EEG  - F/u MRI brain   - Continue IV ativan PRN for seizure activity   - Continue 1:1 observation for safety  - Continue home parkinson's meds    #HTN/HLD  #Reflex bradycardia  - HR 52 on arrival, dropped down into 30s during RRT   - Continue tele monitoring  - Hold Coreg   - C/w Amlodipine, statin   - C/w IV hydralazine 10mg q8 with parameters (takes 75mg PO TID at home)  - Check TTE, consider cardiology consult if abnormal, has seen Dr De La Cruz in past     #CKD   - Cr 3.63 (baseline appears 3-3.5 range)  - Continue to monitor BMP, avoid nephrotoxic medications     #Hypothyroidism   - TSH 7.63 , check T4/T3  - Continue synthroid, may need dose adjustment     #FTT, poor PO intake, unintentional weight loss  - Encourage oral intake  - swallow eval ordered, f/u   - Continue gentle IVF hydration   - Dietician consulted  - Can f/u with PCP for further management to r/o underlying malignancy     #BPH, hx prostate cancer, hx bladder mass s/p resection  - Continue proscar, flomax   - Check UA    #SBO s/p colostomy   - routine ostomy care    #DNR/DNI  - MOLST in chart    #DVT ppx  - heparin SQ   83-year-old male with PMHx of Parkinson's, dementia, HTN, HLD, CKD IV, hypothyroid, bladder mass s/p resection 02/2024, SBO s/p colostomy, prostate cancer s/p resection, melanoma, arthritis, anemia (gets routine CBC check) presents to the  ED from [facility] for possible seizure-like episode. Admitted for:    #Seizure like activity  #Post-ictal state likely superimposed on baseline Parkinson's dementia   - admit to telemetry   - CT head negative for acute pathology  - Lactate 3.4 -> 2.1 s/p 1L IVF  - Neurology following Dr. Meier, will hold off on Keppra load given significant CKD, new onset seizures  - F/u EEG  - F/u MRI brain   - AEDs per neurology  - Continue IV ativan PRN for seizure activity   - Continue 1:1 observation for safety  - Continue home parkinson's meds    #HTN/HLD  #Reflex bradycardia  - HR 52 on arrival, dropped down into 30s during RRT   - Continue tele monitoring  - Hold Coreg   - C/w Amlodipine, statin   - C/w IV hydralazine 10mg q8 with parameters (takes 75mg PO TID at home)  - Check TTE, consider cardiology consult if abnormal, has seen Dr De La Cruz in past     #CKD   - Cr 3.63 (baseline appears 3-3.5 range)  - Continue to monitor BMP, avoid nephrotoxic medications     #Hypothyroidism   - TSH 7.63 , check T4/T3  - Continue synthroid, may need dose adjustment     #BPH, hx prostate cancer, hx bladder mass s/p resection  #UA suggestive of UTI  - Continue proscar, flomax   - Start IV CTX daily  - F/u UCx    #FTT, poor PO intake, unintentional weight loss  - Encourage oral intake  - swallow eval ordered, f/u, NPO for now  - Continue gentle IVF hydration   - Dietician consulted  - Can f/u with PCP for further management to r/o underlying malignancy     #SBO s/p colostomy   - routine ostomy care    #DNR/DNI  - MOLST in chart    #DVT ppx  - heparin SQ

## 2024-09-04 NOTE — H&P ADULT - NSHPLABSRESULTS_GEN_ALL_CORE
09-04-24 @ 10:05  Glucose 108 [70 - 99]  CO2 total 22 [22 - 31]  Chloride 109 [96 - 108]  Sodium 138 [135 - 145]  Potassium 5.0 [3.5 - 5.3]  Calcium 9.1 [8.5 - 10.1]  Creatinine 3.63 [0.50 - 1.30]  BUN 48 [7 - 23]  eGFR 16  Anion gap 7 [5 - 17]  AST 22 [15 - 37]  ALT 10 [12 - 78]  Alk phos 73 [40 - 120]  Albumin 3.7 [3.3 - 5.0]    WBC 8.80 [3.80 - 10.50]  Hemoglobin 12.1 [13.0 - 17.0]  Hematocrit 37.2 [39.0 - 50.0]  Platelets 198 [150 - 400]    < from: CT Head No Cont (09.04.24 @ 11:23) >    IMPRESSION:  No evidence of an acute intracranial hemorrhage, midline shift, or   hydrocephalus.  Stable atrophy with white matter ischemic changes.  Opacified bilateral mastoids and middle ear.    < end of copied text >    < from: Xray Chest 1 View- PORTABLE-Urgent (09.04.24 @ 10:54) >    IMPRESSION: No focal infiltrate or congestion. Borderline cardiomegaly on   this limited inspiratory effort. Hiatal hernia suggested. Regional   osseous structures appropriate for age    < end of copied text >

## 2024-09-05 ENCOUNTER — RESULT REVIEW (OUTPATIENT)
Age: 84
End: 2024-09-05

## 2024-09-05 NOTE — SWALLOW BEDSIDE ASSESSMENT ADULT - COMMENTS
The pt was admitted to  from [facility] after being witnessed "shaking" possibly reflective of seizures. Hospital course is remarkable for The pt was admitted to  from [facility] after being witnessed "shaking" possibly reflective of seizures. Hospital course is remarkable for possible seizure activity with post ictal behaviors, bradycardia and UTI. This profile is superimposed upon a history of reported recent reduced PO intake, Parkinson's Dementia, hypothyroidism, HTN, CKD, anemia, arthritis, Prostate Cancer NOS, bladder mass s/p resection, SBO status post colostomy, prior Mohs surgery for skin cancer, past hemorrhoidectomy, and previous TKR. See below for additional prior medical information. The pt was admitted to  from [facility] after being witnessed "shaking" possibly reflective of seizures. Hospital course is remarkable for possible seizure activity with post ictal behaviors, bradycardia and UTI. This profile is superimposed upon a history of reported recent reduced PO intake with significant weigh loss, Parkinson's Dementia, hypothyroidism, HTN, CKD, anemia, arthritis, Prostate Cancer NOS, bladder mass s/p resection, SBO status post colostomy, prior Mohs surgery for skin cancer, past hemorrhoidectomy, and previous TKR. See below for additional prior medical information.

## 2024-09-05 NOTE — DIETITIAN INITIAL EVALUATION ADULT - OTHER INFO
83-year-old male with PMHx of Parkinson's, dementia, HTN, HLD, CKD IV, hypothyroid, bladder mass s/p resection 02/2024, SBO s/p colostomy, prostate cancer s/p resection, melanoma, arthritis, anemia (gets routine CBC check) presents to the  ED from [facility] for possible seizure-like episode. Per wife at bedside, patient was sitting upright in a chair when he had an episode of shaking, has no reported history of seizures in the past.  Episode was witnessed by aide, no trauma as patient was seated the whole time, unknown how long the episode lasted. When wife arrived at bedside, patient appeared confused from his baseline.  Admit for seizure-like activity, post-ictal state likely superimposed on baseline Parkinson's dementia, FTT, poor PO intake, unintentional weight loss    Known to nutr services and dx'd w/ mal on multiple admissions; still meets criteria. Seen by SLP on 9/5/24 - "Suggest NPO restriction at this time. Pt is at high risk for aspiration with all PO types. Regardless of consistency, at this time."  Unable to obtain diet/wt hx 2/2 dementia/AMS. Bed scale wt of 120# taken by RD on 9/5/24.  Wt hx as per EMR: 135# (bed scale wt taken by RD on 2/14/24); 151.8# taken on 9/28/23; 135# (on 6/9/22).  Unintentional wt loss of 31.8# / 20.9% wt loss x 1 yr ; severe & clinically significant. NFPE reveals varying degrees of mild-mod muscle/ fat wasting - appears thin. ADAT per MD, keep NPO restrictions for now as per SLP. Rec'd to add ensure + HP shake BID (350kcal, 20g protein) if/when medically feasible. Is DNR/DNI; however, nutrition support NOT addressed in MOLST - Confirm goals of care regarding nutrition support.**** See below for other recommendations.

## 2024-09-05 NOTE — DIETITIAN INITIAL EVALUATION ADULT - ORAL INTAKE PTA/DIET HISTORY
Unable to obtain meaningful information 2/2 dementia, AMS. Resides at [facility] as per EMR.

## 2024-09-05 NOTE — PROGRESS NOTE ADULT - ASSESSMENT
83-year-old male with PMHx of Parkinson's, dementia, HTN, HLD, CKD IV, hypothyroid, bladder mass s/p resection 02/2024, SBO s/p colostomy, prostate cancer s/p resection, melanoma, arthritis, anemia (gets routine CBC check) presents to the  ED from [facility] for possible seizure-like episode. Admitted for:    #Seizure like activity  #Post-ictal state likely superimposed on baseline Parkinson's dementia   - admit to telemetry   - CT head negative for acute pathology  - Lactate 3.4 -> 2.1 s/p 1L IVF  - Neurology following Dr. Meier, will hold off on Keppra load given significant CKD, new onset seizures  -   Repeat EEG done this morning  consistent with diffuse cerebral dysfunction, no epileptiform activity seen and no clinical events or seizures were recorded  - F/u MRI brain   - fosphenytoin 1 g followed by 100 mg 3 times daily  - Continue IV ativan PRN for seizure activity   - Continue 1:1 observation for safety  - Continue home parkinson's meds --> transition to Parcopa sublingual while NPO    Dilantin level on 9/6 and 9/7 AM    #HTN/HLD  #Reflex bradycardia  - HR 52 on arrival, dropped down into 30s during RRT   - Continue tele monitoring  - Hold Coreg   - C/w Amlodipine, statin   - C/w IV hydralazine 10mg q8 with parameters (takes 75mg PO TID at home)    #UTI  UA suspicious for UTI  Follow up Urine Cultures   Continue IV Ceftriaxone.       #CKD   - Cr 3.63 (baseline appears 3-3.5 range)  - Continue to monitor BMP, avoid nephrotoxic medications     #Hypothyroidism   - TSH 7.63 ,   - Continue synthroid, increase dose to 100mcg     #BPH, hx prostate cancer, hx bladder mass s/p resection  #UA suggestive of UTI  - Continue proscar, flomax   - Start IV CTX daily  - F/u UCx    #FTT, poor PO intake, unintentional weight loss  - Encourage oral intake  - swallow eval ordered, f/u, NPO for now  - Continue gentle IVF hydration   - Dietician consulted  - Can f/u with PCP for further management to r/o underlying malignancy     #SBO s/p colostomy   - routine ostomy care    #DNR/DNI  - MOLST in chart    #GOC   Consult palliative Care, family considering hospice and ? Comfort care    #DVT ppx  - heparin SQ

## 2024-09-05 NOTE — SWALLOW BEDSIDE ASSESSMENT ADULT - ASR SWALLOW DENTITION
----- Message from Karlee Ann sent at 7/11/2023 10:36 AM CDT -----  Patient called and stated that he need to schedule a three month follow up. She stated that he had an appointment scheduled but he had to cancel. Please give him a call at [phone]     Unable to formally assess due to altered alertness, altered mentation, reduced active participation and periodic resistive mouth closing on stimulation.

## 2024-09-05 NOTE — PROGRESS NOTE ADULT - ASSESSMENT
83-year-old M with PMHx of Parkinson's, dementia, HTN, HLD, CKD IV, hypothyroid, bladder mass s/p resection 02/2024, prostate ca presents to the  ED from [facility] for possible seizure-like episode, was witnessed by aide, no trauma as patient was seated the whole time, unknown how long the episode lasted. When wife arrived at bedside, patient appeared confused. In ED, while awaiting admission, EEG was done, patient had another episode of seizure-like activity with generalized shaking, drooling/frothing from the mouth with unresponsiveness, he was given Ativan 2 Mg x 1  CTH negative for acute pathology. Labs notable for Cr 3.63, Lactate 3.4 -> 2.1 s/p 1L IVF, TSH 7.63.      Initial EEG done is limited due to motion artifact, no gross seizures noted, however, underlying seizure activity may be obscured. Repeat EEG done this morning  consistent with diffuse cerebral dysfunction, no epileptiform activity seen and no clinical events or seizures were recorded        # New onset seizure x 2, possible triggers - infection    – Given possible 2 seizures, AED started, as renal function is very poor, will defer levetiracetam, loaded with fosphenytoin 1 g followed by 100 mg 3 times daily  – May switch over to another AED once patient stabilized  -- Dilantin level on 9/6 and 9/7 AM    #Parkinson's disease with dementia    – Continue carbidopa levodopa 25/100 mg 2 tablets TID, can give Parcopa sublingual if NPO

## 2024-09-05 NOTE — SWALLOW BEDSIDE ASSESSMENT ADULT - MUCOSAL QUALITY
Unable to formally assess due to altered alertness, altered mentation, reduced active participation and periodic resistive mouth closing on stimulation.

## 2024-09-05 NOTE — DIETITIAN INITIAL EVALUATION ADULT - ADD RECOMMEND
1) ADAT per MD; remain w/ NPO restrictions as per SLP  **Maintain aspiration precautions, back of bed >35 degrees.  2) Rec'd to add ensure + HP shake BID (350kcal, 20g protein) if/when medically feasible  3) Monitor bowel movements, if no BM for >3 days, consider implementing bowel regimen.  4) MVI w/ minerals daily to ensure 100% RDA met  5) Consider adding thiamine 100 mg daily 2/2 poor PO intake/ malnutrition  6) Monitor lytes/ min and replete prn; remains at HIGH RISK for refeeding syndrome  7) Confirm goals of care regarding nutrition support. Is DNR/DNI; however, nutrition support NOT addressed in MOL[facility].****  RD will continue to monitor PO intake, labs, hydration, and wt prn.

## 2024-09-05 NOTE — SWALLOW BEDSIDE ASSESSMENT ADULT - SLP GENERAL OBSERVATIONS
.1) On encounter, diffuse muscle rigidity was evident w/cogwheeling on right > left. A loss of bulk was evident in pt's strap muscle regions. His lower lip was mildly edematous. The pt tended to move his UE's in a restless/fidgety manner at times. The pt was arousable but fatigued, communicatively passive/hypoactive & internally distractible. Pt typically did not sustain joint attention. The pt occasionally responsively verbalized during low structured Q/A dyads on very limited occasions. Interrogatives were concrete & personally relevant. At these limited times when pt verbalized, his speech output was marked by reduced articulatory pressure, & decreased vocal intensity c/w parkinsonian Dysarthria that is apt to appear heightened at times when he is fatigued/lethargic. Additionally, the verbalizations that could be deciphered were linguistically intact but brief & variably contextually inappropriate indicative of concomitant Cognitive Dysfunction. Cognitive Dysfunction is in setting of Encephalopathy associated with acute medical issues(i.e ? seizure activity w/post ictal behaviors, UTI, bradycardia, need for administration of sedatives at times, etc) as well as underlying Dementia.  Response effectiveness during Q/A dyads was no greater than 20% or less. Over articulation cues, volumizing cues, verbal elaboration cues and cognitive redirection cues were of mild transient benefit. .On encounter, diffuse muscle rigidity was evident w/cogwheeling on right > left. A loss of bulk was evident in pt's strap muscle regions. His lower lip was mildly edematous. The pt tended to move his UE's in a restless/fidgety manner at times. The pt was arousable but fatigued, communicatively passive/hypoactive & internally distractible. Pt typically did not sustain joint attention. The pt occasionally responsively verbalized during low structured Q/A dyads on very limited occasions. Interrogatives were concrete & personally relevant. At these limited times when pt verbalized, his speech output was marked by reduced articulatory pressure, & decreased vocal intensity c/w parkinsonian Dysarthria that is apt to appear heightened at times when he is fatigued/lethargic. Additionally, the verbalizations that could be deciphered were linguistically intact but brief & variably contextually inappropriate indicative of concomitant Cognitive Dysfunction. Cognitive Dysfunction is in setting of Encephalopathy associated with acute medical issues(i.e ? seizure activity w/post ictal behaviors, UTI, bradycardia, need for administration of sedatives at times, etc) as well as underlying Dementia.  Response effectiveness during Q/A dyads was no greater than 20% or less. Over articulation cues, volumizing cues, verbal elaboration cues and cognitive redirection cues were of mild transient benefit.

## 2024-09-05 NOTE — SWALLOW BEDSIDE ASSESSMENT ADULT - ADDITIONAL RECOMMENDATIONS
1) HOSPITALIST, AND NEURO F/U. CONSIDER SUBLINGUAL PARKINSON'S MEDS GIVEN PT'S DYSPHAGIA.     2) NUTRITION F/U. AT NUTRITION RISK.     3) SUGGEST A PALLIATIVE CARE CONSULT.

## 2024-09-05 NOTE — DIETITIAN INITIAL EVALUATION ADULT - PERTINENT LABORATORY DATA
09-05    144  |  113<H>  |  50<H>  ----------------------------<  94  3.6   |  22  |  3.55<H>    Ca    8.3<L>      05 Sep 2024 08:05  Phos  3.6     09-04  Mg     2.6     09-04    TPro  7.4  /  Alb  3.7  /  TBili  0.6  /  DBili  x   /  AST  22  /  ALT  10<L>  /  AlkPhos  73  09-04  POCT Blood Glucose.: 103 mg/dL (09-04-24 @ 15:53)

## 2024-09-05 NOTE — SWALLOW BEDSIDE ASSESSMENT ADULT - NS SPL SWALLOW CLINIC TRIAL FT
The pt demonstrated periodically reduced alertness for/orientation to feeding with periodic psychogenically reduced willingness to accept PO atop prominent Oropharyngeal Dysphagia with post prandial aspiration signs with puree food which is the most conservative food texture. Resistive mouth closing sometimes noted upon PO presentation. When PO in oral cavity, bolus formation/transfer were achieved via moderately to excessively prolonged/discontinuous lingual pimping actions. Piecemeal deglutition was evident. Swallow trigger was timely to mildly latent and laryngeal lift on palpation during swallowing trials was prominently decreased. Solids/liquids not offered given the severity of pt's Dysphagia. Suggest NPO restriction at this time given the severity of patient's Oropharyngeal Dysphagia as pt is at high risk to aspirate with all PO types at this time, regardless of consistency. Dysphagia is apt to fluctuate in severity with changes in alertness/mood/mentation. Pt's Oropharyngeal Dysphagia is in setting of multiple acute medical issues(i.e ? seizure activity w/post ictal behaviors, UTI, bradycardia, need for administration of sedatives at times, etc), muscle wasting, Parkinson's Disease and underlying Dementia.

## 2024-09-05 NOTE — SWALLOW BEDSIDE ASSESSMENT ADULT - SWALLOW EVAL: DIAGNOSIS
1) On encounter, diffuse muscle rigidity was evident w/cogwheeling on right > left. A loss of bulk was evident in pt's strap muscle regions. Pt was arousable but fatigued, communicatively passive/hypoactive & internally distractible. Pt typically did not sustain joint attention. He occasionally verbalized during communicative probes on very limited occasions. At these limited times, his speech output was marked by reduced articulatory pressure, & decreased vocal intensity c/w parkinsonian Dysarthria that is apt to appear heightened at times when he is fatigued/lethargic. Additionally, the verbalizations that could be deciphered were linguistically intact but brief & variably contextually inappropriate indicative of concomitant Cognitive Dysfunction. Cognitive Dysfunction is in setting of Encephalopathy associated with acute medical issues(i.e ? seizure activity w/post ictal behaviors, UTI, bradycardia, need for administration of sedatives at times, etc) as well as underlying Dementia.

## 2024-09-05 NOTE — DIETITIAN INITIAL EVALUATION ADULT - NSFNSGIIOFT_GEN_A_CORE
Goal Outcome Evaluation:      Plan of Care Reviewed With: patient      See provider notification from earlier in night regarding BP.  Outcome Evaluation: BP improved now to 92/56 (MAP 73) after most recent bolus give at 0345. HR 80s-90s. Pt up to bedside commode and voided 100mL and had loose watery stool. RA at this time with O2 sats 91%. Pt states it felt good to get up OOB to commode. Denies pain. Telemetry is NSR.       I&O's Detail  *none doc'd

## 2024-09-05 NOTE — SWALLOW BEDSIDE ASSESSMENT ADULT - SWALLOW EVAL: RECOMMENDED DIET
SUGGEST NPO RESTRICTION AT THIS TIME. PT IS AT HIGH RISK FOR ASPIRATION WITH ALL PO TYPES, REGARDLESS OF CONSISTENCY, AT THIS TIME.

## 2024-09-05 NOTE — SWALLOW BEDSIDE ASSESSMENT ADULT - SWALLOW EVAL: PROGNOSIS
2) The pt demonstrates periodically reduced alertness for/orientation to feeding with periodic psychogenically reduced willingness to accept PO atop prominent Oropharyngeal Dysphagia with post prandial aspiration signs with puree food which is the most conservative food texture. Solids/liquids not offered given the severity of pt's Dysphagia. Dysphagia is apt to fluctuate in severity with changes in alertness/mood/mentation. Pt's Oropharyngeal Dysphagia is in setting of multiple acute medical issues(i.e ? seizure activity w/post ictal behaviors, UTI, bradycardia, need for administration of sedatives at times, etc), muscle wasting, Parkinson's Disease and underlying Dementia.

## 2024-09-05 NOTE — PROGRESS NOTE ADULT - SUBJECTIVE AND OBJECTIVE BOX
Patient more alert today, opens eyes, moves right side better than left.  As per the staff was able to take his carbidopa levodopa a.m. dose.  No further seizure-like activity noted.    Is on fosphenytoin 100 Mg every 8 hours.    Repeat EEG done this morning  consistent with diffuse cerebral dysfunction. No   epileptiform activity was seen and no clinical events or seizures were recorded    ROS: As above, other ROS Negative    MEDICATIONS  (STANDING):  allopurinol 100 milliGRAM(s) Oral daily  amLODIPine   Tablet 5 milliGRAM(s) Oral daily  bisacodyl 5 milliGRAM(s) Oral at bedtime  carbidopa/levodopa  25/100 2 Tablet(s) Oral four times a day  cefTRIAXone Injectable. 1000 milliGRAM(s) IV Push every 24 hours  clonazePAM  Tablet 0.5 milliGRAM(s) Oral at bedtime  escitalopram 5 milliGRAM(s) Oral daily  finasteride 5 milliGRAM(s) Oral daily  fosphenytoin IVPB 100 milliGRAM(s) PE IV Intermittent every 8 hours  heparin   Injectable 5000 Unit(s) SubCutaneous every 12 hours  hydrALAZINE Injectable 10 milliGRAM(s) IV Push every 8 hours  levothyroxine 88 MICROGram(s) Oral daily  polyethylene glycol 3350 17 Gram(s) Oral daily  simvastatin 10 milliGRAM(s) Oral at bedtime  sodium chloride 0.9%. 1000 milliLiter(s) (75 mL/Hr) IV Continuous <Continuous>  tamsulosin 0.4 milliGRAM(s) Oral at bedtime      Vital Signs Last 24 Hrs  T(C): 36.5 (05 Sep 2024 09:31), Max: 37.3 (04 Sep 2024 17:57)  T(F): 97.7 (05 Sep 2024 09:31), Max: 99.2 (04 Sep 2024 17:57)  HR: 53 (05 Sep 2024 12:59) (51 - 61)  BP: 166/56 (05 Sep 2024 12:59) (99/47 - 195/75)  BP(mean): 101 (04 Sep 2024 17:57) (62 - 112)  RR: 18 (05 Sep 2024 09:31) (16 - 18)  SpO2: 97% (05 Sep 2024 09:31) (97% - 100%)    Parameters below as of 05 Sep 2024 09:31  Patient On (Oxygen Delivery Method): room air      On exam:   GEN: Normocephalic, in no distress,     Neurological exam:  HF: Arousable, follows simple commands, speech hypophonic but audible, answers yes/ no  CN: Pupils 3-2mm, symmetric, EOMI, no facial weakness, tongue midline,     MOTOR: cogwheeling, L > R,, moves right upper extremity well and freely, withdraws both lower extremities, no rest tremors   SENSORY/ co-ord limited  Gait/Balance: Cannot test                          9.8    8.34  )-----------( 180      ( 05 Sep 2024 08:05 )             30.0     09-05    144  |  113<H>  |  50<H>  ----------------------------<  94  3.6   |  22  |  3.55<H>    Ca    8.3<L>      05 Sep 2024 08:05  Phos  3.6     09-04  Mg     2.6     09-04    TPro  7.4  /  Alb  3.7  /  TBili  0.6  /  DBili  x   /  AST  22  /  ALT  10<L>  /  AlkPhos  73  09-04          Radiology report:  - < from: CT Head No Cont (09.04.24 @ 11:23) >  IMPRESSION:  No evidence of an acute intracranial hemorrhage, midline shift, or   hydrocephalus.  Stable atrophy with white matter ischemic changes.  Opacified bilateral mastoids and middle ear.      < from: EEG Awake or Drowsy (09.05.24 @ 09:00) >  EEG Summary/Classification:  This was an abnormal EEG study in the awake and drowsy states due to the   presence of:  -Mild generalized slowing.    EEG Clinical Correlate/  Impression:  The findings are consistent with diffuse cerebral dysfunction. No   epileptiform activity was seen and no clinical events or seizures were   recorded. Consider a repeat study if clinically indicated.      < from: EEG Awake or Drowsy (09.04.24 @ 16:35) >  EEG Summary/  Impression:  This was an equivocal EEG.  There is significant muscle artifact obscuring the majority of the   background.  No definite epileptiform activity is seen. Epileptiform activity obscured   by artifact cannot be ruled out.  A repeat study is recommended.

## 2024-09-05 NOTE — DIETITIAN INITIAL EVALUATION ADULT - PERTINENT MEDS FT
MEDICATIONS  (STANDING):  allopurinol 100 milliGRAM(s) Oral daily  amLODIPine   Tablet 5 milliGRAM(s) Oral daily  bisacodyl 5 milliGRAM(s) Oral at bedtime  carbidopa/levodopa  25/100 2 Tablet(s) Oral four times a day  cefTRIAXone Injectable. 1000 milliGRAM(s) IV Push every 24 hours  clonazePAM  Tablet 0.5 milliGRAM(s) Oral at bedtime  escitalopram 5 milliGRAM(s) Oral daily  finasteride 5 milliGRAM(s) Oral daily  fosphenytoin IVPB 100 milliGRAM(s) PE IV Intermittent every 8 hours  heparin   Injectable 5000 Unit(s) SubCutaneous every 12 hours  hydrALAZINE Injectable 10 milliGRAM(s) IV Push every 8 hours  levothyroxine 88 MICROGram(s) Oral daily  polyethylene glycol 3350 17 Gram(s) Oral daily  simvastatin 10 milliGRAM(s) Oral at bedtime  sodium chloride 0.9%. 1000 milliLiter(s) (75 mL/Hr) IV Continuous <Continuous>  tamsulosin 0.4 milliGRAM(s) Oral at bedtime    MEDICATIONS  (PRN):  acetaminophen     Tablet .. 650 milliGRAM(s) Oral every 6 hours PRN Mild Pain (1 - 3)  acetaminophen   IVPB .. 1000 milliGRAM(s) IV Intermittent once PRN Temp greater or equal to 38C (100.4F)  LORazepam   Injectable 2 milliGRAM(s) IV Push once PRN Seizure Activity  midazolam Injectable 10 milliGRAM(s) IntraMuscular once PRN Seizure Activity

## 2024-09-05 NOTE — PROGRESS NOTE ADULT - SUBJECTIVE AND OBJECTIVE BOX
HOSPITALIST ATTENDING PROGRESS NOTE    Chart and meds reviewed.      Subjective: Patient seen and examined. Patient awake      Additional results/Imaging, I have personally reviewed:    LABS:                            9.8    8.34  )-----------( 180      ( 05 Sep 2024 08:05 )             30.0     09-05    144  |  113<H>  |  50<H>  ----------------------------<  94  3.6   |  22  |  3.55<H>    Ca    8.3<L>      05 Sep 2024 08:05  Phos  3.6     09-04  Mg     2.6     09-04    TPro  7.4  /  Alb  3.7  /  TBili  0.6  /  DBili  x   /  AST  22  /  ALT  10<L>  /  AlkPhos  73  09-04        LIVER FUNCTIONS - ( 04 Sep 2024 10:05 )  Alb: 3.7 g/dL / Pro: 7.4 gm/dL / ALK PHOS: 73 U/L / ALT: 10 U/L / AST: 22 U/L / GGT: x             Urinalysis Basic - ( 05 Sep 2024 08:05 )    Color: x / Appearance: x / SG: x / pH: x  Gluc: 94 mg/dL / Ketone: x  / Bili: x / Urobili: x   Blood: x / Protein: x / Nitrite: x   Leuk Esterase: x / RBC: x / WBC x   Sq Epi: x / Non Sq Epi: x / Bacteria: x              All other systems reviewed and found to be negative with the exception of what has been described above.    MEDICATIONS  (STANDING):  allopurinol 100 milliGRAM(s) Oral daily  amLODIPine   Tablet 5 milliGRAM(s) Oral daily  bisacodyl 5 milliGRAM(s) Oral at bedtime  carbidopa/levodopa  25/100 2 Tablet(s) Oral four times a day  cefTRIAXone Injectable. 1000 milliGRAM(s) IV Push every 24 hours  clonazePAM  Tablet 0.5 milliGRAM(s) Oral at bedtime  escitalopram 5 milliGRAM(s) Oral daily  finasteride 5 milliGRAM(s) Oral daily  fosphenytoin IVPB 100 milliGRAM(s) PE IV Intermittent every 8 hours  heparin   Injectable 5000 Unit(s) SubCutaneous every 12 hours  hydrALAZINE Injectable 10 milliGRAM(s) IV Push every 8 hours  levothyroxine 88 MICROGram(s) Oral daily  polyethylene glycol 3350 17 Gram(s) Oral daily  simvastatin 10 milliGRAM(s) Oral at bedtime  sodium chloride 0.9%. 1000 milliLiter(s) (75 mL/Hr) IV Continuous <Continuous>  tamsulosin 0.4 milliGRAM(s) Oral at bedtime    MEDICATIONS  (PRN):  acetaminophen     Tablet .. 650 milliGRAM(s) Oral every 6 hours PRN Mild Pain (1 - 3)  acetaminophen   IVPB .. 1000 milliGRAM(s) IV Intermittent once PRN Temp greater or equal to 38C (100.4F)  LORazepam   Injectable 2 milliGRAM(s) IV Push once PRN Seizure Activity  midazolam Injectable 10 milliGRAM(s) IntraMuscular once PRN Seizure Activity      VITALS:  T(F): 97.7 (09-05-24 @ 09:31), Max: 99.2 (09-04-24 @ 17:57)  HR: 53 (09-05-24 @ 12:59) (51 - 61)  BP: 166/56 (09-05-24 @ 12:59) (122/62 - 195/75)  RR: 18 (09-05-24 @ 09:31) (16 - 18)  SpO2: 97% (09-05-24 @ 09:31) (97% - 98%)  Wt(kg): --    I&O's Summary      CAPILLARY BLOOD GLUCOSE          PHYSICAL EXAM:  Constitutional: NAD, tired appearing, deconditioned/frail, intermittently agitated  HEENT: PERRLA, EOMI, dry MM  Respiratory: Breath sounds are clear bilaterally, No wheezing, rales or rhonchi  Cardiovascular: S1 and S2, sinus bradycardia, no murmurs, gallops or rubs  Gastrointestinal: +BS, soft, non-tender, non-distended, +Colostomy  Extremities: No peripheral edema, +DP pulses b/l  Neurological: A&O x 0-1, no focal deficits   Musculoskeletal: 5/5 strength b/l upper and lower extremities  Skin: Normal, skin warm and dry    CULTURES:      Telemetry, personally reviewed

## 2024-09-06 NOTE — PHYSICAL THERAPY INITIAL EVALUATION ADULT - PERTINENT HX OF CURRENT PROBLEM, REHAB EVAL
presents to the  ED 9/4 from [facility] for possible seizure-like episode, was witnessed by aide, no trauma as patient was seated the whole time, unknown how long the episode lasted. When wife arrived at bedside, patient appeared confused. In ED, while awaiting admission, EEG was done, patient had another episode of seizure-like activity with generalized shaking, drooling/frothing from the mouth with unresponsiveness, he was given Ativan 2 Mg x 1  CTH negative for acute pathology. Labs notable for Cr 3.63, Lactate 3.4 -> 2.1 s/p 1L IVF, TSH 7.63.    Initial EEG done is limited due to motion artifact, no gross seizures noted, however, underlying seizure activity may be obscured. Repeat EEG done this morning  consistent with diffuse cerebral dysfunction, no epileptiform activity seen

## 2024-09-06 NOTE — PROGRESS NOTE ADULT - SUBJECTIVE AND OBJECTIVE BOX
HOSPITALIST ATTENDING PROGRESS NOTE    Chart and meds reviewed.      Subjective: Patient seen and examined. More awake and alert today, reseen by speech patient should remain NPO. family meeting with palliative care, Long discussion with family myself. Considering hospice placement. Inpatient vs [facility]. Patient has made wishes known that he would not want NG tube. family in agreement.       Additional results/Imaging, I have personally reviewed:    LABS:                            10.7   7.97  )-----------( 197      ( 06 Sep 2024 07:37 )             33.9     09-06    149<H>  |  118<H>  |  53<H>  ----------------------------<  102<H>  3.8   |  18<L>  |  3.17<H>    Ca    8.4<L>      06 Sep 2024 07:37              Urinalysis Basic - ( 06 Sep 2024 07:37 )    Color: x / Appearance: x / SG: x / pH: x  Gluc: 102 mg/dL / Ketone: x  / Bili: x / Urobili: x   Blood: x / Protein: x / Nitrite: x   Leuk Esterase: x / RBC: x / WBC x   Sq Epi: x / Non Sq Epi: x / Bacteria: x        Lactate, Blood: 1.1 mmol/L (09-05 @ 20:50)        All other systems reviewed and found to be negative with the exception of what has been described above.    MEDICATIONS  (STANDING):  allopurinol 100 milliGRAM(s) Oral daily  amLODIPine   Tablet 5 milliGRAM(s) Oral daily  bisacodyl 5 milliGRAM(s) Oral at bedtime  carbidopa/levodopa  25/100 2 Tablet(s) Oral four times a day  cefTRIAXone Injectable. 1000 milliGRAM(s) IV Push every 24 hours  clonazePAM  Tablet 0.5 milliGRAM(s) Oral at bedtime  escitalopram 5 milliGRAM(s) Oral daily  finasteride 5 milliGRAM(s) Oral daily  fosphenytoin IVPB 100 milliGRAM(s) PE IV Intermittent every 8 hours  heparin   Injectable 5000 Unit(s) SubCutaneous every 12 hours  hydrALAZINE Injectable 10 milliGRAM(s) IV Push once  labetalol Injectable 10 milliGRAM(s) IV Push once  levothyroxine 100 MICROGram(s) Oral daily  polyethylene glycol 3350 17 Gram(s) Oral daily  simvastatin 10 milliGRAM(s) Oral at bedtime  sodium chloride 0.9%. 1000 milliLiter(s) (75 mL/Hr) IV Continuous <Continuous>  tamsulosin 0.4 milliGRAM(s) Oral at bedtime    MEDICATIONS  (PRN):  acetaminophen     Tablet .. 650 milliGRAM(s) Oral every 6 hours PRN Mild Pain (1 - 3)  acetaminophen   IVPB .. 1000 milliGRAM(s) IV Intermittent once PRN Temp greater or equal to 38C (100.4F)  hydrALAZINE Injectable 10 milliGRAM(s) IV Push every 4 hours PRN SBP > 180  LORazepam   Injectable 2 milliGRAM(s) IV Push once PRN Seizure Activity  midazolam Injectable 10 milliGRAM(s) IntraMuscular once PRN Seizure Activity      VITALS:  T(F): 97.6 (09-06-24 @ 16:33), Max: 98.1 (09-05-24 @ 21:53)  HR: 56 (09-06-24 @ 16:33) (53 - 57)  BP: 198/71 (09-06-24 @ 16:33) (160/72 - 198/71)  RR: 18 (09-06-24 @ 16:33) (18 - 18)  SpO2: 98% (09-06-24 @ 16:33) (96% - 98%)  Wt(kg): --    I&O's Summary      CAPILLARY BLOOD GLUCOSE          PHYSICAL EXAM:  Constitutional: NAD, tired appearing, deconditioned/frail, intermittently agitated  HEENT: PERRLA, EOMI, dry MM  Respiratory: Breath sounds are clear bilaterally, No wheezing, rales or rhonchi  Cardiovascular: S1 and S2, sinus bradycardia, no murmurs, gallops or rubs  Gastrointestinal: +BS, soft, non-tender, non-distended, +Colostomy  Extremities: No peripheral edema, +DP pulses b/l  Neurological: A&O x 0-1, no focal deficits   Musculoskeletal: 5/5 strength b/l upper and lower extremities  Skin: Normal, skin warm and dry      CULTURES:      Telemetry, personally reviewed

## 2024-09-06 NOTE — PROGRESS NOTE ADULT - ASSESSMENT
83-year-old male with PMHx of Parkinson's, dementia, HTN, HLD, CKD IV, hypothyroid, bladder mass s/p resection 02/2024, SBO s/p colostomy, prostate cancer s/p resection, melanoma, arthritis, anemia (gets routine CBC check) presents to the  ED from [facility] for possible seizure-like episode. Admitted for:    #Seizure like activity  #Post-ictal state likely superimposed on baseline Parkinson's dementia   - admit to telemetry   - CT head negative for acute pathology  - Lactate 3.4 -> 2.1 s/p 1L IVF  - Neurology following Dr. Meier, will hold off on Keppra load given significant CKD, new onset seizures  -   Repeat EEG done this morning  consistent with diffuse cerebral dysfunction, no epileptiform activity seen and no clinical events or seizures were recorded  - F/u MRI brain   - fosphenytoin 1 g followed by 100 mg 3 times daily  - Continue IV ativan PRN for seizure activity   - Continue 1:1 observation for safety  - Continue home parkinson's meds --> transition to Parcopa sublingual while NPO    Dilantin level on 9/6 and 9/7 AM    #HTN/HLD  #Reflex bradycardia  - HR 52 on arrival, dropped down into 30s during RRT   - Continue tele monitoring  - Hold Coreg   - C/w Amlodipine, statin   - C/w IV hydralazine 10mg q4 with parameters      #UTI  UA suspicious for UTI  Follow up Urine Cultures   Continue IV Ceftriaxone.       #CKD   - Cr 3.63 (baseline appears 3-3.5 range)  - Continue to monitor BMP, avoid nephrotoxic medications     #Hypothyroidism   - TSH 7.63 ,   - Continue synthroid, increase dose to 100mcg     #BPH, hx prostate cancer, hx bladder mass s/p resection  #UA suggestive of UTI  - Continue proscar, flomax   - Start IV CTX daily  - F/u UCx    #FTT, poor PO intake, unintentional weight loss  - Encourage oral intake  - swallow eval ordered, f/u, NPO for now  - Continue gentle IVF hydration   - Dietician consulted  - Can f/u with PCP for further management to r/o underlying malignancy     #SBO s/p colostomy   - routine ostomy care    #DNR/DNI  - MOLST in chart    #GOC   Consult palliative Care, family considering hospice and ? Comfort care    #DVT ppx  - heparin SQ

## 2024-09-06 NOTE — CONSULT NOTE ADULT - SUBJECTIVE AND OBJECTIVE BOX
83-year-old M with PMHx of Parkinson's, dementia, HTN, HLD, CKD IV, hypothyroid, bladder mass s/p resection 02/2024, prostate ca presents to the  ED from [facility] for possible seizure-like episode. Per wife at bedside, he had an episode of shaking ? seizure, was witnessed by aide. When wife arrived at bedside, patient appeared confused  Wife does note patient has had decreased p.o. intake over the past few months with unintentional 20 lb weight loss. In ED, VSS. CTH negative for acute pathology. Labs notable for Cr 3.63, Lactate 3.4 -> 2.1 s/p 1L IVF, TSH 7.63. Palliative Medicine Consult to further establish GOC.  9/7/24 Seen and examined at bedside. Pt unresponsive to voice. In no distress    PAIN: ( )Yes   ( X)No  No non verbal signs or symptoms    DYSPNEA: ( ) Yes  ( X) No      PAST MEDICAL & SURGICAL HISTORY:  HTN (hypertension)  HLD (hyperlipidemia)  Parkinson disease  CKD (chronic kidney disease)  Hypothyroidism  Bladder mass  Obstruction of bowel  Prostate cancer  Melanoma of skin  CA skin, basal cell  Cancer, skin, squamous cell  Arthritis  Anemia  History of total knee replacement, left  H/O hemorrhoidectomy  H/O colonoscopy  H/O Mohs micrographic surgery for skin cancer      SOCIAL HX:  Lives at [facility]  Hx opiate tolerance ( )YES  ( X)NO    Baseline ADLs  (Prior to Admission)  ( ) Independent   ( X)Dependent    FAMILY HISTORY:  FHx: prostate cancer        Review of Systems:  Unable to obtain/Limited due to: unresponsive      PHYSICAL EXAM:    Vital Signs Last 24 Hrs  T(C): 36.3 (06 Sep 2024 05:00), Max: 36.7 (05 Sep 2024 21:53)  T(F): 97.3 (06 Sep 2024 05:00), Max: 98.1 (05 Sep 2024 21:53)  HR: 54 (06 Sep 2024 09:06) (51 - 57)  BP: 193/77 (06 Sep 2024 09:06) (160/72 - 193/77)  RR: 18 (06 Sep 2024 09:06) (16 - 18)  SpO2: 96% (06 Sep 2024 09:06) (96% - 98%)    Parameters below as of 06 Sep 2024 09:06  Patient On (Oxygen Delivery Method): room air    PPSV2:  10-20 %  FAST: unable to access due to unresponsiveness    General: Elderly male in bed   Mental Status: unresponsive to voice  HEENT: oral mucosa dry  Lungs: clear to auscultation reynaldo  Cardiac: S1S2+  GI: abd soft NT ND + BS  : voids  Ext: moves on bed  Neuro: AMS      LABS:                        10.7   7.97  )-----------( 197      ( 06 Sep 2024 07:37 )             33.9     09-06    149<H>  |  118<H>  |  53<H>  ----------------------------<  102<H>  3.8   |  18<L>  |  3.17<H>    Ca    8.4<L>      06 Sep 2024 07:37        Albumin: Albumin: 3.7 g/dL (09-04 @ 10:05)      Allergies    latex (Rash)  Aleve, Ibuprofen, Motrin (Rash)  ibuprofen (Rash)  Advil (Rash)    Intolerances      MEDICATIONS  (STANDING):  allopurinol 100 milliGRAM(s) Oral daily  amLODIPine   Tablet 5 milliGRAM(s) Oral daily  bisacodyl 5 milliGRAM(s) Oral at bedtime  carbidopa/levodopa  25/100 2 Tablet(s) Oral four times a day  cefTRIAXone Injectable. 1000 milliGRAM(s) IV Push every 24 hours  clonazePAM  Tablet 0.5 milliGRAM(s) Oral at bedtime  escitalopram 5 milliGRAM(s) Oral daily  finasteride 5 milliGRAM(s) Oral daily  fosphenytoin IVPB 100 milliGRAM(s) PE IV Intermittent every 8 hours  heparin   Injectable 5000 Unit(s) SubCutaneous every 12 hours  levothyroxine 100 MICROGram(s) Oral daily  polyethylene glycol 3350 17 Gram(s) Oral daily  simvastatin 10 milliGRAM(s) Oral at bedtime  sodium chloride 0.9%. 1000 milliLiter(s) (75 mL/Hr) IV Continuous <Continuous>  tamsulosin 0.4 milliGRAM(s) Oral at bedtime    MEDICATIONS  (PRN):  acetaminophen     Tablet .. 650 milliGRAM(s) Oral every 6 hours PRN Mild Pain (1 - 3)  acetaminophen   IVPB .. 1000 milliGRAM(s) IV Intermittent once PRN Temp greater or equal to 38C (100.4F)  hydrALAZINE Injectable 10 milliGRAM(s) IV Push every 4 hours PRN SBP > 180  LORazepam   Injectable 2 milliGRAM(s) IV Push once PRN Seizure Activity  midazolam Injectable 10 milliGRAM(s) IntraMuscular once PRN Seizure Activity      RADIOLOGY/ADDITIONAL STUDIES:    < from: MR Head No Cont (09.05.24 @ 15:37) >    ACC: [id number] EXAM:  MR BRAIN   ORDERED BY: CALUDIO CACERES     PROCEDURE DATE:  09/05/2024          INTERPRETATION:  EXAM: MRI OF THE BRAIN WITHOUT CONTRAST    HISTORY: New onset seizure    TECHNIQUE: Multi-planar multi-sequential MR imaging of the brain was   performed without intravenous contrast.    COMPARISON: CT of the head September 4, 2024.    FINDINGS:    Study is limited based on motion artifact.    No diffusion restriction to suggest acute infarct. Foci of increased   T2/FLAIR signal in the deep and periventricular white matter, compatible   with chronic small vessel disease. Atrophy to the bilateral amygdalae and   hippocampal formations. The bilateral hippocampal formations otherwise   have the appropriate configuration and MRsignal to the extent   visualized. Parenchymal volume loss resulting in a ex vacuo dilatation   appearance of the bilateral ventricles. The visualized extra axial spaces   and basal cisterns are within normal limits. No midline shift or mass   effectpresent.    The craniocervical junction is within normal limits. The sella is largely   CSF filled. The major intracranial vessels demonstrate the expected   signal void related to vascular flow to the extent visualized. Mild   mucosal thickening in the ethmoid air cells. Bilateral mastoid effusions   with fluid in the middle ear cavities. The visualized orbits are within   normal limits.      IMPRESSION:    Study is limited based on motion artifact.    1.  No evidence of acute infarct or midlineshift.  2.  Chronic small vessel disease.  3.  Bilateral mastoid effusions with fluid in the middle ear cavities.   Recommend clinical correlation for a otomastoiditis.    < end of copied text >    < from: CT Head No Cont (09.04.24 @ 11:23) >    ACC: [id number] EXAM:  CT BRAIN   ORDERED BY: THOM ROY     PROCEDURE DATE:  09/04/2024          INTERPRETATION:  CT HEAD    CLINICAL HISTORY: New onset seizure    TECHNIQUE:  Noncontrast CT.  Axial Acquisition.  Sagittal and coronal reformations.    COMPARISON:  Compared to study dated 2/13/2024    FINDINGS:  HEMORRHAGE:  No evidence of intracranial hemorrhage.  BRAIN PARENCHYMA:  Moderate atrophy. Mild chronic periventricular white   matter ischemic changes.  No mass or mass effect.  VENTRICLES / SHIFT:  Prominent ventricles due to underlying brain   atrophy.  No hydrocephalus.  No midline shift.  EXTRA-AXIAL / BASAL CISTERNS:  No extra-axial mass. Basal cisterns   preserved.  CALVARIUM AND EXTRACRANIAL SOFT TISSUES:  No depressed calvarial fracture.  SINUSES, ORBITS, MASTOIDS:  Opacified bilateral mastoids and middle ear.   Visualized paranasal sinuses are well-aerated..    IMPRESSION:  No evidence of an acute intracranial hemorrhage, midline shift, or   hydrocephalus.  Stable atrophy with white matter ischemic changes.  Opacified bilateral mastoids and middle ear.      < end of copied text >  < from: Xray Chest 1 View- PORTABLE-Urgent (09.04.24 @ 10:54) >    ACC: [id number] EXAM:  XR CHEST PORTABLE URGENT 1V   ORDERED BY: THOM ROY     PROCEDURE DATE:  09/04/2024          INTERPRETATION:  EXAM: XR CHEST URGENT    INDICATION: seizure JXR    COMPARISON: February 18, 2024    IMPRESSION: No focal infiltrate or congestion. Borderline cardiomegaly on   this limited inspiratory effort. Hiatal hernia suggested. Regional   osseous structures appropriate for age      < end of copied text >  
 83y old  Male who presents with a chief complaint of seizure like activity       HPI:  83-year-old M with PMHx of Parkinson's, dementia, HTN, HLD, CKD IV, hypothyroid, bladder mass s/p resection 02/2024, prostate ca presents to the  ED from [facility] for possible seizure-like episode. Per wife at bedside, he had an episode of shaking ? seizure, was witnessed by aide, no trauma as patient was seated the whole time, unknown how long the episode lasted. When wife arrived at bedside, patient appeared confused  Wife does note patient has had decreased p.o. intake over the past few months with unintentional 20 lb weight loss. In ED, VSS. CTH negative for acute pathology. Labs notable for Cr 3.63, Lactate 3.4 -> 2.1 s/p 1L IVF, TSH 7.63.      Patient was being prepped for admission, after speaking with me EEG was ordered, while EEG was being done, RRT was called as patient had another episode of seizure-like activity with generalized shaking, drooling / frothing from the mouth with alteration in awareness, he was given IV Ativan 2 Mg x 1.  RRT team evaluated the patient.  EEG done is limited at there is motion artifact, no gross seizures noted, however, underlying seizure activity may be obscured    At the time of my exam, patient is sedated with Ativan, wife is by his bedside, she reports he takes carbidopa levodopa 2 tablets 4 times a day prescribed by his neurologist, He has no prior history of seizures.    Patient has history of Parkinson's disease, he follows up with Dr. Gao, he is currently on carbidopa levodopa 25/100 mg 2 tablets 4 times a day.  All the information is obtained from his wife, patient unable to provide any details        PAST MEDICAL & SURGICAL HISTORY:  HTN (hypertension)      HLD (hyperlipidemia)      Parkinson disease      CKD (chronic kidney disease)      Hypothyroidism      Bladder mass      Prostate cancer      Melanoma of skin      Arthritis      Anemia      History of total knee replacement, left      H/O hemorrhoidectomy        FAMILY HISTORY:  FHx: prostate cancer      Social Hx:  Nonsmoker, no drug or alcohol use      MEDICATIONS  (STANDING):  allopurinol 100 milliGRAM(s) Oral daily  amLODIPine   Tablet 5 milliGRAM(s) Oral daily  bisacodyl 5 milliGRAM(s) Oral at bedtime  carbidopa/levodopa  25/100 2 Tablet(s) Oral four times a day  cefTRIAXone Injectable. 1000 milliGRAM(s) IV Push every 24 hours  clonazePAM  Tablet 0.5 milliGRAM(s) Oral at bedtime  cloNIDine Patch 0.1 mG/24Hr(s) 1 patch Transdermal once  escitalopram 5 milliGRAM(s) Oral daily  finasteride 5 milliGRAM(s) Oral daily  fosphenytoin IVPB 1000 milliGRAM(s) PE IV Intermittent once  heparin   Injectable 5000 Unit(s) SubCutaneous every 12 hours  hydrALAZINE Injectable 10 milliGRAM(s) IV Push every 8 hours  levothyroxine 88 MICROGram(s) Oral daily  polyethylene glycol 3350 17 Gram(s) Oral daily  simvastatin 10 milliGRAM(s) Oral at bedtime  sodium chloride 0.9%. 1000 milliLiter(s) (75 mL/Hr) IV Continuous <Continuous>  tamsulosin 0.4 milliGRAM(s) Oral at bedtime       Allergies    latex (Rash)  Aleve, Ibuprofen, Motrin (Rash)  ibuprofen (Rash)  Advil (Rash)    Intolerances        ROS: Pertinent positives in HPI, all other ROS were reviewed and are negative.        Vital Signs Last 24 Hrs  T(C): 37.3 (04 Sep 2024 17:57), Max: 37.3 (04 Sep 2024 17:57)  T(F): 99.2 (04 Sep 2024 17:57), Max: 99.2 (04 Sep 2024 17:57)  HR: 57 (04 Sep 2024 17:57) (52 - 61)  BP: 183/73 (04 Sep 2024 17:57) (99/47 - 195/75)  BP(mean): 101 (04 Sep 2024 17:57) (62 - 112)  RR: 16 (04 Sep 2024 17:57) (16 - 16)  SpO2: 97% (04 Sep 2024 17:57) (97% - 100%)    Parameters below as of 04 Sep 2024 17:57  Patient On (Oxygen Delivery Method): room air      On exam:   GEN: frail appearing male, sedated  CV: rRR, s1, S2  PULM: CTAB  HEENT: no nuchal rigidity  EXTREM: no CCE    Neurological exam:  HF: Sleepy unarousable post sedation,  CN: Pupils 3-2mm, symmetric, resisting eye opening no facial weakness, tongue midline, no dysarthria but is hypophonic.    MOTOR: mild cogwheeling, R>L, no rest tremor, .   SENSORY/ co-ord limited  Gait/Balance: Cannot test        Labs:   09-04    138  |  109<H>  |  48<H>  ----------------------------<  108<H>  5.0   |  22  |  3.63<H>    Ca    9.1      04 Sep 2024 10:05  Phos  3.6     09-04  Mg     2.6     09-04    TPro  7.4  /  Alb  3.7  /  TBili  0.6  /  DBili  x   /  AST  22  /  ALT  10<L>  /  AlkPhos  73  09-04                          12.1   8.80  )-----------( 198      ( 04 Sep 2024 10:05 )             37.2       Radiology:  - < from: CT Head No Cont (09.04.24 @ 11:23) >  FINDINGS:  HEMORRHAGE:  No evidence of intracranial hemorrhage.  BRAIN PARENCHYMA:  Moderate atrophy. Mild chronic periventricular white   matter ischemic changes.  No mass or mass effect.  VENTRICLES / SHIFT:  Prominent ventricles due to underlying brain   atrophy.  No hydrocephalus.  No midline shift.  EXTRA-AXIAL / BASAL CISTERNS:  No extra-axial mass. Basal cisterns   preserved.  CALVARIUM AND EXTRACRANIAL SOFT TISSUES:  No depressed calvarial fracture.  SINUSES, ORBITS, MASTOIDS:  Opacified bilateral mastoids and middle ear.   Visualized paranasal sinuses are well-aerated..    IMPRESSION:  No evidence of an acute intracranial hemorrhage, midline shift, or   hydrocephalus.  Stable atrophy with white matter ischemic changes.  Opacified bilateral mastoids and middle ear.    < from: EEG Awake or Drowsy (09.04.24 @ 16:35) >  EEG Summary/  Impression:  This was an equivocal EEG.  There is significant muscle artifact obscuring the majority of the   background.  No definite epileptiform activity is seen. Epileptiform activity obscured   by artifact cannot be ruled out.  A repeat study is recommended.

## 2024-09-06 NOTE — PHYSICAL THERAPY INITIAL EVALUATION ADULT - TRANSFER SKILLS, REHAB EVAL
Patient ID: Wilfred is a 20 year old male.    Chief Complaint   Patient presents with   • Establish Care     HPI:  Patient presented for sports physical.      Patient denies any acute concerns.    Patient will be going to college in [employer].  Patient will be playing baseball this is a sophomore year.  Patient denies any complaints.  Patient in the past was diagnosed with scoliosis however this does not inhibit his ability to do athletics.  Denies any current back pain.        Wilfred Hardin   Patient Active Problem List   Diagnosis   (none) - all problems resolved or deleted     Past Medical History:   Diagnosis Date   • Acne 3/4/2015   • Scoliosis 9/23/2013     Wilfred  has a past surgical history that includes No past surgeries.  His family history includes Patient is unaware of any medical problems in his father and mother.  Social History     Socioeconomic History   • Marital status: Single     Spouse name: Not on file   • Number of children: Not on file   • Years of education: Not on file   • Highest education level: Not on file   Occupational History   • Not on file   Social Needs   • Financial resource strain: Not on file   • Food insecurity:     Worry: Not on file     Inability: Not on file   • Transportation needs:     Medical: Not on file     Non-medical: Not on file   Tobacco Use   • Smoking status: Never Smoker   • Smokeless tobacco: Never Used   Substance and Sexual Activity   • Alcohol use: Not on file   • Drug use: Not on file   • Sexual activity: Not on file   Lifestyle   • Physical activity:     Days per week: Not on file     Minutes per session: Not on file   • Stress: Not on file   Relationships   • Social connections:     Talks on phone: Not on file     Gets together: Not on file     Attends Latter-day service: Not on file     Active member of club or organization: Not on file     Attends meetings of clubs or organizations: Not on file     Relationship status: Not on file   • Intimate  partner violence:     Fear of current or ex partner: Not on file     Emotionally abused: Not on file     Physically abused: Not on file     Forced sexual activity: Not on file   Other Topics Concern   • Not on file   Social History Narrative   • Not on file     Wilfred Hardin currently has no medications in their medication list.  Wilfred   No current outpatient medications on file.     No current facility-administered medications for this visit.      Wilfred is allergic to penicillin g.    Review of Systems:  Review of Systems   All other systems reviewed and are negative.      Physical:  Visit Vitals  /60   Pulse 93   Temp 99 °F (37.2 °C) (Temporal)   Ht 5' 7.75\" (1.721 m)   Wt 85.3 kg (188 lb)   SpO2 100%   BMI 28.80 kg/m²     Physical Exam   Constitutional: He is oriented to person, place, and time. He appears well-developed and well-nourished.   HENT:   Head: Normocephalic and atraumatic.   Right Ear: External ear normal.   Left Ear: External ear normal.   Nose: Nose normal.   Mouth/Throat: Oropharynx is clear and moist.   Eyes: Pupils are equal, round, and reactive to light. Conjunctivae and EOM are normal.   Neck: Normal range of motion. Neck supple. No thyromegaly present.   Cardiovascular: Normal rate, regular rhythm and normal heart sounds.   Pulmonary/Chest: Effort normal and breath sounds normal. No respiratory distress. He has no wheezes. He has no rales.   Abdominal: Soft. Bowel sounds are normal. He exhibits no distension. There is no abdominal tenderness. There is no rebound.   Neurological: He is alert and oriented to person, place, and time.   Skin: Skin is warm and dry.   Psychiatric: He has a normal mood and affect. His behavior is normal. Judgment and thought content normal.   Vitals reviewed.        Wilfred was seen today for establish care.    Diagnoses and all orders for this visit:    Healthcare maintenance        1. Healthcare maintenance  Sports physical  Cleared for sports for 1  year  Plans to play baseball sophomore year at [employer]  Form filled out and given to patient                Medical compliance with plan discussed and risks of non-compliance reviewed.    Patient education completed on disease process, etiology & prognosis.    Patient expresses understanding of the plan.    Proper usage and side effects of medications reviewed & discussed.    Risks and benefits of medications discussed at length with the patient.  Refer to orders.    Return to clinic as clinically indicated as discussed with patient who verbalized understanding of & agreement with the plan.    Written handout given.       Electronically signed by: Hair Iglesias DO on  08/04/20            [facility] [place] [address]   [address] [facility] [address]   Phone: [phone]   Fax: [phone]   spouse describes max assist/dependent transfer of 1 "strong person" w/ much difficulty performing turn/needed assist

## 2024-09-06 NOTE — PHYSICAL THERAPY INITIAL EVALUATION ADULT - MODALITIES TREATMENT COMMENTS
pt unable to actively participate w/ PT at this time. DC PT. available to reconsult should pt MS /medical status improve. d/w nsg.

## 2024-09-06 NOTE — GOALS OF CARE CONVERSATION - ADVANCED CARE PLANNING - CONVERSATION DETAILS
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   (04 Sep 2024 16:28)      PERTINENT PMH REVIEWED:  [ X ] YES [ ] NO           Primary Contact: Wife Angela     HCP [  ] Surrogate [  X ] Guardian [   ]    Mental Status: [ ] Alert  [  ] Oriented [  ] Confused [ X ] Lethargic [  ]  Concerns of Depression [  ]- Not identified   Anxiety [   ]- Not identified   Baseline ADLs (prior to admission):  Independent [ ] moderately [ ] fully   Dependent   [ ] moderately [ X ] fully    Anticipated Grief: Patient [  ] Family [  X  ]    Caregiver [place] Assessed: Yes [  X  ] No [  ]    Orthodox: Episcopal     Spiritual Concerns: Not identified     Goals of Care: to be determined, family is considering comfort but would like the weekend to thinkt his over     Previous Services: [facility] and Private hire aide during the day     ADVANCE DIRECTIVES: DNR/DNI     Anticipated D/C Plan: to be further determined, possibly back to [facility] with Hospice- family to make a decision. 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   (04 Sep 2024 16:28)      PERTINENT PMH REVIEWED:  [ X ] YES [ ] NO           Primary Contact: Wife Angela     HCP [  ] Surrogate [  X ] Guardian [   ]    Mental Status: [ ] Alert  [  ] Oriented [  ] Confused [ X ] Lethargic [  ]  Concerns of Depression [  ]- Not identified   Anxiety [   ]- Not identified   Baseline ADLs (prior to admission):  Independent [ ] moderately [ ] fully   Dependent   [ ] moderately [ X ] fully    Anticipated Grief: Patient [  ] Family [  X  ]    Caregiver [place] Assessed: Yes [  X  ] No [  ]    Gnosticist: Yazdanism     Spiritual Concerns: Not identified     Goals of Care: to be determined, family is considering comfort but would like the weekend to thinkt his over     Previous Services: [facility] and Private hire aide during the day     ADVANCE DIRECTIVES: DNR/DNI     Anticipated D/C Plan: to be further determined, possibly back to [facility] with Hospice- family to make a decision. Follow up meeting on Monday at 10:30 AM                      Summary:    This SW met with pts wife and son to discuss goals of care, assist with planning and provide supportive counseling. Role of Palliative explained. Pt. has been at [facility] with a private aide during the day. Pts wife also lives with him at [facility]. Pt. was brought to the hospital after he had a seizure.    Goals of care discussed. Pts family shared how he has declined. They discussed how he is not eating and spitting out his medications. Pts wife shared how she knows that if pt. could see himself he would not want to live like this. Feelings explored and support provided. We discussed the option of hospice at [facility], which [facility] has also discussed with pts wife. Hospice services and philosophy of care explained in detail. We also reviewed inpatient hospice if pts symptoms were to increase and he needed IV management. LTC with a comfort MOLST also explained. Pts family would like to give pt. the weekend and see how he does. We have a follow up meeting scheduled for Monday at 10:30AM.     Plan at this time is to continue with current medical management and have a follow up meeting on Monday at 10:30AM. Emotional support provided our team will continue to follow.

## 2024-09-06 NOTE — CONSULT NOTE ADULT - CONVERSATION DETAILS
We met with pts wife and son to discuss goals of care, assist with planning and provide supportive counseling. Role of Palliative explained. Pt. has been at [facility] with a private aide during the day. Pts wife also lives with him at [facility]. Pt. was brought to the hospital after he had a seizure.    Goals of care discussed. Pts family shared how he has declined. They discussed how he is not eating and spitting out his medications. Pts wife shared how she knows that if pt. could see himself he would not want to live like this. Feelings explored and support provided. We discussed the option of hospice at [facility], which [facility] has also discussed with pts wife. Hospice services and philosophy of care explained in detail. We also reviewed inpatient hospice if pts symptoms were to increase and he needed IV management. LTC with a comfort MOLST also explained. Pts family would like to give pt. the weekend and see how he does. We have a follow up meeting scheduled for Monday at 10:30AM.     Plan at this time is to continue with current medical management and have a follow up meeting on Monday at 10:30AM. Emotional support provided our team will continue to follow.

## 2024-09-06 NOTE — PROGRESS NOTE ADULT - ASSESSMENT
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 for possible seizure-like episode, was witnessed by aide, no trauma as patient was seated the whole time, unknown how long the episode lasted. When wife arrived at bedside, patient appeared confused. In ED, while awaiting admission, EEG was done, patient had another episode of seizure-like activity with generalized shaking, drooling/frothing from the mouth with unresponsiveness, he was given Ativan 2 Mg x 1  CTH negative for acute pathology. Labs notable for Cr 3.63, Lactate 3.4 -> 2.1 s/p 1L IVF, TSH 7.63.      Initial EEG done is limited due to motion artifact, no gross seizures noted, however, underlying seizure activity may be obscured. Repeat EEG done this morning  consistent with diffuse cerebral dysfunction, no epileptiform activity seen and no clinical events or seizures were recorded      # New onset seizure x 2, possible triggers - infection    Given possible 2 seizures, AED started, as renal function is very poor, defer levetiracetam, loaded with fosphenytoin 1 g followed by 100 mg 3 times daily, Dilantin level today 26.1 supratherapeutic,    – Will change fosphenytoin to 100 Mg twice daily,  – May switch over to another AED once patient stabilized  -- Dilantin level on 9/7 and 9/8 AM    #Parkinson's disease with dementia, is N.P.O, not taking his medications.    – I spoke with pharmacy, Parcopa SL is no longer available, they recommended crushing the medication and administer with apply with juice,    – Spoke with Dr. Cabrera, if needed Corpak can be inserted for medicine delivery    Dr. Cabrera is meeting with family to see what the long-term goals are.    Dr. Rodriguez is on service from 9/7/2024 he will follow-up with Dilantin level and adjust medication             Continue carbidopa levodopa 25/100 mg 2 tablets TID,

## 2024-09-06 NOTE — PHYSICAL THERAPY INITIAL EVALUATION ADULT - GENERAL OBSERVATIONS, REHAB EVAL
pt rec'd supine in bed on 3N, eyes closed, opens on command, ltd verbalizations-unclear, IV, spouse and 1:1 present

## 2024-09-06 NOTE — CONSULT NOTE ADULT - ASSESSMENT
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 Per wife at bedside, he had an episode of shaking ? seizure, was witnessed by aide. When wife arrived at bedside, patient appeared confused  Wife does note patient has had decreased p.o. intake over the past few months with unintentional 20 lb weight loss. In ED, VSS. CTH negative for acute pathology. Labs notable for Cr 3.63, Lactate 3.4 -> 2.1 s/p 1L IVF, TSH 7.63. Palliative Medicine Consult to further establish GOC.  9/7/24 Seen and examined at bedside. Pt unresponsive to voice. 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 Per wife at bedside, he had an episode of shaking ? seizure, was witnessed by aide. When wife arrived at bedside, patient appeared confused  Wife does note patient has had decreased p.o. intake over the past few months with unintentional 20 lb weight loss. In ED, VSS. CTH negative for acute pathology. Labs notable for Cr 3.63, Lactate 3.4 -> 2.1 s/p 1L IVF, TSH 7.63. Palliative Medicine Consult to further establish GOC.  9/7/24 Seen and examined at bedside. Pt unresponsive to voice. In no distress    Assessment and Plan:    1) Seizure like activity  -Post-ictal state likely superimposed on baseline Parkinson's dementia   -CT head negative for acute pathology  -Neurology following Dr. Meier  - F/u EEG  - F/u MRI brain   - Continue IV ativan PRN for seizure activity     2) CKD   - Cr 3.63 (baseline appears 3-3.5 range)  - Continue to monitor BMP  - avoid nephrotoxic medications     3) BPH  - hx prostate cancer  - hx bladder mass s/p resection  - UA suggestive of UTI  -Continue proscar, flomax, abx as per med  - F/u UCx    4) Debility  - poor PO intake, unintentional weight loss  - Encourage oral intake  - swallow eval ordered  - NPO for now  -Weakness    5) Advanced Directives  -Pt lacks capacity  -Wife Angela surrogate  -MOLST DNR/DNI/Trial NIV    Time Spent: 75 minutes including the care, coordination and counseling of this patient, 50% of which was spent coordinating and counseling.

## 2024-09-06 NOTE — PHYSICAL THERAPY INITIAL EVALUATION ADULT - PASSIVE RANGE OF MOTION EXAMINATION, REHAB EVAL
R shld flex to 90, B abd ~30, L shld flex ~30, lacks L elbow ext, lacks R elbow flex, B hip/knee flex ltd (~30deg), lacks B TKE, B ankle WFL

## 2024-09-06 NOTE — PHYSICAL THERAPY INITIAL EVALUATION ADULT - NSPTDISCHREC_GEN_A_CORE
[facility]    History and Physical - Hospitalist Service       Date of Admission:  4/15/2022    Assessment & Plan      Crista Harman is a 85 year old female admitted on 4/15/2022.     85-year-old female with history of coronary artery disease, hypertension, neurocognitive decline presents with syncopal episode at home.      Syncope  -CT head negative for acute changes, EKG sinus bradycardia troponin negative,  -In the past her beta-blocker was discontinued due to sinus bradycardia but still appears to be with slow rate in the 50s.  Given previous right coronary artery obstructive lesion will get cardiology to see her.  -Will keep on telemetry,check orthostatics vitals,  echo in the a.m., cardiology consult    Addendum-BP orthostatic from lying to sitting.  Received 500 mL bolus initially.  Start IV hydration with NS reassess after 1 L.    Hypokalemia-KCl 40 mEq once.  Repeat in the a.m. and replace as needed.    Hypertension.   -Controlled, c/w pta imdur    CAD  -No angina, recently admitted April 2021 angiogram at that time showed severe right coronary artery ostial stenosis unamenable to PCI and currently on medical therapy  -Continue Plavix and aspirin, Imdur, statin.  Serial troponins, monitor on telemetry    Elevated TSH  -TSH 9.1  -Check free T4 and T3    Neurocognitive decline  -Holding pta donepezil given bradycardia with hx of syncope          Diet: Low Saturated Fat Na <2400 mg  DVT Prophylaxis: Pneumatic Compression Devices  Chauhan Catheter: Not present  Central Lines: None  Cardiac Monitoring: None  Code Status: No CPR- Do NOT Intubate discussed with patient and her son     Clinically Significant Risk Factors Present on Admission     # Platelet Defect: home medication list includes an antiplatelet medication       Disposition Plan   Expected Discharge: 1 day       The patient's care was discussed with the Patient.    Guillermo Sanford MD  Hospitalist Service  [facility]  [facility]  Securely message with the [employer] Web Console (learn more here)  Text page via [employer] Paging/Directory         ______________________________________________________________________    Chief Complaint       History is obtained from the patient    History of Present Illness   Crista Harman is a 85-year-old female with history of coronary artery disease, hypertension, neurocognitive decline presents with syncopal episode at home.  Per her son who was with her at the time of the event patient reported to him that she is not feeling right and feels dizzy and sat down on the couch.  Then few minutes later passed out while sitting on the couch by his side.  He tried to shake her and tried to wake her up but did not respond to him.  So he called 911 after about a minute later and per instruction from EMS lowered her down on the floor and lifted her legs and in about 4 to 5 minutes she regained consciousness.  No seizures noted.  Forgetful and confused after the event.  She did not fall, no history of trauma, no chest pain or palpitations.  Work-up at ED with CT, EKG, troponin unremarkable.  She was given 500 mill bolus of IV fluids and admitted for further work-up.    Review of Systems    The 10 point Review of Systems is negative other than noted in the HPI or here.     Past Medical History    I have reviewed this patient's medical history and updated it with pertinent information if needed.   Past Medical History:   Diagnosis Date     Acute non-ST elevation myocardial infarction (NSTEMI) (H) 3/28/2021     CAD (coronary artery disease)      CAD (coronary artery disease)      Colitis 1/22/2015     Coronary artery disease due to lipid rich plaque 12/18/2012     Dyslipidemia, goal LDL below 70 12/18/2012     Essential hypertension 12/18/2012     History of heart attack      History of tobacco abuse      HTN (hypertension)      Hyperlipidemia      Hypertension      Peripheral vascular disease (H)        Past  Surgical History   I have reviewed this patient's surgical history and updated it with pertinent information if needed.  Past Surgical History:   Procedure Laterality Date     APPENDECTOMY       BACK SURGERY       CORONARY STENT PLACEMENT      2      CV CORONARY ANGIOGRAM N/A 3/28/2021    Procedure: Coronary Angiogram;  Surgeon: Narinder Ryan MD;  Location: [facility] Cardiac Cath Lab;  Service: Cardiology     CV LEFT HEART CATHETERIZATION WITH LEFT VENTRICULOGRAM N/A 3/28/2021    Procedure: Left Heart Catheterization with Left Ventriculogram;  Surgeon: Narinder Ryan MD;  Location: [facility] Cardiac Cath Lab;  Service: Cardiology     femoral stents  2011     IR ABDOMINAL AORTOGRAM  8/25/2011     IR MISCELLANEOUS PROCEDURE  8/25/2011     OOPHORECTOMY       STENT,CORONARY, S660 24/30 2007     VASCULAR SURGERY      stents legs        Social History   I have reviewed this patient's social history and updated it with pertinent information if needed.  Social History     Tobacco Use     Smoking status: Former Smoker     Types: Cigarettes, Cigarettes     Quit date: 1/22/2007     Years since quitting: 15.2     Smokeless tobacco: Never Used   Substance Use Topics     Alcohol use: Yes     Comment: Alcoholic Drinks/day: occasional beer or bloody randall     Drug use: No       Family History   I have reviewed this patient's family history and updated it with pertinent information if needed.  Family History   Problem Relation Age of Onset     Coronary Artery Disease Father      Prostate Cancer Father      Diabetes No family hx of      Hypertension No family hx of      Breast Cancer No family hx of      Colon Cancer No family hx of      Other Cancer No family hx of      Kidney Disease Mother      Heart Disease Father      Heart Disease Brother      Hypertension Brother        Prior to Admission Medications   Prior to Admission Medications   Prescriptions Last Dose Informant Patient Reported? Taking?   QUEtiapine (SEROQUEL)  25 MG tablet 4/14/2022 at 25 mg  No Yes   Sig: TAKE 1/2 TO 1 TABLET EVERY EVENING AROUND DINNER   aspirin 81 MG EC tablet 4/14/2022  Yes Yes   Sig: Take 81 mg by mouth daily   atorvastatin (LIPITOR) 80 MG tablet 4/15/2022  Yes Yes   Sig: Take 80 mg by mouth daily   clopidogrel (PLAVIX) 75 MG tablet 4/15/2022  Yes Yes   Sig: Take 75 mg by mouth daily   donepezil (ARICEPT) 5 MG tablet 4/14/2022  Yes Yes   Sig: Take 5 mg by mouth At Bedtime   isosorbide mononitrate (IMDUR) 30 MG 24 hr tablet 4/14/2022  Yes Yes   Sig: Take 60 mg by mouth every evening   omeprazole (PRILOSEC) 20 MG DR capsule 4/15/2022  No Yes   Sig: TAKE 1 CAPSULE(20 MG) BY MOUTH DAILY BEFORE BREAKFAST      Facility-Administered Medications: None     Allergies   No Known Allergies    Physical Exam   Vital Signs: Temp: 97.6  F (36.4  C) Temp src: Oral BP: 135/89 Pulse: 62   Resp: (!) 36 SpO2: 99 % O2 Device: None (Room air)    Weight: 105 lbs 0 oz    General Appearance: Alert oriented  Eyes: Pink conjunctiva  HEENT: PERRLA  Respiratory: Bilateral clear chest  Cardiovascular: S1-S2, systolic murmur at right upper sternal border  GI: Soft abdomen nontender  Genitourinary: No CVA or spinal tenderness  Skin: No skin rash  Musculoskeletal: No edema  Neurologic: AOx3      Data   Data reviewed today: I reviewed all medications, new labs and imaging results over the last 24 hours. I personally reviewed   Recent Labs   Lab 04/15/22  2026   WBC 5.6   HGB 12.1   MCV 92         POTASSIUM 3.4*   CHLORIDE 105   CO2 27   BUN 10   CR 0.80   ANIONGAP 9   ELEANOR 9.1   GLC 94   ALBUMIN 4.2   PROTTOTAL 6.7   BILITOTAL 0.8   ALKPHOS 46   ALT 14   AST 22     Recent Results (from the past 24 hour(s))   Head CT w/o contrast    Narrative    EXAM: CT HEAD W/O CONTRAST  LOCATION: [facility]  DATE/TIME: 4/15/2022 9:10 PM    INDICATION: Head trauma, pain, minor (Age >= 65y)  COMPARISON: None.  TECHNIQUE: Routine CT Head without IV contrast.  Multiplanar reformats. Dose reduction techniques were used.    FINDINGS:  INTRACRANIAL CONTENTS: No intracranial hemorrhage, extraaxial collection, or mass effect.  No CT evidence of acute infarct. Mild to moderate presumed chronic small vessel ischemic changes throughout the cerebral hemispheric white matter bilaterally.   Chronic left thalamic lacunar infarct. Mild generalized volume loss. No hydrocephalus.     VISUALIZED ORBITS/SINUSES/MASTOIDS: No intraorbital abnormality. No paranasal sinus mucosal disease. No middle ear or mastoid effusion.    BONES/SOFT TISSUES: No acute abnormality.      Impression    IMPRESSION:  1.  No acute intracranial process.  2.  Mild to moderate chronic small vessel ischemic disease and mild generalized brain parenchymal volume loss.   XR Chest 1 View    Narrative    EXAM: XR CHEST 1 VIEW  LOCATION: [facility]  DATE/TIME: 4/15/2022 9:15 PM    INDICATION: Syncope.  COMPARISON: Chest x-ray on 06/07/2021.      Impression    IMPRESSION: Single AP view of the chest were obtained. Cardiomediastinal silhouette is within normal limits. Atherosclerotic vascular calcification of the aortic knob. No suspicious focal pulmonary opacities. No significant pleural effusion or   pneumothorax.      hospice if approp

## 2024-09-06 NOTE — PROGRESS NOTE ADULT - SUBJECTIVE AND OBJECTIVE BOX
Patient has fluctuating mental status, at times totally mute, immobile, not taking medications or even opening his mouth, did not get his Parkinson's medications since past 24 hours.  At times is more lucid opens eyes and interacts and talks very briefly.    Patient has not had any more seizures.    Dilantin level checked 26.1, is currently on fosphenytoin 100 Mg IV Q 8 hours    Patient seen by speech pathologist, NPO. recommended except meds      ROS: As above, other ROS Unable to obtain       MEDICATIONS  (STANDING):  allopurinol 100 milliGRAM(s) Oral daily  amLODIPine   Tablet 5 milliGRAM(s) Oral daily  bisacodyl 5 milliGRAM(s) Oral at bedtime  carbidopa/levodopa  25/100 2 Tablet(s) Oral four times a day  cefTRIAXone Injectable. 1000 milliGRAM(s) IV Push every 24 hours  clonazePAM  Tablet 0.5 milliGRAM(s) Oral at bedtime  cloNIDine Patch 0.2 mG/24Hr(s) 1 patch Transdermal every 7 days  escitalopram 5 milliGRAM(s) Oral daily  finasteride 5 milliGRAM(s) Oral daily  fosphenytoin IVPB 100 milliGRAM(s) PE IV Intermittent every 12 hours  heparin   Injectable 5000 Unit(s) SubCutaneous every 12 hours  levothyroxine 100 MICROGram(s) Oral daily  polyethylene glycol 3350 17 Gram(s) Oral daily  simvastatin 10 milliGRAM(s) Oral at bedtime  tamsulosin 0.4 milliGRAM(s) Oral at bedtime      Vital Signs Last 24 Hrs  T(C): 36.4 (06 Sep 2024 16:33), Max: 36.7 (05 Sep 2024 21:53)  T(F): 97.6 (06 Sep 2024 16:33), Max: 98.1 (05 Sep 2024 21:53)  HR: 58 (06 Sep 2024 18:39) (53 - 58)  BP: 196/59 (06 Sep 2024 18:39) (160/72 - 198/71)  BP(mean): --  RR: 18 (06 Sep 2024 16:33) (18 - 18)  SpO2: 98% (06 Sep 2024 16:33) (96% - 98%)    Parameters below as of 06 Sep 2024 16:33  Patient On (Oxygen Delivery Method): room air        On exam:   GEN: Normocephalic, in no distress,     Neurological exam:  HF: Lethargic, sitting holding aides' hand, briefly arousable, does not follow commands, speech hypophonic barely audible  CN: Pupils 3-2mm, symmetric, EOMI, no facial weakness, tongue midline,     MOTOR: cogwheeling, L > R,, moves right upper extremity well and freely, withdraws both lower extremities, no rest tremors   SENSORY/ co-ord limited  Gait/Balance: Cannot test                          10.7   7.97  )-----------( 197      ( 06 Sep 2024 07:37 )             33.9     09-06    149<H>  |  118<H>  |  53<H>  ----------------------------<  102<H>  3.8   |  18<L>  |  3.17<H>    Ca    8.4<L>      06 Sep 2024 07:37        Radiology report:  - < from: CT Head No Cont (09.04.24 @ 11:23) >  IMPRESSION:  No evidence of an acute intracranial hemorrhage, midline shift, or   hydrocephalus.  Stable atrophy with white matter ischemic changes.  Opacified bilateral mastoids and middle ear.      < from: EEG Awake or Drowsy (09.05.24 @ 09:00) >  EEG Summary/Classification:  This was an abnormal EEG study in the awake and drowsy states due to the   presence of:  -Mild generalized slowing.    EEG Clinical Correlate/  Impression:  The findings are consistent with diffuse cerebral dysfunction. No   epileptiform activity was seen and no clinical events or seizures were   recorded. Consider a repeat study if clinically indicated.

## 2024-09-07 NOTE — CHART NOTE - NSCHARTNOTEFT_GEN_A_CORE
Vital Signs Last 24 Hrs  T(C): 36.7 (07 Sep 2024 05:25), Max: 36.8 (06 Sep 2024 20:45)  T(F): 98.1 (07 Sep 2024 05:25), Max: 98.2 (06 Sep 2024 20:45)  HR: 57 (07 Sep 2024 05:25) (54 - 64)  BP: 211/67 (07 Sep 2024 05:25) (182/84 - 217/83)  BP(mean): --  RR: 18 (07 Sep 2024 05:25) (18 - 18)  SpO2: 99% (07 Sep 2024 05:25) (96% - 99%)    Parameters below as of 07 Sep 2024 05:25  Patient On (Oxygen Delivery Method): room air    repeat BP after additional hydralazine remains unchanged 216/67 HR 57  increase clonidine patch  will add nitro paste x1

## 2024-09-07 NOTE — PROGRESS NOTE ADULT - SUBJECTIVE AND OBJECTIVE BOX
HOSPITALIST ATTENDING PROGRESS NOTE    Chart and meds reviewed.      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 Per wife at bedside, patient was sitting upright in a chair when he had an episode of shaking.  Patient has no reported history of seizures in the past.  Episode was witnessed by aide, no trauma as patient was seated the whole time, unknown how long the episode lasted.  When wife arrived at bedside, patient appeared confused from his baseline. No reported fever, rash, nausea, vomiting.  Wife does note patient has had decreased p.o. intake over the past few months with unintentional 20lb weight loss, PCP was considering obtaining CT chest/abdomen/pelvis to evaluate for malignancy. No other acute complaints at this time.     Subjective: Patient seen and examined. Overnight events reviewed.       Additional results/Imaging, I have personally reviewed:    LABS:                            10.7   7.97  )-----------( 197      ( 06 Sep 2024 07:37 )             33.9     09-06    149<H>  |  118<H>  |  53<H>  ----------------------------<  102<H>  3.8   |  18<L>  |  3.17<H>    Ca    8.4<L>      06 Sep 2024 07:37              Urinalysis Basic - ( 06 Sep 2024 07:37 )    Color: x / Appearance: x / SG: x / pH: x  Gluc: 102 mg/dL / Ketone: x  / Bili: x / Urobili: x   Blood: x / Protein: x / Nitrite: x   Leuk Esterase: x / RBC: x / WBC x   Sq Epi: x / Non Sq Epi: x / Bacteria: x              All other systems reviewed and found to be negative with the exception of what has been described above.    MEDICATIONS  (STANDING):  allopurinol 100 milliGRAM(s) Oral daily  bisacodyl 5 milliGRAM(s) Oral at bedtime  carbidopa/levodopa  25/100 2 Tablet(s) Oral four times a day  clonazePAM  Tablet 0.5 milliGRAM(s) Oral at bedtime  cloNIDine Patch 0.3 mG/24Hr(s) 1 patch Transdermal every 7 days  escitalopram 5 milliGRAM(s) Oral daily  finasteride 5 milliGRAM(s) Oral daily  fosphenytoin IVPB 100 milliGRAM(s) PE IV Intermittent every 12 hours  heparin   Injectable 5000 Unit(s) SubCutaneous every 12 hours  levothyroxine 100 MICROGram(s) Oral daily  polyethylene glycol 3350 17 Gram(s) Oral daily  simvastatin 10 milliGRAM(s) Oral at bedtime  tamsulosin 0.4 milliGRAM(s) Oral at bedtime    MEDICATIONS  (PRN):  acetaminophen     Tablet .. 650 milliGRAM(s) Oral every 6 hours PRN Mild Pain (1 - 3)  acetaminophen   IVPB .. 1000 milliGRAM(s) IV Intermittent once PRN Temp greater or equal to 38C (100.4F)  hydrALAZINE Injectable 10 milliGRAM(s) IV Push every 4 hours PRN SBP > 180  LORazepam   Injectable 2 milliGRAM(s) IV Push once PRN Seizure Activity  midazolam Injectable 10 milliGRAM(s) IntraMuscular once PRN Seizure Activity      VITALS:  T(F): 98.1 (09-07-24 @ 05:25), Max: 98.2 (09-06-24 @ 20:45)  HR: 60 (09-07-24 @ 10:45) (56 - 64)  BP: 171/54 (09-07-24 @ 10:45) (171/54 - 217/83)  RR: 18 (09-07-24 @ 05:25) (18 - 18)  SpO2: 99% (09-07-24 @ 05:25) (97% - 99%)  Wt(kg): --    I&O's Summary      CAPILLARY BLOOD GLUCOSE          PHYSICAL EXAM:  Gen: No acute distress   HEENT:  pupils equal and reactive, EOMI,   NECK:   supple, no carotid bruits, No JVD  CV:  +S1, +S2, regular rate rhythm, no murmurs or rubs  RESP:   lungs clear to auscultation bilaterally, no wheezing, rales, rhonchi, good air entry bilaterally  GI:  abdomen soft, non-tender, non-distended, normal BS, no bruits, no abdominal masses, no palpable masses  MSK:   normal muscle tone, no atrophy, no rigidity, no contractions  EXT:  no clubbing, no cyanosis, no edema, no calf pain, swelling or erythema  VASCULAR:  pulses equal and symmetric in the upper and lower extremities  NEURO:  AAOX3, no focal neurological deficits, follows all commands, able to move extremities spontaneously  SKIN:  no ulcers, lesions or rashes      CULTURES:      Telemetry, personally reviewed HOSPITALIST ATTENDING PROGRESS NOTE    Chart and meds reviewed.      HPI: 83-year-old male with PMHx of Parkinson's, dementia, HTN, HLD, CKD IV, hypothyroid, bladder mass s/p resection 02/2024, SBO s/p colostomy, prostate cancer s/p resection, melanoma, arthritis, anemia (gets routine CBC check) presents to the  ED from [facility] for possible seizure-like episode. Per wife at bedside, patient was sitting upright in a chair when he had an episode of shaking.  Patient has no reported history of seizures in the past.  Episode was witnessed by aide, no trauma as patient was seated the whole time, unknown how long the episode lasted.  When wife arrived at bedside, patient appeared confused from his baseline. No reported fever, rash, nausea, vomiting.  Wife does note patient has had decreased p.o. intake over the past few months with unintentional 20lb weight loss, PCP was considering obtaining CT chest/abdomen/pelvis to evaluate for malignancy. No other acute complaints at this time.     Subjective: Patient seen and examined. Overnight events reviewed. BP still significantly elevated this AM Will give 10mg Labetolol. Continue IV hydralazine. Patient unable to take PO meds, somnolent but arousable to stimuli this AM. Family does not want any NG tube. Continue to monitor but will likely transition to comfort care monday.       Additional results/Imaging, I have personally reviewed:    LABS:                            10.7   7.97  )-----------( 197      ( 06 Sep 2024 07:37 )             33.9     09-06    149<H>  |  118<H>  |  53<H>  ----------------------------<  102<H>  3.8   |  18<L>  |  3.17<H>    Ca    8.4<L>      06 Sep 2024 07:37              Urinalysis Basic - ( 06 Sep 2024 07:37 )    Color: x / Appearance: x / SG: x / pH: x  Gluc: 102 mg/dL / Ketone: x  / Bili: x / Urobili: x   Blood: x / Protein: x / Nitrite: x   Leuk Esterase: x / RBC: x / WBC x   Sq Epi: x / Non Sq Epi: x / Bacteria: x              All other systems reviewed and found to be negative with the exception of what has been described above.    MEDICATIONS  (STANDING):  allopurinol 100 milliGRAM(s) Oral daily  bisacodyl 5 milliGRAM(s) Oral at bedtime  carbidopa/levodopa  25/100 2 Tablet(s) Oral four times a day  clonazePAM  Tablet 0.5 milliGRAM(s) Oral at bedtime  cloNIDine Patch 0.3 mG/24Hr(s) 1 patch Transdermal every 7 days  escitalopram 5 milliGRAM(s) Oral daily  finasteride 5 milliGRAM(s) Oral daily  fosphenytoin IVPB 100 milliGRAM(s) PE IV Intermittent every 12 hours  heparin   Injectable 5000 Unit(s) SubCutaneous every 12 hours  levothyroxine 100 MICROGram(s) Oral daily  polyethylene glycol 3350 17 Gram(s) Oral daily  simvastatin 10 milliGRAM(s) Oral at bedtime  tamsulosin 0.4 milliGRAM(s) Oral at bedtime    MEDICATIONS  (PRN):  acetaminophen     Tablet .. 650 milliGRAM(s) Oral every 6 hours PRN Mild Pain (1 - 3)  acetaminophen   IVPB .. 1000 milliGRAM(s) IV Intermittent once PRN Temp greater or equal to 38C (100.4F)  hydrALAZINE Injectable 10 milliGRAM(s) IV Push every 4 hours PRN SBP > 180  LORazepam   Injectable 2 milliGRAM(s) IV Push once PRN Seizure Activity  midazolam Injectable 10 milliGRAM(s) IntraMuscular once PRN Seizure Activity      VITALS:  T(F): 98.1 (09-07-24 @ 05:25), Max: 98.2 (09-06-24 @ 20:45)  HR: 60 (09-07-24 @ 10:45) (56 - 64)  BP: 171/54 (09-07-24 @ 10:45) (171/54 - 217/83)  RR: 18 (09-07-24 @ 05:25) (18 - 18)  SpO2: 99% (09-07-24 @ 05:25) (97% - 99%)  Wt(kg): --    I&O's Summary      CAPILLARY BLOOD GLUCOSE          PHYSICAL EXAM:  Constitutional: NAD, tired appearing, deconditioned/frail, intermittently agitated  HEENT: PERRLA, EOMI, dry MM  Respiratory: Breath sounds are clear bilaterally, No wheezing, rales or rhonchi  Cardiovascular: S1 and S2, sinus bradycardia, no murmurs, gallops or rubs  Gastrointestinal: +BS, soft, non-tender, non-distended, +Colostomy  Extremities: No peripheral edema, +DP pulses b/l  Neurological: A&O x 0-1, no focal deficits   Musculoskeletal: 5/5 strength b/l upper and lower extremities  Skin: Normal, skin warm and dry        CULTURES:      Telemetry, personally reviewed

## 2024-09-07 NOTE — PROGRESS NOTE ADULT - SUBJECTIVE AND OBJECTIVE BOX
HPI:  83-year-old man PMHx of Parkinson's, dementia, HTN, HLD, CKD IV, hypothyroid, bladder mass s/p resection 02/2024, SBO s/p colostomy, prostate cancer s/p resection, melanoma, arthritis, anemia (gets routine CBC check) presents to the  ED from [facility] for possible seizure-like episode, overall decline as per wife of several months.  No reported history of seizures in the past.      ROS:     MEDICATIONS  (STANDING):  allopurinol 100 milliGRAM(s) Oral daily  bisacodyl 5 milliGRAM(s) Oral at bedtime  carbidopa/levodopa  25/100 2 Tablet(s) Oral four times a day  clonazePAM  Tablet 0.5 milliGRAM(s) Oral at bedtime  cloNIDine Patch 0.3 mG/24Hr(s) 1 patch Transdermal every 7 days  escitalopram 5 milliGRAM(s) Oral daily  finasteride 5 milliGRAM(s) Oral daily  fosphenytoin IVPB 100 milliGRAM(s) PE IV Intermittent every 12 hours  heparin   Injectable 5000 Unit(s) SubCutaneous every 12 hours  levothyroxine 100 MICROGram(s) Oral daily  levothyroxine Injectable 75 MICROGram(s) IV Push at bedtime  polyethylene glycol 3350 17 Gram(s) Oral daily  simvastatin 10 milliGRAM(s) Oral at bedtime  tamsulosin 0.4 milliGRAM(s) Oral at bedtime    MEDICATIONS  (PRN):  acetaminophen     Tablet .. 650 milliGRAM(s) Oral every 6 hours PRN Mild Pain (1 - 3)  acetaminophen   IVPB .. 1000 milliGRAM(s) IV Intermittent once PRN Temp greater or equal to 38C (100.4F)  hydrALAZINE Injectable 10 milliGRAM(s) IV Push every 4 hours PRN SBP > 180  LORazepam   Injectable 2 milliGRAM(s) IV Push once PRN Seizure Activity  midazolam Injectable 10 milliGRAM(s) IntraMuscular once PRN Seizure Activity      Vital Signs Last 24 Hrs  T(C): 36.7 (07 Sep 2024 05:25), Max: 36.8 (06 Sep 2024 20:45)  T(F): 98.1 (07 Sep 2024 05:25), Max: 98.2 (06 Sep 2024 20:45)  HR: 60 (07 Sep 2024 10:45) (56 - 64)  BP: 171/54 (07 Sep 2024 10:45) (171/54 - 217/83)  BP(mean): --  RR: 18 (07 Sep 2024 05:25) (18 - 18)  SpO2: 99% (07 Sep 2024 05:25) (97% - 99%)    Parameters below as of 07 Sep 2024 05:25  Patient On (Oxygen Delivery Method): room air      Neurological Exam:    HF: Patient is alert, mumbles, doesnt follow commands  CN: Blinks to threat, Pupils are equal and reactive. Extra ocular muscles are grossly intact, grimaces bilaterally. Tongue is midline. Sensation is intact in the face. Other CN II-XII are intact.   Motor: diff to evaluate tone, not cooperative, + bradikinetic, + cogwheel, + tremor R>L. strength ~ 4/5 bilat   Sensory: withdraws to PP bilat  DTR: 0-1/4 all 4 extremities. Babinski is negative bilateral.  Co-ord:  Cannot test    Basic Metabolic Panel in AM (09.06.24 @ 07:37)   Sodium: 149 mmol/L  Potassium: 3.8 mmol/L  Chloride: 118 mmol/L  Carbon Dioxide: 18 mmol/L  Anion Gap: 13 mmol/L  Blood Urea Nitrogen: 53 mg/dL  Creatinine: 3.17 mg/dL  Glucose: 102 mg/dL  Calcium: 8.4 mg/dL  eGFR: 19:       Complete Blood Count in AM (09.06.24 @ 07:37)   WBC Count: 7.97 K/uL  RBC Count: 3.32 M/uL  Hemoglobin: 10.7 g/dL  Hematocrit: 33.9 %  Mean Cell Volume: 102.1 fl  Mean Cell Hemoglobin: 32.2 pg  Mean Cell Hemoglobin Conc: 31.6 gm/dL  Red Cell Distrib Width: 15.0 %  Platelet Count - Automated: 197 K/uL    Phenytoin Level, Serum: 20.8 ug/mL (09.07.24 @ 08:14)       < from: MR Head No Cont (09.05.24 @ 15:37) >  DINGS:    Study is limited based on motion artifact.    No diffusion restriction to suggest acute infarct. Foci of increased   T2/FLAIR signal in the deep and periventricular white matter, compatible   with chronic small vessel disease. Atrophy to the bilateral amygdalae and   hippocampal formations. The bilateral hippocampal formations otherwise   have the appropriate configuration and MRsignal to the extent   visualized. Parenchymal volume loss resulting in a ex vacuo dilatation   appearance of the bilateral ventricles. The visualized extra axial spaces   and basal cisterns are within normal limits. No midline shift or mass   effectpresent.    The craniocervical junction is within normal limits. The sella is largely   CSF filled. The major intracranial vessels demonstrate the expected   signal void related to vascular flow to the extent visualized. Mild   mucosal thickening in the ethmoid air cells. Bilateral mastoid effusions   with fluid in the middle ear cavities. The visualized orbits are within   normal limits.      IMPRESSION:    Study is limited based on motion artifact.    1.  No evidence of acute infarct or midlineshift.  2.  Chronic small vessel disease.  3.  Bilateral mastoid effusions with fluid in the middle ear cavities.   Recommend clinical correlation for a otomastoiditis.    < end of copied text >    < from: EEG Awake or Drowsy (09.05.24 @ 09:00) >    EEG Summary/Classification:  This was an abnormal EEG study in the awake and drowsy states due to the   presence of:  -Mild generalized slowing.    EEG Clinical Correlate/  Impression:  The findings are consistent with diffuse cerebral dysfunction. No   epileptiform activity was seen and no clinical events or seizures were   recorded. Consider a repeat study if clinically indicated.      ________________________________________  Mai Collins MD  Attending Physician  Director of Epilepsy at [facility]    < end of copied text >

## 2024-09-07 NOTE — PROGRESS NOTE ADULT - ASSESSMENT
83-year-old man PMHx of Parkinson's, dementia, HTN, HLD, CKD IV, hypothyroid, bladder mass s/p resection resents to the  ED from [facility] for possible seizure, CT/MR H negative for acute pathology. EEG slow, no seizure activity. loaded with fosphenytoin 1 g, now on 100 mg 2 times daily,.  Discussed with wife restarting parkinson meds through NGT, but she refused.  Suggest:  24 video EEG  wife reports plan to meet with palliative care, if no improvement [facility] hospice care form monday on.

## 2024-09-07 NOTE — PROGRESS NOTE ADULT - ASSESSMENT
83-year-old male with PMHx of Parkinson's, dementia, HTN, HLD, CKD IV, hypothyroid, bladder mass s/p resection 02/2024, SBO s/p colostomy, prostate cancer s/p resection, melanoma, arthritis, anemia (gets routine CBC check) presents to the  ED from [facility] for possible seizure-like episode. Admitted for:    #Seizure like activity  #Post-ictal state likely superimposed on baseline Parkinson's dementia   - admit to telemetry   - CT head negative for acute pathology  - Lactate 3.4 -> 2.1 s/p 1L IVF  - Neurology following Dr. Meier, will hold off on Keppra load given significant CKD, new onset seizures  -   Repeat EEG done this morning  consistent with diffuse cerebral dysfunction, no epileptiform activity seen and no clinical events or seizures were recorded  - F/u MRI brain   - fosphenytoin 1 g followed by 100 mg 3 times daily  - Continue IV ativan PRN for seizure activity   - Continue 1:1 observation for safety  - Continue home parkinson's meds --> unable to change to Sublingual or IV - Next step is NG tube but family does not want to pursue this route. Monitor over the weekend and possible Comfort care transition on monday   Dilantin level on 9/6 and 9/7 AM    #HTN/HLD  #Reflex bradycardia  - HR 52 on arrival, dropped down into 30s during RRT   - Continue tele monitoring  - Hold Coreg   - C/w Amlodipine, statin   - C/w IV hydralazine 10mg q4 with parameters  -Clonidine patch increased to 0.3       #UTI  UA suspicious for UTI  Urine cultures negative   Completed 3 day course of IV abx.       #CKD   - Cr 3.63 (baseline appears 3-3.5 range)  - Continue to monitor BMP, avoid nephrotoxic medications     #Hypothyroidism   - TSH 7.63 ,   - Convert to 50mcg IV Synthroid    #FTT, poor PO intake, unintentional weight loss  - Encourage oral intake  - swallow eval ordered, f/u, NPO for now  - Continue gentle IVF hydration   - Dietician consulted  - Can f/u with PCP for further management to r/o underlying malignancy     #SBO s/p colostomy   - routine ostomy care    #DNR/DNI  - MOLST in chart    #GOC   Consult palliative Care, family considering hospice and ? Comfort care    #DVT ppx  - heparin SQ

## 2024-09-07 NOTE — CHART NOTE - NSCHARTNOTEFT_GEN_A_CORE
received call from RN that patient hypertensive overnight despite IV hydralazine  Chart reviewed, patient NPO and unable to take PO medications. no NGT per Family and patient wishes per chart.  [facility]    T(C): 36.7 (09-07-24 @ 05:25), Max: 36.8 (09-06-24 @ 20:45)  HR: 57 (09-07-24 @ 05:25) (54 - 64)  BP: 211/67 (09-07-24 @ 05:25) (182/84 - 217/83)  RR: 18 (09-07-24 @ 05:25) (18 - 18)  SpO2: 99% (09-07-24 @ 05:25) (96% - 99%)    CONSTITUTIONAL: confused  RESP: No respiratory distress, no use of accessory muscles; CTA b/l,   CV: RRR, +S1S2, no MRG; no JVD; no peripheral edema  GI: Soft, NT, ND      will give x1 dose 10mg hydralazine  Consider increasing catapres patch   can not take Vasotec or CCB due to MELISSA and bradycardia

## 2024-09-08 NOTE — PROGRESS NOTE ADULT - SUBJECTIVE AND OBJECTIVE BOX
HPI: 83-year-old male with PMHx of Parkinson's, dementia, HTN, HLD, CKD IV, hypothyroid, bladder mass s/p resection 02/2024, SBO s/p colostomy, prostate cancer s/p resection, melanoma, arthritis, anemia (gets routine CBC check) presents to the  ED from [facility] for possible seizure-like episode. Per wife at bedside, patient was sitting upright in a chair when he had an episode of shaking.  Patient has no reported history of seizures in the past.  Episode was witnessed by aide, no trauma as patient was seated the whole time, unknown how long the episode lasted.  When wife arrived at bedside, patient appeared confused from his baseline. No reported fever, rash, nausea, vomiting.  Wife does note patient has had decreased p.o. intake over the past few months with unintentional 20lb weight loss, PCP was considering obtaining CT chest/abdomen/pelvis to evaluate for malignancy. No other acute complaints at this time.     Subjective: Patient seen and examined. Overnight events reviewed. BP still significantly elevated this AM Will give 10mg Labetolol. Continue IV hydralazine. Patient unable to take PO meds, somnolent but arousable to stimuli this AM. Family does not want any NG tube. Continue to monitor but will likely transition to comfort care monday.       Additional results/Imaging, I have personally reviewed:All other systems reviewed and found to be negative with the exception of what has been described above.PHYSICAL EXAM:  Constitutional: NAD, tired appearing, deconditioned/frail, intermittently agitated  HEENT: PERRLA, EOMI, dry MM  Respiratory: Breath sounds are clear bilaterally, No wheezing, rales or rhonchi  Cardiovascular: S1 and S2, sinus bradycardia, no murmurs, gallops or rubs  Gastrointestinal: +BS, soft, non-tender, non-distended, +Colostomy  Extremities: No peripheral edema, +DP pulses b/l  Neurological: A&O x 0-1, no focal deficits   Musculoskeletal: 5/5 strength b/l upper and lower extremities  Skin: Normal, skin warm and dry     HPI: 83-year-old male with PMHx of Parkinson's, dementia, HTN, HLD, CKD IV, hypothyroid, bladder mass s/p resection 02/2024, SBO s/p colostomy, prostate cancer s/p resection, melanoma, arthritis, anemia (gets routine CBC check) presents to the  ED from [facility] for possible seizure-like episode. Per wife at bedside, patient was sitting upright in a chair when he had an episode of shaking.  Patient has no reported history of seizures in the past.  Episode was witnessed by aide, no trauma as patient was seated the whole time, unknown how long the episode lasted.  When wife arrived at bedside, patient appeared confused from his baseline. No reported fever, rash, nausea, vomiting.  Wife does note patient has had decreased p.o. intake over the past few months with unintentional 20lb weight loss, PCP was considering obtaining CT chest/abdomen/pelvis to evaluate for malignancy. No other acute complaints at this time.     Subjective: Patient seen and examined. Overnight events reviewed. BP still significantly elevated this AM Will give 10mg Labetolol. Continue IV hydralazine. Patient unable to take PO meds, somnolent Family does not want any NG tube. Continue to monitor but will likely transition to comfort care monday.       Additional results/Imaging, I have personally reviewed:All other systems reviewed and found to be negative with the exception of what has been described above.PHYSICAL EXAM:  Constitutional: NAD, tired appearing, deconditioned/frail, intermittently agitated  HEENT: PERRLA, EOMI, dry MM  Respiratory: Breath sounds are clear bilaterally, No wheezing, rales or rhonchi  Cardiovascular: S1 and S2, sinus bradycardia, no murmurs, gallops or rubs  Gastrointestinal: +BS, soft, non-tender, non-distended, +Colostomy  Extremities: No peripheral edema, +DP pulses b/l  Neurological: A&O x 0-1, no focal deficits   Musculoskeletal: 5/5 strength b/l upper and lower extremities  Skin: Normal, skin warm and dry

## 2024-09-08 NOTE — PROGRESS NOTE ADULT - ASSESSMENT
83-year-old male with PMHx of Parkinson's, dementia, HTN, HLD, CKD IV, hypothyroid, bladder mass s/p resection 02/2024, SBO s/p colostomy, prostate cancer s/p resection, melanoma, arthritis, anemia (gets routine CBC check) presents to the  ED from [facility] for possible seizure-like episode. Admitted for:    #Seizure like activity  #Post-ictal state likely superimposed on baseline Parkinson's dementia   - admit to telemetry   - CT head negative for acute pathology  - Lactate 3.4 -> 2.1 s/p 1L IVF  - Neurology following Dr. Meier, will hold off on Keppra load given significant CKD, new onset seizures  -   Repeat EEG done this morning  consistent with diffuse cerebral dysfunction, no epileptiform activity seen and no clinical events or seizures were recorded  - F/u MRI brain   - fosphenytoin 1 g followed by 100 mg 3 times daily  - Continue IV ativan PRN for seizure activity   - Continue 1:1 observation for safety  - Continue home parkinson's meds --> unable to change to Sublingual or IV - Next step is NG tube but family does not want to pursue this route. Monitor over the weekend and possible Comfort care transition on monday   Dilantin level on 9/6 and 9/7 AM    #HTN/HLD  #Reflex bradycardia  - HR 52 on arrival, dropped down into 30s during RRT   - Continue tele monitoring  - Hold Coreg   - C/w Amlodipine, statin   - C/w IV hydralazine 10mg q4 with parameters  -Clonidine patch increased to 0.3       #UTI  UA suspicious for UTI  Urine cultures negative Completed 3 day course of IV abx.       #CKD   - Cr 3.63 (baseline appears 3-3.5 range)  - Continue to monitor BMP, avoid nephrotoxic medications     #Hypothyroidism   - TSH 7.63 ,   - Convert to 50mcg IV Synthroid    #FTT, poor PO intake, unintentional weight loss  - Encourage oral intake  - swallow eval ordered, f/u, NPO for now- Continue gentle IVF hydration   - Dietician consulted  - Can f/u with PCP for further management to r/o underlying malignancy     #SBO s/p colostomy   - routine ostomy care    #DNR/DNI  - MOLST in chart    #GOC   Consult palliative Care, family considering hospice and ? Comfort care    #DVT ppx  - heparin SQ   83-year-old male with PMHx of Parkinson's, dementia, HTN, HLD, CKD IV, hypothyroid, bladder mass s/p resection 02/2024, SBO s/p colostomy, prostate cancer s/p resection, melanoma, arthritis, anemia (gets routine CBC check) presents to the  ED from [facility] for possible seizure-like episode. Admitted for:    #Seizure like activity  #Post-ictal state likely superimposed on baseline Parkinson's dementia   - admit to telemetry   - CT head negative for acute pathology  - Lactate 3.4 -> 2.1 s/p 1L IVF  - Neurology following Dr. Meier, will hold off on Keppra load given significant CKD, new onset seizures  -   Repeat EEG done this morning  consistent with diffuse cerebral dysfunction, no epileptiform activity seen and no clinical events or seizures were recorded  - F/u MRI brain   - fosphenytoin 1 g followed by 100 mg 3 times daily  - Continue IV ativan PRN for seizure activity   - Continue 1:1 observation for safety  - Continue home parkinson's meds --> unable to change to Sublingual or IV - Next step is NG tube but family does not want to pursue this route. Monitor over the weekend and possible Comfort care transition on monday   Dilantin level on 9/6 and 9/7 AM    #HTN/HLD  #Reflex bradycardia  - HR 52 on arrival, dropped down into 30s during RRT   - Continue tele monitoring  - Hold Coreg   - C/w Amlodipine, statin   - C/w IV hydralazine 10mg q4 with parameters  -Clonidine patch increased to 0.3       #UTI  UA suspicious for UTI  Urine cultures negative Completed 3 day course of IV abx.       #CKD   - Cr 3.63 (baseline appears 3-3.5 range)  - Continue to monitor BMP, avoid nephrotoxic medications     #Hypothyroidism   - TSH 7.63 ,   - Convert to 50mcg IV Synthroid    #FTT, poor PO intake, unintentional weight loss  - Encourage oral intake  - swallow eval ordered, f/u, NPO for now- Continue gentle IVF hydration   - Dietician consulted  - Can f/u with PCP for further management to r/o underlying malignancy     #SBO s/p colostomy   - routine ostomy care    #DNR/DNI  - MOLST in chart    #GOC   Consult palliative Care, family considering hospice and ? Comfort care    #DVT ppx  - heparin SQ    family does not want any further labs and opting for in patient hospice , do not want feeding tube ,   IVF will not change prognosis - will avoid IVF for now

## 2024-09-08 NOTE — PROGRESS NOTE ADULT - SUBJECTIVE AND OBJECTIVE BOX
HPI:  83-year-old man with PMHx of Parkinson's, dementia, HTN, HLD, CKD IV, hypothyroid, bladder mass s/p resection 02/2024, SBO s/p colostomy, prostate cancer s/p resection, melanoma, arthritis, anemia (gets routine CBC check) presents to the  ED from [facility] for possible seizure-like episode. While awaiting admission, RRT was called for another episode of seizure like activity with generalized shaking, drooling, decreased mentation, improved with IV ativan 2mg x1. Also noted to have likely reflex bradycardia to low 30s during RRT, improved on its own. Pt is DNR/DNI, admitted to Telemetry. s/p 24 V EEG.  ROS:     MEDICATIONS  (STANDING):  allopurinol 100 milliGRAM(s) Oral daily  bisacodyl 5 milliGRAM(s) Oral at bedtime  carbidopa/levodopa  25/100 2 Tablet(s) Oral four times a day  clonazePAM  Tablet 0.5 milliGRAM(s) Oral at bedtime  cloNIDine Patch 0.3 mG/24Hr(s) 1 patch Transdermal every 7 days  escitalopram 5 milliGRAM(s) Oral daily  finasteride 5 milliGRAM(s) Oral daily  fosphenytoin IVPB 100 milliGRAM(s) PE IV Intermittent every 12 hours  heparin   Injectable 5000 Unit(s) SubCutaneous every 12 hours  levothyroxine Injectable 75 MICROGram(s) IV Push at bedtime  nitroglycerin    2% Ointment 0.5 Inch(s) Transdermal once  polyethylene glycol 3350 17 Gram(s) Oral daily  simvastatin 10 milliGRAM(s) Oral at bedtime  tamsulosin 0.4 milliGRAM(s) Oral at bedtime    MEDICATIONS  (PRN):  acetaminophen     Tablet .. 650 milliGRAM(s) Oral every 6 hours PRN Mild Pain (1 - 3)  acetaminophen   IVPB .. 1000 milliGRAM(s) IV Intermittent once PRN Temp greater or equal to 38C (100.4F)  hydrALAZINE Injectable 10 milliGRAM(s) IV Push every 4 hours PRN SBP > 180  LORazepam   Injectable 2 milliGRAM(s) IV Push once PRN Seizure Activity  midazolam Injectable 10 milliGRAM(s) IntraMuscular once PRN Seizure Activity      Vital Signs Last 24 Hrs  T(C): 36.8 (08 Sep 2024 08:46), Max: 36.8 (08 Sep 2024 08:46)  T(F): 98.3 (08 Sep 2024 08:46), Max: 98.3 (08 Sep 2024 08:46)  HR: 54 (08 Sep 2024 11:35) (54 - 58)  BP: 199/59 (08 Sep 2024 11:35) (166/61 - 217/77)  BP(mean): --  RR: 18 (08 Sep 2024 10:40) (16 - 18)  SpO2: 96% (08 Sep 2024 10:40) (96% - 100%)    Parameters below as of 08 Sep 2024 10:40  Patient On (Oxygen Delivery Method): room air      Neurological Exam:    HF: Patient is asleep, doesn't arouse  CN: Pupils are equal and reactive. There is no facial droop or asymmetry. grimaces bilaterally, Tongue is midline.  Motor: unable to examine, resists arm, legs movements, + increased tone  Sensory: withdraws to pain bilaterally  DTR: 1-2/4 all 4 extremities. Babinski are withdrawn bilateral.  Co-ord:  Cannot test         Comprehensive Metabolic Panel in AM (09.08.24 @ 07:33)   Sodium: 156 mmol/L  Potassium: 3.7 mmol/L  Chloride: 126 mmol/L  Carbon Dioxide: 19 mmol/L  Anion Gap: 11 mmol/L  Blood Urea Nitrogen: 62 mg/dL  Creatinine: 3.56 mg/dL  Glucose: 121 mg/dL  Calcium: 8.5 mg/dL  Protein Total: 6.1 gm/dL  Albumin: 3.1 g/dL  Bilirubin Total: 0.4 mg/dL  Alkaline Phosphatase: 74 U/L  Aspartate Aminotransferase (AST/SGOT): 18 U/L  Alanine Aminotransferase (ALT/SGPT): 10 U/L  eGFR: 16: Complete Blood Count in AM (09.08.24 @ 07:33)   WBC Count: 9.24 K/uL  RBC Count: 3.26 M/uL  Hemoglobin: 10.4 g/dL  Hematocrit: 33.1 %  Mean Cell Volume: 101.5 fl  Mean Cell Hemoglobin: 31.9 pg  Mean Cell Hemoglobin Conc: 31.4 gm/dL  Red Cell Distrib Width: 15.2 %  Platelet Count - Automated: 185 K/uL    Phenytoin Level, Serum: 21.0 ug/mL (09.08.24 @ 07:33)   Phenytoin Level, Serum: 20.8 ug/mL (09.07.24 @ 08:14)   Phenytoin Level, Serum: 26.1 ug/mL (09.06.24 @ 07:37)     < from: MR Head No Cont (09.05.24 @ 15:37) >      IMPRESSION:    Study is limited based on motion artifact.    1.  No evidence of acute infarct or midlineshift.  2.  Chronic small vessel disease.  3.  Bilateral mastoid effusions with fluid in the middle ear cavities.   Recommend clinical correlation for a otomastoiditis.    < end of copied text >        EEG CLASSIFICATION:   Findings: Abnormal.     Generalized background slowing rhythmic theta.    INTERPRETATION:   No events were reported.     No focal, lateralized or epileptiform features were present.     No seizure sequences occurred.     This is an abnormal EEG.     This is an abnormal EEG consistent with moderate bilateral diffuse cerebral dysfunction.     This recording is consistent with metabolic derangement.

## 2024-09-08 NOTE — PROGRESS NOTE ADULT - ASSESSMENT
83-year-old man PMHx of Parkinson's, dementia, HTN, HLD, CKD IV, hypothyroid, bladder mass s/p resection [facility], RR in ED, for second seizure like activity. NO further clinical evens, on  fosphenytoin 100 mg 2 times daily. Repeat serum phenytoin level 21. 24 V EEG, no seizure activities, c/w encephalopathy.  Discussed with wife restarting parkinson meds through NGT, but she refused.  Suggest:  Continue fosphenytoin 100 mg IV BID  Family considering hospice/palliative care

## 2024-09-09 ENCOUNTER — TRANSCRIPTION ENCOUNTER (OUTPATIENT)
Age: 84
End: 2024-09-09

## 2024-09-09 NOTE — PROGRESS NOTE ADULT - SUBJECTIVE AND OBJECTIVE BOX
83-year-old M with PMHx of Parkinson's, dementia, HTN, HLD, CKD IV, hypothyroid, bladder mass s/p resection 02/2024, prostate ca presents to the  ED from [facility] for possible seizure-like episode. Per wife at bedside, he had an episode of shaking ? seizure, was witnessed by aide. When wife arrived at bedside, patient appeared confused  Wife does note patient has had decreased p.o. intake over the past few months with unintentional 20 lb weight loss. In ED, VSS. CTH negative for acute pathology. Labs notable for Cr 3.63, Lactate 3.4 -> 2.1 s/p 1L IVF, TSH 7.63. Palliative Medicine Consult to further establish GOC.  9/7/24 Seen and examined at bedside. Pt unresponsive to voice. In no distress  9/9/24 Seen Na examined at bedside. Eyes open. Non verbal    PAIN: ( )Yes   ( X)No  No non verbal signs or symptoms    DYSPNEA: ( ) Yes  ( X) No      PAST MEDICAL & SURGICAL HISTORY:  HTN (hypertension)  HLD (hyperlipidemia)  Parkinson disease  CKD (chronic kidney disease)  Hypothyroidism  Bladder mass  Obstruction of bowel  Prostate cancer  Melanoma of skin  CA skin, basal cell  Cancer, skin, squamous cell  Arthritis  Anemia  History of total knee replacement, left  H/O hemorrhoidectomy  H/O colonoscopy  H/O Mohs micrographic surgery for skin cancer      SOCIAL HX:  Lives at [facility]  Hx opiate tolerance ( )YES  ( X)NO    Baseline ADLs  (Prior to Admission)  ( ) Independent   ( X)Dependent    FAMILY HISTORY:  FHx: prostate cancer        Review of Systems:  Unable to obtain/Limited due to: unresponsive      PHYSICAL EXAM:  ICU Vital Signs Last 24 Hrs  T(C): 36.7 (09 Sep 2024 09:02), Max: 36.7 (09 Sep 2024 09:02)  T(F): 98.1 (09 Sep 2024 09:02), Max: 98.1 (09 Sep 2024 09:02)  HR: 55 (09 Sep 2024 09:02) (53 - 96)  BP: 204/75 (09 Sep 2024 11:05) (204/75 - 239/85)  RR: 17 (09 Sep 2024 09:02) (17 - 18)  SpO2: 98% (09 Sep 2024 09:02) (95% - 98%)    O2 Parameters below as of 09 Sep 2024 09:02  Patient On (Oxygen Delivery Method): room air    PPSV2:  10-20 %  FAST: unable to access due to unresponsiveness    General: Elderly male in bed   Mental Status: unresponsive to voice  HEENT: oral mucosa dry  Lungs: clear to auscultation reynaldo  Cardiac: S1S2+  GI: abd soft NT ND + BS  : voids  Ext: moves on bed  Neuro: AMS      LABS:                                 10.4   9.24  )-----------( 185      ( 08 Sep 2024 07:33 )             33.1   09-08    156<H>  |  126<H>  |  62<H>  ----------------------------<  121<H>  3.7   |  19<L>  |  3.56<H>    Ca    8.5      08 Sep 2024 07:33    TPro  6.1  /  Alb  3.1<L>  /  TBili  0.4  /  DBili  x   /  AST  18  /  ALT  10<L>  /  AlkPhos  74  09-08        Albumin: Albumin: 3.7 g/dL (09-04 @ 10:05)      Allergies    latex (Rash)  Aleve, Ibuprofen, Motrin (Rash)  ibuprofen (Rash)  Advil (Rash)    Intolerances  MEDICATIONS  (STANDING):  allopurinol 100 milliGRAM(s) Oral daily  bisacodyl 5 milliGRAM(s) Oral at bedtime  carbidopa/levodopa  25/100 2 Tablet(s) Oral four times a day  clonazePAM  Tablet 0.5 milliGRAM(s) Oral at bedtime  cloNIDine Patch 0.3 mG/24Hr(s) 1 patch Transdermal every 7 days  escitalopram 5 milliGRAM(s) Oral daily  finasteride 5 milliGRAM(s) Oral daily  fosphenytoin IVPB 100 milliGRAM(s) PE IV Intermittent every 12 hours  heparin   Injectable 5000 Unit(s) SubCutaneous every 12 hours  levothyroxine Injectable 75 MICROGram(s) IV Push at bedtime  polyethylene glycol 3350 17 Gram(s) Oral daily  simvastatin 10 milliGRAM(s) Oral at bedtime  tamsulosin 0.4 milliGRAM(s) Oral at bedtime    MEDICATIONS  (PRN):  acetaminophen     Tablet .. 650 milliGRAM(s) Oral every 6 hours PRN Mild Pain (1 - 3)  acetaminophen   IVPB .. 1000 milliGRAM(s) IV Intermittent once PRN Temp greater or equal to 38C (100.4F)  hydrALAZINE Injectable 10 milliGRAM(s) IV Push every 4 hours PRN SBP > 180  LORazepam   Injectable 2 milliGRAM(s) IV Push once PRN Seizure Activity  midazolam Injectable 10 milliGRAM(s) IntraMuscular once PRN Seizure Activity      RADIOLOGY/ADDITIONAL STUDIES:

## 2024-09-09 NOTE — PROGRESS NOTE ADULT - PROVIDER SPECIALTY LIST ADULT
Neurology
Palliative Care
Hospitalist
Neurology
Hospitalist
Hospitalist
Neurology
Hospitalist
Neurology

## 2024-09-09 NOTE — GOALS OF CARE CONVERSATION - ADVANCED CARE PLANNING - CONVERSATION DETAILS
This SW had a follow up meeting with pts family today. Pts family has decided that they want to focus on pts comfort and refer for Hospice. We discussed inpatient hospice services as pt. cannot swallow and is receiving IV medication for seizure management. Pts family is in agreement with inpatient hospice. They would like referral to [facility]. Covering VIRAJ Diane made aware and will place referral. Emotional support provided. Our team will continue to follow.

## 2024-09-09 NOTE — PROGRESS NOTE ADULT - ASSESSMENT
83-year-old M with PMHx of Parkinson's, dementia, HTN, HLD, CKD IV, hypothyroid, bladder mass s/p resection 02/2024, prostate ca presents to the  ED from [facility] for possible seizure-like episode. Per wife at bedside, he had an episode of shaking ? seizure, was witnessed by aide. When wife arrived at bedside, patient appeared confused  Wife does note patient has had decreased p.o. intake over the past few months with unintentional 20 lb weight loss. In ED, VSS. CTH negative for acute pathology. Labs notable for Cr 3.63, Lactate 3.4 -> 2.1 s/p 1L IVF, TSH 7.63. Palliative Medicine Consult to further establish GOC.  9/7/24 Seen and examined at bedside. Pt unresponsive to voice. In no distress    Assessment and Plan:    1) Seizure like activity  -Post-ictal state likely superimposed on baseline Parkinson's dementia   -CT head negative for acute pathology  -Neurology following Dr. Meier  - F/u EEG  - F/u MRI brain   - Continue IV ativan PRN for seizure activity   -Cont antiseizure meds IV    2) CKD   - Cr 3.63 (baseline appears 3-3.5 range)  - Continue to monitor BMP  - avoid nephrotoxic medications     3) BPH  - hx prostate cancer  - hx bladder mass s/p resection  - UA suggestive of UTI  -Continue proscar, flomax, abx as per med    4) Debility  - poor PO intake, unintentional weight loss  - Encourage oral intake  - swallow eval ordered  - NPO as per Swallow eval  -Weakness    5) Advanced Directives  -Pt lacks capacity  -Wife Angela surrogate  -MOLST DNR/DNI/Trial NIV  -Transition to inpatient hospice [facility]    Time Spent: 35 minutes including the care, coordination and counseling of this patient, 50% of which was spent coordinating and counseling.

## 2024-09-09 NOTE — DISCHARGE NOTE PROVIDER - HOSPITAL COURSE
83-year-old male with PMHx of Parkinson's, dementia, HTN, HLD, CKD IV, hypothyroid, bladder mass s/p resection 02/2024, SBO s/p colostomy, prostate cancer s/p resection, melanoma, arthritis, anemia (gets routine CBC check) presents to the  ED from [facility] for possible seizure-like episode. Admitted for:    #Seizure like activity  #Post-ictal state likely superimposed on baseline Parkinson's dementia   Failure to thrive   unable to tolerate po-   HCP opted for in patient hospice , comfort care only due to poor prognosis and did not want any invasive interventions  nor feeding tube  on phenytoin IV prn for seizure prophylaxis    #HTN/HLD  #Reflex bradycardia  #UTI  UA suspicious for UTI  Urine cultures negative Completed 3 day course of IV abx.       #CKD       #Hypothyroidism       #FTT, poor PO intake, unintentional weight loss       #SBO s/p colostomy   - routine ostomy care    in pt hospice         family does not want any further labs and opting for in patient hospice , do not want feeding tube ,   IVF will not change prognosis - will avoid IVF for now  discharge time 47mins

## 2024-09-09 NOTE — PROGRESS NOTE ADULT - REASON FOR ADMISSION
seizure like activity

## 2024-09-09 NOTE — DISCHARGE NOTE PROVIDER - NSDCFUSCHEDAPPT_GEN_ALL_CORE_FT
Sylvia Gao  [facility]  NEUROLOGY [address]  Scheduled Appointment: 11/18/2024

## 2024-09-09 NOTE — DISCHARGE NOTE PROVIDER - NSDCCPCAREPLAN_GEN_ALL_CORE_FT
PRINCIPAL DISCHARGE DIAGNOSIS  Diagnosis: Seizure-like activity  Assessment and Plan of Treatment: for inpt hospice

## 2024-09-09 NOTE — DISCHARGE NOTE NURSING/CASE MANAGEMENT/SOCIAL WORK - PATIENT PORTAL LINK FT
You can access the FollowMyHealth Patient Portal offered by [facility] by registering at the following website: [email or web address]. By joining [facility]’s FollowMyHealth portal, you will also be able to view your health information using other applications (apps) compatible with our system.

## 2024-09-09 NOTE — PROGRESS NOTE ADULT - NUTRITIONAL ASSESSMENT
This patient has been assessed with a concern for Malnutrition and has been determined to have a diagnosis/diagnoses of Underweight (BMI < 19) and Moderate protein-calorie malnutrition.    This patient is being managed with:   Diet NPO-  Entered: Sep  4 2024  4:23PM  

## 2024-09-09 NOTE — DISCHARGE NOTE PROVIDER - DETAILS OF MALNUTRITION DIAGNOSIS/DIAGNOSES
This patient has been assessed with a concern for Malnutrition and was treated during this hospitalization for the following Nutrition diagnosis/diagnoses:     -  09/05/2024: Underweight (BMI < 19)   -  09/05/2024: Moderate protein-calorie malnutrition

## 2024-09-17 DIAGNOSIS — N39.0 URINARY TRACT INFECTION, SITE NOT SPECIFIED: ICD-10-CM

## 2024-09-17 DIAGNOSIS — E03.9 HYPOTHYROIDISM, UNSPECIFIED: ICD-10-CM

## 2024-09-17 DIAGNOSIS — Z79.890 HORMONE REPLACEMENT THERAPY: ICD-10-CM

## 2024-09-17 DIAGNOSIS — Z91.040 LATEX ALLERGY STATUS: ICD-10-CM

## 2024-09-17 DIAGNOSIS — F02.811 DEMENTIA IN OTHER DISEASES CLASSIFIED ELSEWHERE, UNSPECIFIED SEVERITY, WITH AGITATION: ICD-10-CM

## 2024-09-17 DIAGNOSIS — Z90.79 ACQUIRED ABSENCE OF OTHER GENITAL ORGAN(S): ICD-10-CM

## 2024-09-17 DIAGNOSIS — G90.09 OTHER IDIOPATHIC PERIPHERAL AUTONOMIC NEUROPATHY: ICD-10-CM

## 2024-09-17 DIAGNOSIS — I12.9 HYPERTENSIVE CHRONIC KIDNEY DISEASE WITH STAGE 1 THROUGH STAGE 4 CHRONIC KIDNEY DISEASE, OR UNSPECIFIED CHRONIC KIDNEY DISEASE: ICD-10-CM

## 2024-09-17 DIAGNOSIS — Z87.891 PERSONAL HISTORY OF NICOTINE DEPENDENCE: ICD-10-CM

## 2024-09-17 DIAGNOSIS — R56.9 UNSPECIFIED CONVULSIONS: ICD-10-CM

## 2024-09-17 DIAGNOSIS — Z88.6 ALLERGY STATUS TO ANALGESIC AGENT: ICD-10-CM

## 2024-09-17 DIAGNOSIS — N40.0 BENIGN PROSTATIC HYPERPLASIA WITHOUT LOWER URINARY TRACT SYMPTOMS: ICD-10-CM

## 2024-09-17 DIAGNOSIS — N18.4 CHRONIC KIDNEY DISEASE, STAGE 4 (SEVERE): ICD-10-CM

## 2024-09-17 DIAGNOSIS — Z96.652 PRESENCE OF LEFT ARTIFICIAL KNEE JOINT: ICD-10-CM

## 2024-09-17 DIAGNOSIS — G20.A1 PARKINSON'S DISEASE WITHOUT DYSKINESIA, WITHOUT MENTION OF FLUCTUATIONS: ICD-10-CM

## 2024-09-17 DIAGNOSIS — M19.90 UNSPECIFIED OSTEOARTHRITIS, UNSPECIFIED SITE: ICD-10-CM

## 2024-09-17 DIAGNOSIS — D63.1 ANEMIA IN CHRONIC KIDNEY DISEASE: ICD-10-CM

## 2024-09-17 DIAGNOSIS — R62.7 ADULT FAILURE TO THRIVE: ICD-10-CM

## 2024-09-17 DIAGNOSIS — Z85.820 PERSONAL HISTORY OF MALIGNANT MELANOMA OF SKIN: ICD-10-CM

## 2024-09-17 DIAGNOSIS — Z66 DO NOT RESUSCITATE: ICD-10-CM

## 2024-09-17 DIAGNOSIS — Z85.46 PERSONAL HISTORY OF MALIGNANT NEOPLASM OF PROSTATE: ICD-10-CM

## 2024-09-17 DIAGNOSIS — E78.5 HYPERLIPIDEMIA, UNSPECIFIED: ICD-10-CM

## 2024-09-17 DIAGNOSIS — E44.0 MODERATE PROTEIN-CALORIE MALNUTRITION: ICD-10-CM

## 2024-11-18 ENCOUNTER — APPOINTMENT (OUTPATIENT)
Dept: NEUROLOGY | Facility: CLINIC | Age: 84
End: 2024-11-18

## 2024-12-31 NOTE — DISCHARGE NOTE PROVIDER - EXTENDED VTE YES NO FOR MLM ENOXAPARIN
Pt is a 89 year old male with PMH significant for advanced dementia (non-verbal, alert, bedbound at baseline), A-fib on warfarin, CVA w/L sided residual weakness, HTN, BPH, HLD, GERD.  Pt presents due to worsening lethargy and rigors seen at home. Daughter reports patient had similar symptoms during a previous UTI. Patient has had multiple UTIs previously. Unclear if any changes in urination. No suprapubic tenderness. Febrile and tachycardic in ED.  Pt admitted for sepsis 2/2 UTI. ,